# Patient Record
Sex: FEMALE | Race: WHITE | NOT HISPANIC OR LATINO | Employment: OTHER | URBAN - METROPOLITAN AREA
[De-identification: names, ages, dates, MRNs, and addresses within clinical notes are randomized per-mention and may not be internally consistent; named-entity substitution may affect disease eponyms.]

---

## 2017-01-30 ENCOUNTER — HOSPITAL ENCOUNTER (OUTPATIENT)
Dept: MAMMOGRAPHY | Facility: HOSPITAL | Age: 57
Discharge: HOME | End: 2017-01-30
Attending: SURGERY
Payer: COMMERCIAL

## 2017-01-30 DIAGNOSIS — Z12.31 ENCOUNTER FOR SCREENING MAMMOGRAM FOR MALIGNANT NEOPLASM OF BREAST: ICD-10-CM

## 2017-01-30 PROCEDURE — G0202 SCR MAMMO BI INCL CAD: HCPCS

## 2017-02-07 ENCOUNTER — HOSPITAL ENCOUNTER (OUTPATIENT)
Dept: ULTRASOUND IMAGING | Facility: CLINIC | Age: 57
Discharge: HOME | End: 2017-02-07
Payer: COMMERCIAL

## 2017-02-07 ENCOUNTER — HOSPITAL ENCOUNTER (OUTPATIENT)
Dept: MAMMOGRAPHY | Facility: CLINIC | Age: 57
Discharge: HOME | End: 2017-02-07
Payer: COMMERCIAL

## 2017-02-07 DIAGNOSIS — R92.8 ABNORMAL SCREENING MAMMOGRAM: ICD-10-CM

## 2017-02-07 PROCEDURE — 76642 ULTRASOUND BREAST LIMITED: CPT

## 2017-02-07 PROCEDURE — G0206 DX MAMMO INCL CAD UNI: HCPCS

## 2017-03-07 ENCOUNTER — GENERIC CONVERSION - ENCOUNTER (OUTPATIENT)
Dept: OTHER | Facility: OTHER | Age: 57
End: 2017-03-07

## 2017-03-20 ENCOUNTER — GENERIC CONVERSION - ENCOUNTER (OUTPATIENT)
Dept: OTHER | Facility: OTHER | Age: 57
End: 2017-03-20

## 2017-05-05 ENCOUNTER — ALLSCRIPTS OFFICE VISIT (OUTPATIENT)
Dept: OTHER | Facility: OTHER | Age: 57
End: 2017-05-05

## 2017-05-05 ENCOUNTER — LAB REQUISITION (OUTPATIENT)
Dept: LAB | Facility: HOSPITAL | Age: 57
End: 2017-05-05
Payer: COMMERCIAL

## 2017-05-05 DIAGNOSIS — I10 ESSENTIAL (PRIMARY) HYPERTENSION: ICD-10-CM

## 2017-05-05 DIAGNOSIS — E11.9 TYPE 2 DIABETES MELLITUS WITHOUT COMPLICATIONS (HCC): ICD-10-CM

## 2017-05-05 LAB
ALBUMIN SERPL BCP-MCNC: 4 G/DL (ref 3.5–5)
ALP SERPL-CCNC: 77 U/L (ref 46–116)
ALT SERPL W P-5'-P-CCNC: 20 U/L (ref 12–78)
ANION GAP SERPL CALCULATED.3IONS-SCNC: 9 MMOL/L (ref 4–13)
AST SERPL W P-5'-P-CCNC: 11 U/L (ref 5–45)
BILIRUB SERPL-MCNC: 0.53 MG/DL (ref 0.2–1)
BUN SERPL-MCNC: 28 MG/DL (ref 5–25)
CALCIUM SERPL-MCNC: 9.5 MG/DL (ref 8.3–10.1)
CHLORIDE SERPL-SCNC: 101 MMOL/L (ref 100–108)
CO2 SERPL-SCNC: 28 MMOL/L (ref 21–32)
CREAT SERPL-MCNC: 1.28 MG/DL (ref 0.6–1.3)
EST. AVERAGE GLUCOSE BLD GHB EST-MCNC: 197 MG/DL
GFR SERPL CREATININE-BSD FRML MDRD: 43.1 ML/MIN/1.73SQ M
GLUCOSE P FAST SERPL-MCNC: 167 MG/DL (ref 65–99)
HBA1C MFR BLD: 8.5 % (ref 4.2–6.3)
POTASSIUM SERPL-SCNC: 5 MMOL/L (ref 3.5–5.3)
PROT SERPL-MCNC: 7.5 G/DL (ref 6.4–8.2)
SODIUM SERPL-SCNC: 138 MMOL/L (ref 136–145)

## 2017-05-05 PROCEDURE — 83036 HEMOGLOBIN GLYCOSYLATED A1C: CPT | Performed by: FAMILY MEDICINE

## 2017-05-05 PROCEDURE — 80053 COMPREHEN METABOLIC PANEL: CPT | Performed by: FAMILY MEDICINE

## 2017-05-06 ENCOUNTER — GENERIC CONVERSION - ENCOUNTER (OUTPATIENT)
Dept: OTHER | Facility: OTHER | Age: 57
End: 2017-05-06

## 2017-07-03 ENCOUNTER — GENERIC CONVERSION - ENCOUNTER (OUTPATIENT)
Dept: OTHER | Facility: OTHER | Age: 57
End: 2017-07-03

## 2017-07-27 ENCOUNTER — GENERIC CONVERSION - ENCOUNTER (OUTPATIENT)
Dept: OTHER | Facility: OTHER | Age: 57
End: 2017-07-27

## 2017-08-01 ENCOUNTER — ALLSCRIPTS OFFICE VISIT (OUTPATIENT)
Dept: OTHER | Facility: OTHER | Age: 57
End: 2017-08-01

## 2017-08-01 ENCOUNTER — TRANSCRIBE ORDERS (OUTPATIENT)
Dept: ADMINISTRATIVE | Facility: HOSPITAL | Age: 57
End: 2017-08-01

## 2017-08-01 DIAGNOSIS — Z12.31 VISIT FOR SCREENING MAMMOGRAM: Primary | ICD-10-CM

## 2017-09-08 ENCOUNTER — APPOINTMENT (OUTPATIENT)
Dept: LAB | Facility: HOSPITAL | Age: 57
End: 2017-09-08
Payer: COMMERCIAL

## 2017-09-08 ENCOUNTER — GENERIC CONVERSION - ENCOUNTER (OUTPATIENT)
Dept: OTHER | Facility: OTHER | Age: 57
End: 2017-09-08

## 2017-09-08 ENCOUNTER — ALLSCRIPTS OFFICE VISIT (OUTPATIENT)
Dept: OTHER | Facility: OTHER | Age: 57
End: 2017-09-08

## 2017-09-08 DIAGNOSIS — I10 ESSENTIAL (PRIMARY) HYPERTENSION: ICD-10-CM

## 2017-09-08 DIAGNOSIS — E11.9 TYPE 2 DIABETES MELLITUS WITHOUT COMPLICATIONS (HCC): ICD-10-CM

## 2017-09-08 DIAGNOSIS — E78.5 HYPERLIPIDEMIA: ICD-10-CM

## 2017-09-08 LAB
EST. AVERAGE GLUCOSE BLD GHB EST-MCNC: 192 MG/DL
HBA1C MFR BLD: 8.3 % (ref 4.2–6.3)

## 2017-09-08 PROCEDURE — 83036 HEMOGLOBIN GLYCOSYLATED A1C: CPT

## 2017-09-08 PROCEDURE — 84443 ASSAY THYROID STIM HORMONE: CPT

## 2017-09-08 PROCEDURE — 80061 LIPID PANEL: CPT

## 2017-09-08 PROCEDURE — 36415 COLL VENOUS BLD VENIPUNCTURE: CPT

## 2017-09-08 PROCEDURE — 80053 COMPREHEN METABOLIC PANEL: CPT

## 2017-09-09 LAB
ALBUMIN SERPL BCP-MCNC: 3.5 G/DL (ref 3.5–5)
ALP SERPL-CCNC: 67 U/L (ref 46–116)
ALT SERPL W P-5'-P-CCNC: 22 U/L (ref 12–78)
ANION GAP SERPL CALCULATED.3IONS-SCNC: 10 MMOL/L (ref 4–13)
AST SERPL W P-5'-P-CCNC: 13 U/L (ref 5–45)
BILIRUB SERPL-MCNC: 0.5 MG/DL (ref 0.2–1)
BUN SERPL-MCNC: 29 MG/DL (ref 5–25)
CALCIUM SERPL-MCNC: 9 MG/DL (ref 8.3–10.1)
CHLORIDE SERPL-SCNC: 103 MMOL/L (ref 100–108)
CHOLEST SERPL-MCNC: 175 MG/DL (ref 50–200)
CO2 SERPL-SCNC: 27 MMOL/L (ref 21–32)
CREAT SERPL-MCNC: 1.35 MG/DL (ref 0.6–1.3)
GFR SERPL CREATININE-BSD FRML MDRD: 44 ML/MIN/1.73SQ M
GLUCOSE P FAST SERPL-MCNC: 190 MG/DL (ref 65–99)
HDLC SERPL-MCNC: 37 MG/DL (ref 40–60)
LDLC SERPL CALC-MCNC: 70 MG/DL (ref 0–100)
POTASSIUM SERPL-SCNC: 5.1 MMOL/L (ref 3.5–5.3)
PROT SERPL-MCNC: 7 G/DL (ref 6.4–8.2)
SODIUM SERPL-SCNC: 140 MMOL/L (ref 136–145)
TRIGL SERPL-MCNC: 342 MG/DL
TSH SERPL DL<=0.05 MIU/L-ACNC: 3.31 UIU/ML (ref 0.36–3.74)

## 2017-09-11 ENCOUNTER — GENERIC CONVERSION - ENCOUNTER (OUTPATIENT)
Dept: OTHER | Facility: OTHER | Age: 57
End: 2017-09-11

## 2017-09-25 ENCOUNTER — GENERIC CONVERSION - ENCOUNTER (OUTPATIENT)
Dept: FAMILY MEDICINE CLINIC | Facility: CLINIC | Age: 57
End: 2017-09-25

## 2017-09-25 ENCOUNTER — GENERIC CONVERSION - ENCOUNTER (OUTPATIENT)
Dept: OTHER | Facility: OTHER | Age: 57
End: 2017-09-25

## 2017-10-17 ENCOUNTER — GENERIC CONVERSION - ENCOUNTER (OUTPATIENT)
Dept: OTHER | Facility: OTHER | Age: 57
End: 2017-10-17

## 2017-10-25 ENCOUNTER — GENERIC CONVERSION - ENCOUNTER (OUTPATIENT)
Dept: OTHER | Facility: OTHER | Age: 57
End: 2017-10-25

## 2017-12-13 ENCOUNTER — GENERIC CONVERSION - ENCOUNTER (OUTPATIENT)
Dept: FAMILY MEDICINE CLINIC | Facility: CLINIC | Age: 57
End: 2017-12-13

## 2017-12-21 ENCOUNTER — GENERIC CONVERSION - ENCOUNTER (OUTPATIENT)
Dept: FAMILY MEDICINE CLINIC | Facility: CLINIC | Age: 57
End: 2017-12-21

## 2017-12-26 ENCOUNTER — ALLSCRIPTS OFFICE VISIT (OUTPATIENT)
Dept: OTHER | Facility: OTHER | Age: 57
End: 2017-12-26

## 2017-12-26 ENCOUNTER — LAB REQUISITION (OUTPATIENT)
Dept: LAB | Facility: HOSPITAL | Age: 57
End: 2017-12-26
Payer: COMMERCIAL

## 2017-12-26 DIAGNOSIS — M19.90 OSTEOARTHRITIS: ICD-10-CM

## 2017-12-26 DIAGNOSIS — E11.9 TYPE 2 DIABETES MELLITUS WITHOUT COMPLICATIONS (HCC): ICD-10-CM

## 2017-12-26 DIAGNOSIS — E66.9 OBESITY: ICD-10-CM

## 2017-12-26 DIAGNOSIS — E03.9 HYPOTHYROIDISM: ICD-10-CM

## 2017-12-26 DIAGNOSIS — K21.9 GASTRO-ESOPHAGEAL REFLUX DISEASE WITHOUT ESOPHAGITIS: ICD-10-CM

## 2017-12-26 DIAGNOSIS — E55.9 VITAMIN D DEFICIENCY: ICD-10-CM

## 2017-12-26 DIAGNOSIS — I10 ESSENTIAL (PRIMARY) HYPERTENSION: ICD-10-CM

## 2017-12-26 DIAGNOSIS — E78.5 HYPERLIPIDEMIA: ICD-10-CM

## 2017-12-26 LAB
25(OH)D3 SERPL-MCNC: 15.3 NG/ML (ref 30–100)
ALBUMIN SERPL BCP-MCNC: 3.9 G/DL (ref 3.5–5)
ALP SERPL-CCNC: 74 U/L (ref 46–116)
ALT SERPL W P-5'-P-CCNC: 21 U/L (ref 12–78)
ANION GAP SERPL CALCULATED.3IONS-SCNC: 9 MMOL/L (ref 4–13)
AST SERPL W P-5'-P-CCNC: 10 U/L (ref 5–45)
BASOPHILS # BLD AUTO: 0.06 THOUSANDS/ΜL (ref 0–0.1)
BASOPHILS NFR BLD AUTO: 1 % (ref 0–1)
BILIRUB SERPL-MCNC: 0.42 MG/DL (ref 0.2–1)
BUN SERPL-MCNC: 36 MG/DL (ref 5–25)
CALCIUM SERPL-MCNC: 10 MG/DL (ref 8.3–10.1)
CHLORIDE SERPL-SCNC: 101 MMOL/L (ref 100–108)
CHOLEST SERPL-MCNC: 251 MG/DL (ref 50–200)
CO2 SERPL-SCNC: 27 MMOL/L (ref 21–32)
CREAT SERPL-MCNC: 1.28 MG/DL (ref 0.6–1.3)
CREAT UR-MCNC: 107 MG/DL
EOSINOPHIL # BLD AUTO: 0.15 THOUSAND/ΜL (ref 0–0.61)
EOSINOPHIL NFR BLD AUTO: 2 % (ref 0–6)
ERYTHROCYTE [DISTWIDTH] IN BLOOD BY AUTOMATED COUNT: 14 % (ref 11.6–15.1)
EST. AVERAGE GLUCOSE BLD GHB EST-MCNC: 203 MG/DL
GFR SERPL CREATININE-BSD FRML MDRD: 47 ML/MIN/1.73SQ M
GLUCOSE SERPL-MCNC: 173 MG/DL (ref 65–140)
HBA1C MFR BLD: 8.7 % (ref 4.2–6.3)
HCT VFR BLD AUTO: 38.3 % (ref 34.8–46.1)
HDLC SERPL-MCNC: 42 MG/DL (ref 40–60)
HGB BLD-MCNC: 12.4 G/DL (ref 11.5–15.4)
LDLC SERPL CALC-MCNC: 155 MG/DL (ref 0–100)
LYMPHOCYTES # BLD AUTO: 2.54 THOUSANDS/ΜL (ref 0.6–4.47)
LYMPHOCYTES NFR BLD AUTO: 26 % (ref 14–44)
MCH RBC QN AUTO: 28.7 PG (ref 26.8–34.3)
MCHC RBC AUTO-ENTMCNC: 32.4 G/DL (ref 31.4–37.4)
MCV RBC AUTO: 89 FL (ref 82–98)
MICROALBUMIN UR-MCNC: 9.1 MG/L (ref 0–20)
MICROALBUMIN/CREAT 24H UR: 9 MG/G CREATININE (ref 0–30)
MONOCYTES # BLD AUTO: 0.74 THOUSAND/ΜL (ref 0.17–1.22)
MONOCYTES NFR BLD AUTO: 8 % (ref 4–12)
NEUTROPHILS # BLD AUTO: 6.21 THOUSANDS/ΜL (ref 1.85–7.62)
NEUTS SEG NFR BLD AUTO: 63 % (ref 43–75)
NRBC BLD AUTO-RTO: 0 /100 WBCS
PLATELET # BLD AUTO: 331 THOUSANDS/UL (ref 149–390)
PMV BLD AUTO: 11.4 FL (ref 8.9–12.7)
POTASSIUM SERPL-SCNC: 4.9 MMOL/L (ref 3.5–5.3)
PROT SERPL-MCNC: 7.9 G/DL (ref 6.4–8.2)
RBC # BLD AUTO: 4.32 MILLION/UL (ref 3.81–5.12)
SODIUM SERPL-SCNC: 137 MMOL/L (ref 136–145)
TRIGL SERPL-MCNC: 271 MG/DL
TSH SERPL DL<=0.05 MIU/L-ACNC: 2.09 UIU/ML (ref 0.36–3.74)
WBC # BLD AUTO: 9.74 THOUSAND/UL (ref 4.31–10.16)

## 2017-12-26 PROCEDURE — 80061 LIPID PANEL: CPT | Performed by: FAMILY MEDICINE

## 2017-12-26 PROCEDURE — 85025 COMPLETE CBC W/AUTO DIFF WBC: CPT | Performed by: FAMILY MEDICINE

## 2017-12-26 PROCEDURE — 84443 ASSAY THYROID STIM HORMONE: CPT | Performed by: FAMILY MEDICINE

## 2017-12-26 PROCEDURE — 82306 VITAMIN D 25 HYDROXY: CPT | Performed by: FAMILY MEDICINE

## 2017-12-26 PROCEDURE — 82570 ASSAY OF URINE CREATININE: CPT | Performed by: FAMILY MEDICINE

## 2017-12-26 PROCEDURE — 82043 UR ALBUMIN QUANTITATIVE: CPT | Performed by: FAMILY MEDICINE

## 2017-12-26 PROCEDURE — 83036 HEMOGLOBIN GLYCOSYLATED A1C: CPT | Performed by: FAMILY MEDICINE

## 2017-12-26 PROCEDURE — 80053 COMPREHEN METABOLIC PANEL: CPT | Performed by: FAMILY MEDICINE

## 2017-12-27 NOTE — PROGRESS NOTES
Assessment   1  Benign essential hypertension (401 1) (I10)   2  DM type 2 (diabetes mellitus, type 2) (250 00) (E11 9)   3  Hyperlipidemia (272 4) (E78 5)   4  Hypothyroidism (244 9) (E03 9)   5  GERD without esophagitis (530 81) (K21 9)   6  Obesity (278 00) (E66 9)   7  Vitamin D deficiency (268 9) (E55 9)   8  Osteoarthritis (715 90) (M19 90)    Plan   Acute URI    · Cheratussin -10 MG/5ML Oral Syrup; 2 tsp qHs prn  Benign essential hypertension    · Begin a limited exercise program ; Status:Complete;   Done: 62MEV4816   · Begin or continue regular aerobic exercise  Gradually work up to at least 3 sessions of    30 minutes of exercise a week ; Status:Complete;   Done: 63RFT9246   · Continue with our present treatment plan ; Status:Complete;   Done: 16AOJ7549   · Eat a low fat and low cholesterol diet ; Status:Complete;   Done: 93YWE4948   · Restrict the salt in your diet by avoiding highly salted foods ; Status:Complete;   Done:    95OJJ8483   · Restrict your sodium (salt) intake to 2 grams per day ; Status:Complete;   Done:    22VAQ5480   · Take your blood pressure twice a week  Record the numbers and bring them with you to    your appointments ; Status:Complete;   Done: 08RRM0532   · We encourage you to begin to make lifestyle changes to help control your blood    pressure  These may include losing weight, increasing your activity level, limiting salt in    your diet, decreasing alcohol intake, and eating a diet low in fat and rich in fruits    and vegetables ; Status:Complete;   Done: 60GOU6963   · Call [phone] if: Your blood pressure is frequently higher than 140/90 ;    Status:Complete;   Done: 39QWX4173  Benign essential hypertension, DM type 2 (diabetes mellitus, type 2), GERD without    esophagitis, Hyperlipidemia, Hypothyroidism, Obesity, Osteoarthritis, Vitamin D    deficiency    · (1) CBC/PLT/DIFF; Status:Hold For - Exact Date; Requested for: In Office Collection;    · (1) COMPREHENSIVE METABOLIC PANEL; Status:Hold For - Exact Date; Requested    for: In Office Collection;    · (1) HEMOGLOBIN A1C; Status:Hold For - Exact Date; Requested for: In Office Collection;    · (1) LIPID PANEL, FASTING; Status:Hold For - Exact Date; Requested for: In Office    Collection;    · (1) MICROALBUMIN CREATININE RATIO, RANDOM URINE; Status:Hold For - Exact Date; Requested for: In Office Collection;    · (1) TSH WITH FT4 REFLEX; Status:Hold For - Exact Date; Requested for: In Office    Collection;    · (1) VITAMIN D 25-HYDROXY; Status:Hold For - Exact Date; Requested for: In C [employer];   DM type 2 (diabetes mellitus, type 2)    · Brush your teeth {freq1} and floss at least once a day ; Status:Complete;   Done:    50RLG8091   · Cut your nails straight across ; Status:Complete;   Done: 02DCU4924   · Follow a diabetic diet with 1500 calories ; Status:Complete;   Done: 31TNU3969   · Inspect your feet and legs daily if you have vascular disease ; Status:Complete;   Done:    79ZWU6403   · Inspect your feet daily ; Status:Complete;   Done: 79CUP1686   · Some eating tips that can help you lose weight ; Status:Complete;   Done: 97SWF5764   · There are many exercise options for seniors ; Status:Complete;   Done: 46NTR6239   · We recommend that you bring your body mass index down to 26 ; Status:Complete;      Done: 01AQG9178   · We recommend that you change your eating habits slowly ; Status:Complete;   Done:    61IRO8192   · Wear shoes that give your toes plenty of room ; Status:Complete;   Done: 73RNA6001   · You will need to take a blood sugar reading 4 times a day ; Status:Complete;   Done:    53ROK2934  Hyperlipidemia    · A diet that is low in fat, cholesterol, and sodium is considered a cardiac diet ;    Status:Complete;   Done: 17KXY6156   · Eat no more than 30 grams of fat per day ; Status:Complete;   Done: 53QVQ3426   · We recommend you modify your diet to achieve and maintain a healthy weight    Being overweight may increase your risk for developing health problems such as diabetes,    heart disease, and cancer  Avoid high fat foods and eat a balanced diet rich    in fruits and vegetables  The combination of a reduced-calorie diet and increased    physical activity is recommended  Please let us know if you would like to    learn more about your nutrition and calorie needs, and additional options including    weight loss programs that can help you achieve your goals ; Status:Complete;   Done:    06JMG3508    Discussion/Summary      Fasting labs drawn as above  Patient to continue present treatment  Patient instructed to follow a low-fat, low-salt and a low sugar/carbohydrate diet more carefully and get regular exercise 150 minutes per week  Weight loss encouraged  Patient to continue home glucose monitoring  Patient to follow up with the specialist as scheduled and return to the office in 3 months  Possible side effects of new medications were reviewed with the patient/guardian today  The treatment plan was reviewed with the patient/guardian  The patient/guardian understands and agrees with the treatment plan      Chief Complaint   Fasting [facility]    Patient is here today for follow up of chronic conditions described in HPI  History of Present Illness   Patient is here for routine appointment for chronic conditions and fasting labs  Patient has been feeling fairly well overall although currently complains of URI symptoms treated with Mucinex DM  Cough persists and keeping patient awake at night  Patient saw Dr Susan Pacheco, endocrinologist on 12/21/2017 and was started on Victoza and has recheck appointment in 4 months  Patient has an appointment with nonsurgical bariatric medicine at [facility] with Dr Oliver Owen on 02/14/2018  Home glucose monitoring reveals fasting blood sugars 190-220  Patient is up-to-date on diabetic eye exam and foot exam and colonoscopy      The patient reports no change in the condition  She has had no significant interval events  Interval symptoms:  denies weight gain,-- denies cold intolerance,-- stable fatigue,-- denies weakness,-- denies constipation,-- denies dyspnea on exertion,-- denies trouble concentrating,-- denies hair loss-- and-- stable dry skin  Associated symptoms: arthralgias,-- paresthesias-- and-- leg swelling, but-- no myalgias,-- no depression,-- no weight loss-- and-- no palpitations  Medications:  the patient is adherent to her medication regimen, but-- she denies medication side effects  Disease management:  the patient is doing well with her goals  Due for: thyroid stimulating hormone  The patient states her hyperlipidemia has been under good control since the last visit  Comorbid Illnesses: diabetes mellitus-- and-- hypertension  She has no significant interval events  Symptoms: denies muscle pain-- and-- denies muscle weakness  Associated symptoms include no memory loss  Medications: the patient is adherent with her medication regimen  -- She denies medication side effects  The patient is doing well with her hyperlipidemia goals  the patient's last LDL was 70 mg/dL  The patient is due for a lipid panel-- and-- liver function tests  The patient presents for follow-up of essential hypertension  The patient states she has been doing well with her blood pressure control since the last visit  She has no comorbid illnesses  She has no significant interval events  Symptoms: denies impaired vision,-- denies dyspnea,-- denies chest pain,-- denies intermittent leg claudication-- and-- stable lower extremity edema  Associated symptoms include no headache  Home monitoring: The patient is not checking blood pressure at home  Medications: the patient is adherent with her medication regimen  -- She denies medication side effects  Disease Management: the patient is doing well with her blood pressure goals      The patient states she has been doing poorly with her Type II Diabetes control since the last visit  Comorbid Illnesses: hypertension,-- hyperlipidemia-- and-- obesity  She has no significant interval events  Symptoms: stable numbness of the feet,-- denies a foot ulcer,-- denies foot pain-- and-- denies visual impairment  Home monitoring: The patient checks her blood sugars regularly  -- Glycemic control has been poor  -- the patient reports no symptomatic hypoglycemic episodes  Medications: the patient is adherent with her medication regimen  -- She denies medication side effects  The patient is not doing well with her diabetes goals  Due For: a urine microalbumin-- and-- a hemoglobin A1c  Review of Systems        Constitutional: no fever,-- not feeling poorly-- and-- no chills--       The patient presents with complaints of occasional episodes of feeling tired  Eyes: No complaints of eye pain, no red eyes, no eyesight problems, no discharge, no dry eyes, no itching of eyes  ENT: nasal discharge, but-- no earache,-- no nosebleeds,-- no sore throat,-- no hearing loss-- and-- no hoarseness  Gastrointestinal: nausea, but-- no abdominal pain,-- no vomiting,-- no constipation,-- no diarrhea-- and-- no blood in stools  Genitourinary: no dysuria-- and-- no incontinence  Hematologic/Lymphatic: No complaints of swollen glands, no swollen glands in the neck, does not bleed easily, does not bruise easily  Active Problems   1  Abnormal blood chemistry (790 6) (R79 9)   2  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)   3  Acute URI (465 9) (J06 9)   4  Anemia (285 9) (D64 9)   5  Benign essential hypertension (401 1) (I10)   6  Dermatitis (692 9) (L30 9)   7  Disc degeneration, lumbar (722 52) (M51 36)   8  DM type 2 (diabetes mellitus, type 2) (250 00) (E11 9)   9  Encounter for routine gynecological examination (V72 31) (Z01 419)   10  Encounter for screening colonoscopy (V76 51) (Z12 11)   11   Encounter for screening mammogram for malignant neoplasm of breast (V76 12)      (Z12 31)   12  Fibrocystic breast disease, unspecified laterality (610 1) (N60 19)   13  GERD without esophagitis (530 81) (K21 9)   14  Hyperlipidemia (272 4) (E78 5)   15  Hypothyroidism (244 9) (E03 9)   16  Knee pain, right (719 46) (M25 561)   17  Neoplasm of uncertain behavior of skin (238 2) (D48 5)   18  Obesity (278 00) (E66 9)   19  Osteoarthritis (715 90) (M19 90)   20  Preoperative examination (V72 84) (Z01 818)   21  Shoulder pain, right (719 41) (M25 511)   22  Sleep apnea (780 57) (G47 30)   23  Vitamin D deficiency (268 9) (E55 9)    Past Medical History   1  History of Abnormal finding on breast imaging (793 89) (R92 8)   2  History of Acute upper respiratory infection (465 9) (J06 9)   3  History of Age At First Period 15 Years Old (Menarche)   4  History of Arthralgia (719 40)   5  History of acute bronchitis (V12 69) (Z87 09)   6  History of acute pancreatitis (V12 79) (Z87 19)   7  History of diverticulitis of colon (V12 79) (Z87 19)   8  History of influenza (V12 09) (Z87 09)   9  History of polycystic ovarian syndrome (V13 29) (Z87 42)   10  History of rotator cuff tear (V13 59) (Z87 39)   11  History of Irritable bowel syndrome (564 1) (K58 9)   12  History of Never Pregnant    Surgical History   1  History of Anal Fistulotomy (Subcutaneous)   2  History of Cholecystectomy   3  History of Hemorrhoidectomy   4  History of Oral Surgery   5  History of Ovarian Cystectomy   6  History of Perirectal Abscess I&D   7  History of Rotator Cuff Repair   8  History of Tonsillectomy    Family History   Mother    1  Family history of Diabetes  Maternal Aunt    2  Family history of breast cancer in female (V16 3) (Z80 3)   3  Family history of breast cancer in female (V16 3) (Z80 3)  Paternal Aunt    4  Family history of Breast Cancer (V16 3)  Paternal Uncle    5  Family history of Carcinosarcoma Of The Oral Cavity (V16 0)   6   Family history of Prostate Cancer (V16 42)  Maternal Cousin    7  Family history of breast cancer (V16 3) (Z80 3)    Social History    · Being A Social Drinker   · Never a smoker    Current Meds    1  Ammonium Lactate 12 % External Cream;     Therapy: (Recorded:03Vqz1803) to Recorded   2  Aspirin EC Low Dose 81 MG Oral Tablet Delayed Release; TAKE 1 TABLET DAILY; Therapy: (Recorded:39Oct1481) to Recorded   3  Capsaicin 0 025 % External Cream;     Therapy: (Recorded:85Yvo9155) to Recorded   4  Ezetimibe-Simvastatin 10-20 MG Oral Tablet; take 1 tablet by mouth every day; Therapy: 09Ekz9259 to (Marvia )  Requested for: 38Rqz0633; Last     Rx:73Kzo7381 Ordered   5  Farxiga 10 MG Oral Tablet; TAKE 1 TABLET BY MOUTH DAILY BEFORE MEAL; Therapy: 16KXN9601 to (AANHITIV:67NLI2029)  Requested for: 48Hyd8388; Last     Rx:41Vav7692 Ordered   6  Levothyroxine Sodium 112 MCG Oral Tablet; TAKE 1 TABLET DAILY AS DIRECTED; Therapy: 36KHU0113 to )  Requested for: 60Zto4652; Last     Rx:24Mip0972 Ordered   7  Lisinopril-Hydrochlorothiazide 20-25 MG Oral Tablet; TAKE 1TABLET BY MOUTH TWICE A     DAY; Therapy: 17AKJ8614 to (Diamond Christian)  Requested for: 13YZM5433; Last     Rx:10Wow7692 Ordered   8  Lyrica 25 MG Oral Capsule; TAKE 1 CAPSULE TWICE DAILY; Last Rx:73Oix2496     Ordered   9  MetFORMIN HCl - 500 MG Oral Tablet; take 2 tablets by mouth twice daily; Therapy: 43COC4286 to (Evaluate:28Jyz5716)  Requested for: 29KJO3435; Last     Rx:33Zze2825 Ordered   10  MiraLax Oral Powder; Therapy: (Recorded:21Dpe5055) to Recorded   11  OneTouch Delica Lancets 42Z Miscellaneous; TEST TWICE A DAY; Therapy: 60ZYL0369 to (Evaluate:36Eof1775)  Requested for: 33Ivv8836; Last      Rx:00Ore3898 Ordered   12  OneTouch Ultra Blue In [employer]; TEST TWICE A DAY;       Therapy: 92Mua2653 to (Evaluate:53Lac8790)  Requested for: 26Nov2017; Last      Rx:02Sep2016; Status: ACTIVE - Renewal Denied Ordered   13  Pantoprazole Sodium 40 MG Oral Tablet Delayed Release; TAKE 1 TABLET ONCE DAILY; Therapy: 38NNF9996 to (Royanne Shirts)  Requested for: 92MCP2362; Last      Rx:63Xne2911 Ordered   14  Victoza 18 MG/3ML Subcutaneous Solution Pen-injector; Therapy: 06OTF4981 to Recorded    Allergies   1  No Known Drug Allergies    Vitals   Vital Signs    Recorded: 00BHE9830 08:58AM Recorded: 90BPP7185 08:03AM   Temperature  97 3 F   Heart Rate 76    Respiration 16    Systolic 896    Diastolic 80    Height  5 ft 4 5 in   Weight  287 lb    BMI Calculated  48 5   BSA Calculated  2 29     Physical Exam        Constitutional      General appearance: No acute distress, well appearing and well nourished  Eyes      Conjunctiva and lids: No swelling, erythema or discharge  Ears, Nose, Mouth, and Throat      External inspection of ears and nose: Normal        Otoscopic examination: Tympanic membranes translucent with normal light reflex  Canals patent without erythema  Nasal mucosa, septum, and turbinates: Abnormal   There was a mucoid discharge from both nares  The bilateral nasal mucosa was edematous  Oropharynx: Normal with no erythema, edema, exudate or lesions  Pulmonary      Respiratory effort: No increased work of breathing or signs of respiratory distress  Auscultation of lungs: Clear to auscultation  Cardiovascular      Auscultation of heart: Normal rate and rhythm, normal S1 and S2, without murmurs  Examination of extremities for edema and/or varicosities: Abnormal   nonpitting edema present  Carotid pulses: Normal        Abdomen      Abdomen: Non-tender, no masses  Lymphatic      Palpation of lymph nodes in neck: No lymphadenopathy  Musculoskeletal      Gait and station: Normal        Inspection/palpation of joints, bones, and muscles: Normal        Skin      Skin and subcutaneous tissue: Normal without rashes or lesions         Psychiatric Orientation to person, place, and time: Normal        Mood and affect: Normal           Health Management   DM type 2 (diabetes mellitus, type 2)   (1) HEMOGLOBIN A1C; every 3 months; Last 70YYU2768; Next Due: 65APG8337; Overdue  *VB - Eye Exam; every 2 years; Last 78ASW6390; Next Due: 74GGS2452; Active  Hemoglobin A1c- POC; every 6 months; Last 03DSU9152; Next Due: 53GQI0913; Overdue  Urine Microalbumin/Creatinine - POC; every 1 year; Last 27Xap6837; Next Due: 45XRU5811; Overdue  Encounter for screening colonoscopy   COLONOSCOPY; every 3 years; Last 60UWF7369; Next Due: 84GDK1678; Active  Hyperlipidemia   (1) HEPATIC FUNCTION PANEL; every 6 months; Next Permanently Deferred;   (1) LIPID PANEL, FASTING; every 1 year; Last 68CCW3185; Next Due: 39Mwf0203; Active  Health Maintenance   *VB - Foot Exam; every 1 year; Last 50VAH3486; Next Due: 82BTU5342; Active    Future Appointments      Date/Time Provider Specialty Site   08/01/2018 10:15 AM EVANGELINA Stewart   [facility]     Signatures    Electronically signed by : Jearlean Rinne, DO; Dec 26 2017  9:12AM EST                       (Author)

## 2018-01-09 NOTE — RESULT NOTES
Verified Results  (1) HEMOGLOBIN A1C 65XZR8992 05:41PM [facility]   5 7-6 4% impaired fasting glucose  >=6 5% diagnosis of diabetes    Falsely low levels are seen in conditions linked to short RBC life span-  hemolytic anemia, and splenomegaly  Falsely elevated levels are seen in situations where there is an increased production of RBC- receipt of erythropoietin or blood transfusions  Adopted from ADA-Clinical Practice Recommendations     Test Name Result Flag Reference   HEMOGLOBIN A1C 8 4 % H 4 0-5 6   EST  AVG  GLUCOSE 194 mg/dl       (1) BASIC METABOLIC PROFILE 09RXN2986 05:41PM [facility] Kidney Disease Education Program recommendations are as follows:  GFR calculation is accurate only with a steady state creatinine  Chronic Kidney disease less than 60 ml/min/1 73 sq  meters  Kidney failure less than 15 ml/min/1 73 sq  meters  Test Name Result Flag Reference   GLUCOSE,RANDM 161 mg/dL H    SODIUM 138 mmol/L  136-145   POTASSIUM 4 8 mmol/L  3 5-5 3   CHLORIDE 105 mmol/L  100-108   CARBON DIOXIDE 24 mmol/L  21-32   ANION GAP (CALC) 9 mmol/L  4-13   BLOOD UREA NITROGEN 27 mg/dL H 5-25   CREATININE 1 16 mg/dL  0 60-1 30   Standardized to IDMS reference method   CALCIUM 9 5 mg/dL  8 3-10 1   eGFR Non-African American 48 5 ml/min/1 73sq m         Plan  DM type 2 (diabetes mellitus, type 2)    · (1) HEMOGLOBIN A1C ; every 3 months; Last 15FJY7336; Next 18JIX5101;  Status:Active   · Hemoglobin A1c- POC ; every 6 months; Next I459790; Status:Active    Discussion/Summary   FBS is elevated and overall BS control, HgbA1C, is worse at 8 4  Recommend patient increase Farxiga to 10mg QD as discussed

## 2018-01-10 ENCOUNTER — GENERIC CONVERSION - ENCOUNTER (OUTPATIENT)
Dept: OTHER | Facility: OTHER | Age: 58
End: 2018-01-10

## 2018-01-10 NOTE — RESULT NOTES
Discussion/Summary   Phone call to patient discussed lab results  BUN and creatinine are slightly elevated indicating slightly worsening kidney function  Therefore recommend patient discontinue meloxicam and all NSAIDs including Motrin and Aleve  Patient instructed to increase water intake and continue present treatment  Patient to follow-up with endocrinology as scheduled  Verified Results  (1) COMPREHENSIVE METABOLIC PANEL 78YWD1092 67:97HH Pam Briscoe Order Number: [id number]     Test Name Result Flag Reference   SODIUM 140 mmol/L  136-145   POTASSIUM 5 1 mmol/L  3 5-5 3   CHLORIDE 103 mmol/L  100-108   CARBON DIOXIDE 27 mmol/L  21-32   ANION GAP (CALC) 10 mmol/L  4-13   BLOOD UREA NITROGEN 29 mg/dL H 5-25   CREATININE 1 35 mg/dL H 0 60-1 30   Standardized to IDMS reference method   CALCIUM 9 0 mg/dL  8 3-10 1   BILI, TOTAL 0 50 mg/dL  0 20-1 00   ALK PHOSPHATAS 67 U/L     ALT (SGPT) 22 U/L  12-78   Specimen collection should occur prior to Sulfasalazine and/or Sulfapyridine administration due to the potential for falsely depressed results  AST(SGOT) 13 U/L  5-45   Specimen collection should occur prior to Sulfasalazine administration due to the potential for falsely depressed results  ALBUMIN 3 5 g/dL  3 5-5 0   TOTAL PROTEIN 7 0 g/dL  6 4-8 2   eGFR 44 ml/min/1 73sq m     [facility] Disease Education Program recommendations are as follows:  GFR calculation is accurate only with a steady state creatinine  Chronic Kidney disease less than 60 ml/min/1 73 sq  meters  Kidney failure less than 15 ml/min/1 73 sq  meters  GLUCOSE FASTING 190 mg/dL H 65-99   Specimen collection should occur prior to Sulfasalazine administration due to the potential for falsely depressed results  Specimen collection should occur prior to Sulfapyridine administration due to the potential for falsely elevated results       (1) HEMOGLOBIN A1C 05Ygv5494 05:04PM Kemper Rubinstein TW Order Number: [id number]     Test Name Result Flag Reference   HEMOGLOBIN A1C 8 3 % H 4 2-6 3   EST  AVG  GLUCOSE 192 mg/dl       (1) LIPID PANEL, FASTING 08Sep2017 05:04PM Carol Ann Less Order Number: [id number]     Test Name Result Flag Reference   CHOLESTEROL 175 mg/dL     HDL,DIRECT 37 mg/dL L 40-60   Specimen collection should occur prior to Metamizole administration due to the potential for falsley depressed results  LDL CHOLESTEROL CALCULATED 70 mg/dL  0-100   Triglyceride:        Normal ??? ??? ??? ??? ??? ??? ??? <150 mg/dl   ??? ??? ???Borderline High ??? ??? 150-199 mg/dl   ??? ??? ? ?? High ??? ??? ??? ??? ??? ??? ??? 200-499 mg/dl   ??? ??? ? ??Very High ??? ??? ??? ??? ??? >499 mg/dl      Cholesterol:       Desirable ??? ??? ??? ??? <200 mg/dl   ??? ??? Borderline High ??? 200-239 mg/dl   ??? ??? High ??? ??? ??? ??? ??? ??? >239 mg/dl      HDL Cholesterol:       High ??? ???>59 mg/dL   ??? ??? Low ??? ??? <41 mg/dL      This screening LDL is a calculated result  It does not have the accuracy of the Direct Measured LDL in the monitoring of patients with hyperlipidemia and/or statin therapy  Direct Measure LDL (UOB104) must be ordered separately in these patients  TRIGLYCERIDES 342 mg/dL H <=150   Specimen collection should occur prior to N-Acetylcysteine or Metamizole administration due to the potential for falsely depressed results  (1) TSH WITH FT4 REFLEX 08Sep2017 05:04PM Carol Ann Less Order Number: [id number]     Test Name Result Flag Reference   TSH 3 310 uIU/mL  0 358-3 740   Patients undergoing fluorescein dye angiography may retain small amounts of fluorescein in the body for 48-72 hours post procedure  Samples containing fluorescein can produce falsely depressed TSH values  If the patient had this procedure,a specimen should be resubmitted post fluorescein clearance            The recommended reference ranges for TSH during pregnancy are as follows:  First trimester 0 1 to 2 5 uIU/mL  Second trimester  0 2 to 3 0 uIU/mL  Third trimester 0 3 to 3 0 uIU/m       Plan  DM type 2 (diabetes mellitus, type 2)    · (1) HEMOGLOBIN A1C ; every 3 months; Last 42AKI3845; Next 56MNV4146;  Status:Active   · Hemoglobin A1c- POC ; every 6 months; Next 02UYF4274; Status:Active  Hyperlipidemia    · (1) LIPID PANEL, FASTING ; every 1 year;  Last 85CZU6808; Next 95Odk8440;  Status:Active

## 2018-01-12 NOTE — RESULT NOTES
Verified Results  Rapid StrepA- POC 71Fon6044 10:39AM Eriberto Montano     Test Name Result Flag Reference   Rapid Strep Negative       Urine Dip Non-Automated- POC 17Gav6143 10:23AM Eriberto Montano     Test Name Result Flag Reference   Color Yellow     Clarity Transparent     Leukocytes +1     Nitrite neg     Blood neg     Bilirubin neg     Urobilinogen 0 2     Protein neg     Ph 5     Specific Gravity 1 020     Ketone neg     Glucose 1000     Color Yellow     Clarity Transparent     Leukocytes +1     Nitrite neg     Blood neg     Bilirubin neg     Urobilinogen 0 2     Protein neg     Ph 5     Specific Gravity 1 020     Ketone neg     Glucose 1000

## 2018-01-13 NOTE — RESULT NOTES
Message   Please fax lab results to patient at      Verified Results  (1) CBC/PLT/DIFF [id number] 01:40PM Walter Dunn Order Number: [id number]     Test Name Result Flag Reference   WBC COUNT 10 84 Thousand/uL H 4 31-10 16   RBC COUNT 3 81 Million/uL  3 81-5 12   HEMOGLOBIN 11 3 g/dL L 11 5-15 4   HEMATOCRIT 35 0 %  34 8-46  1   MCV 92 fL  82-98   MCH 29 7 pg  26 8-34 3   MCHC 32 3 g/dL  31 4-37 4   RDW 13 2 %  11 6-15 1   MPV 11 4 fL  8 9-12 7   PLATELET COUNT 874 Thousands/uL  149-390   nRBC AUTOMATED 0 /100 WBCs     NEUTROPHILS RELATIVE PERCENT 63 %  43-75   LYMPHOCYTES RELATIVE PERCENT 28 %  14-44   MONOCYTES RELATIVE PERCENT 7 %  4-12   EOSINOPHILS RELATIVE PERCENT 1 %  0-6   BASOPHILS RELATIVE PERCENT 1 %  0-1   NEUTROPHILS ABSOLUTE COUNT 6 87 Thousands/?L  1 85-7 62   LYMPHOCYTES ABSOLUTE COUNT 2 98 Thousands/?L  0 60-4 47   MONOCYTES ABSOLUTE COUNT 0 76 Thousand/?L  0 17-1 22   EOSINOPHILS ABSOLUTE COUNT 0 14 Thousand/?L  0 00-0 61   BASOPHILS ABSOLUTE COUNT 0 05 Thousands/?L  0 00-0 10   - Patient Instructions: This bloodwork is non-fasting  Please drink two glasses of water morning of bloodwork  (1) COMPREHENSIVE METABOLIC PANEL 29XJP4978 47:31EZ Walter Dunn Order Number: [id number]     Test Name Result Flag Reference   GLUCOSE,RANDM 137 mg/dL     If the patient is fasting, the ADA then defines impaired fasting glucose as > 100 mg/dL and diabetes as > or equal to 123 mg/dL     SODIUM 139 mmol/L  136-145   POTASSIUM 4 7 mmol/L  3 5-5 3   CHLORIDE 104 mmol/L  100-108   CARBON DIOXIDE 26 mmol/L  21-32   ANION GAP (CALC) 9 mmol/L  4-13   BLOOD UREA NITROGEN 24 mg/dL  5-25   CREATININE 1 17 mg/dL  0 60-1 30   Standardized to IDMS reference method   CALCIUM 9 7 mg/dL  8 3-10 1   BILI, TOTAL 0 46 mg/dL  0 20-1 00   ALK PHOSPHATAS 69 U/L     ALT (SGPT) 20 U/L  12-78   AST(SGOT) 11 U/L  5-45   ALBUMIN 3 9 g/dL  3 5-5 0   TOTAL PROTEIN 7 4 g/dL 6  4-8 2   eGFR Non-African American 47 8 ml/min/1 73sq m     [facility] [employer] Disease Education Program recommendations are as follows:  GFR calculation is accurate only with a steady state creatinine  Chronic Kidney disease less than 60 ml/min/1 73 sq  meters  Kidney failure less than 15 ml/min/1 73 sq  meters  (1) HEMOGLOBIN A1C 23Dec2016 01:40PM [employer] Order Number: [id number]     Test Name Result Flag Reference   HEMOGLOBIN A1C 8 3 % H 4 2-6 3   EST  AVG  GLUCOSE 192 mg/dl       (1) MICROALBUMIN CREATININE RATIO, RANDOM URINE 23Dec2016 01:40PM [employer] Order Number: [id number]     Test Name Result Flag Reference   MICROALBUMIN/ CREAT R 7 mg/g creatinine  0-30   MICROALBUMIN,URINE 8 4 mg/L  0 0-20 0   CREATININE URINE 125 0 mg/dL       (1) LIPID PANEL, FASTING 23Dec2016 01:40PM [employer] Order Number: [id number]     Test Name Result Flag Reference   CHOLESTEROL 185 mg/dL     HDL,DIRECT 48 mg/dL  40-60   Specimen collection should occur prior to Metamizole administration due to the potential for falsely depressed results  LDL CHOLESTEROL CALCULATED 90 mg/dL  0-100   - Patient Instructions: This is a fasting blood test  Water,black tea or black  coffee only after 9:00pm the night before test   Drink 2 glasses of water the morning of test       Triglyceride:         Normal              <150 mg/dl       Borderline High    150-199 mg/dl       High               200-499 mg/dl       Very High          >499 mg/dl  Cholesterol:         Desirable        <200 mg/dl      Borderline High  200-239 mg/dl      High             >239 mg/dl  HDL Cholesterol:        High    >59 mg/dL      Low     <41 mg/dL  LDL CALCULATED:    This screening LDL is a calculated result  It does not have the accuracy of the Direct Measured LDL in the monitoring of patients with hyperlipidemia and/or statin therapy     Direct Measure LDL (UGJ367) must be ordered separately in these patients  TRIGLYCERIDES 237 mg/dL H <=150   Specimen collection should occur prior to N-Acetylcysteine or Metamizole administration due to the potential for falsely depressed results  (1) TSH WITH FT4 REFLEX 47Lgr1826 01:40PM [facility] Order Number: [id number]     Test Name Result Flag Reference   TSH 0 888 uIU/mL  0 358-3 740   Patients undergoing fluorescein dye angiography may retain small amounts of fluorescein in the body for 48-72 hours post procedure  Samples containing fluorescein can produce falsely depressed TSH values  If the patient had this procedure,a specimen should be resubmitted post fluorescein clearance  The recommended reference ranges for TSH during pregnancy are as follows:  First trimester 0 1 to 2 5 uIU/mL  Second trimester  0 2 to 3 0 uIU/mL  Third trimester 0 3 to 3 0 uIU/m     (1) VITAMIN D 25-HYDROXY [id number] 01:40PM [facility] Order Number: [id number]     Test Name Result Flag Reference   VIT D 25-HYDROX 28 1 ng/mL L 30 0-100 0   This assay is a certified procedure of the CDC Vitamin D Standardization Certification Program (VDSCP)     Deficiency <20ng/ml   Insufficiency 20-30ng/ml   Sufficient  ng/ml     *Patients undergoing fluorescein dye angiography may retain small amounts of fluorescein in the body for 48-72 hours post procedure  Samples containing fluorescein can produce falsely elevated Vitamin D values  If the patient had this procedure, a specimen should be resubmitted post fluorescein clearance  Plan  DM type 2 (diabetes mellitus, type 2)    · (1) HEMOGLOBIN A1C ; every 3 months; Last 01MTY8652; Next 04JEE7679;  Status:Active   · Hemoglobin A1c- POC ; every 6 months; Next 27RHQ3058; Status:Active  Hyperlipidemia    · (1) LIPID PANEL, FASTING ; every 1 year; Last 72AHZ6311; Next 84Byw6157;  Status:Active    Discussion/Summary   Phone call to patient discussed lab results   Discussed treatment options for diabetes including adding medication although patient prefers to try exercise, diet and weight loss and will recheck fasting labs in 3 months  Recommend patient had vitamin D 2000 international units daily

## 2018-01-14 VITALS
BODY MASS INDEX: 48.82 KG/M2 | SYSTOLIC BLOOD PRESSURE: 128 MMHG | WEIGHT: 293 LBS | HEIGHT: 65 IN | RESPIRATION RATE: 16 BRPM | HEART RATE: 72 BPM | TEMPERATURE: 98.3 F | DIASTOLIC BLOOD PRESSURE: 82 MMHG

## 2018-01-14 VITALS
DIASTOLIC BLOOD PRESSURE: 82 MMHG | HEIGHT: 65 IN | HEART RATE: 95 BPM | BODY MASS INDEX: 47.65 KG/M2 | RESPIRATION RATE: 15 BRPM | SYSTOLIC BLOOD PRESSURE: 118 MMHG | OXYGEN SATURATION: 98 % | TEMPERATURE: 98.7 F | WEIGHT: 286 LBS

## 2018-01-14 VITALS
RESPIRATION RATE: 16 BRPM | SYSTOLIC BLOOD PRESSURE: 132 MMHG | BODY MASS INDEX: 47.82 KG/M2 | HEART RATE: 80 BPM | TEMPERATURE: 97.7 F | WEIGHT: 287 LBS | HEIGHT: 65 IN | DIASTOLIC BLOOD PRESSURE: 80 MMHG

## 2018-01-14 NOTE — RESULT NOTES
Verified Results  (1) COMPREHENSIVE METABOLIC PANEL 00XKN0046 06:44DW Elenita Mary Order Number: [id number]  TW Order Number: [id number]     Test Name Result Flag Reference   GLUCOSE,RANDM 160 mg/dL H    If the patient is fasting, the ADA then defines impaired fasting glucose as > 100 mg/dL and diabetes as > or equal to 123 mg/dL  SODIUM 139 mmol/L  136-145   POTASSIUM 5 2 mmol/L  3 5-5 3   CHLORIDE 106 mmol/L  100-108   CARBON DIOXIDE 25 mmol/L  21-32   ANION GAP (CALC) 8 mmol/L  4-13   BLOOD UREA NITROGEN 22 mg/dL  5-25   CREATININE 1 22 mg/dL  0 60-1 30   Standardized to IDMS reference method   CALCIUM 9 8 mg/dL  8 3-10 1   BILI, TOTAL 0 65 mg/dL  0 20-1 00   ALK PHOSPHATAS 65 U/L     ALT (SGPT) 25 U/L  12-78   AST(SGOT) 15 U/L  5-45   ALBUMIN 3 9 g/dL  3 5-5 0   TOTAL PROTEIN 7 6 g/dL  6 4-8 2   eGFR Non-African American 45 8 ml/min/1 73sq m     [facility] [employer] Disease Education Program recommendations are as follows:  GFR calculation is accurate only with a steady state creatinine  Chronic Kidney disease less than 60 ml/min/1 73 sq  meters  Kidney failure less than 15 ml/min/1 73 sq  meters  (1) HEMOGLOBIN A1C 87PBW3673 08:50AM Elenita Mary Order Number: [id number]  TW Order Number: [id number]     Test Name Result Flag Reference   HEMOGLOBIN A1C 7 9 % H 4 2-6 3   EST  AVG  GLUCOSE 180 mg/dl       (1) LIPID PANEL, FASTING 19YLY3125 08:50AM Elenita Mary Order Number: [id number]  TW Order Number: [id number]     Test Name Result Flag Reference   CHOLESTEROL 164 mg/dL     HDL,DIRECT 37 mg/dL L 40-60   Specimen collection should occur prior to Metamizole administration due to the potential for falsely depressed results     LDL CHOLESTEROL CALCULATED 63 mg/dL  0-100   Triglyceride:         Normal              <150 mg/dl       Borderline High    150-199 mg/dl       High               200-499 mg/dl       Very High          >499 mg/dl  Cholesterol:         Desirable        <200 mg/dl      Borderline High  200-239 mg/dl      High             >239 mg/dl  HDL Cholesterol:        High    >59 mg/dL      Low     <41 mg/dL  LDL CALCULATED:    This screening LDL is a calculated result  It does not have the accuracy of the Direct Measured LDL in the monitoring of patients with hyperlipidemia and/or statin therapy  Direct Measure LDL (ICT582) must be ordered separately in these patients  TRIGLYCERIDES 321 mg/dL H <=150   Specimen collection should occur prior to N-Acetylcysteine or Metamizole administration due to the potential for falsely depressed results  (1) TSH WITH FT4 REFLEX 24Jun2016 08:50AM Andra Carver Order Number: [id number]  TW Order Number: [id number]     Test Name Result Flag Reference   TSH 2 200 uIU/mL  0 358-3 740   Patients undergoing fluorescein dye angiography may retain small amounts of fluorescein in the body for 48-72 hours post procedure  Samples containing fluorescein can produce falsely depressed TSH values  If the patient had this procedure,a specimen should be resubmitted post fluorescein clearance  The recommended reference ranges for TSH during pregnancy are as follows:  First trimester 0 1 to 2 5 uIU/mL  Second trimester  0 2 to 3 0 uIU/mL  Third trimester 0 3 to 3 0 uIU/m       Plan  DM type 2 (diabetes mellitus, type 2)    · (1) HEMOGLOBIN A1C ; every 3 months; Last 20UGJ4915; Next 10XKP7552;  Status:Active   · Hemoglobin A1c- POC ; every 6 months; Next 51GMC9266; Status:Active  Hyperlipidemia    · (1) LIPID PANEL, FASTING ; every 1 year; Last 94HXR6579; Next 16WZM6082;  Status:Active    Discussion/Summary   FBS and Trig elevated although overall BS control, HgbA1C, improved at 7 9  Cont present Tx and follow low sugar/carb and low fat diet more carefully

## 2018-01-15 NOTE — RESULT NOTES
Discussion/Summary   FBS elevated and overall BS control, HgbA1C, worse at 8 5  Cont present Tx and f/u with Dr Live Cox, endocrinologist as discussed  Verified Results  (1) COMPREHENSIVE METABOLIC PANEL 80RIF5473 79:75ST [facility] [place] [facility] [place] Number: [id number]     Test Name Result Flag Reference   SODIUM 138 mmol/L  136-145   POTASSIUM 5 0 mmol/L  3 5-5 3   CHLORIDE 101 mmol/L  100-108   CARBON DIOXIDE 28 mmol/L  21-32   ANION GAP (CALC) 9 mmol/L  4-13   BLOOD UREA NITROGEN 28 mg/dL H 5-25   CREATININE 1 28 mg/dL  0 60-1 30   Standardized to IDMS reference method   CALCIUM 9 5 mg/dL  8 3-10 1   BILI, TOTAL 0 53 mg/dL  0 20-1 00   ALK PHOSPHATAS 77 U/L     ALT (SGPT) 20 U/L  12-78   AST(SGOT) 11 U/L  5-45   ALBUMIN 4 0 g/dL  3 5-5 0   TOTAL PROTEIN 7 5 g/dL  6 4-8 2   eGFR Non-African American 43 1 ml/min/1 73sq m     National Kidney Disease Education Program recommendations are as follows:  GFR calculation is accurate only with a steady state creatinine  Chronic Kidney disease less than 60 ml/min/1 73 sq  meters  Kidney failure less than 15 ml/min/1 73 sq  meters  GLUCOSE FASTING 167 mg/dL H 65-99     (1) HEMOGLOBIN A1C 58MCP3500 11:38AM [facility] [place] [facility] [place] Order Number: [id number]     Test Name Result Flag Reference   HEMOGLOBIN A1C 8 5 % H 4 2-6 3   EST  AVG  GLUCOSE 197 mg/dl         Plan  DM type 2 (diabetes mellitus, type 2)    · (1) HEMOGLOBIN A1C ; every 3 months; Last 32BRF0034; Next 35Cfh4070;  Status:Active   · Hemoglobin A1c- POC ; every 6 months;  Next [id number]; Status:Active

## 2018-01-16 NOTE — RESULT NOTES
Verified Results  (1) URINE CULTURE 12Apr2016 10:48AM Pam Briscoe Order Number: [id number]     Test Name Result Flag Reference   CLINICAL REPORT (Report)     Test:        Urine culture  Specimen Source:  Urine, Unspecified Source  Specimen Type:   Urine  Specimen Date:   4/12/2016 10:48 AM  Result Date:    4/13/2016 10:14 PM  Result Status:   Final result  Resulting Lab:   [address] [address]            Tel: [phone]                 CULTURE                                       ------------------                                   50,000-59,000 cfu/ml Mixed Contaminants X3       Discussion/Summary   Urine culture is negative  Continue present treatment

## 2018-01-22 VITALS — BODY MASS INDEX: 49.34 KG/M2 | WEIGHT: 289 LBS | TEMPERATURE: 98.7 F | HEIGHT: 64 IN

## 2018-01-22 VITALS — HEART RATE: 88 BPM | SYSTOLIC BLOOD PRESSURE: 122 MMHG | RESPIRATION RATE: 18 BRPM | DIASTOLIC BLOOD PRESSURE: 74 MMHG

## 2018-01-22 VITALS — OXYGEN SATURATION: 98 %

## 2018-01-23 VITALS
BODY MASS INDEX: 47.82 KG/M2 | WEIGHT: 287 LBS | SYSTOLIC BLOOD PRESSURE: 118 MMHG | TEMPERATURE: 97.3 F | HEIGHT: 65 IN | DIASTOLIC BLOOD PRESSURE: 80 MMHG | RESPIRATION RATE: 16 BRPM | HEART RATE: 76 BPM

## 2018-01-23 NOTE — MISCELLANEOUS
Message   Recorded as Task   Date: 01/09/2018 02:56 PM, Created By: David Lee   Task Name: Call Back   Assigned To: Damaris Negron   Regarding Patient: Satya Felix, Status: Active   Comment:    David Lee - 09 Jan 2018 2:56 PM     TASK CREATED  Angela Aric called asking for her lab work results from 12/26/2017, please advise   Damaris Negron - 10 Adebayo 2018 12:08 PM     TASK EDITED  Phone call to patient discussed lab results  Patient will add vitamin D 2000 international units daily  Patient recently started on Victoza and therefore will continue present treatment for diabetes  Patient switched to generic Vytorin about 2 months ago and cholesterol is significantly elevated  Discussed treatment options of increasing dose of Vytorin or switching to generic Crestor  Patient just filled a 90 day supply of the generic Vytorin and prefers to continue on current treatment and will recheck fasting lipids in 3 months  Will fax lab results to Dr Eric Szymanski, endocrinologist and mail copy to patient          Signatures   Electronically signed by : Lindsay Florez DO; Adebayo 10 2018 12:08PM EST                       (Author)

## 2018-01-31 DIAGNOSIS — Z12.31 ENCOUNTER FOR SCREENING MAMMOGRAM FOR MALIGNANT NEOPLASM OF BREAST: ICD-10-CM

## 2018-02-15 ENCOUNTER — HOSPITAL ENCOUNTER (OUTPATIENT)
Dept: MAMMOGRAPHY | Facility: MEDICAL CENTER | Age: 58
Discharge: HOME | End: 2018-02-15
Payer: COMMERCIAL

## 2018-02-15 DIAGNOSIS — Z12.31 ENCOUNTER FOR SCREENING MAMMOGRAM FOR MALIGNANT NEOPLASM OF BREAST: ICD-10-CM

## 2018-02-15 PROCEDURE — 77067 SCR MAMMO BI INCL CAD: CPT

## 2018-02-21 DIAGNOSIS — E03.9 HYPOTHYROIDISM, UNSPECIFIED TYPE: ICD-10-CM

## 2018-02-21 DIAGNOSIS — E11.8 TYPE 2 DIABETES MELLITUS WITH COMPLICATION, WITHOUT LONG-TERM CURRENT USE OF INSULIN (HCC): Primary | ICD-10-CM

## 2018-02-21 DIAGNOSIS — I10 ESSENTIAL HYPERTENSION: ICD-10-CM

## 2018-02-21 RX ORDER — SITAGLIPTIN 100 MG/1
TABLET, FILM COATED ORAL
Qty: 90 TABLET | Refills: 2 | Status: SHIPPED | OUTPATIENT
Start: 2018-02-21 | End: 2018-02-27

## 2018-02-21 RX ORDER — LEVOTHYROXINE SODIUM 112 UG/1
TABLET ORAL
Qty: 90 TABLET | Refills: 1 | Status: SHIPPED | OUTPATIENT
Start: 2018-02-21 | End: 2018-02-27

## 2018-02-21 RX ORDER — LISINOPRIL AND HYDROCHLOROTHIAZIDE 25; 20 MG/1; MG/1
TABLET ORAL
Qty: 180 TABLET | Refills: 1 | Status: SHIPPED | OUTPATIENT
Start: 2018-02-21 | End: 2018-11-12 | Stop reason: SDUPTHER

## 2018-02-27 ENCOUNTER — OFFICE VISIT (OUTPATIENT)
Dept: FAMILY MEDICINE CLINIC | Facility: CLINIC | Age: 58
End: 2018-02-27
Payer: COMMERCIAL

## 2018-02-27 VITALS
RESPIRATION RATE: 16 BRPM | TEMPERATURE: 99.1 F | OXYGEN SATURATION: 98 % | HEIGHT: 65 IN | BODY MASS INDEX: 46.48 KG/M2 | DIASTOLIC BLOOD PRESSURE: 76 MMHG | WEIGHT: 279 LBS | HEART RATE: 72 BPM | SYSTOLIC BLOOD PRESSURE: 110 MMHG

## 2018-02-27 DIAGNOSIS — R10.13 EPIGASTRIC PAIN: ICD-10-CM

## 2018-02-27 DIAGNOSIS — R07.9 CHEST PAIN, UNSPECIFIED TYPE: Primary | ICD-10-CM

## 2018-02-27 DIAGNOSIS — E78.5 HYPERLIPIDEMIA, UNSPECIFIED HYPERLIPIDEMIA TYPE: ICD-10-CM

## 2018-02-27 PROCEDURE — 99214 OFFICE O/P EST MOD 30 MIN: CPT | Performed by: FAMILY MEDICINE

## 2018-02-27 RX ORDER — LIRAGLUTIDE 6 MG/ML
INJECTION SUBCUTANEOUS
COMMUNITY
Start: 2018-01-21 | End: 2018-07-03

## 2018-02-27 RX ORDER — LANCETS 33 GAUGE
EACH MISCELLANEOUS
COMMUNITY
Start: 2017-12-26 | End: 2018-02-27

## 2018-02-27 RX ORDER — AZITHROMYCIN 250 MG/1
TABLET, FILM COATED ORAL DAILY
COMMUNITY
Start: 2017-09-25 | End: 2018-02-27

## 2018-02-27 RX ORDER — CAPSAICIN 0.025 %
CREAM (GRAM) TOPICAL
Status: ON HOLD | COMMUNITY
End: 2022-02-08

## 2018-02-27 RX ORDER — GUAIFENESIN AND CODEINE PHOSPHATE 100; 10 MG/5ML; MG/5ML
10 SOLUTION ORAL
COMMUNITY
Start: 2017-12-26 | End: 2018-03-29

## 2018-02-27 RX ORDER — ROSUVASTATIN CALCIUM 20 MG/1
20 TABLET, COATED ORAL DAILY
Qty: 90 TABLET | Refills: 1 | Status: SHIPPED | OUTPATIENT
Start: 2018-02-27 | End: 2018-09-18 | Stop reason: SDUPTHER

## 2018-02-27 RX ORDER — LISINOPRIL AND HYDROCHLOROTHIAZIDE 25; 20 MG/1; MG/1
20-25 TABLET ORAL
COMMUNITY
Start: 2013-12-13 | End: 2018-02-27

## 2018-02-27 RX ORDER — POLYETHYLENE GLYCOL 3350 17 G/17G
POWDER, FOR SOLUTION ORAL
COMMUNITY

## 2018-02-27 RX ORDER — LANCETS 33 GAUGE
EACH MISCELLANEOUS 2 TIMES DAILY
COMMUNITY
Start: 2016-06-19 | End: 2018-03-29

## 2018-02-27 RX ORDER — PREGABALIN 25 MG/1
1 CAPSULE ORAL 2 TIMES DAILY
COMMUNITY
End: 2018-03-29

## 2018-02-27 RX ORDER — FERROUS SULFATE 325(65) MG
TABLET ORAL
COMMUNITY
Start: 2013-12-13 | End: 2018-07-03

## 2018-02-27 RX ORDER — PANTOPRAZOLE SODIUM 40 MG/1
40 TABLET, DELAYED RELEASE ORAL
COMMUNITY
Start: 2014-03-14 | End: 2018-12-22 | Stop reason: SDUPTHER

## 2018-02-27 RX ORDER — AMMONIUM LACTATE 12 G/100G
CREAM TOPICAL
Status: ON HOLD | COMMUNITY
End: 2022-02-08

## 2018-02-27 RX ORDER — LEVOTHYROXINE SODIUM 112 UG/1
112 TABLET ORAL
COMMUNITY
Start: 2013-12-13 | End: 2018-09-18 | Stop reason: SDUPTHER

## 2018-02-27 RX ORDER — UBIQUINOL 100 MG
CAPSULE ORAL
COMMUNITY
End: 2019-11-21

## 2018-02-27 RX ORDER — ASPIRIN 81 MG/1
81 TABLET ORAL
COMMUNITY
Start: 2013-12-13 | End: 2018-03-29

## 2018-02-27 RX ORDER — EZETIMIBE AND SIMVASTATIN 10; 20 MG/1; MG/1
TABLET ORAL
COMMUNITY
Start: 2017-12-26 | End: 2018-02-27

## 2018-02-27 RX ORDER — BENZONATATE 100 MG/1
100 CAPSULE ORAL
COMMUNITY
Start: 2014-03-14 | End: 2018-02-27

## 2018-02-27 RX ORDER — ASPIRIN 81 MG/1
1 TABLET ORAL DAILY
COMMUNITY

## 2018-02-27 RX ORDER — PEN NEEDLE, DIABETIC 31 GX3/16"
NEEDLE, DISPOSABLE MISCELLANEOUS
COMMUNITY
Start: 2017-12-27 | End: 2020-06-15

## 2018-02-27 NOTE — PROGRESS NOTES
Assessment/Plan:  Patient will be scheduled for a stress echo, will heed results  Continue present treatment except will discontinue generic Vytorin and switch to rosuvastatin 20 mg daily  Recommend patient call Dr Clayton Friday, endocrinologist regarding potential side effects from [address]  Patient to return to the office as scheduled or sooner p r n  No problem-specific Assessment & Plan notes found for this encounter  Diagnoses and all orders for this visit:    Chest pain, unspecified type  Comments:  Schedule stress echo  Continue present treatment  Orders:  -     Echo stress test w contrast if indicated; Future    Epigastric pain  Comments:  Continue present treatment with pantoprazole  Hyperlipidemia, unspecified hyperlipidemia type  Comments:  Lipids not well controlled  Discontinue generic Vytorin and start rosuvastatin 20 mg daily  Orders:  -     rosuvastatin (CRESTOR) 20 MG tablet; Take 1 tablet (20 mg total) by mouth daily    Other orders  -     ammonium lactate (LAC-HYDRIN) 12 % cream; Apply topically  -     aspirin (ECOTRIN LOW STRENGTH) 81 mg EC tablet; Take 81 mg by mouth  -     aspirin (ASPIRIN LOW DOSE) 81 mg EC tablet; Take 1 tablet by mouth daily  -     Discontinue: azithromycin (ZITHROMAX) 250 mg tablet; Take by mouth daily  -     Discontinue: benzonatate (TESSALON PERLES) 100 mg capsule; Take 100 mg by mouth  -     capsaicin (ZOSTRIX) 0 025 % cream; Apply topically  -     guaifenesin-codeine (CHERATUSSIN AC) 100-10 MG/5ML liquid; Take 10 mL by mouth  -     Cholecalciferol 2000 units CAPS; Take 2,000 Units by mouth  -     Dapagliflozin Propanediol 10 MG TABS; Take 10 mg by mouth  -     Discontinue: ezetimibe-simvastatin (VYTORIN) 10-20 mg per tablet;   -     ferrous sulfate 325 (65 Fe) mg tablet;  Take by mouth  -     UNIFINE PENTIPS 31G X 5 MM MISC;   -     Discontinue: ONETOUCH DELICA LANCETS 23Y MISC;   -     VICTOZA 18 MG/3ML SOPN;   -     Liraglutide 18 MG/3ML SOPN; Inject 1 8 mg under the skin  -     pregabalin (LYRICA) 25 mg capsule; Take 1 capsule by mouth 2 (two) times a day  -     metFORMIN (GLUCOPHAGE) 500 mg tablet; Take 500 mg by mouth  -     polyethylene glycol (MIRALAX) powder; Take by mouth  -     ONETOUCH DELICA LANCETS 36I MISC; by Does not apply route 2 (two) times a day  -     ONETOUCH DELICA LANCETS FINE MISC; by Does not apply route 2 (two) times a day  -     glucose blood test strip; by In Vitro route 2 (two) times a day  -     pantoprazole (PROTONIX) 40 mg tablet; Take 40 mg by mouth  -     Discontinue: sitaGLIPtin (JANUVIA) 100 mg tablet; Take 1 tablet by mouth daily  -     levothyroxine 112 mcg tablet; Take 112 mcg by mouth  -     Discontinue: lisinopril-hydrochlorothiazide (PRINZIDE,ZESTORETIC) 20-25 MG per tablet; Take 20-25 mg by mouth  -     Glucosamine 750 MG TABS; Take by mouth          Subjective:      Patient ID: Glendy Mc is a 62 y o  female  Patient is being seen in follow-up from an emergency room visit at [facility] yesterday on 02/26/18 for chest pain  Patient had labs, EKG, chest x-ray and V/Q lung scan which was negative and was discharged home on no new medication to follow up here  Patient complains of constant chest and epigastric pain for the past 2 weeks  She denies pain associated with physical exertion  Patient denies shortness of breath, palpitations or diaphoresis  Patient admits to nausea and upper abdominal pain since starting on Victoza 2 months ago  Patient states she had a negative stress test several years ago  She admits to family history of heart disease  Chest Pain    The current episode started 1 to 4 weeks ago  The problem occurs constantly  The problem has been unchanged  The pain is present in the substernal region and epigastric region  The pain is moderate  The quality of the pain is described as heavy  The pain radiates to the left shoulder, epigastrium and upper back   Associated symptoms include abdominal pain, back pain, dizziness, headaches, lower extremity edema, malaise/fatigue and nausea  Pertinent negatives include no claudication, cough, diaphoresis, exertional chest pressure, fever, hemoptysis, irregular heartbeat, leg pain, near-syncope, numbness, orthopnea, palpitations, PND, shortness of breath, sputum production, syncope, vomiting or weakness  She has tried antacids for the symptoms  The treatment provided no relief  Risk factors include obesity, lack of exercise and post-menopausal        The following portions of the patient's history were reviewed and updated as appropriate: allergies, current medications, past family history, past medical history, past social history, past surgical history and problem list     Review of Systems   Constitutional: Positive for malaise/fatigue  Negative for diaphoresis and fever  Respiratory: Negative for cough, hemoptysis, sputum production and shortness of breath  Cardiovascular: Positive for chest pain  Negative for palpitations, orthopnea, claudication, syncope, PND and near-syncope  Gastrointestinal: Positive for abdominal pain and nausea  Negative for vomiting  Musculoskeletal: Positive for back pain  Neurological: Positive for dizziness and headaches  Negative for weakness and numbness  Objective:      /76   Pulse 72   Temp 99 1 °F (37 3 °C)   Resp 16   Ht 5' 4 57" (1 64 m)   Wt 127 kg (279 lb)   SpO2 98%   BMI 47 05 kg/m²          Physical Exam   Constitutional: She is oriented to person, place, and time  She appears well-developed and well-nourished  No distress  HENT:   Head: Normocephalic  Right Ear: External ear normal    Left Ear: External ear normal    Nose: Nose normal    Mouth/Throat: Oropharynx is clear and moist    Eyes: Conjunctivae are normal  No scleral icterus  Neck: Neck supple  No thyromegaly present  Cardiovascular: Normal rate and regular rhythm      Pulmonary/Chest: Effort normal and breath sounds normal  Abdominal: Soft  There is tenderness  There is no rebound and no guarding  Mild epigastric tenderness  Musculoskeletal: She exhibits no edema  Lymphadenopathy:     She has no cervical adenopathy  Neurological: She is alert and oriented to person, place, and time  Skin: Skin is warm and dry  She is not diaphoretic  Psychiatric: She has a normal mood and affect

## 2018-02-27 NOTE — PATIENT INSTRUCTIONS
Schedule stress echo  Discontinue generic Vytorin and start rosuvastatin 20 mg daily  Continue other medications the same  Recommend you call Dr Roderick Shaikh, endocrinologist to discuss potential side effects of Victoza  Return to the office as scheduled or sooner as needed

## 2018-03-12 ENCOUNTER — VBI (OUTPATIENT)
Dept: ADMINISTRATIVE | Facility: OTHER | Age: 58
End: 2018-03-12

## 2018-03-12 NOTE — TELEPHONE ENCOUNTER
Glendy Mc    ED Visit Information     Ed visit date: 2/26/18  Diagnosis Description:PRECORDIAL PAIN  In Network? Yes [facility]                                                                                                                                                                                                                                                                                                            Discharge status: Home  Discharged with meds ? NA  Number of ED visits to date: 1  ED Severity:5     Outreach Information    Outreach successful: Yes 1  Date letter mailed:n/a  Date Finalized:3/12/18    Care Coordination    Follow up appointment with pcp: yes 2/27/18  Transportation issues ?  NA    [employer] type:  7 Day Outreach  [facility] PCP:  Yes  Practice Contacted Patient ?:  Yes  Pt had ED follow up with pcp/staff ?:  Yes Call with visit scheduled

## 2018-03-29 ENCOUNTER — OFFICE VISIT (OUTPATIENT)
Dept: FAMILY MEDICINE CLINIC | Facility: CLINIC | Age: 58
End: 2018-03-29
Payer: COMMERCIAL

## 2018-03-29 VITALS
DIASTOLIC BLOOD PRESSURE: 82 MMHG | HEIGHT: 64 IN | RESPIRATION RATE: 16 BRPM | WEIGHT: 286 LBS | TEMPERATURE: 98.3 F | BODY MASS INDEX: 48.83 KG/M2 | HEART RATE: 72 BPM | SYSTOLIC BLOOD PRESSURE: 122 MMHG

## 2018-03-29 DIAGNOSIS — E55.9 VITAMIN D DEFICIENCY: ICD-10-CM

## 2018-03-29 DIAGNOSIS — I10 ESSENTIAL HYPERTENSION: ICD-10-CM

## 2018-03-29 DIAGNOSIS — E11.42 TYPE 2 DIABETES MELLITUS WITH DIABETIC POLYNEUROPATHY, WITHOUT LONG-TERM CURRENT USE OF INSULIN (HCC): Primary | ICD-10-CM

## 2018-03-29 DIAGNOSIS — E03.9 HYPOTHYROIDISM, UNSPECIFIED TYPE: ICD-10-CM

## 2018-03-29 DIAGNOSIS — E11.42 TYPE 2 DIABETES MELLITUS WITH PERIPHERAL NEUROPATHY (HCC): ICD-10-CM

## 2018-03-29 DIAGNOSIS — K21.9 GERD WITHOUT ESOPHAGITIS: ICD-10-CM

## 2018-03-29 DIAGNOSIS — E66.01 OBESITY, CLASS III, BMI 40-49.9 (MORBID OBESITY) (HCC): ICD-10-CM

## 2018-03-29 DIAGNOSIS — E78.5 HYPERLIPIDEMIA, UNSPECIFIED HYPERLIPIDEMIA TYPE: ICD-10-CM

## 2018-03-29 PROBLEM — M17.0 PRIMARY OSTEOARTHRITIS OF BOTH KNEES: Status: ACTIVE | Noted: 2018-02-16

## 2018-03-29 PROBLEM — G47.33 OSA (OBSTRUCTIVE SLEEP APNEA): Status: ACTIVE | Noted: 2018-03-29

## 2018-03-29 PROBLEM — E66.9 DIABETES MELLITUS TYPE 2 IN OBESE (HCC): Status: ACTIVE | Noted: 2018-02-16

## 2018-03-29 PROBLEM — M19.90 ARTHRITIS: Status: ACTIVE | Noted: 2018-03-29

## 2018-03-29 PROBLEM — E11.69 DIABETES MELLITUS TYPE 2 IN OBESE (HCC): Status: ACTIVE | Noted: 2018-02-16

## 2018-03-29 PROBLEM — K58.9 IBS (IRRITABLE BOWEL SYNDROME): Status: ACTIVE | Noted: 2018-03-29

## 2018-03-29 PROBLEM — E66.813 OBESITY, CLASS III, BMI 40-49.9 (MORBID OBESITY): Status: ACTIVE | Noted: 2018-02-16

## 2018-03-29 LAB
ALBUMIN SERPL BCP-MCNC: 3.8 G/DL (ref 3.5–5)
ALP SERPL-CCNC: 68 U/L (ref 46–116)
ALT SERPL W P-5'-P-CCNC: 15 U/L (ref 12–78)
ANION GAP SERPL CALCULATED.3IONS-SCNC: 8 MMOL/L (ref 4–13)
AST SERPL W P-5'-P-CCNC: 13 U/L (ref 5–45)
BILIRUB SERPL-MCNC: 0.6 MG/DL (ref 0.2–1)
BUN SERPL-MCNC: 30 MG/DL (ref 5–25)
CALCIUM SERPL-MCNC: 9.2 MG/DL (ref 8.3–10.1)
CHLORIDE SERPL-SCNC: 107 MMOL/L (ref 100–108)
CHOLEST SERPL-MCNC: 138 MG/DL (ref 50–200)
CO2 SERPL-SCNC: 27 MMOL/L (ref 21–32)
CREAT SERPL-MCNC: 1.16 MG/DL (ref 0.6–1.3)
EST. AVERAGE GLUCOSE BLD GHB EST-MCNC: 174 MG/DL
GFR SERPL CREATININE-BSD FRML MDRD: 52 ML/MIN/1.73SQ M
GLUCOSE P FAST SERPL-MCNC: 128 MG/DL (ref 65–99)
HBA1C MFR BLD: 7.7 % (ref 4.2–6.3)
HDLC SERPL-MCNC: 39 MG/DL (ref 40–60)
LDLC SERPL CALC-MCNC: 61 MG/DL (ref 0–100)
POTASSIUM SERPL-SCNC: 5 MMOL/L (ref 3.5–5.3)
PROT SERPL-MCNC: 7.2 G/DL (ref 6.4–8.2)
SODIUM SERPL-SCNC: 142 MMOL/L (ref 136–145)
TRIGL SERPL-MCNC: 191 MG/DL

## 2018-03-29 PROCEDURE — 36415 COLL VENOUS BLD VENIPUNCTURE: CPT | Performed by: FAMILY MEDICINE

## 2018-03-29 PROCEDURE — 99214 OFFICE O/P EST MOD 30 MIN: CPT | Performed by: FAMILY MEDICINE

## 2018-03-29 PROCEDURE — 80053 COMPREHEN METABOLIC PANEL: CPT | Performed by: FAMILY MEDICINE

## 2018-03-29 PROCEDURE — 83036 HEMOGLOBIN GLYCOSYLATED A1C: CPT | Performed by: FAMILY MEDICINE

## 2018-03-29 PROCEDURE — 80061 LIPID PANEL: CPT | Performed by: FAMILY MEDICINE

## 2018-03-29 RX ORDER — GABAPENTIN 100 MG/1
100 CAPSULE ORAL 2 TIMES DAILY
Qty: 180 CAPSULE | Refills: 3 | Status: SHIPPED | OUTPATIENT
Start: 2018-03-29 | End: 2018-07-03

## 2018-03-29 NOTE — PATIENT INSTRUCTIONS
Switch from Lyrica to gabapentin as discussed  Continue other medications the same  Follow a low-fat, low-salt and a low sugar/carbohydrate diet and get regular exercise walking as tolerated  Weight loss encouraged  Continue home glucose monitoring  Follow up with specialist as scheduled and return to the office in 3 months

## 2018-03-29 NOTE — ASSESSMENT & PLAN NOTE
BP well controlled  Continue present treatment  Follow a low-salt diet and get regular exercise walking as tolerated

## 2018-03-29 NOTE — PROGRESS NOTES
Assessment:  Fasting labs drawn as below  Continue present treatment and will switch from Lyrica to gabapentin  Follow a low-fat, low-salt and a low sugar/carbohydrate diet and get regular exercise walking as tolerated  Weight loss encouraged  Continue home glucose monitoring  Follow up with specialist as scheduled and return to the office in 3 months  Type 2 diabetes mellitus with peripheral neuropathy (HCC)  Improved home glucose monitoring  Fasting labs drawn  Continue present treatment  HTN (hypertension)  BP well controlled  Continue present treatment  Follow a low-salt diet and get regular exercise walking as tolerated  Hyperlipidemia  Fasting labs drawn for lipid profile  Continue Crestor and low-fat and low-cholesterol diet  Diagnoses and all orders for this visit:    Type 2 diabetes mellitus with diabetic polyneuropathy, without long-term current use of insulin (HCC)  -     Dapagliflozin Propanediol (FARXIGA) 10 MG TABS; Take 1 tablet (10 mg total) by mouth daily  -     metFORMIN (GLUCOPHAGE) 500 mg tablet; Take two tablets twice daily  -     gabapentin (NEURONTIN) 100 mg capsule; Take 1 capsule (100 mg total) by mouth 2 (two) times a day  -     Comprehensive metabolic panel  -     HEMOGLOBIN A1C W/ EAG ESTIMATION    Type 2 diabetes mellitus with peripheral neuropathy (HCC)    Essential hypertension    Hypothyroidism, unspecified type    GERD without esophagitis    Obesity, Class III, BMI 40-49 9 (morbid obesity) ([facility] [address] )    Vitamin D deficiency    Hyperlipidemia, unspecified hyperlipidemia type  -     Lipid panel          Subjective:      Patient ID: Ian Mariscal is a 62 y o  female  Patient is here for routine appointment for chronic conditions and fasting labs  Patient has been feeling fairly well overall    Patient denies any further chest pain and canceled her stress echo test secondary to cost   No regular exercise program   Patient recently was started on Victoza by endocrinologist and home glucose monitoring reveals fasting blood sugars in the range of 130-160 which is a significant improvement  Patient's health insurance plan will no longer cover Lyrica and therefore she will be switched to gabapentin  Patient is up-to-date on diabetic eye exam and foot exam       Hypertension   This is a chronic problem  The problem is controlled  Associated symptoms include peripheral edema  Pertinent negatives include no anxiety, blurred vision, chest pain, headaches, malaise/fatigue, orthopnea, palpitations, PND or shortness of breath  Past treatments include ACE inhibitors and diuretics  Compliance problems include exercise and diet  There is no history of CAD/MI, CVA or retinopathy  Hyperlipidemia   This is a chronic problem  The problem is uncontrolled  Recent lipid tests were reviewed and are high  Exacerbating diseases include diabetes, hypothyroidism and obesity  Pertinent negatives include no chest pain, focal weakness, leg pain, myalgias or shortness of breath  Current antihyperlipidemic treatment includes statins  Risk factors for coronary artery disease include diabetes mellitus, dyslipidemia, family history, obesity, hypertension and post-menopausal    Diabetes   She presents for her follow-up diabetic visit  She has type 2 diabetes mellitus  Her disease course has been worsening  There are no hypoglycemic associated symptoms  Pertinent negatives for hypoglycemia include no headaches  Associated symptoms include foot paresthesias  Pertinent negatives for diabetes include no blurred vision, no chest pain, no fatigue, no foot ulcerations, no polydipsia, no polyuria, no visual change, no weakness and no weight loss  There are no hypoglycemic complications  Symptoms are improving  Diabetic complications include peripheral neuropathy  Pertinent negatives for diabetic complications include no CVA or retinopathy  Current diabetic treatment includes oral agent (triple therapy)  She is compliant with treatment most of the time  Her weight is stable  She is following a generally healthy diet  She has had a previous visit with a dietitian  She rarely participates in exercise  Her breakfast blood glucose is taken between 8-9 am  Her breakfast blood glucose range is generally 140-180 mg/dl  An ACE inhibitor/angiotensin II receptor blocker is being taken  She sees a podiatrist Eye exam is current  The following portions of the patient's history were reviewed and updated as appropriate: allergies, current medications, past family history, past medical history, past social history, past surgical history and problem list     Review of Systems   Constitutional: Negative for fatigue, malaise/fatigue and weight loss  Eyes: Negative for blurred vision  Respiratory: Negative for shortness of breath  Cardiovascular: Negative for chest pain, palpitations, orthopnea and PND  Endocrine: Negative for polydipsia and polyuria  Musculoskeletal: Negative for myalgias  Neurological: Negative for focal weakness, weakness and headaches  Objective:      /82   Pulse 72   Temp 98 3 °F (36 8 °C) (Tympanic)   Resp 16   Ht 5' 4 17" (1 63 m)   Wt 130 kg (286 lb)   BMI 48 83 kg/m²          Physical Exam   Constitutional: She is oriented to person, place, and time  She appears well-developed and well-nourished  HENT:   Head: Normocephalic  Right Ear: External ear normal    Left Ear: External ear normal    Nose: Nose normal    Mouth/Throat: Oropharynx is clear and moist    Eyes: Conjunctivae are normal  No scleral icterus  Neck: Neck supple  No thyromegaly present  Cardiovascular: Normal rate and regular rhythm  Pulmonary/Chest: Effort normal and breath sounds normal    Abdominal: Soft  There is no tenderness  Musculoskeletal: She exhibits no edema  Lymphadenopathy:     She has no cervical adenopathy  Neurological: She is alert and oriented to person, place, and time  Skin: Skin is warm and dry  Psychiatric: She has a normal mood and affect

## 2018-07-03 ENCOUNTER — OFFICE VISIT (OUTPATIENT)
Dept: FAMILY MEDICINE CLINIC | Facility: CLINIC | Age: 58
End: 2018-07-03
Payer: COMMERCIAL

## 2018-07-03 ENCOUNTER — TRANSCRIBE ORDERS (OUTPATIENT)
Dept: ADMINISTRATIVE | Facility: HOSPITAL | Age: 58
End: 2018-07-03

## 2018-07-03 ENCOUNTER — APPOINTMENT (OUTPATIENT)
Dept: LAB | Facility: HOSPITAL | Age: 58
End: 2018-07-03
Payer: COMMERCIAL

## 2018-07-03 VITALS
SYSTOLIC BLOOD PRESSURE: 122 MMHG | TEMPERATURE: 97.5 F | HEIGHT: 64 IN | WEIGHT: 278 LBS | DIASTOLIC BLOOD PRESSURE: 80 MMHG | BODY MASS INDEX: 47.46 KG/M2

## 2018-07-03 DIAGNOSIS — R11.0 NAUSEA: ICD-10-CM

## 2018-07-03 DIAGNOSIS — R79.89 ELEVATED SERUM CREATININE: Primary | ICD-10-CM

## 2018-07-03 DIAGNOSIS — K29.70 GASTRITIS WITHOUT BLEEDING, UNSPECIFIED CHRONICITY, UNSPECIFIED GASTRITIS TYPE: Primary | ICD-10-CM

## 2018-07-03 DIAGNOSIS — K21.9 GERD WITHOUT ESOPHAGITIS: ICD-10-CM

## 2018-07-03 DIAGNOSIS — R10.13 EPIGASTRIC ABDOMINAL PAIN: ICD-10-CM

## 2018-07-03 DIAGNOSIS — R10.13 ABDOMINAL PAIN, EPIGASTRIC: ICD-10-CM

## 2018-07-03 LAB
ALBUMIN SERPL BCP-MCNC: 4.1 G/DL (ref 3.5–5.7)
ALP SERPL-CCNC: 56 U/L (ref 40–150)
ALT SERPL W P-5'-P-CCNC: 14 U/L (ref 7–52)
ANION GAP SERPL CALCULATED.3IONS-SCNC: 7 MMOL/L (ref 4–13)
AST SERPL W P-5'-P-CCNC: 11 U/L (ref 13–39)
BASOPHILS # BLD AUTO: 0 THOUSANDS/ΜL (ref 0–0.1)
BASOPHILS NFR BLD AUTO: 1 % (ref 0–1)
BILIRUB SERPL-MCNC: 0.5 MG/DL (ref 0.2–1)
BUN SERPL-MCNC: 31 MG/DL (ref 7–25)
CALCIUM SERPL-MCNC: 9.4 MG/DL (ref 8.6–10.5)
CHLORIDE SERPL-SCNC: 104 MMOL/L (ref 98–107)
CO2 SERPL-SCNC: 26 MMOL/L (ref 21–31)
CREAT SERPL-MCNC: 1.53 MG/DL (ref 0.6–1.2)
EOSINOPHIL # BLD AUTO: 0.2 THOUSAND/ΜL (ref 0–0.61)
EOSINOPHIL NFR BLD AUTO: 2 % (ref 0–6)
ERYTHROCYTE [DISTWIDTH] IN BLOOD BY AUTOMATED COUNT: 14.4 % (ref 11.6–15.1)
GFR SERPL CREATININE-BSD FRML MDRD: 37 ML/MIN/1.73SQ M
GLUCOSE SERPL-MCNC: 129 MG/DL (ref 65–140)
HCT VFR BLD AUTO: 36.3 % (ref 37–47)
HGB BLD-MCNC: 12.3 G/DL (ref 11.5–15.4)
LIPASE SERPL-CCNC: 62 U/L (ref 11–82)
LYMPHOCYTES # BLD AUTO: 2.4 THOUSANDS/ΜL (ref 0.6–4.47)
LYMPHOCYTES NFR BLD AUTO: 26 % (ref 14–44)
MCH RBC QN AUTO: 30.4 PG (ref 26.8–34.3)
MCHC RBC AUTO-ENTMCNC: 33.9 G/DL (ref 31.4–37.4)
MCV RBC AUTO: 90 FL (ref 82–98)
MONOCYTES # BLD AUTO: 0.7 THOUSAND/ΜL (ref 0.17–1.22)
MONOCYTES NFR BLD AUTO: 8 % (ref 4–12)
NEUTROPHILS # BLD AUTO: 6 THOUSANDS/ΜL (ref 1.85–7.62)
NEUTS SEG NFR BLD AUTO: 65 % (ref 43–75)
NRBC BLD AUTO-RTO: 0 /100 WBCS
PLATELET # BLD AUTO: 250 THOUSANDS/UL (ref 149–390)
PMV BLD AUTO: 9.2 FL (ref 8.9–12.7)
POTASSIUM SERPL-SCNC: 4.8 MMOL/L (ref 3.5–5.5)
PROT SERPL-MCNC: 6.9 G/DL (ref 6.4–8.9)
RBC # BLD AUTO: 4.05 MILLION/UL (ref 3.81–5.12)
SODIUM SERPL-SCNC: 137 MMOL/L (ref 134–143)
WBC # BLD AUTO: 9.2 THOUSAND/UL (ref 4.31–10.16)

## 2018-07-03 PROCEDURE — 99214 OFFICE O/P EST MOD 30 MIN: CPT | Performed by: FAMILY MEDICINE

## 2018-07-03 PROCEDURE — 36415 COLL VENOUS BLD VENIPUNCTURE: CPT | Performed by: FAMILY MEDICINE

## 2018-07-03 PROCEDURE — 83690 ASSAY OF LIPASE: CPT | Performed by: FAMILY MEDICINE

## 2018-07-03 PROCEDURE — 80053 COMPREHEN METABOLIC PANEL: CPT | Performed by: FAMILY MEDICINE

## 2018-07-03 PROCEDURE — 85025 COMPLETE CBC W/AUTO DIFF WBC: CPT | Performed by: FAMILY MEDICINE

## 2018-07-03 NOTE — PROGRESS NOTES
Assessment/Plan:  No significant findings on physical exam   Abdomen is soft and nontender  Considering history of pancreatitis are recommended stat blood testing including a stat lipase level  Await results  Ultrasound if no improvement  She is likely getting a rebound flare of gastritis secondary to recent discontinuation for brief period of time on her pantoprazole  She had recently restarted it and I did recommend for the next 5 days she take it twice daily  Consider GI evaluation if symptoms persist or worsen  She will call if any new symptoms develop her current symptoms worsen or persist   No problem-specific Assessment & Plan notes found for this encounter  Diagnoses and all orders for this visit:    Gastritis without bleeding, unspecified chronicity, unspecified gastritis type  -     CBC and differential  -     Comprehensive metabolic panel  -     Lipase    Nausea    Epigastric abdominal pain  -     CBC and differential  -     Comprehensive metabolic panel  -     Lipase  -     US abdomen complete; Future    Other orders  -     Semaglutide 1 MG/DOSE SOPN; Inject 0 5 mg under the skin Once a week            Subjective:      Patient ID: Marlen Marin is a 62 y o  female  Patient with history of epigastric discomfort for the last 5-7 days  Symptoms are mild-to-moderate  Denies any back pain  No chest pain or shortness of breath  She had been off her pantoprazole for several weeks secondary to lack of ability to pay for the medication  She did recently restarted about 5 days ago and symptoms are improving moderately  Denies any high fever sweats or chills  No bowel changes or fevers  She has had history of pancreatitis in the past   Denies recent changes in diabetes medication  Abdominal Pain   Associated symptoms include headaches  Headache    Associated symptoms include abdominal pain and dizziness  Dizziness   Associated symptoms include abdominal pain and headaches  The following portions of the patient's history were reviewed and updated as appropriate: allergies, current medications, past family history, past medical history, past social history, past surgical history and problem list     Review of Systems   Constitutional: Negative  HENT: Negative  Eyes: Negative  Respiratory: Negative  Cardiovascular: Negative  Gastrointestinal: Positive for abdominal pain  Endocrine: Negative  Genitourinary: Negative  Musculoskeletal: Negative  Skin: Negative  Allergic/Immunologic: Negative  Neurological: Positive for dizziness and headaches  Hematological: Negative  Psychiatric/Behavioral: Negative  Objective:      /80   Temp 97 5 °F (36 4 °C)   Ht 5' 4 17" (1 63 m)   Wt 126 kg (278 lb)   BMI 47 46 kg/m²          Physical Exam   Constitutional: She is oriented to person, place, and time  She appears well-developed and well-nourished  HENT:   Head: Normocephalic and atraumatic  Right Ear: External ear normal  Tympanic membrane is not erythematous and not bulging  Left Ear: External ear normal  Tympanic membrane is not erythematous and not bulging  Nose: Nose normal    Mouth/Throat: Oropharynx is clear and moist and mucous membranes are normal  No oral lesions  No oropharyngeal exudate  Eyes: Conjunctivae and EOM are normal  Right eye exhibits no discharge  Left eye exhibits no discharge  No scleral icterus  Neck: Normal range of motion  Neck supple  No thyromegaly present  Cardiovascular: Normal rate, regular rhythm and normal heart sounds  Exam reveals no gallop and no friction rub  No murmur heard  Pulmonary/Chest: Effort normal  No respiratory distress  She has no wheezes  She has no rales  She exhibits no tenderness  Abdominal: Soft  Bowel sounds are normal  She exhibits no distension and no mass  There is no tenderness  There is no rebound and no guarding  Musculoskeletal: Normal range of motion   She exhibits no edema, tenderness or deformity  Lymphadenopathy:     She has no cervical adenopathy  Neurological: She is alert and oriented to person, place, and time  She has normal reflexes  No cranial nerve deficit  She exhibits normal muscle tone  Coordination normal    Skin: Skin is warm and dry  No rash noted  No erythema  No pallor  Psychiatric: She has a normal mood and affect  Her behavior is normal    Vitals reviewed

## 2018-07-04 PROBLEM — IMO0002 UNCONTROLLED TYPE 2 DIABETES MELLITUS WITH PERIPHERAL NEUROPATHY: Status: ACTIVE | Noted: 2017-07-27

## 2018-07-04 PROBLEM — E11.42 TYPE 2 DIABETES MELLITUS WITH PERIPHERAL NEUROPATHY (HCC): Status: RESOLVED | Noted: 2017-07-27 | Resolved: 2018-07-04

## 2018-07-04 PROBLEM — E78.2 MIXED DIABETIC HYPERLIPIDEMIA ASSOCIATED WITH TYPE 2 DIABETES MELLITUS (HCC): Status: ACTIVE | Noted: 2018-02-16

## 2018-07-04 PROBLEM — I10 HTN (HYPERTENSION): Status: RESOLVED | Noted: 2018-03-29 | Resolved: 2018-07-04

## 2018-07-05 LAB
LEFT EYE DIABETIC RETINOPATHY: NORMAL
RIGHT EYE DIABETIC RETINOPATHY: NORMAL

## 2018-07-05 PROCEDURE — 3072F LOW RISK FOR RETINOPATHY: CPT | Performed by: FAMILY MEDICINE

## 2018-07-10 ENCOUNTER — CLINICAL SUPPORT (OUTPATIENT)
Dept: FAMILY MEDICINE CLINIC | Facility: CLINIC | Age: 58
End: 2018-07-10
Payer: COMMERCIAL

## 2018-07-10 DIAGNOSIS — R79.89 HYPOURICEMIA: Primary | ICD-10-CM

## 2018-07-10 LAB
ALBUMIN SERPL BCP-MCNC: 3.6 G/DL (ref 3.5–5)
ALP SERPL-CCNC: 58 U/L (ref 46–116)
ALT SERPL W P-5'-P-CCNC: 20 U/L (ref 12–78)
ANION GAP SERPL CALCULATED.3IONS-SCNC: 7 MMOL/L (ref 4–13)
AST SERPL W P-5'-P-CCNC: 11 U/L (ref 5–45)
BILIRUB SERPL-MCNC: 0.48 MG/DL (ref 0.2–1)
BUN SERPL-MCNC: 36 MG/DL (ref 5–25)
CALCIUM SERPL-MCNC: 9.5 MG/DL (ref 8.3–10.1)
CHLORIDE SERPL-SCNC: 108 MMOL/L (ref 100–108)
CO2 SERPL-SCNC: 25 MMOL/L (ref 21–32)
CREAT SERPL-MCNC: 1.44 MG/DL (ref 0.6–1.3)
GFR SERPL CREATININE-BSD FRML MDRD: 40 ML/MIN/1.73SQ M
GLUCOSE P FAST SERPL-MCNC: 182 MG/DL (ref 65–99)
POTASSIUM SERPL-SCNC: 5.7 MMOL/L (ref 3.5–5.3)
PROT SERPL-MCNC: 7 G/DL (ref 6.4–8.2)
SODIUM SERPL-SCNC: 140 MMOL/L (ref 136–145)

## 2018-07-10 PROCEDURE — 36415 COLL VENOUS BLD VENIPUNCTURE: CPT

## 2018-07-10 PROCEDURE — 80053 COMPREHEN METABOLIC PANEL: CPT | Performed by: FAMILY MEDICINE

## 2018-07-11 ENCOUNTER — APPOINTMENT (OUTPATIENT)
Dept: LAB | Facility: HOSPITAL | Age: 58
End: 2018-07-11
Payer: COMMERCIAL

## 2018-07-11 DIAGNOSIS — E87.5 HYPERKALEMIA: Primary | ICD-10-CM

## 2018-07-11 DIAGNOSIS — E87.5 HYPERKALEMIA: ICD-10-CM

## 2018-07-11 LAB
ANION GAP SERPL CALCULATED.3IONS-SCNC: 5 MMOL/L (ref 4–13)
BUN SERPL-MCNC: 33 MG/DL (ref 7–25)
CALCIUM SERPL-MCNC: 9.2 MG/DL (ref 8.6–10.5)
CHLORIDE SERPL-SCNC: 105 MMOL/L (ref 98–107)
CO2 SERPL-SCNC: 28 MMOL/L (ref 21–31)
CREAT SERPL-MCNC: 1.28 MG/DL (ref 0.6–1.2)
GFR SERPL CREATININE-BSD FRML MDRD: 47 ML/MIN/1.73SQ M
GLUCOSE SERPL-MCNC: 232 MG/DL (ref 65–140)
POTASSIUM SERPL-SCNC: 5 MMOL/L (ref 3.5–5.5)
SODIUM SERPL-SCNC: 138 MMOL/L (ref 134–143)

## 2018-07-11 PROCEDURE — 36415 COLL VENOUS BLD VENIPUNCTURE: CPT

## 2018-07-11 PROCEDURE — 80048 BASIC METABOLIC PNL TOTAL CA: CPT

## 2018-07-13 ENCOUNTER — HOSPITAL ENCOUNTER (OUTPATIENT)
Dept: ULTRASOUND IMAGING | Facility: HOSPITAL | Age: 58
Discharge: HOME | End: 2018-07-13
Payer: COMMERCIAL

## 2018-07-13 DIAGNOSIS — R10.13 EPIGASTRIC ABDOMINAL PAIN: ICD-10-CM

## 2018-07-13 PROCEDURE — 76700 US EXAM ABDOM COMPLETE: CPT

## 2018-07-15 PROBLEM — K76.0 FATTY LIVER: Status: ACTIVE | Noted: 2018-07-15

## 2018-07-21 PROBLEM — M25.519 SHOULDER JOINT PAIN: Status: ACTIVE | Noted: 2018-07-21

## 2018-07-21 PROBLEM — M75.120 FULL THICKNESS ROTATOR CUFF TEAR: Status: ACTIVE | Noted: 2018-07-21

## 2018-08-14 ENCOUNTER — OFFICE VISIT (OUTPATIENT)
Dept: SURGICAL ONCOLOGY | Facility: CLINIC | Age: 58
End: 2018-08-14
Payer: COMMERCIAL

## 2018-08-14 VITALS
DIASTOLIC BLOOD PRESSURE: 90 MMHG | HEIGHT: 64 IN | TEMPERATURE: 98.6 F | RESPIRATION RATE: 16 BRPM | WEIGHT: 276 LBS | SYSTOLIC BLOOD PRESSURE: 130 MMHG | BODY MASS INDEX: 47.12 KG/M2 | HEART RATE: 88 BPM

## 2018-08-14 DIAGNOSIS — Z80.3 FAMILY HISTORY OF BREAST CANCER IN FEMALE: ICD-10-CM

## 2018-08-14 DIAGNOSIS — Z12.31 SCREENING MAMMOGRAM, ENCOUNTER FOR: ICD-10-CM

## 2018-08-14 DIAGNOSIS — N60.19 FIBROCYSTIC BREAST DISEASE, UNSPECIFIED LATERALITY: Primary | ICD-10-CM

## 2018-08-14 PROCEDURE — 99213 OFFICE O/P EST LOW 20 MIN: CPT | Performed by: SURGERY

## 2018-08-14 NOTE — PROGRESS NOTES
Surgical Oncology Follow Up       [facility]  [facility] [address]  [place] [address]    Marlentian Marin  1960  [id number]  [address]  [facility] [place]  [address] [place] [id number]    Chief Complaint   Patient presents with    Breast Problem     Pt is here for 1 year follow up        Assessment/Plan   Diagnoses and all orders for this visit:    Fibrocystic breast disease, unspecified laterality    Family history of breast cancer in female    Screening mammogram, encounter for  -     Mammo screening bilateral w cad; Future        Advance Care Planning/Advance Directives:  Did not discuss  with the patient  Oncology History:     No history exists  History of Present Illness: Follow up   -Interval History: none    Review of Systems:  Review of Systems   Constitutional: Negative  Negative for appetite change and fever  Eyes: Negative  Respiratory: Negative for shortness of breath  Cardiovascular: Positive for chest pain (had negative cardiac eval and negative eval for pe)  Gastrointestinal: Negative  Endocrine: Negative  Genitourinary: Negative  Musculoskeletal: Negative  Negative for arthralgias and myalgias  Skin: Negative  Allergic/Immunologic: Negative  Neurological: Negative  Hematological: Negative  Negative for adenopathy  Does not bruise/bleed easily  Psychiatric/Behavioral: Negative          Patient Active Problem List   Diagnosis    Anemia    Benign essential hypertension    Arthritis    Mixed diabetic hyperlipidemia associated with type 2 diabetes mellitus ([address] )    Disc degeneration, lumbar    Fibrocystic breast disease, unspecified laterality    GERD without esophagitis    Hyperlipidemia    Hypothyroidism    IBS (irritable bowel syndrome)    Neoplasm of uncertain behavior of skin    Obesity, Class III, BMI 40-49 9 (morbid obesity) ([address] )    MARIA FERNANDA (obstructive sleep apnea)    Primary osteoarthritis of both knees    Vitamin D deficiency    Uncontrolled type 2 diabetes mellitus with peripheral neuropathy (HCC)    Fatty liver    Full thickness rotator cuff tear    Shoulder joint pain    Family history of breast cancer in female    Screening mammogram, encounter for     Past Medical History:   Diagnosis Date    Achrochordon     Acute bronchitis     Allergic rhinitis     Dysuria     IBS (irritable bowel syndrome)     Polycystic ovarian syndrome     Rotator cuff tear     right side     URI (upper respiratory infection)      Past Surgical History:   Procedure Laterality Date    ANAL FISTULOTOMY      subcutaneous     CHOLECYSTECTOMY      HEMORRHOID SURGERY      INCISION AND DRAINAGE PERIRECTAL ABSCESS      MOUTH SURGERY      OVARIAN CYST REMOVAL      ROTATOR CUFF REPAIR Right     TONSILLECTOMY       Family History   Problem Relation Age of Onset    Diabetes Mother     Breast cancer Paternal Aunt     Cancer Paternal Uncle         carcinosarcoma of the oral cavity     Prostate cancer Paternal Uncle     Breast cancer Family     Breast cancer Maternal Aunt     Breast cancer Maternal Aunt      Social History     Social History    Marital status: Single     Spouse name: N/A    Number of children: N/A    Years of education: N/A     Occupational History    Not on file       Social History Main Topics    Smoking status: Never Smoker    Smokeless tobacco: Never Used    Alcohol use Yes      Comment: RARE / social     Drug use: No    Sexual activity: Not on file     Other Topics Concern    Not on file     Social History Narrative    No narrative on file       Current Outpatient Prescriptions:     ammonium lactate (LAC-HYDRIN) 12 % cream, Apply topically, Disp: , Rfl:     aspirin (ASPIRIN LOW DOSE) 81 mg EC tablet, Take 1 tablet by mouth daily, Disp: , Rfl:     capsaicin (ZOSTRIX) 0 025 % cream, Apply topically, Disp: , Rfl:    Dapagliflozin Propanediol (FARXIGA) 10 MG TABS, Take 1 tablet (10 mg total) by mouth daily, Disp: 90 tablet, Rfl: 3    Glucosamine 750 MG TABS, Take by mouth, Disp: , Rfl:     glucose blood test strip, by In Vitro route 2 (two) times a day, Disp: , Rfl:     levothyroxine 112 mcg tablet, Take 112 mcg by mouth, Disp: , Rfl:     lisinopril-hydrochlorothiazide (PRINZIDE,ZESTORETIC) 20-25 MG per tablet, TAKE 1TABLET BY MOUTH TWICE A DAY, Disp: 180 tablet, Rfl: 1    metFORMIN (GLUCOPHAGE) 500 mg tablet, Take two tablets twice daily, Disp: 360 tablet, Rfl: 3    ONETOUCH DELICA LANCETS FINE MISC, by Does not apply route 2 (two) times a day, Disp: , Rfl:     pantoprazole (PROTONIX) 40 mg tablet, Take 40 mg by mouth, Disp: , Rfl:     polyethylene glycol (MIRALAX) powder, Take by mouth, Disp: , Rfl:     rosuvastatin (CRESTOR) 20 MG tablet, Take 1 tablet (20 mg total) by mouth daily, Disp: 90 tablet, Rfl: 1    Semaglutide 1 MG/DOSE SOPN, Inject 0 5 mg under the skin Once a week  , Disp: , Rfl:     UNIFINE PENTIPS 31G X 5 MM MISC, , Disp: , Rfl:     Cholecalciferol 2000 units CAPS, Take 2,000 Units by mouth, Disp: , Rfl:   No Known Allergies    The following portions of the patient's history were reviewed and updated as appropriate: allergies, current medications, past family history, past medical history, past social history, past surgical history and problem list         Vitals:    08/14/18 0920   BP: 130/90   Pulse: 88   Resp: 16   Temp: 98 6 °F (37 °C)       Physical Exam   Constitutional: She is oriented to person, place, and time  She appears well-developed and well-nourished  HENT:   Head: Normocephalic and atraumatic  Pulmonary/Chest: Right breast exhibits no inverted nipple, no mass, no nipple discharge, no skin change and no tenderness  Left breast exhibits no inverted nipple, no mass, no nipple discharge, no skin change and no tenderness     Lymphadenopathy:        Right axillary: No pectoral and no lateral adenopathy present  Left axillary: No pectoral and no lateral adenopathy present  Right: No supraclavicular adenopathy present  Left: No supraclavicular adenopathy present  Neurological: She is alert and oriented to person, place, and time  Psychiatric: She has a normal mood and affect  Results:    Imaging  02/15/2018 bilateral screening mammogram is benign BI-RADS one density one    I reviewed the above imaging data  Discussion/Summary:  55-year-old female with history of fibrocystic changes of the breast as well as family history of breast cancer  There are no concerns on examination today  Her last mammogram was benign  I will make arrangements for her upcoming mammogram due in February  I will see her again in one year or sooner should the need arise

## 2018-09-06 ENCOUNTER — OFFICE VISIT (OUTPATIENT)
Dept: FAMILY MEDICINE CLINIC | Facility: CLINIC | Age: 58
End: 2018-09-06
Payer: COMMERCIAL

## 2018-09-06 VITALS
WEIGHT: 279 LBS | BODY MASS INDEX: 47.63 KG/M2 | HEIGHT: 64 IN | RESPIRATION RATE: 16 BRPM | TEMPERATURE: 98.2 F | SYSTOLIC BLOOD PRESSURE: 128 MMHG | OXYGEN SATURATION: 99 % | HEART RATE: 80 BPM | DIASTOLIC BLOOD PRESSURE: 74 MMHG

## 2018-09-06 DIAGNOSIS — IMO0002 UNCONTROLLED TYPE 2 DIABETES MELLITUS WITH PERIPHERAL NEUROPATHY: Primary | ICD-10-CM

## 2018-09-06 DIAGNOSIS — E66.01 OBESITY, CLASS III, BMI 40-49.9 (MORBID OBESITY) (HCC): ICD-10-CM

## 2018-09-06 DIAGNOSIS — E55.9 VITAMIN D DEFICIENCY: ICD-10-CM

## 2018-09-06 DIAGNOSIS — E78.5 HYPERLIPIDEMIA, UNSPECIFIED HYPERLIPIDEMIA TYPE: ICD-10-CM

## 2018-09-06 DIAGNOSIS — I10 BENIGN ESSENTIAL HYPERTENSION: ICD-10-CM

## 2018-09-06 DIAGNOSIS — K21.9 GERD WITHOUT ESOPHAGITIS: ICD-10-CM

## 2018-09-06 LAB
ALBUMIN SERPL BCP-MCNC: 3.7 G/DL (ref 3.5–5)
ALP SERPL-CCNC: 62 U/L (ref 46–116)
ALT SERPL W P-5'-P-CCNC: 17 U/L (ref 12–78)
ANION GAP SERPL CALCULATED.3IONS-SCNC: 8 MMOL/L (ref 4–13)
AST SERPL W P-5'-P-CCNC: 9 U/L (ref 5–45)
BILIRUB SERPL-MCNC: 0.63 MG/DL (ref 0.2–1)
BUN SERPL-MCNC: 33 MG/DL (ref 5–25)
CALCIUM SERPL-MCNC: 9.6 MG/DL (ref 8.3–10.1)
CHLORIDE SERPL-SCNC: 103 MMOL/L (ref 100–108)
CO2 SERPL-SCNC: 27 MMOL/L (ref 21–32)
CREAT SERPL-MCNC: 1.22 MG/DL (ref 0.6–1.3)
GFR SERPL CREATININE-BSD FRML MDRD: 49 ML/MIN/1.73SQ M
GLUCOSE P FAST SERPL-MCNC: 162 MG/DL (ref 65–99)
POTASSIUM SERPL-SCNC: 4.7 MMOL/L (ref 3.5–5.3)
PROT SERPL-MCNC: 7.3 G/DL (ref 6.4–8.2)
SL AMB POCT HEMOGLOBIN AIC: 7.6
SODIUM SERPL-SCNC: 138 MMOL/L (ref 136–145)
TSH SERPL DL<=0.05 MIU/L-ACNC: 1.05 UIU/ML (ref 0.36–3.74)

## 2018-09-06 PROCEDURE — 99214 OFFICE O/P EST MOD 30 MIN: CPT | Performed by: FAMILY MEDICINE

## 2018-09-06 PROCEDURE — 83036 HEMOGLOBIN GLYCOSYLATED A1C: CPT | Performed by: FAMILY MEDICINE

## 2018-09-06 PROCEDURE — 90682 RIV4 VACC RECOMBINANT DNA IM: CPT

## 2018-09-06 PROCEDURE — 3074F SYST BP LT 130 MM HG: CPT | Performed by: FAMILY MEDICINE

## 2018-09-06 PROCEDURE — 3078F DIAST BP <80 MM HG: CPT | Performed by: FAMILY MEDICINE

## 2018-09-06 PROCEDURE — 3045F PR MOST RECENT HEMOGLOBIN A1C LEVEL 7.0-9.0%: CPT | Performed by: FAMILY MEDICINE

## 2018-09-06 PROCEDURE — 84443 ASSAY THYROID STIM HORMONE: CPT | Performed by: FAMILY MEDICINE

## 2018-09-06 PROCEDURE — 80053 COMPREHEN METABOLIC PANEL: CPT | Performed by: FAMILY MEDICINE

## 2018-09-06 PROCEDURE — 90471 IMMUNIZATION ADMIN: CPT | Performed by: FAMILY MEDICINE

## 2018-09-06 PROCEDURE — 36415 COLL VENOUS BLD VENIPUNCTURE: CPT | Performed by: FAMILY MEDICINE

## 2018-09-06 NOTE — PROGRESS NOTES
Assessment/Plan:  Patient received flu blok vaccine today  Fasting labs drawn for CMP and TSH  Patient to continue present treatment  Patient instructed to follow a low-fat, low-salt and a low sugar/carbohydrate diet more carefully and get regular exercise as tolerated  Weight loss encouraged  Continue home glucose monitoring  Follow up with specialist as scheduled and return to the office in 3 months  GERD without esophagitis  Improved and asymptomatic on pantoprazole daily  Continue present treatment  Uncontrolled type 2 diabetes mellitus with peripheral neuropathy [facility])  Lab Results   Component Value Date    HGBA1C 7 6 09/06/2018    Diabetes under better control  Continue present treatment and follow a low sugar and low-carbohydrate diet more carefully  Continue home glucose monitoring and follow up with Endocrinology as scheduled  No results for input(s): POCGLU in the last 72 hours  Blood Sugar Average: Last 72 hrs:      Benign essential hypertension  BP well controlled  Continue present treatment  Hyperlipidemia  Lipids at goal   Continue present treatment  Obesity, Class III, BMI 40-49 9 (morbid obesity) ([address] )  Improved recommend further weight loss  Vitamin D deficiency  Continue vitamin-D supplement  Diagnoses and all orders for this visit:    Uncontrolled type 2 diabetes mellitus with peripheral neuropathy ([address] )  -     POCT hemoglobin A1c  -     Comprehensive metabolic panel  -     influenza vaccine, 1113-7712, quadrivalent, recombinant, PF, 0 5 mL, for patients 18 yr+ (FLUBLOK)    Benign essential hypertension  -     TSH, 3rd generation with Free T4 reflex    Hyperlipidemia, unspecified hyperlipidemia type    GERD without esophagitis    Obesity, Class III, BMI 40-49 9 (morbid obesity) ([address] )    Vitamin D deficiency          Subjective:      Patient ID: Andie Abebe is a 62 y o  female  Patient is here for follow-up appointment for chronic conditions and fasting labs  Patient has been feeling fairly well overall although admits to ongoing fatigue  She admits to not sleeping well and getting up 2-3 times a night to go to the bathroom  Patient has been out of work now for some time  No regular exercise program   Home glucose monitoring reveals fasting blood sugars 130-180  Patient is up-to-date on mammogram, colonoscopy, diabetic eye exam and foot exam   She has a follow-up appoint with Endocrinology in November of this year and follow up with Orthopedics in October of this year  Diabetes   She presents for her follow-up diabetic visit  She has type 2 diabetes mellitus  Her disease course has been improving  There are no hypoglycemic associated symptoms  Pertinent negatives for hypoglycemia include no headaches  Associated symptoms include fatigue and foot paresthesias  Pertinent negatives for diabetes include no blurred vision, no chest pain, no foot ulcerations, no polydipsia, no polyuria, no visual change, no weakness and no weight loss  There are no hypoglycemic complications  Symptoms are improving  Diabetic complications include peripheral neuropathy  Pertinent negatives for diabetic complications include no CVA, heart disease, nephropathy or retinopathy  Risk factors for coronary artery disease include dyslipidemia, diabetes mellitus, family history, obesity, hypertension and post-menopausal  Current diabetic treatment includes oral agent (dual therapy)  She is compliant with treatment all of the time  Her weight is decreasing steadily  She is following a generally unhealthy diet  She rarely participates in exercise  Her home blood glucose trend is decreasing steadily  Her breakfast blood glucose is taken between 8-9 am  Her breakfast blood glucose range is generally 140-180 mg/dl  An ACE inhibitor/angiotensin II receptor blocker is being taken  She sees a podiatrist Eye exam is current  Hypertension   This is a chronic problem  The problem is controlled  Associated symptoms include peripheral edema  Pertinent negatives include no anxiety, blurred vision, chest pain, headaches, orthopnea, palpitations, PND or shortness of breath  Past treatments include ACE inhibitors and diuretics  The current treatment provides significant improvement  Compliance problems include exercise and diet  There is no history of CAD/MI, CVA or retinopathy  The following portions of the patient's history were reviewed and updated as appropriate: allergies, current medications, past family history, past medical history, past social history, past surgical history and problem list     Review of Systems   Constitutional: Positive for fatigue  Negative for weight loss  Eyes: Negative for blurred vision  Respiratory: Negative for shortness of breath  Cardiovascular: Negative for chest pain, palpitations, orthopnea and PND  Endocrine: Negative for polydipsia and polyuria  Neurological: Negative for weakness and headaches  Objective:      /74   Pulse 80   Temp 98 2 °F (36 8 °C)   Resp 16   Ht 5' 4 17" (1 63 m)   Wt 127 kg (279 lb)   SpO2 99%   BMI 47 63 kg/m²          Physical Exam   Constitutional: She is oriented to person, place, and time  She appears well-developed and well-nourished  HENT:   Head: Normocephalic  Right Ear: External ear normal    Left Ear: External ear normal    Nose: Nose normal    Mouth/Throat: Oropharynx is clear and moist    Eyes: Conjunctivae are normal  No scleral icterus  Neck: Neck supple  No thyromegaly present  Cardiovascular: Normal rate and regular rhythm  Pulmonary/Chest: Effort normal and breath sounds normal    Abdominal: Soft  There is no tenderness  Obese   Musculoskeletal: She exhibits edema  Nonpitting edema of the lower extremities bilaterally  Lymphadenopathy:     She has no cervical adenopathy  Neurological: She is alert and oriented to person, place, and time  Skin: Skin is warm and dry  Psychiatric: She has a normal mood and affect

## 2018-09-06 NOTE — ASSESSMENT & PLAN NOTE
Lab Results   Component Value Date    HGBA1C 7 6 09/06/2018    Diabetes under better control  Continue present treatment and follow a low sugar and low-carbohydrate diet more carefully  Continue home glucose monitoring and follow up with Endocrinology as scheduled  No results for input(s): POCGLU in the last 72 hours      Blood Sugar Average: Last 72 hrs:

## 2018-09-16 PROBLEM — Z12.31 SCREENING MAMMOGRAM, ENCOUNTER FOR: Status: RESOLVED | Noted: 2018-08-14 | Resolved: 2018-09-16

## 2018-09-18 ENCOUNTER — OFFICE VISIT (OUTPATIENT)
Dept: FAMILY MEDICINE CLINIC | Facility: CLINIC | Age: 58
End: 2018-09-18
Payer: COMMERCIAL

## 2018-09-18 VITALS
HEIGHT: 64 IN | HEART RATE: 76 BPM | SYSTOLIC BLOOD PRESSURE: 152 MMHG | RESPIRATION RATE: 16 BRPM | TEMPERATURE: 98.7 F | BODY MASS INDEX: 47.8 KG/M2 | WEIGHT: 280 LBS | DIASTOLIC BLOOD PRESSURE: 80 MMHG

## 2018-09-18 DIAGNOSIS — E03.9 HYPOTHYROIDISM, UNSPECIFIED TYPE: Primary | ICD-10-CM

## 2018-09-18 DIAGNOSIS — E78.5 HYPERLIPIDEMIA, UNSPECIFIED HYPERLIPIDEMIA TYPE: ICD-10-CM

## 2018-09-18 DIAGNOSIS — L91.8 CUTANEOUS SKIN TAGS: Primary | ICD-10-CM

## 2018-09-18 DIAGNOSIS — IMO0002 UNCONTROLLED TYPE 2 DIABETES MELLITUS WITH PERIPHERAL NEUROPATHY: ICD-10-CM

## 2018-09-18 PROCEDURE — 11200 RMVL SKIN TAGS UP TO&INC 15: CPT | Performed by: FAMILY MEDICINE

## 2018-09-18 PROCEDURE — 1036F TOBACCO NON-USER: CPT | Performed by: FAMILY MEDICINE

## 2018-09-18 PROCEDURE — 3008F BODY MASS INDEX DOCD: CPT | Performed by: FAMILY MEDICINE

## 2018-09-18 PROCEDURE — 99212 OFFICE O/P EST SF 10 MIN: CPT | Performed by: FAMILY MEDICINE

## 2018-09-18 RX ORDER — LEVOTHYROXINE SODIUM 112 UG/1
112 TABLET ORAL DAILY
Qty: 90 TABLET | Refills: 0 | Status: SHIPPED | OUTPATIENT
Start: 2018-09-18 | End: 2018-12-09 | Stop reason: SDUPTHER

## 2018-09-18 RX ORDER — ROSUVASTATIN CALCIUM 20 MG/1
20 TABLET, COATED ORAL DAILY
Qty: 90 TABLET | Refills: 0 | Status: SHIPPED | OUTPATIENT
Start: 2018-09-18 | End: 2018-09-28 | Stop reason: SDUPTHER

## 2018-09-18 NOTE — PROGRESS NOTES
Skin tag removal  Date/Time: 9/18/2018 11:57 AM  Performed by: Dangelo Longoria by: Raven Alcantar     Procedure Details - Skin Tag Destruction:     Up to 15      Body area:  Head/neck    Head/neck location:  Neck    Malignancy: benign lesion      Destruction method: scissors used for extraction

## 2018-09-18 NOTE — PROGRESS NOTES
Assessment/Plan:    Seven skin tags identified for excision  1% lidocaine with epi, 1 mL used for local anesthetic  Cleansed with Betadine  Excision with scissors and electrocauterization  Antibiotic ointment and dot Band-Aids applied  Wound care instructions given to patient to keep clean and dry  Discussed signs and symptoms of infection  Return to the office in 1 week or sooner p r n Rosella Goldmann Diagnoses and all orders for this visit:    Cutaneous skin tags  Comments:  1% lidocaine with epi, 1 ml  Betadine cleansed  Excision with scissors and electrocauterization  Antibiotic ointment and dot Band-Aids  Orders:  -     Skin tag removal          Subjective:      Patient ID: Nila Lazar is a 62 y o  female  Patient requests removal of several skin tags on her neck  Patient states that skin tags have gotten in the way of her short collars and necklaces  She denies any bleeding or significant change in skin lesions  The following portions of the patient's history were reviewed and updated as appropriate: allergies, current medications, past family history, past medical history, past social history, past surgical history and problem list     Review of Systems      Objective:      /80 (BP Location: Left arm, Patient Position: Sitting, Cuff Size: Large)   Pulse 76   Temp 98 7 °F (37 1 °C) (Tympanic)   Resp 16   Ht 5' 4 37" (1 635 m)   Wt 127 kg (280 lb)   BMI 47 51 kg/m²          Physical Exam   Constitutional: She is oriented to person, place, and time  She appears well-developed and well-nourished  Eyes: Conjunctivae are normal    Neck: Neck supple  Cardiovascular: Normal rate and regular rhythm  Pulmonary/Chest: Effort normal and breath sounds normal    Lymphadenopathy:     She has no cervical adenopathy  Neurological: She is alert and oriented to person, place, and time  Skin: Skin is warm and dry  Several skin tags present on neck     Psychiatric: She has a normal mood and affect

## 2018-09-20 ENCOUNTER — TELEPHONE (OUTPATIENT)
Dept: FAMILY MEDICINE CLINIC | Facility: CLINIC | Age: 58
End: 2018-09-20

## 2018-09-20 NOTE — TELEPHONE ENCOUNTER
Patient insurance company called and requested that during Patient next Appt if we can set her up with diabetic health management program as it is apart of her benefit

## 2018-09-28 DIAGNOSIS — E78.5 HYPERLIPIDEMIA, UNSPECIFIED HYPERLIPIDEMIA TYPE: ICD-10-CM

## 2018-09-28 RX ORDER — ROSUVASTATIN CALCIUM 20 MG/1
TABLET, COATED ORAL
Qty: 90 TABLET | Refills: 0 | Status: SHIPPED | OUTPATIENT
Start: 2018-09-28 | End: 2019-03-08 | Stop reason: SDUPTHER

## 2018-10-11 ENCOUNTER — TELEPHONE (OUTPATIENT)
Dept: FAMILY MEDICINE CLINIC | Facility: CLINIC | Age: 58
End: 2018-10-11

## 2018-10-11 NOTE — TELEPHONE ENCOUNTER
Patient was seen for skin tag removal and and she states that we coded incorrectly that this was suppose to have a diagnoses of being diabetic please advise

## 2018-10-12 NOTE — TELEPHONE ENCOUNTER
Documentation and coding reviewed for [facility] 09/18/2018  Coding is correct  Charge for skin tag removal submitted to insurance and denied as non covered service  Please have the patient refer to their covered benefits summary or contact their health insurance  Please contact patient to advise

## 2018-10-12 NOTE — TELEPHONE ENCOUNTER
Patient called in and insist that the proper code is sent to her insurance company  She states that she did reach out to her insurance company and they said that with her being diabetic it would be covered, also that she had skin tag removals done in the past here, she states it should be documented that she is diagnose for diabetes   Please Yajaira Weaver

## 2018-10-15 NOTE — TELEPHONE ENCOUNTER
In order for coding to be changed on the claim an addendum will need to be done to the note to include the appropriate diagnosis for that visit  Once this is done I can send the coding changes to billing and request the claim be sent for review and reprocessing

## 2018-10-29 ENCOUNTER — APPOINTMENT (OUTPATIENT)
Dept: LAB | Facility: HOSPITAL | Age: 58
End: 2018-10-29
Payer: COMMERCIAL

## 2018-10-29 ENCOUNTER — TRANSCRIBE ORDERS (OUTPATIENT)
Dept: ADMINISTRATIVE | Facility: HOSPITAL | Age: 58
End: 2018-10-29

## 2018-10-29 DIAGNOSIS — IMO0002 UNCONTROLLED TYPE 2 DIABETES MELLITUS WITH POLYNEUROPATHY: ICD-10-CM

## 2018-10-29 DIAGNOSIS — IMO0002 UNCONTROLLED TYPE 2 DIABETES MELLITUS WITH POLYNEUROPATHY: Primary | ICD-10-CM

## 2018-10-29 LAB
EST. AVERAGE GLUCOSE BLD GHB EST-MCNC: 186 MG/DL
HBA1C MFR BLD: 8.1 % (ref 4.2–6.3)

## 2018-10-29 PROCEDURE — 36415 COLL VENOUS BLD VENIPUNCTURE: CPT

## 2018-10-29 PROCEDURE — 83036 HEMOGLOBIN GLYCOSYLATED A1C: CPT

## 2018-11-12 DIAGNOSIS — I10 ESSENTIAL HYPERTENSION: ICD-10-CM

## 2018-11-12 RX ORDER — LISINOPRIL AND HYDROCHLOROTHIAZIDE 25; 20 MG/1; MG/1
1 TABLET ORAL 2 TIMES DAILY
Qty: 180 TABLET | Refills: 3 | Status: SHIPPED | OUTPATIENT
Start: 2018-11-12 | End: 2019-12-07 | Stop reason: SDUPTHER

## 2018-12-09 DIAGNOSIS — E03.9 HYPOTHYROIDISM, UNSPECIFIED TYPE: ICD-10-CM

## 2018-12-09 RX ORDER — LEVOTHYROXINE SODIUM 112 UG/1
TABLET ORAL
Qty: 90 TABLET | Refills: 0 | Status: SHIPPED | OUTPATIENT
Start: 2018-12-09 | End: 2019-03-08 | Stop reason: SDUPTHER

## 2018-12-12 ENCOUNTER — TELEPHONE (OUTPATIENT)
Dept: FAMILY MEDICINE CLINIC | Facility: CLINIC | Age: 58
End: 2018-12-12

## 2018-12-12 DIAGNOSIS — M17.0 PRIMARY OSTEOARTHRITIS OF BOTH KNEES: Primary | ICD-10-CM

## 2018-12-12 RX ORDER — MELOXICAM 15 MG/1
15 TABLET ORAL DAILY
Qty: 90 TABLET | Refills: 0 | Status: SHIPPED | OUTPATIENT
Start: 2018-12-12 | End: 2019-04-02 | Stop reason: SDUPTHER

## 2018-12-12 NOTE — TELEPHONE ENCOUNTER
Pt is requesting a refill of her meloxicam 15mg take 1 daily for a 90 day supply to be sent to [facility] in [place]  It is not on her med list with us, but is showing up in [facility]  It was originally prescribed by Dr Matheus Perera, but she states that he wants her PCP to take over and monitor her kidney function while on this  She has an appt w/you on 12/17  Please advise  Thank you

## 2018-12-17 ENCOUNTER — OFFICE VISIT (OUTPATIENT)
Dept: FAMILY MEDICINE CLINIC | Facility: CLINIC | Age: 58
End: 2018-12-17
Payer: COMMERCIAL

## 2018-12-17 VITALS
TEMPERATURE: 98 F | SYSTOLIC BLOOD PRESSURE: 132 MMHG | BODY MASS INDEX: 46.32 KG/M2 | HEIGHT: 65 IN | HEART RATE: 76 BPM | RESPIRATION RATE: 16 BRPM | DIASTOLIC BLOOD PRESSURE: 76 MMHG | WEIGHT: 278 LBS

## 2018-12-17 DIAGNOSIS — E55.9 VITAMIN D DEFICIENCY: ICD-10-CM

## 2018-12-17 DIAGNOSIS — IMO0002 UNCONTROLLED TYPE 2 DIABETES MELLITUS WITH PERIPHERAL NEUROPATHY: Primary | ICD-10-CM

## 2018-12-17 DIAGNOSIS — I10 BENIGN ESSENTIAL HYPERTENSION: ICD-10-CM

## 2018-12-17 DIAGNOSIS — E03.9 HYPOTHYROIDISM, UNSPECIFIED TYPE: ICD-10-CM

## 2018-12-17 DIAGNOSIS — E78.5 HYPERLIPIDEMIA, UNSPECIFIED HYPERLIPIDEMIA TYPE: ICD-10-CM

## 2018-12-17 DIAGNOSIS — E66.01 OBESITY, CLASS III, BMI 40-49.9 (MORBID OBESITY) (HCC): ICD-10-CM

## 2018-12-17 DIAGNOSIS — M17.0 PRIMARY OSTEOARTHRITIS OF BOTH KNEES: ICD-10-CM

## 2018-12-17 DIAGNOSIS — K21.9 GERD WITHOUT ESOPHAGITIS: ICD-10-CM

## 2018-12-17 LAB
25(OH)D3 SERPL-MCNC: 24 NG/ML (ref 30–100)
ALBUMIN SERPL BCP-MCNC: 3.7 G/DL (ref 3.5–5)
ALP SERPL-CCNC: 65 U/L (ref 46–116)
ALT SERPL W P-5'-P-CCNC: 19 U/L (ref 12–78)
ANION GAP SERPL CALCULATED.3IONS-SCNC: 10 MMOL/L (ref 4–13)
AST SERPL W P-5'-P-CCNC: 12 U/L (ref 5–45)
BILIRUB SERPL-MCNC: 0.48 MG/DL (ref 0.2–1)
BUN SERPL-MCNC: 34 MG/DL (ref 5–25)
CALCIUM SERPL-MCNC: 9.4 MG/DL (ref 8.3–10.1)
CHLORIDE SERPL-SCNC: 104 MMOL/L (ref 100–108)
CHOLEST SERPL-MCNC: 143 MG/DL (ref 50–200)
CO2 SERPL-SCNC: 23 MMOL/L (ref 21–32)
CREAT SERPL-MCNC: 1.23 MG/DL (ref 0.6–1.3)
CREAT UR-MCNC: 79 MG/DL
GFR SERPL CREATININE-BSD FRML MDRD: 48 ML/MIN/1.73SQ M
GLUCOSE P FAST SERPL-MCNC: 161 MG/DL (ref 65–99)
HDLC SERPL-MCNC: 37 MG/DL (ref 40–60)
LDLC SERPL CALC-MCNC: 53 MG/DL (ref 0–100)
MICROALBUMIN UR-MCNC: 5.2 MG/L (ref 0–20)
MICROALBUMIN/CREAT 24H UR: 7 MG/G CREATININE (ref 0–30)
NONHDLC SERPL-MCNC: 106 MG/DL
POTASSIUM SERPL-SCNC: 4.7 MMOL/L (ref 3.5–5.3)
PROT SERPL-MCNC: 7 G/DL (ref 6.4–8.2)
SODIUM SERPL-SCNC: 137 MMOL/L (ref 136–145)
TRIGL SERPL-MCNC: 266 MG/DL

## 2018-12-17 PROCEDURE — 82570 ASSAY OF URINE CREATININE: CPT | Performed by: FAMILY MEDICINE

## 2018-12-17 PROCEDURE — 1036F TOBACCO NON-USER: CPT | Performed by: FAMILY MEDICINE

## 2018-12-17 PROCEDURE — 3075F SYST BP GE 130 - 139MM HG: CPT | Performed by: FAMILY MEDICINE

## 2018-12-17 PROCEDURE — 3061F NEG MICROALBUMINURIA REV: CPT | Performed by: FAMILY MEDICINE

## 2018-12-17 PROCEDURE — 3008F BODY MASS INDEX DOCD: CPT | Performed by: FAMILY MEDICINE

## 2018-12-17 PROCEDURE — 99214 OFFICE O/P EST MOD 30 MIN: CPT | Performed by: FAMILY MEDICINE

## 2018-12-17 PROCEDURE — 36415 COLL VENOUS BLD VENIPUNCTURE: CPT | Performed by: FAMILY MEDICINE

## 2018-12-17 PROCEDURE — 82043 UR ALBUMIN QUANTITATIVE: CPT | Performed by: FAMILY MEDICINE

## 2018-12-17 PROCEDURE — 80061 LIPID PANEL: CPT | Performed by: FAMILY MEDICINE

## 2018-12-17 PROCEDURE — 3078F DIAST BP <80 MM HG: CPT | Performed by: FAMILY MEDICINE

## 2018-12-17 PROCEDURE — 82306 VITAMIN D 25 HYDROXY: CPT | Performed by: FAMILY MEDICINE

## 2018-12-17 PROCEDURE — 80053 COMPREHEN METABOLIC PANEL: CPT | Performed by: FAMILY MEDICINE

## 2018-12-17 RX ORDER — GABAPENTIN 100 MG/1
100 CAPSULE ORAL
COMMUNITY
End: 2019-07-22 | Stop reason: SDUPTHER

## 2018-12-17 NOTE — PROGRESS NOTES
Assessment/Plan:  Fasting labs drawn for CMP, lipid profile, vitamin-D and urine microalbumin  Continue present treatment  Follow up with specialists as scheduled and return to the office in 4 months  Hypothyroidism  Clinically stable on levothyroxine 112 mcg daily  Uncontrolled type 2 diabetes mellitus with peripheral neuropathy [facility])  Lab Results   Component Value Date    HGBA1C 8 1 (H) 10/29/2018    Fair control  Continue present treatment and follow a low sugar and low-carbohydrate diet more carefully  Recommend regular aerobic exercise 150 min per week  Discussed swimming and/or water aerobics  Continue home glucose monitoring  No results for input(s): POCGLU in the last 72 hours  Blood Sugar Average: Last 72 hrs:      Benign essential hypertension  BP well controlled  Continue present treatment and follow a low-salt diet  Primary osteoarthritis of both knees  Symptomatic right knee  Continue meloxicam as per Orthopedics  Follow up with Orthopedics as scheduled  Hyperlipidemia  Lipids well controlled on rosuvastatin 20 mg daily  Low-fat and low-cholesterol diet  Obesity, Class III, BMI 40-49 9 (morbid obesity) ([facility] [address] )  Patient has lost approximately 30 lb  Recommend continued weight loss and discussed losing 10% of body weight or an additional 25-30 lb  Vitamin D deficiency  Vitamin-D level checked  Continue vitamin-D supplement         Diagnoses and all orders for this visit:    Uncontrolled type 2 diabetes mellitus with peripheral neuropathy ([address] )  -     Comprehensive metabolic panel  -     Lipid panel  -     Microalbumin / creatinine urine ratio    Benign essential hypertension    Hyperlipidemia, unspecified hyperlipidemia type  -     Lipid panel    Primary osteoarthritis of both knees    Vitamin D deficiency  -     Vitamin D 25 hydroxy    Obesity, Class III, BMI 40-49 9 (morbid obesity) ([facility])    Hypothyroidism, unspecified type    GERD without esophagitis    Other orders  - gabapentin (NEURONTIN) 100 mg capsule; Take 100 mg by mouth          Subjective:      Patient ID: Erica Rinaldi is a 62 y o  female  Patient is here for routine appointment for chronic conditions and fasting labs  Patient has been feeling fairly well overall although complains of continued right knee arthritic pain  No regular exercise program   Patient saw Dr Ankita Edwards, orthopedic surgeon on 10/12/2018 and he started patient on meloxicam and requested that we monitor her labs  He told patient that she would need to lose 50 lb in order to qualify for total knee replacement surgery  Patient is getting some relief of her knee pain without side effects  Patient sees Dr Jose aKye, endocrinologist every 6 months  Home glucose monitoring reveals fasting blood sugars 150-170  Patient is up-to-date on mammogram and colonoscopy  She is up-to-date on diabetic eye exam and foot exam       Diabetes   She presents for her follow-up diabetic visit  She has type 2 diabetes mellitus  Her disease course has been stable  There are no hypoglycemic associated symptoms  Pertinent negatives for hypoglycemia include no headaches  Associated symptoms include foot paresthesias  Pertinent negatives for diabetes include no blurred vision, no chest pain, no fatigue, no foot ulcerations, no polydipsia, no polyuria, no visual change, no weakness and no weight loss  There are no hypoglycemic complications  Symptoms are stable  Pertinent negatives for diabetic complications include no CVA or heart disease  Risk factors for coronary artery disease include family history, dyslipidemia, diabetes mellitus, hypertension, obesity and post-menopausal  Current diabetic treatment includes oral agent (dual therapy)  She is compliant with treatment all of the time  She is following a generally unhealthy diet  She rarely participates in exercise   Her breakfast blood glucose is taken between 8-9 am  Her breakfast blood glucose range is generally 140-180 mg/dl  An ACE inhibitor/angiotensin II receptor blocker is being taken  She sees a podiatrist Eye exam is current  Hypertension   This is a chronic problem  The problem is controlled  Associated symptoms include peripheral edema  Pertinent negatives include no anxiety, blurred vision, chest pain, headaches, orthopnea, palpitations, PND or shortness of breath  Past treatments include ACE inhibitors and diuretics  The current treatment provides significant improvement  There is no history of CAD/MI or CVA  Hyperlipidemia   This is a chronic problem  The problem is controlled  Recent lipid tests were reviewed and are normal  Exacerbating diseases include diabetes and obesity  She has no history of hypothyroidism  Pertinent negatives include no chest pain, focal weakness, leg pain, myalgias or shortness of breath  Current antihyperlipidemic treatment includes statins  The current treatment provides significant improvement of lipids  Compliance problems include adherence to exercise and adherence to diet  The following portions of the patient's history were reviewed and updated as appropriate: allergies, current medications, past family history, past medical history, past social history, past surgical history and problem list     Review of Systems   Constitutional: Negative for fatigue and weight loss  Eyes: Negative for blurred vision  Respiratory: Negative for shortness of breath  Cardiovascular: Negative for chest pain, palpitations, orthopnea and PND  Endocrine: Negative for polydipsia and polyuria  Musculoskeletal: Negative for myalgias  Neurological: Negative for focal weakness, weakness and headaches  Objective:      /76   Pulse 76   Temp 98 °F (36 7 °C)   Resp 16   Ht 5' 4 5" (1 638 m)   Wt 126 kg (278 lb)   BMI 46 98 kg/m²          Physical Exam   Constitutional: She is oriented to person, place, and time  She appears well-developed and well-nourished     HENT: Head: Normocephalic  Right Ear: External ear normal    Left Ear: External ear normal    Nose: Nose normal    Mouth/Throat: Oropharynx is clear and moist    Eyes: Conjunctivae are normal  No scleral icterus  Neck: Neck supple  No thyromegaly present  Cardiovascular: Normal rate and regular rhythm  Pulmonary/Chest: Effort normal and breath sounds normal    Abdominal: Soft  There is no tenderness  Musculoskeletal: She exhibits no edema  Lymphadenopathy:     She has no cervical adenopathy  Neurological: She is alert and oriented to person, place, and time  Skin: Skin is warm and dry  Psychiatric: She has a normal mood and affect

## 2018-12-17 NOTE — ASSESSMENT & PLAN NOTE
Lab Results   Component Value Date    HGBA1C 8 1 (H) 10/29/2018    Fair control  Continue present treatment and follow a low sugar and low-carbohydrate diet more carefully  Recommend regular aerobic exercise 150 min per week  Discussed swimming and/or water aerobics  Continue home glucose monitoring  No results for input(s): POCGLU in the last 72 hours      Blood Sugar Average: Last 72 hrs:

## 2018-12-17 NOTE — ASSESSMENT & PLAN NOTE
Symptomatic right knee  Continue meloxicam as per Orthopedics  Follow up with Orthopedics as scheduled

## 2018-12-17 NOTE — ASSESSMENT & PLAN NOTE
Patient has lost approximately 30 lb  Recommend continued weight loss and discussed losing 10% of body weight or an additional 25-30 lb

## 2018-12-22 DIAGNOSIS — K21.9 GASTROESOPHAGEAL REFLUX DISEASE WITHOUT ESOPHAGITIS: Primary | ICD-10-CM

## 2018-12-23 RX ORDER — PANTOPRAZOLE SODIUM 40 MG/1
TABLET, DELAYED RELEASE ORAL
Qty: 90 TABLET | Refills: 3 | Status: SHIPPED | OUTPATIENT
Start: 2018-12-23 | End: 2018-12-27 | Stop reason: SDUPTHER

## 2018-12-27 DIAGNOSIS — K21.9 GASTROESOPHAGEAL REFLUX DISEASE WITHOUT ESOPHAGITIS: ICD-10-CM

## 2018-12-27 RX ORDER — PANTOPRAZOLE SODIUM 40 MG/1
40 TABLET, DELAYED RELEASE ORAL DAILY
Qty: 90 TABLET | Refills: 2 | Status: SHIPPED | OUTPATIENT
Start: 2018-12-27 | End: 2020-03-13

## 2019-01-14 ENCOUNTER — TRANSCRIBE ORDERS (OUTPATIENT)
Dept: ADMINISTRATIVE | Facility: HOSPITAL | Age: 59
End: 2019-01-14

## 2019-01-14 ENCOUNTER — APPOINTMENT (OUTPATIENT)
Dept: LAB | Facility: HOSPITAL | Age: 59
End: 2019-01-14

## 2019-01-14 DIAGNOSIS — Z11.1 SCREENING EXAMINATION FOR PULMONARY TUBERCULOSIS: ICD-10-CM

## 2019-01-14 DIAGNOSIS — Z01.84 IMMUNITY STATUS TESTING: ICD-10-CM

## 2019-01-14 DIAGNOSIS — Z01.84 IMMUNITY STATUS TESTING: Primary | ICD-10-CM

## 2019-01-15 ENCOUNTER — APPOINTMENT (OUTPATIENT)
Dept: LAB | Facility: HOSPITAL | Age: 59
End: 2019-01-15

## 2019-01-15 ENCOUNTER — TRANSCRIBE ORDERS (OUTPATIENT)
Dept: ADMINISTRATIVE | Facility: HOSPITAL | Age: 59
End: 2019-01-15

## 2019-01-15 DIAGNOSIS — Z11.1 TUBERCULOSIS SCREENING: ICD-10-CM

## 2019-01-15 DIAGNOSIS — Z01.84 IMMUNITY STATUS TESTING: Primary | ICD-10-CM

## 2019-01-15 DIAGNOSIS — Z01.84 IMMUNITY STATUS TESTING: ICD-10-CM

## 2019-01-15 LAB — RUBV IGG SERPL IA-ACNC: <0.2 IU/ML

## 2019-01-15 PROCEDURE — 86735 MUMPS ANTIBODY: CPT

## 2019-01-15 PROCEDURE — 86762 RUBELLA ANTIBODY: CPT

## 2019-01-15 PROCEDURE — 86765 RUBEOLA ANTIBODY: CPT

## 2019-01-15 PROCEDURE — 86480 TB TEST CELL IMMUN MEASURE: CPT

## 2019-01-15 PROCEDURE — 36415 COLL VENOUS BLD VENIPUNCTURE: CPT

## 2019-01-15 PROCEDURE — 86787 VARICELLA-ZOSTER ANTIBODY: CPT

## 2019-01-16 LAB
GAMMA INTERFERON BACKGROUND BLD IA-ACNC: 0.05 IU/ML
M TB IFN-G BLD-IMP: NEGATIVE
M TB IFN-G CD4+ BCKGRND COR BLD-ACNC: 0.01 IU/ML
M TB IFN-G CD4+ BCKGRND COR BLD-ACNC: 0.03 IU/ML
MITOGEN IGNF BCKGRD COR BLD-ACNC: >10 IU/ML

## 2019-01-17 LAB
MEV IGG SER QL: NORMAL
MUV IGG SER QL: NORMAL
VZV IGG SER IA-ACNC: NORMAL

## 2019-02-16 ENCOUNTER — HOSPITAL ENCOUNTER (OUTPATIENT)
Dept: MAMMOGRAPHY | Facility: MEDICAL CENTER | Age: 59
Discharge: HOME | End: 2019-02-16
Payer: COMMERCIAL

## 2019-02-16 VITALS — HEIGHT: 65 IN | BODY MASS INDEX: 46.32 KG/M2 | WEIGHT: 278 LBS

## 2019-02-16 DIAGNOSIS — Z12.31 SCREENING MAMMOGRAM, ENCOUNTER FOR: ICD-10-CM

## 2019-02-16 PROCEDURE — 77067 SCR MAMMO BI INCL CAD: CPT

## 2019-03-08 DIAGNOSIS — E78.5 HYPERLIPIDEMIA, UNSPECIFIED HYPERLIPIDEMIA TYPE: ICD-10-CM

## 2019-03-08 DIAGNOSIS — E03.9 HYPOTHYROIDISM, UNSPECIFIED TYPE: ICD-10-CM

## 2019-03-08 RX ORDER — ROSUVASTATIN CALCIUM 20 MG/1
TABLET, COATED ORAL
Qty: 90 TABLET | Refills: 0 | Status: SHIPPED | OUTPATIENT
Start: 2019-03-08 | End: 2019-06-29 | Stop reason: SDUPTHER

## 2019-03-08 RX ORDER — LEVOTHYROXINE SODIUM 112 UG/1
TABLET ORAL
Qty: 90 TABLET | Refills: 0 | Status: SHIPPED | OUTPATIENT
Start: 2019-03-08 | End: 2019-04-09 | Stop reason: SDUPTHER

## 2019-03-21 ENCOUNTER — TELEPHONE (OUTPATIENT)
Dept: FAMILY MEDICINE CLINIC | Facility: CLINIC | Age: 59
End: 2019-03-21

## 2019-03-21 NOTE — TELEPHONE ENCOUNTER
Patient called again in reference to a bill she received for her OV on 09/18 - Removal of skin tags  I see a telephone encounter from 10/11/18 where the bill was re-submitted, however the patient states she just got off the phone with her insurance company and they are stating that when it was re-submitted no notation of her Diabetes dx was attached  Patient would like a call back if we can re-submit the bill again as I did explain that the note was edited to include appropriate dx codes for coverage          [phone] No

## 2019-03-26 DIAGNOSIS — E11.42 TYPE 2 DIABETES MELLITUS WITH DIABETIC POLYNEUROPATHY, WITHOUT LONG-TERM CURRENT USE OF INSULIN (HCC): ICD-10-CM

## 2019-04-01 NOTE — TELEPHONE ENCOUNTER
Pulled original request sent to billing  Sent follow up email to billing requesting the status on coding changes

## 2019-04-01 NOTE — TELEPHONE ENCOUNTER
Received response from billing that the dos in question was sent to coding for changes and to allow for more processing time

## 2019-04-02 DIAGNOSIS — M17.0 PRIMARY OSTEOARTHRITIS OF BOTH KNEES: ICD-10-CM

## 2019-04-02 RX ORDER — MELOXICAM 15 MG/1
15 TABLET ORAL DAILY
Qty: 90 TABLET | Refills: 0 | Status: SHIPPED | OUTPATIENT
Start: 2019-04-02 | End: 2019-07-19 | Stop reason: SDUPTHER

## 2019-04-09 DIAGNOSIS — E03.9 HYPOTHYROIDISM, UNSPECIFIED TYPE: ICD-10-CM

## 2019-04-09 RX ORDER — LEVOTHYROXINE SODIUM 112 UG/1
112 TABLET ORAL DAILY
Qty: 90 TABLET | Refills: 0 | Status: SHIPPED | OUTPATIENT
Start: 2019-04-09 | End: 2019-10-10 | Stop reason: SDUPTHER

## 2019-04-24 NOTE — TELEPHONE ENCOUNTER
Claim was corrected and submitted on 2/14  Insurance reported back saying the service is not covered  Seven Juarez explained to her on 3/21 that the service is not covered by her insurance  They received the email from the practice on 10/18, put a hold on the account and that is when the fabián said that even if code was changed it would not be covered by her insurance and the bill is her responsibility  Per Scott Saucedo in billing on 4/24/19  Would you like me to call the patient to review?

## 2019-04-25 NOTE — TELEPHONE ENCOUNTER
Ramesh Lemos, please help me look into this a little closer, I hadn't realized you and the patient had been working on this together  Patient states that her insurance company told her it is covered  Skin tag removal, but they did not put down that she is a diabetic  Did they not put down that she was diabetic again? Ramesh Lemos called billing and she documented that and she would follow up with her if she had not heard anything as of April 19th  Please call patient to review at   Also her insurance changed as of March 1st of 2019 are they rerunning under the correct insurance?

## 2019-04-25 NOTE — TELEPHONE ENCOUNTER
Please call billing to review that the changes I requested were done  I forwarded you the original email to billing for reference

## 2019-04-26 ENCOUNTER — OFFICE VISIT (OUTPATIENT)
Dept: FAMILY MEDICINE CLINIC | Facility: CLINIC | Age: 59
End: 2019-04-26
Payer: COMMERCIAL

## 2019-04-26 ENCOUNTER — TELEPHONE (OUTPATIENT)
Dept: FAMILY MEDICINE CLINIC | Facility: CLINIC | Age: 59
End: 2019-04-26

## 2019-04-26 VITALS
OXYGEN SATURATION: 96 % | HEART RATE: 67 BPM | HEIGHT: 65 IN | SYSTOLIC BLOOD PRESSURE: 120 MMHG | WEIGHT: 285 LBS | DIASTOLIC BLOOD PRESSURE: 78 MMHG | BODY MASS INDEX: 47.48 KG/M2 | RESPIRATION RATE: 16 BRPM | TEMPERATURE: 98 F

## 2019-04-26 DIAGNOSIS — E03.9 HYPOTHYROIDISM, UNSPECIFIED TYPE: ICD-10-CM

## 2019-04-26 DIAGNOSIS — I10 BENIGN ESSENTIAL HYPERTENSION: ICD-10-CM

## 2019-04-26 DIAGNOSIS — K21.9 GERD WITHOUT ESOPHAGITIS: ICD-10-CM

## 2019-04-26 DIAGNOSIS — E78.5 HYPERLIPIDEMIA, UNSPECIFIED HYPERLIPIDEMIA TYPE: ICD-10-CM

## 2019-04-26 DIAGNOSIS — E55.9 VITAMIN D DEFICIENCY: ICD-10-CM

## 2019-04-26 DIAGNOSIS — IMO0002 UNCONTROLLED TYPE 2 DIABETES MELLITUS WITH PERIPHERAL NEUROPATHY: Primary | ICD-10-CM

## 2019-04-26 DIAGNOSIS — M17.0 PRIMARY OSTEOARTHRITIS OF BOTH KNEES: ICD-10-CM

## 2019-04-26 DIAGNOSIS — E66.01 OBESITY, CLASS III, BMI 40-49.9 (MORBID OBESITY) (HCC): ICD-10-CM

## 2019-04-26 LAB — SL AMB POCT HEMOGLOBIN AIC: 8.3 (ref ?–6.5)

## 2019-04-26 PROCEDURE — 3074F SYST BP LT 130 MM HG: CPT | Performed by: FAMILY MEDICINE

## 2019-04-26 PROCEDURE — 3078F DIAST BP <80 MM HG: CPT | Performed by: FAMILY MEDICINE

## 2019-04-26 PROCEDURE — 83036 HEMOGLOBIN GLYCOSYLATED A1C: CPT | Performed by: FAMILY MEDICINE

## 2019-04-26 PROCEDURE — 3045F PR MOST RECENT HEMOGLOBIN A1C LEVEL 7.0-9.0%: CPT | Performed by: FAMILY MEDICINE

## 2019-04-26 PROCEDURE — 99214 OFFICE O/P EST MOD 30 MIN: CPT | Performed by: FAMILY MEDICINE

## 2019-05-01 NOTE — TELEPHONE ENCOUNTER
Per Arturo Parada in billing, she sees that Marylene Czech took off of the hold because changes were updated, corrected and resubmitted  She sees that a dx code of I030 was used  Under the resubmission status it says corrected  I asked Arturo Parada if the diabetes code was added as Kyung Crawford had asked in her request   From what Arturo Parada could see, they did not rebill with a code of E11  She did find Roseline's request and is of adding the E11  Arturo Parada is now going to retype/copy the request and forward to coding to resubmit  She also is going to have coding follow up with both Kyung Crawford and myself

## 2019-05-02 NOTE — TELEPHONE ENCOUNTER
Spoke with Juan Luis Cook in billing  She confirmed for me that a freeze was put on Shanique's account and she will not be receiving any more bills in the mail until the problem is resolved

## 2019-05-03 ENCOUNTER — TELEPHONE (OUTPATIENT)
Dept: FAMILY MEDICINE CLINIC | Facility: CLINIC | Age: 59
End: 2019-05-03

## 2019-05-09 ENCOUNTER — TELEPHONE (OUTPATIENT)
Dept: FAMILY MEDICINE CLINIC | Facility: CLINIC | Age: 59
End: 2019-05-09

## 2019-05-28 DIAGNOSIS — E11.42 TYPE 2 DIABETES MELLITUS WITH DIABETIC POLYNEUROPATHY, WITHOUT LONG-TERM CURRENT USE OF INSULIN (HCC): ICD-10-CM

## 2019-06-20 ENCOUNTER — OFFICE VISIT (OUTPATIENT)
Dept: FAMILY MEDICINE CLINIC | Facility: CLINIC | Age: 59
End: 2019-06-20
Payer: COMMERCIAL

## 2019-06-20 ENCOUNTER — TRANSCRIBE ORDERS (OUTPATIENT)
Dept: ADMINISTRATIVE | Facility: HOSPITAL | Age: 59
End: 2019-06-20

## 2019-06-20 ENCOUNTER — HOSPITAL ENCOUNTER (OUTPATIENT)
Dept: RADIOLOGY | Facility: HOSPITAL | Age: 59
Discharge: HOME | End: 2019-06-20
Payer: COMMERCIAL

## 2019-06-20 VITALS
HEIGHT: 65 IN | TEMPERATURE: 97.7 F | RESPIRATION RATE: 16 BRPM | DIASTOLIC BLOOD PRESSURE: 84 MMHG | WEIGHT: 280 LBS | OXYGEN SATURATION: 99 % | SYSTOLIC BLOOD PRESSURE: 156 MMHG | HEART RATE: 72 BPM | BODY MASS INDEX: 46.65 KG/M2

## 2019-06-20 DIAGNOSIS — M54.42 ACUTE LEFT-SIDED LOW BACK PAIN WITH LEFT-SIDED SCIATICA: ICD-10-CM

## 2019-06-20 DIAGNOSIS — M54.42 ACUTE LEFT-SIDED LOW BACK PAIN WITH LEFT-SIDED SCIATICA: Primary | ICD-10-CM

## 2019-06-20 PROCEDURE — 3008F BODY MASS INDEX DOCD: CPT | Performed by: FAMILY MEDICINE

## 2019-06-20 PROCEDURE — 72110 X-RAY EXAM L-2 SPINE 4/>VWS: CPT

## 2019-06-20 PROCEDURE — 99214 OFFICE O/P EST MOD 30 MIN: CPT | Performed by: FAMILY MEDICINE

## 2019-06-20 PROCEDURE — 96372 THER/PROPH/DIAG INJ SC/IM: CPT

## 2019-06-20 RX ORDER — METHOCARBAMOL 750 MG/1
750 TABLET, FILM COATED ORAL EVERY 6 HOURS PRN
Qty: 30 TABLET | Refills: 0 | Status: SHIPPED | OUTPATIENT
Start: 2019-06-20 | End: 2019-08-13

## 2019-06-20 RX ORDER — KETOROLAC TROMETHAMINE 30 MG/ML
30 INJECTION, SOLUTION INTRAMUSCULAR; INTRAVENOUS ONCE
Status: COMPLETED | OUTPATIENT
Start: 2019-06-20 | End: 2019-06-20

## 2019-06-20 RX ORDER — KETOROLAC TROMETHAMINE 30 MG/ML
30 INJECTION, SOLUTION INTRAMUSCULAR; INTRAVENOUS ONCE
Status: DISCONTINUED | OUTPATIENT
Start: 2019-06-20 | End: 2019-06-20

## 2019-06-20 RX ADMIN — KETOROLAC TROMETHAMINE 60 MG: 30 INJECTION, SOLUTION INTRAMUSCULAR; INTRAVENOUS at 12:41

## 2019-06-27 ENCOUNTER — EVALUATION (OUTPATIENT)
Dept: PHYSICAL THERAPY | Age: 59
End: 2019-06-27
Payer: COMMERCIAL

## 2019-06-27 DIAGNOSIS — M54.42 ACUTE BACK PAIN WITH SCIATICA, LEFT: Primary | ICD-10-CM

## 2019-06-27 PROCEDURE — 97163 PT EVAL HIGH COMPLEX 45 MIN: CPT | Performed by: PHYSICAL THERAPIST

## 2019-06-27 PROCEDURE — 97113 AQUATIC THERAPY/EXERCISES: CPT | Performed by: PHYSICAL THERAPIST

## 2019-06-29 DIAGNOSIS — E78.5 HYPERLIPIDEMIA, UNSPECIFIED HYPERLIPIDEMIA TYPE: ICD-10-CM

## 2019-07-01 ENCOUNTER — OFFICE VISIT (OUTPATIENT)
Dept: PHYSICAL THERAPY | Age: 59
End: 2019-07-01
Payer: COMMERCIAL

## 2019-07-01 DIAGNOSIS — M54.42 ACUTE BACK PAIN WITH SCIATICA, LEFT: Primary | ICD-10-CM

## 2019-07-01 PROCEDURE — 97113 AQUATIC THERAPY/EXERCISES: CPT

## 2019-07-01 RX ORDER — ROSUVASTATIN CALCIUM 20 MG/1
TABLET, COATED ORAL
Qty: 90 TABLET | Refills: 0 | Status: SHIPPED | OUTPATIENT
Start: 2019-07-01 | End: 2019-10-15 | Stop reason: SDUPTHER

## 2019-07-01 NOTE — PROGRESS NOTES
Daily Note     Today's date: 2019  Patient name: Gianfranco Bentley  : 1960  MRN: [id number]  Referring provider: Ryan Marx DO  Dx:   Encounter Diagnosis     ICD-10-CM    1  Acute back pain with sciatica, left M54 42        Start Time: 1300  Stop Time: 1355  Total time in clinic (min): 55 minutes    Subjective: pt reports she was a little sore but did feel good after she left  Objective: See treatment diary below      Assessment: Tolerated treatment well  Added UE and core ex's w/o complaints  Pt did have increased pain down L LE w/ [employer]  Patient would benefit from continued PT      Plan: Continue per plan of care        Precautions: DM, HTN, right RTC repair, GERD, IBS      Exercise Diary             Water walking For 4' 5           Postural training             Gait training 2' 5           Home exercise pgm/patient education 10            Wall: t/h raises  1           Hip abd/add  1             1           squats  1           Knee flex/ext  1           Step-ups (fwd/bkwd/ss)             SLS (eyes open/closed)             SLS w UE mvmt  AROM/ball toss             Weight shifting             UE Noodle work x 4   5           UE AROM             Resistive UE work (paddles, bells, TB)  2 red           Core work ball press  2           Sit on noodle with movement             Seated on pool bench w proper posture  1           Ankle df/pf  1             1           Hip Ab/add  1           Knee flex/ext  1           Deep water mvmt  3           Deep water tx/stretching 10' 10           Specific self - stretches wall/steps 2' 2               Modalities             whirlpool ' 10

## 2019-07-03 ENCOUNTER — OFFICE VISIT (OUTPATIENT)
Dept: PHYSICAL THERAPY | Age: 59
End: 2019-07-03
Payer: COMMERCIAL

## 2019-07-03 DIAGNOSIS — M54.42 ACUTE BACK PAIN WITH SCIATICA, LEFT: Primary | ICD-10-CM

## 2019-07-03 PROCEDURE — 97113 AQUATIC THERAPY/EXERCISES: CPT

## 2019-07-03 NOTE — PROGRESS NOTES
Daily Note     Today's date: 7/3/2019  Patient name: Octavia Puckett  : 1960  MRN: [id number]  Referring provider: Nacho Pruitt DO  Dx:   Encounter Diagnosis     ICD-10-CM    1  Acute back pain with sciatica, left M54 42        Start Time: 1645  Stop Time: 1745  Total time in clinic (min): 60 minutes    Subjective: pt reports pain continues in LB /10  She c/o shin splints and foot cramping  " I need to drink more water"  Objective: See treatment diary below      Assessment: Tolerated treatment well  Added Rows using a noodle today w/o complaints  Pt did have L foot cramping after HS stretch on step  Pt had to sit on the bench for 10 min, cold water was given to pt to drink and gentle AROM to L foot  Patient would benefit from continued PT      Plan: Continue per plan of care        Precautions: DM, HTN, right RTC repair, GERD, IBS      Exercise Diary  6/27 7/1 7/3          Water walking For 4' 5 5          Postural training             Gait training 2' 5 5          Home exercise pgm/patient education 10'            Wall: t/h raises  1 1          Hip abd/add  1 1            1 1          squats  1 1          Knee flex/ext  1 1          Step-ups (fwd/bkwd/ss)             SLS (eyes open/closed)             SLS w UE mvmt  AROM/ball toss             Weight shifting             UE Noodle work x 4   5 5          UE AROM             Resistive UE work (paddles, bells, TB)  2 red 2          Core work ball press  2 2          Sit on noodle with movement             Seated on pool bench w proper posture  1 1          Ankle df/pf  1   1 1          Hip Ab/add  1 1          Knee flex/ext  1 1          Deep water mvmt  3 3          Deep water tx/stretching 10' 10 10          Specific self - stretches wall/steps 2' 2 2              Modalities   7/3          whirlpool 5' 10 10

## 2019-07-08 LAB
LEFT EYE DIABETIC RETINOPATHY: NORMAL
RIGHT EYE DIABETIC RETINOPATHY: NORMAL

## 2019-07-08 PROCEDURE — 3072F LOW RISK FOR RETINOPATHY: CPT | Performed by: FAMILY MEDICINE

## 2019-07-09 ENCOUNTER — OFFICE VISIT (OUTPATIENT)
Dept: PHYSICAL THERAPY | Age: 59
End: 2019-07-09
Payer: COMMERCIAL

## 2019-07-09 DIAGNOSIS — M54.42 ACUTE BACK PAIN WITH SCIATICA, LEFT: Primary | ICD-10-CM

## 2019-07-09 PROCEDURE — 97113 AQUATIC THERAPY/EXERCISES: CPT | Performed by: PHYSICAL THERAPIST

## 2019-07-09 NOTE — PROGRESS NOTES
Daily Note     Today's date: 2019  Patient name: Jon Perez  : 1960  MRN: [id number]  Referring provider: Heather Sampson DO  Dx:   Encounter Diagnosis     ICD-10-CM    1  Acute back pain with sciatica, left M54 42        Start Time: 1300  Stop Time: 1355  Total time in clinic (min): 55 minutes    Subjective: Patient complains of left back and hip pain at 7/10      Objective: See treatment diary below    Precautions: DM, HTN, right RTC repair, GERD, IBS      Exercise Diary  6/27 7/1 7/3 7/9         Water walking For 4' 5 5 5         Postural training             Gait training 2' 5 5 5         Home exercise pgm/patient education 10'            Wall: t/h raises  1 1 1         Hip abd/add  1 1 1           1 1 1         squats  1 1 1         Knee flex/ext  1 1 1         Step-ups (fwd/bkwd/ss)             SLS (eyes open/closed)             SLS w UE mvmt  AROM/ball toss             Weight shifting             UE Noodle work x 4   5 5 5         UE AROM             Resistive UE work (paddles, bells, TB)  2 red 2 2         Core work ball press  2 2 2         Sit on noodle with movement             Seated on pool bench w proper posture  1 1 1         Ankle df/pf  1 1 1           1 1 1         Hip Ab/add  1 1 1         Knee flex/ext  1 1 1         Deep water mvmt  3 3 3         Deep water tx/stretching 10' 10 10 10         Specific self - stretches wall/steps 2' 2 2 2             Modalities  6/27 7/1 7/3 7/9         whirlpool 5' 10 10 10                      Assessment: Tolerated treatment well  Patient exhibited good technique with therapeutic exercises and would benefit from continued PT, still with complaints of left back, hip and groin      Plan: Progress treatment as tolerated

## 2019-07-12 ENCOUNTER — OFFICE VISIT (OUTPATIENT)
Dept: PHYSICAL THERAPY | Age: 59
End: 2019-07-12
Payer: COMMERCIAL

## 2019-07-12 DIAGNOSIS — M54.42 ACUTE BACK PAIN WITH SCIATICA, LEFT: Primary | ICD-10-CM

## 2019-07-12 PROCEDURE — 97113 AQUATIC THERAPY/EXERCISES: CPT

## 2019-07-12 NOTE — PROGRESS NOTES
Daily Note     Today's date: 2019  Patient name: Lucina Young  : 1960  MRN: [id number]  Referring provider: Chris Bunch DO  Dx:   Encounter Diagnosis     ICD-10-CM    1  Acute back pain with sciatica, left M54 42        Start Time: 1255  Stop Time: 1355  Total time in clinic (min): 60 minutes    Subjective: Patient reports increased lower back pain today  Notes getting better since starting with aquatic therapy  Objective: See treatment diary below      Assessment: Tolerated treatment well with all aquatic exercises  Guarded movements out of water  Patient exhibited good technique with therapeutic exercises and would benefit from continued PT      Plan: Continue per plan of care        Precautions: DM, HTN, right RTC repair, GERD, IBS      Exercise Diary  6/27 7/1 7/3 7/9 7/12        Water walking For 4' 5 5 5 5        Postural training             Gait training 2' 5 5 5 5        Home exercise pgm/patient education 10'            Wall: t/h raises  1 1 1 1        Hip abd/add  1 1 1 1        Marching  1 1 1 1        squats  1 1 1 1        Knee flex/ext  1 1 1 1        Step-ups (fwd/bkwd/ss)             SLS (eyes open/closed)             SLS w UE mvmt  AROM/ball toss             Weight shifting             UE Noodle work x 4   5 5 5 5        UE AROM             Resistive UE work (paddles, bells, TB)  2 red 2 2 2        Core work ball press  2 2 2 2        Sit on noodle with movement             Seated on pool bench w proper posture  1 1 1 1        Ankle df/pf  1 1 1 1        marching  1 1 1 1        Hip Ab/add  1 1 1 1        Knee flex/ext  1 1 1 1        Deep water mvmt  3 3 3 3        Deep water tx/stretching 10' 10 10 10 10        Specific self - stretches wall/steps 2' 2 2 2 2            Modalities  6/27 7/1 7/3 7/9 7/12        whirlpool  10 10 10 10

## 2019-07-15 ENCOUNTER — OFFICE VISIT (OUTPATIENT)
Dept: PHYSICAL THERAPY | Age: 59
End: 2019-07-15
Payer: COMMERCIAL

## 2019-07-15 DIAGNOSIS — M54.42 ACUTE BACK PAIN WITH SCIATICA, LEFT: Primary | ICD-10-CM

## 2019-07-15 PROCEDURE — 97113 AQUATIC THERAPY/EXERCISES: CPT | Performed by: PHYSICAL THERAPIST

## 2019-07-15 NOTE — PROGRESS NOTES
Daily Note     Today's date: 7/15/2019  Patient name: Yana Li  : 1960  MRN: [id number]  Referring provider: Josie Crowley DO  Dx:   Encounter Diagnosis     ICD-10-CM    1  Acute back pain with sciatica, left M54 42        Start Time: 1300  Stop Time: 1357  Total time in clinic (min): 57 minutes    Subjective: Patient notes some improvement      Objective: See treatment diary below       Precautions: DM, HTN, right RTC repair, GERD, IBS      Exercise Diary  6/27 7/1 7/3 7/9 7/12 7/15       Water walking For 4' 5 5 5 5 5       Postural training             Gait training 2' 5 5 5 5 5       Home exercise pgm/patient education 10'            Wall: t/h raises  1 1 1 1 1       Hip abd/add  1 1 1 1 1       Marching  1 1 1 1 1       squats  1 1 1 1 1       Knee flex/ext  1 1 1 1 1       Step-ups (fwd/bkwd/ss)             SLS (eyes open/closed)             SLS w UE mvmt  AROM/ball toss             Weight shifting             UE Noodle work x 4   5 5 5 5 5       UE AROM             Resistive UE work (paddles, bells, TB)  2 red 2 2 2 2       Core work ball press  2 2 2 2 2       Sit on noodle with movement             Seated on pool bench w proper posture  1 1 1 1 1       Ankle df/pf  1 1 1 1 1       marching  1 1 1 1 1       Hip Ab/add  1 1 1 1 1       Knee flex/ext  1 1 1 1 1       Deep water mvmt  3 3 3 3 3       Deep water tx/stretching 10' 10 10 10 10 10       Specific self - stretches wall/steps 2' 2 2 2 2 2           Modalities  6/27 7/1 7/3 7/9 7/12 7/15       whirlpool 5' 10 10 10 10 10                       Assessment: Tolerated treatment well  Patient demonstrated fatigue post treatment, exhibited good technique with therapeutic exercises and would benefit from continued PT, slow steady movements in pool, decreased  Pain with water exercises and [address]: Progress treatment as tolerated

## 2019-07-17 NOTE — PROGRESS NOTES
Daily Note     Today's date: 2019  Patient name: Rommel Avendaño  : 1960  MRN: [id number]  Referring provider: Tejinder Almodovar DO  Dx:   Encounter Diagnosis     ICD-10-CM    1  Acute back pain with sciatica, left M54 42        Start Time: 930  Stop Time: 1028  Total time in clinic (min): 58 minutes    Subjective: Patient reports increased pain today, notes mornings are rough due to increased pain  Rate pain today a 7/10  Objective: See treatment diary below      Assessment: Tolerated treatment fairly well,  slow steady movements in pool, decreased  pain with water exercises and DEW TX,   Patient exhibited good technique with therapeutic exercises and would benefit from continued PT      Plan: Continue per plan of care        Precautions: DM, HTN, right RTC repair, GERD, IBS      Exercise Diary  6/27 7/1 7/3 7/9 7/12 7/15 7/18      Water walking For 4' 5 5 5 5 5 5      Postural training             Gait training 2' 5 5 5 5 5 5      Home exercise pgm/patient education 10'            Wall: t/h raises  1 1 1 1 1 1      Hip abd/add  1 1 1 1 1 2      Marching  1 1 1 1 1 1      squats  1 1 1 1 1 1      Knee flex/ext  1 1 1 1 1 1      Step-ups (fwd/bkwd/ss)             SLS (eyes open/closed)             SLS w UE mvmt  AROM/ball toss             Weight shifting             UE Noodle work x 4   5 5 5 5 5 5      UE AROM             Resistive UE work (paddles, bells, TB)  2 red 2 2 2 2 2      Core work ball press  2 2 2 2 2 2      Sit on noodle with movement             Seated on pool bench w proper posture  1 1 1 1 1 1      Ankle df/pf  1 1 1 1 1 1      marching  1 1 1 1 1 1      Hip Ab/add  1 1 1 1 1 1      Knee flex/ext  1 1 1 1 1 1      Deep water mvmt  3 3 3 3 3 3      Deep water tx/stretching 10' 10 10 10 10 10 10      Specific self - stretches wall/steps 2' 2 2 2 2 2 2          Modalities   7/3 7/9 7/12 7/15 7/18      whirlpool 5' 10 10 10 10 10 10'

## 2019-07-18 ENCOUNTER — OFFICE VISIT (OUTPATIENT)
Dept: PHYSICAL THERAPY | Age: 59
End: 2019-07-18
Payer: COMMERCIAL

## 2019-07-18 DIAGNOSIS — M54.42 ACUTE BACK PAIN WITH SCIATICA, LEFT: Primary | ICD-10-CM

## 2019-07-18 PROCEDURE — 97113 AQUATIC THERAPY/EXERCISES: CPT

## 2019-07-19 DIAGNOSIS — M17.0 PRIMARY OSTEOARTHRITIS OF BOTH KNEES: ICD-10-CM

## 2019-07-19 RX ORDER — MELOXICAM 15 MG/1
15 TABLET ORAL DAILY
Qty: 90 TABLET | Refills: 0 | Status: SHIPPED | OUTPATIENT
Start: 2019-07-19 | End: 2019-10-13 | Stop reason: SDUPTHER

## 2019-07-22 DIAGNOSIS — IMO0002 UNCONTROLLED TYPE 2 DIABETES MELLITUS WITH PERIPHERAL NEUROPATHY: Primary | ICD-10-CM

## 2019-07-22 RX ORDER — GABAPENTIN 100 MG/1
100 CAPSULE ORAL 2 TIMES DAILY
Qty: 60 CAPSULE | Refills: 3 | Status: SHIPPED | OUTPATIENT
Start: 2019-07-22 | End: 2019-10-18 | Stop reason: SDUPTHER

## 2019-07-25 ENCOUNTER — OFFICE VISIT (OUTPATIENT)
Dept: PHYSICAL THERAPY | Age: 59
End: 2019-07-25
Payer: COMMERCIAL

## 2019-07-25 DIAGNOSIS — M54.42 ACUTE BACK PAIN WITH SCIATICA, LEFT: Primary | ICD-10-CM

## 2019-07-25 PROCEDURE — 97113 AQUATIC THERAPY/EXERCISES: CPT

## 2019-07-25 NOTE — PROGRESS NOTES
Daily Note     Today's date: 2019  Patient name: Arely Campbell  : 1960  MRN: [id number]  Referring provider: Heidi Lighter, DO  Dx:   Encounter Diagnosis     ICD-10-CM    1  Acute back pain with sciatica, left M54 42        Start Time: 1300  Stop Time: 1400  Total time in clinic (min): 60 minutes    Subjective: pt c/o LBP and sciatica pain 8/10 today  She states she feels better in the water and she does get some relief till the next day  She notes the next day she is really tight in her L LE/buttock  She also states she is doing more around the house now and she hasn't used her walker in about 3 weeks  Objective: See treatment diary below   Goals set at evaluation:  ST-3 WEEKS  1  Decrease pain in LB < 7/10 on VAS at its worst pain is still 7-8/10 at worst but good days 3-4/10  2  Increase ROM L/S right SB,  and extension without symptoms   R lumbar SB and ext did not provoke any symptoms now  3   Improve ambulation to less AD on level surfaces with safe technique   pt is not using any AD to ambulate now      Assessment: Tolerated treatment fairly well  Modified pt's program today to help w/ sciatica pain  Began session w/ DWTX  f/b deep water WW and ex's then ended w/ DWTX again  Pt did have relief after session pain noted at 4/10, will monitor NV to see if  The modified program helped w/ pain and tightness the next day  Pt is showing positive gains toward goals that were set by PT at initial eval  Patient would benefit from continued PT to help reach LTG and also to improve quality of life by decreasing pain and improving function  Plan: Continue per plan of care        Precautions: DM, HTN, right RTC repair, GERD, IBS      Exercise Diary   7/ 7/3 7/9 7/12 7/15 7/18 7/25     Water walking For 4' 5 5 5 5 5 5 5     Postural training             Gait training 2' 5 5 5 5 5 5      Home exercise pgm/patient education 10'            Wall: t/h raises  1 1 1 1 1 1 1     Hip abd/add  1 1 1 1 1 2 2     Marching  1 1 1 1 1 1 1     squats  1 1 1 1 1 1 nt     Knee flex/ext  1 1 1 1 1 1 1     Step-ups (fwd/bkwd/ss)             SLS (eyes open/closed)             SLS w UE mvmt  AROM/ball toss             Weight shifting             UE Noodle work x 4   5 5 5 5 5 5 nt     UE AROM             Resistive UE work (paddles, bells, TB)  2 red 2 2 2 2 2 2     Core work ball press  2 2 2 2 2 2 2     Sit on noodle with movement             Seated on pool bench w proper posture  1 1 1 1 1 1 1     Ankle df/pf  1 1 1 1 1 1 1     marching  1 1 1 1 1 1 1     Hip Ab/add  1 1 1 1 1 1 1     Knee flex/ext  1 1 1 1 1 1 1     Deep water mvmt  3 3 3 3 3 3 5     Deep water tx/stretching 10' 10 10 10 10 10 10 20     Specific self - stretches wall/steps 2' 2 2 2 2 2 2 2         Modalities  6/27 7/1 7/3 7/9 7/12 7/15 7/18 7/25     whirlpool 5' 10 10 10 10 10 10' 10

## 2019-07-29 ENCOUNTER — TELEPHONE (OUTPATIENT)
Dept: FAMILY MEDICINE CLINIC | Facility: CLINIC | Age: 59
End: 2019-07-29

## 2019-07-29 ENCOUNTER — APPOINTMENT (OUTPATIENT)
Dept: LAB | Facility: HOSPITAL | Age: 59
End: 2019-07-29
Payer: COMMERCIAL

## 2019-07-29 ENCOUNTER — APPOINTMENT (OUTPATIENT)
Dept: PHYSICAL THERAPY | Age: 59
End: 2019-07-29
Payer: COMMERCIAL

## 2019-07-29 DIAGNOSIS — R30.0 DYSURIA: Primary | ICD-10-CM

## 2019-07-29 LAB
BACTERIA UR QL AUTO: ABNORMAL /HPF
BILIRUB UR QL STRIP: NEGATIVE
CLARITY UR: CLEAR
COLOR UR: YELLOW
GLUCOSE UR STRIP-MCNC: ABNORMAL MG/DL
HGB UR QL STRIP.AUTO: NEGATIVE
HYALINE CASTS #/AREA URNS LPF: ABNORMAL /LPF
KETONES UR STRIP-MCNC: NEGATIVE MG/DL
LEUKOCYTE ESTERASE UR QL STRIP: ABNORMAL
NITRITE UR QL STRIP: NEGATIVE
NON-SQ EPI CELLS URNS QL MICRO: ABNORMAL /HPF
PH UR STRIP.AUTO: 6 [PH]
PROT UR STRIP-MCNC: NEGATIVE MG/DL
RBC #/AREA URNS AUTO: ABNORMAL /HPF
SP GR UR STRIP.AUTO: 1.02 (ref 1–1.03)
UROBILINOGEN UR QL STRIP.AUTO: 0.2 E.U./DL
WBC #/AREA URNS AUTO: ABNORMAL /HPF

## 2019-07-29 PROCEDURE — 81001 URINALYSIS AUTO W/SCOPE: CPT | Performed by: FAMILY MEDICINE

## 2019-07-29 NOTE — TELEPHONE ENCOUNTER
Patient has burning when urinating and after  Patient would like an order for a urinalysis so that that she can have it done in [place]  Please advise

## 2019-08-02 ENCOUNTER — TELEPHONE (OUTPATIENT)
Dept: FAMILY MEDICINE CLINIC | Facility: CLINIC | Age: 59
End: 2019-08-02

## 2019-08-02 NOTE — TELEPHONE ENCOUNTER
Pt called, stating that you have been working with her to resolve some billing issues and is requesting a phone call back  This is in regard to the skin tag removal, she states that the wrong DX code is being used  She states that she because she is diabetic, this is covered, but the diabetes code needs to be on there  I advised her that you will be out until Tuesday, and would not get back to her until sometime next week and she was fine with that  Her phone #[phone]    Thank you

## 2019-08-08 NOTE — TELEPHONE ENCOUNTER
LM on machine for patient advising I spoke to [facility] in billing and her account was placed back on hold  I am working with billing and coding to resolved

## 2019-08-13 ENCOUNTER — OFFICE VISIT (OUTPATIENT)
Dept: OBGYN CLINIC | Facility: MEDICAL CENTER | Age: 59
End: 2019-08-13
Payer: COMMERCIAL

## 2019-08-13 VITALS
SYSTOLIC BLOOD PRESSURE: 140 MMHG | BODY MASS INDEX: 47.03 KG/M2 | DIASTOLIC BLOOD PRESSURE: 84 MMHG | HEIGHT: 65 IN | WEIGHT: 282.3 LBS

## 2019-08-13 DIAGNOSIS — Z11.51 SCREENING FOR HUMAN PAPILLOMAVIRUS (HPV): ICD-10-CM

## 2019-08-13 DIAGNOSIS — Z01.419 ENCOUNTER FOR ROUTINE GYNECOLOGICAL EXAMINATION WITH PAPANICOLAOU SMEAR OF CERVIX: Primary | ICD-10-CM

## 2019-08-13 PROCEDURE — S0610 ANNUAL GYNECOLOGICAL EXAMINA: HCPCS | Performed by: OBSTETRICS & GYNECOLOGY

## 2019-08-13 NOTE — PROGRESS NOTES
ASSESSMENT & PLAN: Terra Garcia was seen today for vaginitis  Diagnoses and all orders for this visit:    Encounter for routine gynecological examination with Papanicolaou smear of cervix  -     Thinprep Tis Pap and HPV mRNA E6/E7 Reflex HPV 16,18/45    Screening for human papillomavirus (HPV)  -     Thinprep Tis Pap and HPV mRNA E6/E7 Reflex HPV 16,18/45    Discussion/Summary:  Patient is new to myself and the practice referred by primary kasandra,giulia Gaitan; Caitlin Jeong brought an excellent health care summary for review; she has multiple health issues but is being very compliant, including a scheduled visit with Dr Annika Gonzalez tomorrow  Ireviewed potential post menopausal gyn emergencies and also self breast exam, giving her a waterproof placard with instructions and had her palpate "lumps" on our "foam" model  Explained new protocol for pap and HPV since she has not had one in many years  1   Routine well woman exam done today  2  Pap and HPV:  The patient's pap is not up to date  Pap and cotesting was done today  Current ASCCP Guidelines reviewed  3   Mammogram was ordered by Dr Annika Gonzalez  4   Colonoscopy is up to date  4  The following were reviewed in today's visit: breast self exam, adequate intake of calcium and vitamin D, exercise and healthy diet  5  F/u 1 year  CC:  Annual Gynecologic Examination    HPI: Viridiana Marx is a 62 y o  who presents for annual gynecologic examination  She has the following concerns:  Skin tag and suprapubic irritation    Health Maintenance:    Patient describes her health as good  Patient has weight concerns  She exercises 7 days per week with walking   She does wear her seatbelt routinely  She does perform regular monthly self breast exams  She does feel safe at home  Patients does follow a diabetic diet  Patient gets 5 servings of dairy or calcium rich foods a day      Last pap: unknown date  Last mammogram: 2018  Last colonoscopy: 2012    Patient Active Problem List   Diagnosis    Anemia    Benign essential hypertension    Arthritis    Mixed diabetic hyperlipidemia associated with type 2 diabetes mellitus ([facility] [address] )    Disc degeneration, lumbar    Fibrocystic breast disease, unspecified laterality    GERD without esophagitis    Hyperlipidemia    Hypothyroidism    IBS (irritable bowel syndrome)    Neoplasm of uncertain behavior of skin    Obesity, Class III, BMI 40-49 9 (morbid obesity) (HCC)    MARIA FERNANDA (obstructive sleep apnea)    Primary osteoarthritis of both knees    Vitamin D deficiency    Uncontrolled type 2 diabetes mellitus with peripheral neuropathy (HCC)    Fatty liver    Full thickness rotator cuff tear    Shoulder joint pain    Family history of breast cancer in female       Past Medical History:   Diagnosis Date    Achrochordon     Acute bronchitis     Allergic rhinitis     Dysuria     IBS (irritable bowel syndrome)     Polycystic ovarian syndrome     Rotator cuff tear     right side     URI (upper respiratory infection)        Past Surgical History:   Procedure Laterality Date    ANAL FISTULOTOMY      subcutaneous     CHOLECYSTECTOMY      HEMORRHOID SURGERY      INCISION AND DRAINAGE PERIRECTAL ABSCESS      MOUTH SURGERY      OVARIAN CYST REMOVAL      ROTATOR CUFF REPAIR Right     TONSILLECTOMY         Past OB/Gyn History:    History of abnormal pap smears: No    Patient is currently sexually active  monogamous   Patient's family planning method is post menopausal status      Family History   Problem Relation Age of Onset    Diabetes Mother     Breast cancer Paternal Aunt     Cancer Paternal Uncle         carcinosarcoma of the oral cavity     Prostate cancer Paternal Uncle     Breast cancer Family     Breast cancer Maternal Aunt     Breast cancer Maternal Aunt        Social History:  Social History     Socioeconomic History    Marital status: Single     Spouse name: Not on file    Number of children: Not on file    Years of education: Not on file    Highest education level: Not on file   Occupational History    Not on file   Social Needs    Financial resource strain: Not on file    Food insecurity:     Worry: Not on file     Inability: Not on file    Transportation needs:     Medical: Not on file     Non-medical: Not on file   Tobacco Use    Smoking status: Never Smoker    Smokeless tobacco: Never Used   Substance and Sexual Activity    Alcohol use: Yes     Comment: RARE / social     Drug use: No    Sexual activity: Not on file   Lifestyle    Physical activity:     Days per week: Not on file     Minutes per session: Not on file    Stress: Not on file   Relationships    Social connections:     Talks on phone: Not on file     Gets together: Not on file     Attends Pentecostal service: Not on file     Active member of club or organization: Not on file     Attends meetings of clubs or organizations: Not on file     Relationship status: Not on file    Intimate partner violence:     Fear of current or ex partner: Not on file     Emotionally abused: Not on file     Physically abused: Not on file     Forced sexual activity: Not on file   Other Topics Concern    Not on file   Social History Narrative    Caffeine - denies    Retired     Presently lives with significant other     Patient is currently retired after 44 years working for [employer]       No Known Allergies      Current Outpatient Medications:     ammonium lactate (LAC-HYDRIN) 12 % cream, Apply topically, Disp: , Rfl:     aspirin (ASPIRIN LOW DOSE) 81 mg EC tablet, Take 1 tablet by mouth daily, Disp: , Rfl:     capsaicin (ZOSTRIX) 0 025 % cream, Apply topically, Disp: , Rfl:     Cholecalciferol 2000 units CAPS, Take 4,000 Units by mouth , Disp: , Rfl:     Dapagliflozin Propanediol (FARXIGA) 10 MG TABS, Take 1 tablet (10 mg total) by mouth daily, Disp: 90 tablet, Rfl: 3    Dulaglutide (TRULICITY) 0 38 PERALTA/1 5MP SOPN, Inject 0 75 mg under the skin Once a week, Disp: , Rfl:     gabapentin (NEURONTIN) 100 mg capsule, Take 1 capsule (100 mg total) by mouth 2 (two) times a day, Disp: 60 capsule, Rfl: 3    Glucosamine 750 MG TABS, Take by mouth, Disp: , Rfl:     glucose blood test strip, by In Vitro route 2 (two) times a day, Disp: , Rfl:     levothyroxine 112 mcg tablet, Take 1 tablet (112 mcg total) by mouth daily, Disp: 90 tablet, Rfl: 0    lisinopril-hydrochlorothiazide (PRINZIDE,ZESTORETIC) 20-25 MG per tablet, Take 1 tablet by mouth 2 (two) times a day, Disp: 180 tablet, Rfl: 3    meloxicam (MOBIC) 15 mg tablet, Take 1 tablet (15 mg total) by mouth daily, Disp: 90 tablet, Rfl: 0    metFORMIN (GLUCOPHAGE) 500 mg tablet, TAKE 2 TABLETS TWICE A DAY, Disp: 360 tablet, Rfl: 3    ONETOUCH DELICA LANCETS FINE MISC, by Does not apply route 2 (two) times a day, Disp: , Rfl:     pantoprazole (PROTONIX) 40 mg tablet, Take 1 tablet (40 mg total) by mouth daily, Disp: 90 tablet, Rfl: 2    polyethylene glycol (MIRALAX) powder, Take by mouth, Disp: , Rfl:     rosuvastatin (CRESTOR) 20 MG tablet, TAKE 1 TABLET BY MOUTH EVERY DAY, Disp: 90 tablet, Rfl: 0    UNIFINE PENTIPS 31G X 5 MM MISC, , Disp: , Rfl:     Review of Systems  Constitutional :no fever, feels well, no tiredness, no recent weight gain or loss  ENT: no ear ache, no loss of hearing, no nosebleeds or nasal discharge, no sore throat or hoarseness  Cardiovascular: no complaints of slow or fast heart beat, no chest pain, no palpitations, no leg claudication or lower extremity edema    Respiratory: no complaints of shortness of shortness of breath, no CHOI  Breasts:no complaints of breast pain, breast lump, or nipple discharge  Gastrointestinal: no complaints of abdominal pain, constipation, nausea, vomiting, or diarrhea or bloody stools  Genitourinary : no complaints of dysuria, incontinence, pelvic pain, no dysmenorrhea, vaginal discharge or abnormal vaginal bleeding and as noted in HPI   Musculoskeletal: no complaints of arthralgia, no myalgia, no joint swelling or stiffness, no limb pain or swelling  Integumentary: no complaints of skin rash or lesion, itching or dry skin  Neurological: no complaints of headache, no confusion, no numbness or tingling, no dizziness or fainting    Physical Exam:     /84   Ht 5' 4 5" (1 638 m)   Wt 128 kg (282 lb 4 8 oz)   LMP  (LMP Unknown)   Breastfeeding? No   BMI 47 71 kg/m²     General: appears stated age, cooperative, alert normal mood and affect   Psychiatric oriented to person, place and time  Mood and affect normal   Neck: normal, supple,trachea midline, no masses  Thyroid: normal, no thyromegaly   Heart: regular rate and rhythm, S1, S2 normal, no murmur, click, rub or gallop   Lungs: clear to auscultation bilaterally, no increased work of breathing or signs of respiratory distress   Breasts: normal, no dimpling or skin changes noted   Abdomen: soft, non-tender, without masses or organomegaly   Vulva: normal , no lesions; suprapubic area has flat, pink area , previously opened spontaneously (advised to apply an antibiotic ointment)  On right [facility] near external genitalia ia a single polypoid skin tag with a thin base, approx  1 x 1 x 1 cm (if removal is desired, advised to co tact Dr Jett Pink or myself (presently, asymptpmatic)   Vagina: normal , no lesions or dryness   Urethra: normal   Urethal meatus normal   Bladder Normal, soft, non-tender and no prolapse or masses appreciated   Cervix: normal, no palpable masses :ec and c pap a d HPV culture done   Uterus: normal , non-tender, not enlarged, no palpable masses   Adnexa: normal, non-tender without fullness or masses; hemoccult neg  Lymphatic Palpation of lymph nodes in neck, axilla, groin and/or other locations: no lymphadenopathy or masses noted   Skin Normal skin turgor and no rashes    Palpation of skin and subcutaneous tissue normal

## 2019-08-14 ENCOUNTER — OFFICE VISIT (OUTPATIENT)
Dept: SURGICAL ONCOLOGY | Facility: CLINIC | Age: 59
End: 2019-08-14
Payer: COMMERCIAL

## 2019-08-14 VITALS
BODY MASS INDEX: 46.72 KG/M2 | HEART RATE: 113 BPM | SYSTOLIC BLOOD PRESSURE: 130 MMHG | TEMPERATURE: 99 F | RESPIRATION RATE: 18 BRPM | DIASTOLIC BLOOD PRESSURE: 80 MMHG | HEIGHT: 65 IN | WEIGHT: 280.4 LBS

## 2019-08-14 DIAGNOSIS — N60.19 FIBROCYSTIC BREAST DISEASE, UNSPECIFIED LATERALITY: Primary | ICD-10-CM

## 2019-08-14 DIAGNOSIS — Z12.31 SCREENING MAMMOGRAM, ENCOUNTER FOR: ICD-10-CM

## 2019-08-14 DIAGNOSIS — Z80.3 FAMILY HISTORY OF BREAST CANCER IN FEMALE: ICD-10-CM

## 2019-08-14 PROCEDURE — 99213 OFFICE O/P EST LOW 20 MIN: CPT | Performed by: SURGERY

## 2019-08-14 NOTE — PROGRESS NOTES
Surgical Oncology Follow Up       [facility]  [facility] [address]    Quincy Colon  1960  [id number]  [address] [place]  [facility] SURGICAL ONCOLOGY [place]  [place] [facility] [address]    Chief Complaint   Patient presents with    Follow-up       Assessment/Plan   Diagnoses and all orders for this visit:    Fibrocystic breast disease, unspecified laterality    Family history of breast cancer in female    Screening mammogram, encounter for  -     Mammo screening bilateral w 3d & cad; Future        Advance Care Planning/Advance Directives:  Did not discuss  with the patient  Oncology History:     No history exists  History of Present Illness: Follow-up  -Interval History:  None    Review of Systems:  Review of Systems   Constitutional: Negative  Negative for appetite change and fever  Eyes: Negative  Respiratory: Negative for shortness of breath  Cardiovascular: Negative  Gastrointestinal: Negative  Endocrine: Negative  Genitourinary: Negative  Musculoskeletal: Positive for back pain  Negative for arthralgias and myalgias  Skin: Negative  Allergic/Immunologic: Negative  Neurological: Negative  Hematological: Negative  Negative for adenopathy  Does not bruise/bleed easily  Psychiatric/Behavioral: Negative          Patient Active Problem List   Diagnosis    Anemia    Benign essential hypertension    Arthritis    Mixed diabetic hyperlipidemia associated with type 2 diabetes mellitus ([address] )    Disc degeneration, lumbar    Fibrocystic breast disease, unspecified laterality    GERD without esophagitis    Hyperlipidemia    Hypothyroidism    IBS (irritable bowel syndrome)    Neoplasm of uncertain behavior of skin    Obesity, Class III, BMI 40-49 9 (morbid obesity) ([address] )    MARIA FERNANDA (obstructive sleep apnea)    Primary osteoarthritis of both knees    Vitamin D deficiency  Uncontrolled type 2 diabetes mellitus with peripheral neuropathy (HCC)    Fatty liver    Full thickness rotator cuff tear    Shoulder joint pain    Family history of breast cancer in female    Screening mammogram, encounter for     Past Medical History:   Diagnosis Date    Achrochordon     Acute bronchitis     Allergic rhinitis     Dysuria     IBS (irritable bowel syndrome)     Polycystic ovarian syndrome     Rotator cuff tear     right side     URI (upper respiratory infection)      Past Surgical History:   Procedure Laterality Date    ANAL FISTULOTOMY      subcutaneous     CHOLECYSTECTOMY      HEMORRHOID SURGERY      INCISION AND DRAINAGE PERIRECTAL ABSCESS      MOUTH SURGERY      OVARIAN CYST REMOVAL      ROTATOR CUFF REPAIR Right     TONSILLECTOMY       Family History   Problem Relation Age of Onset    Diabetes Mother     Breast cancer Paternal Aunt     Cancer Paternal Uncle         carcinosarcoma of the oral cavity     Prostate cancer Paternal Uncle     Breast cancer Family     Breast cancer Maternal Aunt     Breast cancer Maternal Aunt      Social History     Socioeconomic History    Marital status: Single     Spouse name: Not on file    Number of children: Not on file    Years of education: Not on file    Highest education level: Not on file   Occupational History    Not on file   Social Needs    Financial resource strain: Not on file    Food insecurity:     Worry: Not on file     Inability: Not on file    Transportation needs:     Medical: Not on file     Non-medical: Not on file   Tobacco Use    Smoking status: Never Smoker    Smokeless tobacco: Never Used   Substance and Sexual Activity    Alcohol use: Yes     Comment: RARE / social     Drug use: No    Sexual activity: Not on file   Lifestyle    Physical activity:     Days per week: Not on file     Minutes per session: Not on file    Stress: Not on file   Relationships    Social connections:     Talks on phone: Not on file     Gets together: Not on file     Attends Christian service: Not on file     Active member of club or organization: Not on file     Attends meetings of clubs or organizations: Not on file     Relationship status: Not on file    Intimate partner violence:     Fear of current or ex partner: Not on file     Emotionally abused: Not on file     Physically abused: Not on file     Forced sexual activity: Not on file   Other Topics Concern    Not on file   Social History Narrative    Caffeine - denies    Retired       Current Outpatient Medications:     ammonium lactate (LAC-HYDRIN) 12 % cream, Apply topically, Disp: , Rfl:     aspirin (ASPIRIN LOW DOSE) 81 mg EC tablet, Take 1 tablet by mouth daily, Disp: , Rfl:     capsaicin (ZOSTRIX) 0 025 % cream, Apply topically, Disp: , Rfl:     Cholecalciferol 2000 units CAPS, Take 5,000 Units by mouth , Disp: , Rfl:     Dapagliflozin Propanediol (FARXIGA) 10 MG TABS, Take 1 tablet (10 mg total) by mouth daily, Disp: 90 tablet, Rfl: 3    Dulaglutide (TRULICITY) 0 46 IU/1 4NT SOPN, Inject 0 75 mg under the skin Once a week, Disp: , Rfl:     gabapentin (NEURONTIN) 100 mg capsule, Take 1 capsule (100 mg total) by mouth 2 (two) times a day, Disp: 60 capsule, Rfl: 3    glucose blood test strip, by In Vitro route 2 (two) times a day, Disp: , Rfl:     levothyroxine 112 mcg tablet, Take 1 tablet (112 mcg total) by mouth daily, Disp: 90 tablet, Rfl: 0    lisinopril-hydrochlorothiazide (PRINZIDE,ZESTORETIC) 20-25 MG per tablet, Take 1 tablet by mouth 2 (two) times a day, Disp: 180 tablet, Rfl: 3    meloxicam (MOBIC) 15 mg tablet, Take 1 tablet (15 mg total) by mouth daily, Disp: 90 tablet, Rfl: 0    metFORMIN (GLUCOPHAGE) 500 mg tablet, TAKE 2 TABLETS TWICE A DAY, Disp: 360 tablet, Rfl: 3    ONETOUCH DELICA LANCETS FINE MISC, by Does not apply route 2 (two) times a day, Disp: , Rfl:     pantoprazole (PROTONIX) 40 mg tablet, Take 1 tablet (40 mg total) by mouth daily, Disp: 90 tablet, Rfl: 2    polyethylene glycol (MIRALAX) powder, Take by mouth, Disp: , Rfl:     rosuvastatin (CRESTOR) 20 MG tablet, TAKE 1 TABLET BY MOUTH EVERY DAY, Disp: 90 tablet, Rfl: 0    UNIFINE PENTIPS 31G X 5 MM MISC, , Disp: , Rfl:     Glucosamine 750 MG TABS, Take by mouth, Disp: , Rfl:   No Known Allergies    The following portions of the patient's history were reviewed and updated as appropriate: allergies, current medications, past family history, past medical history, past social history, past surgical history and problem list         Vitals:    08/14/19 0956   BP: 130/80   Pulse: (!) 113   Resp: 18   Temp: 99 °F (37 2 °C)       Physical Exam   Constitutional: She is oriented to person, place, and time  She appears well-developed and well-nourished  HENT:   Head: Normocephalic and atraumatic  Pulmonary/Chest: Right breast exhibits no inverted nipple, no mass, no nipple discharge, no skin change and no tenderness  Left breast exhibits no inverted nipple, no mass, no nipple discharge, no skin change and no tenderness  Lymphadenopathy:        Right axillary: No pectoral and no lateral adenopathy present  Left axillary: No pectoral and no lateral adenopathy present  Right: No supraclavicular adenopathy present  Left: No supraclavicular adenopathy present  Neurological: She is alert and oriented to person, place, and time  Psychiatric: She has a normal mood and affect  Results:  Labs:      Imaging  02/16/2019 bilateral screening mammogram is benign BI-RADS one with a density of two    I reviewed the above imaging data  Discussion/Summary:  59-year-old female with fibrocystic changes and family history of breast cancer  There are no concerns on her examination today  Her last mammogram was also benign  I will make arrangements for her upcoming mammogram due in February of 2020   Provided there are no concerns on this mammogram, I will see her again in one year for another clinical exam or sooner should the need arise

## 2019-08-16 ENCOUNTER — OFFICE VISIT (OUTPATIENT)
Dept: FAMILY MEDICINE CLINIC | Facility: CLINIC | Age: 59
End: 2019-08-16
Payer: COMMERCIAL

## 2019-08-16 VITALS
RESPIRATION RATE: 16 BRPM | TEMPERATURE: 98.3 F | OXYGEN SATURATION: 99 % | HEART RATE: 72 BPM | SYSTOLIC BLOOD PRESSURE: 142 MMHG | BODY MASS INDEX: 48.49 KG/M2 | HEIGHT: 64 IN | WEIGHT: 284 LBS | DIASTOLIC BLOOD PRESSURE: 84 MMHG

## 2019-08-16 DIAGNOSIS — E78.5 HYPERLIPIDEMIA, UNSPECIFIED HYPERLIPIDEMIA TYPE: ICD-10-CM

## 2019-08-16 DIAGNOSIS — E66.01 OBESITY, CLASS III, BMI 40-49.9 (MORBID OBESITY) (HCC): ICD-10-CM

## 2019-08-16 DIAGNOSIS — I10 BENIGN ESSENTIAL HYPERTENSION: ICD-10-CM

## 2019-08-16 DIAGNOSIS — M54.16 LUMBAR RADICULOPATHY: ICD-10-CM

## 2019-08-16 DIAGNOSIS — K21.9 GERD WITHOUT ESOPHAGITIS: ICD-10-CM

## 2019-08-16 DIAGNOSIS — IMO0002 UNCONTROLLED TYPE 2 DIABETES MELLITUS WITH PERIPHERAL NEUROPATHY: Primary | ICD-10-CM

## 2019-08-16 DIAGNOSIS — E03.9 HYPOTHYROIDISM, UNSPECIFIED TYPE: ICD-10-CM

## 2019-08-16 DIAGNOSIS — M51.36 DISC DEGENERATION, LUMBAR: ICD-10-CM

## 2019-08-16 LAB
ALBUMIN SERPL BCP-MCNC: 3.8 G/DL (ref 3.5–5)
ALP SERPL-CCNC: 63 U/L (ref 46–116)
ALT SERPL W P-5'-P-CCNC: 17 U/L (ref 12–78)
ANION GAP SERPL CALCULATED.3IONS-SCNC: 7 MMOL/L (ref 4–13)
AST SERPL W P-5'-P-CCNC: 10 U/L (ref 5–45)
BILIRUB SERPL-MCNC: 0.61 MG/DL (ref 0.2–1)
BUN SERPL-MCNC: 32 MG/DL (ref 5–25)
CALCIUM SERPL-MCNC: 9.4 MG/DL (ref 8.3–10.1)
CHLORIDE SERPL-SCNC: 104 MMOL/L (ref 100–108)
CHOLEST SERPL-MCNC: 160 MG/DL (ref 50–200)
CLINICAL INFO: NORMAL
CO2 SERPL-SCNC: 27 MMOL/L (ref 21–32)
CREAT SERPL-MCNC: 1.19 MG/DL (ref 0.6–1.3)
CYTO CVX: NORMAL
CYTOLOGY CMNT CVX/VAG CYTO-IMP: NORMAL
DATE PREVIOUS BX: NORMAL
GFR SERPL CREATININE-BSD FRML MDRD: 50 ML/MIN/1.73SQ M
GLUCOSE P FAST SERPL-MCNC: 144 MG/DL (ref 65–99)
HDLC SERPL-MCNC: 39 MG/DL (ref 40–60)
HPV E6+E7 MRNA CVX QL NAA+PROBE: NOT DETECTED
LDLC SERPL CALC-MCNC: 65 MG/DL (ref 0–100)
LMP START DATE: NORMAL
NONHDLC SERPL-MCNC: 121 MG/DL
POTASSIUM SERPL-SCNC: 4.7 MMOL/L (ref 3.5–5.3)
PROT SERPL-MCNC: 7.2 G/DL (ref 6.4–8.2)
SL AMB POCT HEMOGLOBIN AIC: 7.8 (ref ?–6.5)
SL AMB PREV. PAP:: NORMAL
SODIUM SERPL-SCNC: 138 MMOL/L (ref 136–145)
SPECIMEN SOURCE CVX/VAG CYTO: NORMAL
TRIGL SERPL-MCNC: 280 MG/DL

## 2019-08-16 PROCEDURE — 83036 HEMOGLOBIN GLYCOSYLATED A1C: CPT | Performed by: FAMILY MEDICINE

## 2019-08-16 PROCEDURE — 99214 OFFICE O/P EST MOD 30 MIN: CPT | Performed by: FAMILY MEDICINE

## 2019-08-16 PROCEDURE — 36415 COLL VENOUS BLD VENIPUNCTURE: CPT | Performed by: FAMILY MEDICINE

## 2019-08-16 PROCEDURE — 80053 COMPREHEN METABOLIC PANEL: CPT | Performed by: FAMILY MEDICINE

## 2019-08-16 PROCEDURE — 3008F BODY MASS INDEX DOCD: CPT | Performed by: FAMILY MEDICINE

## 2019-08-16 PROCEDURE — 1036F TOBACCO NON-USER: CPT | Performed by: FAMILY MEDICINE

## 2019-08-16 PROCEDURE — 80061 LIPID PANEL: CPT | Performed by: FAMILY MEDICINE

## 2019-08-16 PROCEDURE — 3045F PR MOST RECENT HEMOGLOBIN A1C LEVEL 7.0-9.0%: CPT | Performed by: FAMILY MEDICINE

## 2019-08-16 RX ORDER — METHOCARBAMOL 750 MG/1
750 TABLET, FILM COATED ORAL EVERY 6 HOURS PRN
Qty: 30 TABLET | Refills: 2 | Status: SHIPPED | OUTPATIENT
Start: 2019-08-16 | End: 2019-11-21 | Stop reason: ALTCHOICE

## 2019-08-16 NOTE — ASSESSMENT & PLAN NOTE
Lab Results   Component Value Date    HGBA1C 7 8 (A) 08/16/2019    Diabetic control improved with fingerstick hemoglobin A1c at 7 8  Continue present treatment and follow a low sugar and low-carbohydrate diet more carefully  Continue home glucose monitoring and follow up with Endocrinology as scheduled  No results for input(s): POCGLU in the last 72 hours      Blood Sugar Average: Last 72 hrs:

## 2019-08-16 NOTE — PROGRESS NOTES
Assessment/Plan  Fasting labs drawn as below  Patient be scheduled for MRI of the lumbar spine at open MRI at patient's request   Will heed results  Discussed treatment options including referral to pain management and or orthopedic spine surgeon  Continue present treatment and restart methocarbamol 750 mg q 6 hours p r n , caution regarding drowsiness  Continue water exercises  Weight loss encouraged  Return to the office in 3 months  Uncontrolled type 2 diabetes mellitus with peripheral neuropathy ([facility] )  Lab Results   Component Value Date    HGBA1C 7 8 (A) 08/16/2019    Diabetic control improved with fingerstick hemoglobin A1c at 7 8  Continue present treatment and follow a low sugar and low-carbohydrate diet more carefully  Continue home glucose monitoring and follow up with Endocrinology as scheduled  No results for input(s): POCGLU in the last 72 hours  Blood Sugar Average: Last 72 hrs:         Diagnoses and all orders for this visit:    Uncontrolled type 2 diabetes mellitus with peripheral neuropathy ([facility] )  -     POCT hemoglobin A1c  -     Comprehensive metabolic panel    Hypothyroidism, unspecified type    Benign essential hypertension  -     Comprehensive metabolic panel    Hyperlipidemia, unspecified hyperlipidemia type  -     Comprehensive metabolic panel  -     Lipid panel    Obesity, Class III, BMI 40-49 9 (morbid obesity) (HCC)    Disc degeneration, lumbar  -     MRI lumbar spine wo contrast; Future  -     methocarbamol (ROBAXIN) 750 mg tablet; Take 1 tablet (750 mg total) by mouth every 6 (six) hours as needed for muscle spasms    GERD without esophagitis    Lumbar radiculopathy  -     MRI lumbar spine wo contrast; Future  -     methocarbamol (ROBAXIN) 750 mg tablet; Take 1 tablet (750 mg total) by mouth every 6 (six) hours as needed for muscle spasms          Subjective:      Patient ID: Rosemary Rios is a 62 y o  female      Patient is here for follow-up appointment for chronic conditions and fasting labs  Patient complains of continued left-sided low back pain with pain radiating to the left buttocks, left thigh and left groin  She admits to pain, numbness, tingling and weakness into her left leg  Patient has been doing formal physical therapy and more recently water therapy  Home glucose monitoring is improved with fasting blood sugars in the range of 150-170 with an occasional 200  Patient is up-to-date on diabetic eye exam and foot exam and colonoscopy  She recently saw Dr Mckayla Schultz at [facility] on 08/13/2019 and is up-to-date on her mammogram   She follows with Dr Gabriella Mckenzie, endocrinologist every 6 months  Diabetes   She presents for her follow-up diabetic visit  She has type 2 diabetes mellitus  Her disease course has been improving  There are no hypoglycemic associated symptoms  Associated symptoms include fatigue, foot paresthesias, polydipsia, polyuria, weakness and weight loss  Pertinent negatives for diabetes include no blurred vision, no chest pain, no foot ulcerations and no visual change  There are no hypoglycemic complications  Symptoms are improving  Diabetic complications include peripheral neuropathy  Pertinent negatives for diabetic complications include no CVA, heart disease, nephropathy or retinopathy  Risk factors for coronary artery disease include dyslipidemia, diabetes mellitus, family history, obesity and hypertension  Current diabetic treatment includes oral agent (dual therapy)  She is compliant with treatment all of the time  Her weight is decreasing steadily  She is following a generally unhealthy diet  She participates in exercise intermittently  Her home blood glucose trend is decreasing steadily  Her breakfast blood glucose is taken between 8-9 am  Her breakfast blood glucose range is generally 140-180 mg/dl  An ACE inhibitor/angiotensin II receptor blocker is being taken  She sees a podiatrist Eye exam is current     Back Pain   The current episode started more than 1 month ago  The problem occurs constantly  The problem is unchanged  The pain is present in the lumbar spine (left)  The quality of the pain is described as burning and shooting  The pain radiates to the left thigh (left buttocks)  The pain is moderate  The pain is the same all the time  The symptoms are aggravated by standing and sitting  Stiffness is present in the morning  Associated symptoms include leg pain, numbness, paresthesias, tingling, weakness and weight loss  Pertinent negatives include no abdominal pain, bladder incontinence, bowel incontinence, chest pain, dysuria, pelvic pain or perianal numbness  She has tried muscle relaxant, NSAIDs and heat (PT) for the symptoms  The treatment provided mild relief  The following portions of the patient's history were reviewed and updated as appropriate: allergies, current medications, past family history, past medical history, past social history, past surgical history and problem list     Review of Systems   Constitutional: Positive for fatigue and weight loss  Eyes: Negative for blurred vision  Cardiovascular: Negative for chest pain  Gastrointestinal: Negative for abdominal pain and bowel incontinence  Endocrine: Positive for polydipsia and polyuria  Genitourinary: Negative for bladder incontinence, dysuria and pelvic pain  Musculoskeletal: Positive for back pain  Neurological: Positive for tingling, weakness, numbness and paresthesias  Objective:      /84   Pulse 72   Temp 98 3 °F (36 8 °C) (Oral)   Resp 16   Ht 5' 4" (1 626 m)   Wt 129 kg (284 lb)   LMP  (LMP Unknown)   SpO2 99% Comment: Room Air  BMI 48 75 kg/m²          Physical Exam   Constitutional: She is oriented to person, place, and time  She appears well-developed and well-nourished  HENT:   Head: Normocephalic     Right Ear: External ear normal    Left Ear: External ear normal    Nose: Nose normal    Mouth/Throat: Oropharynx is clear and moist    Eyes: Conjunctivae are normal  No scleral icterus  Neck: Neck supple  No thyromegaly present  Cardiovascular: Normal rate and regular rhythm  Pulmonary/Chest: Effort normal and breath sounds normal    Abdominal: Soft  There is no tenderness  Musculoskeletal: She exhibits tenderness  She exhibits no edema  Mild left lumbosacral tenderness  Positive left straight leg raise  Lymphadenopathy:     She has no cervical adenopathy  Neurological: She is alert and oriented to person, place, and time  Skin: Skin is warm and dry  Psychiatric: She has a normal mood and affect

## 2019-08-21 ENCOUNTER — TELEPHONE (OUTPATIENT)
Dept: FAMILY MEDICINE CLINIC | Facility: CLINIC | Age: 59
End: 2019-08-21

## 2019-08-21 NOTE — TELEPHONE ENCOUNTER
Patient left message on voicemail making you aware the old prior Kandis Dub was deleted by provider, you can move forward with prior auth

## 2019-08-21 NOTE — TELEPHONE ENCOUNTER
FYI - Pt called stating that when [employer] sends out reminder TEXT messages it still sends out our old phone number to patients if they need to call our office back to change their appt  I told her I would relay this to the practice admin

## 2019-08-21 NOTE — TELEPHONE ENCOUNTER
Oliva Smith from [address] [place] [address] called, stating that pt has an appt for her MRI of Lumbar Spine w/out contrast on Friday, but needs a prior auth  I called to start prior auth, but there is one pending that was ordered by a different Dr, so I was unable to start the one ordered by her chiropractor, Dr Lloyd Cymro

## 2019-08-22 NOTE — TELEPHONE ENCOUNTER
Prior auth started with [employer], office notes and results of XR faxed to [phone] as requested marked urgent       Awaiting approval  Tracking number [id number]    mario Greene

## 2019-08-23 ENCOUNTER — TELEPHONE (OUTPATIENT)
Dept: FAMILY MEDICINE CLINIC | Facility: CLINIC | Age: 59
End: 2019-08-23

## 2019-08-23 NOTE — TELEPHONE ENCOUNTER
Did peer to peer review with physician regarding denial of MRI of the lumbar spine  She states that patient is still denied MRI of the lumbar spine until she completes a full 6 weeks of physical therapy  Please notify patient

## 2019-08-23 NOTE — TELEPHONE ENCOUNTER
Bindu Berger called and stated that her insurance denied authorization for the MRI scheduled today on her lower back  They stated to Bindu Berger that she can request a peer to peer review  She would like to know the next step to proceed    Please advise  Thank you

## 2019-08-23 NOTE — TELEPHONE ENCOUNTER
Raven Newton from [facility] called and stated that it is denied and a [id number] would be required if you would like  You may call [phone] option 3 and you have 5 days to do this   Tracking number is [id number]

## 2019-08-23 NOTE — TELEPHONE ENCOUNTER
Spoke to [employer] regarding this and they state that it is waiting to be reviewed, and likely will not have a decision today  I called Miriam Akers @ [facility], ph $[phone], to advise her, as pt has appt for her MRI this afternoon

## 2019-09-05 NOTE — PROGRESS NOTES
Patient finished her authorized PT and wanted to see the MD as she was still having issues and wanted an MRI back in July, but has not returned for further PT  D/c PT at this time

## 2019-10-10 DIAGNOSIS — E03.9 HYPOTHYROIDISM, UNSPECIFIED TYPE: ICD-10-CM

## 2019-10-10 RX ORDER — LEVOTHYROXINE SODIUM 112 UG/1
112 TABLET ORAL DAILY
Qty: 90 TABLET | Refills: 0 | Status: SHIPPED | OUTPATIENT
Start: 2019-10-10 | End: 2019-12-30 | Stop reason: SDUPTHER

## 2019-10-13 DIAGNOSIS — M17.0 PRIMARY OSTEOARTHRITIS OF BOTH KNEES: ICD-10-CM

## 2019-10-14 RX ORDER — MELOXICAM 15 MG/1
TABLET ORAL
Qty: 90 TABLET | Refills: 0 | Status: SHIPPED | OUTPATIENT
Start: 2019-10-14 | End: 2019-11-07 | Stop reason: SDUPTHER

## 2019-10-15 DIAGNOSIS — E78.5 HYPERLIPIDEMIA, UNSPECIFIED HYPERLIPIDEMIA TYPE: ICD-10-CM

## 2019-10-15 RX ORDER — ROSUVASTATIN CALCIUM 20 MG/1
20 TABLET, COATED ORAL DAILY
Qty: 90 TABLET | Refills: 3 | Status: SHIPPED | OUTPATIENT
Start: 2019-10-15 | End: 2020-06-15 | Stop reason: SDUPTHER

## 2019-10-18 DIAGNOSIS — IMO0002 UNCONTROLLED TYPE 2 DIABETES MELLITUS WITH PERIPHERAL NEUROPATHY: ICD-10-CM

## 2019-10-18 RX ORDER — GABAPENTIN 100 MG/1
CAPSULE ORAL
Qty: 180 CAPSULE | Refills: 1 | Status: SHIPPED | OUTPATIENT
Start: 2019-10-18 | End: 2019-11-21

## 2019-10-28 ENCOUNTER — TELEPHONE (OUTPATIENT)
Dept: FAMILY MEDICINE CLINIC | Facility: CLINIC | Age: 59
End: 2019-10-28

## 2019-10-28 NOTE — TELEPHONE ENCOUNTER
9/18/2018 claim has been sent for another bill, she wants a phone call only because she doesn't think its resolved   Please advise

## 2019-10-29 NOTE — TELEPHONE ENCOUNTER
Spoke with patient last evening  She states she had spoken with her insurance company who told her they only received one resubmitted claim and it did not have the new dx codes on it  I looked through [employer] and it does show the updated dx  I will call billing today to see if there is anotehr way to approach this issue

## 2019-10-31 ENCOUNTER — TELEPHONE (OUTPATIENT)
Dept: FAMILY MEDICINE CLINIC | Facility: CLINIC | Age: 59
End: 2019-10-31

## 2019-10-31 NOTE — TELEPHONE ENCOUNTER
Reached out to patient to let her know what the insurance company said and that we were going to try to rebill with the dx changed around for the visit

## 2019-10-31 NOTE — TELEPHONE ENCOUNTER
Spoke with insurance company to verify receipt of resubmitted claim  Nathaly Kaur confirmed that it was processed on 2/9/19  She does note that there are the additional diabetes dx which were added in the resubmitted claim; however, she states they would need to be PRIMARY, and the original primary dx was not removed  I spoke with billing who will check to see if we can submit a corrected claim with the diabetes dx as primary for this procedure  They will contact me with further information

## 2019-11-07 DIAGNOSIS — M17.0 PRIMARY OSTEOARTHRITIS OF BOTH KNEES: ICD-10-CM

## 2019-11-07 RX ORDER — MELOXICAM 15 MG/1
15 TABLET ORAL DAILY
Qty: 90 TABLET | Refills: 0 | Status: SHIPPED | OUTPATIENT
Start: 2019-11-07 | End: 2020-02-25 | Stop reason: SDUPTHER

## 2019-11-21 ENCOUNTER — OFFICE VISIT (OUTPATIENT)
Dept: FAMILY MEDICINE CLINIC | Facility: CLINIC | Age: 59
End: 2019-11-21
Payer: COMMERCIAL

## 2019-11-21 VITALS
WEIGHT: 284 LBS | HEIGHT: 64 IN | TEMPERATURE: 97.3 F | BODY MASS INDEX: 48.49 KG/M2 | SYSTOLIC BLOOD PRESSURE: 132 MMHG | RESPIRATION RATE: 16 BRPM | HEART RATE: 72 BPM | DIASTOLIC BLOOD PRESSURE: 80 MMHG

## 2019-11-21 DIAGNOSIS — E66.01 OBESITY, CLASS III, BMI 40-49.9 (MORBID OBESITY) (HCC): ICD-10-CM

## 2019-11-21 DIAGNOSIS — I10 BENIGN ESSENTIAL HYPERTENSION: ICD-10-CM

## 2019-11-21 DIAGNOSIS — E03.9 HYPOTHYROIDISM, UNSPECIFIED TYPE: ICD-10-CM

## 2019-11-21 DIAGNOSIS — K21.9 GERD WITHOUT ESOPHAGITIS: ICD-10-CM

## 2019-11-21 DIAGNOSIS — Z23 NEED FOR INFLUENZA VACCINATION: ICD-10-CM

## 2019-11-21 DIAGNOSIS — E78.5 HYPERLIPIDEMIA, UNSPECIFIED HYPERLIPIDEMIA TYPE: ICD-10-CM

## 2019-11-21 DIAGNOSIS — E55.9 VITAMIN D DEFICIENCY: ICD-10-CM

## 2019-11-21 DIAGNOSIS — M51.36 DISC DEGENERATION, LUMBAR: ICD-10-CM

## 2019-11-21 DIAGNOSIS — IMO0002 UNCONTROLLED TYPE 2 DIABETES MELLITUS WITH PERIPHERAL NEUROPATHY: Primary | ICD-10-CM

## 2019-11-21 LAB — SL AMB POCT HEMOGLOBIN AIC: 9.4 (ref ?–6.5)

## 2019-11-21 PROCEDURE — 83036 HEMOGLOBIN GLYCOSYLATED A1C: CPT

## 2019-11-21 PROCEDURE — 1036F TOBACCO NON-USER: CPT

## 2019-11-21 PROCEDURE — 90682 RIV4 VACC RECOMBINANT DNA IM: CPT

## 2019-11-21 PROCEDURE — 99214 OFFICE O/P EST MOD 30 MIN: CPT

## 2019-11-21 PROCEDURE — 3046F HEMOGLOBIN A1C LEVEL >9.0%: CPT

## 2019-11-21 PROCEDURE — 90471 IMMUNIZATION ADMIN: CPT

## 2019-11-21 RX ORDER — GABAPENTIN 100 MG/1
100 CAPSULE ORAL 2 TIMES DAILY
Qty: 180 CAPSULE | Refills: 1 | Status: CANCELLED | OUTPATIENT
Start: 2019-11-21

## 2019-11-21 RX ORDER — GABAPENTIN 100 MG/1
200 CAPSULE ORAL 2 TIMES DAILY
Qty: 360 CAPSULE | Refills: 1 | Status: SHIPPED | OUTPATIENT
Start: 2019-11-21 | End: 2020-06-15 | Stop reason: SDUPTHER

## 2019-11-21 NOTE — ASSESSMENT & PLAN NOTE
Lab Results   Component Value Date    HGBA1C 9 4 (A) 11/21/2019    Diabetic control significantly worse with fingerstick hemoglobin A1c at 9 4 today revealing poor diabetic control  Patient will increase Trulicity as per Dr Emily Tovar  Patient to continue home glucose monitoring

## 2019-11-21 NOTE — PROGRESS NOTES
Assessment/Plan:  Patient received flu blok vaccine today  Patient given lab requisition for fasting labs as below  Patient to continue present treatment although will increase gabapentin to 200 mg b i d  She is instructed to follow a low-fat, low-salt and a low sugar/carbohydrate diet more carefully and get regular exercise walking 150 minutes per week  Weight loss encouraged  Return to the office in 3 months  Uncontrolled type 2 diabetes mellitus with peripheral neuropathy ([facility] )    Lab Results   Component Value Date    HGBA1C 9 4 (A) 11/21/2019    Diabetic control significantly worse with fingerstick hemoglobin A1c at 9 4 today revealing poor diabetic control  Patient will increase Trulicity as per Dr Tre London  Patient to continue home glucose monitoring  Diagnoses and all orders for this visit:    Uncontrolled type 2 diabetes mellitus with peripheral neuropathy ([facility] )  -     POCT hemoglobin A1c  -     CBC and Platelet; Future  -     Comprehensive metabolic panel; Future  -     Cancel: Microalbumin / creatinine urine ratio  -     gabapentin (NEURONTIN) 100 mg capsule; Take 2 capsules (200 mg total) by mouth 2 (two) times a day  -     Microalbumin / creatinine urine ratio; Future  -     influenza vaccine, [id number], quadrivalent, recombinant, PF, 0 5 mL, for patients 18 yr+ (FLUBLOK)    Hypothyroidism, unspecified type  -     TSH, 3rd generation with Free T4 reflex; Future    Benign essential hypertension    Hyperlipidemia, unspecified hyperlipidemia type  -     Comprehensive metabolic panel; Future  -     Lipid panel; Future    GERD without esophagitis    Disc degeneration, lumbar    Obesity, Class III, BMI 40-49 9 (morbid obesity) ([facility])    Vitamin D deficiency  -     Vitamin D 25 hydroxy;  Future    Need for influenza vaccination  -     influenza vaccine, [id number], quadrivalent, recombinant, PF, 0 5 mL, for patients 18 yr+ (FLUBLOK)    Other orders  -     Cancel: POCT hemoglobin A1c  - Cholecalciferol (VITAMIN D3) 125 MCG (5000 UT) TABS; Take 5,000 Units by mouth daily  -     Cancel: gabapentin (NEURONTIN) 100 mg capsule; Take 1 capsule (100 mg total) by mouth 2 (two) times a day          Subjective:      Patient ID: Bill Colorado is a 61 y o  female  Patient is here for follow-up appointment for chronic conditions and requests lab requisition for fasting labs  She requests flu vaccine today  Patient has been feeling fairly well overall  She admits to not following her diet carefully and no regular exercise as she has been getting over her back problems which are now improved  She recently saw Dr Natalie Sandy, endocrinologist and he increased her Trulicity and recheck appointment in 6 months  Home glucose monitoring reveals fasting blood sugars 190-280  Patient is up-to-date on diabetic eye exam and foot exam   She is up-to-date on mammogram and colonoscopy  Diabetes   She presents for her follow-up diabetic visit  She has type 2 diabetes mellitus  Her disease course has been stable  There are no hypoglycemic associated symptoms  Pertinent negatives for hypoglycemia include no headaches, nervousness/anxiousness or tremors  Associated symptoms include foot paresthesias  Pertinent negatives for diabetes include no blurred vision, no chest pain, no fatigue, no foot ulcerations, no polydipsia, no polyuria, no visual change, no weakness and no weight loss  There are no hypoglycemic complications  Symptoms are stable  Diabetic complications include peripheral neuropathy  Pertinent negatives for diabetic complications include no CVA, heart disease, nephropathy or retinopathy  Risk factors for coronary artery disease include dyslipidemia, diabetes mellitus, family history, obesity, hypertension and post-menopausal  Current diabetic treatment includes oral agent (dual therapy)  She is compliant with treatment all of the time  Her weight is stable  She is following a generally unhealthy diet   She rarely participates in exercise  Her home blood glucose trend is increasing steadily  Her breakfast blood glucose is taken between 7-8 am  Her breakfast blood glucose range is generally 180-200 mg/dl  An ACE inhibitor/angiotensin II receptor blocker is being taken  She sees a podiatrist Eye exam is current  Hypertension   This is a chronic problem  The problem is controlled  Associated symptoms include peripheral edema  Pertinent negatives include no anxiety, blurred vision, chest pain, headaches, orthopnea, palpitations, PND or shortness of breath  Past treatments include ACE inhibitors and diuretics  The current treatment provides significant improvement  Compliance problems include exercise  There is no history of CAD/MI, CVA or retinopathy  Identifiable causes of hypertension include a thyroid problem  Hyperlipidemia   This is a chronic problem  The problem is controlled  Exacerbating diseases include diabetes, hypothyroidism and obesity  Pertinent negatives include no chest pain, focal sensory loss, focal weakness, leg pain, myalgias or shortness of breath  Current antihyperlipidemic treatment includes statins  The current treatment provides significant improvement of lipids  Compliance problems include adherence to exercise and adherence to diet  Thyroid Problem   Presents for follow-up visit  Symptoms include dry skin and leg swelling  Patient reports no anxiety, cold intolerance, constipation, depressed mood, diaphoresis, diarrhea, fatigue, hair loss, heat intolerance, hoarse voice, palpitations, tremors, visual change, weight gain or weight loss  The symptoms have been stable  Her past medical history is significant for diabetes and hyperlipidemia         The following portions of the patient's history were reviewed and updated as appropriate: allergies, current medications, past family history, past medical history, past social history, past surgical history and problem list     Review of Systems Constitutional: Negative for diaphoresis, fatigue, weight gain and weight loss  HENT: Negative for hoarse voice  Eyes: Negative for blurred vision  Respiratory: Negative for shortness of breath  Cardiovascular: Negative for chest pain, palpitations, orthopnea and PND  Gastrointestinal: Negative for constipation and diarrhea  Endocrine: Negative for cold intolerance, heat intolerance, polydipsia and polyuria  Musculoskeletal: Negative for myalgias  Neurological: Negative for tremors, focal weakness, weakness and headaches  Psychiatric/Behavioral: The patient is not nervous/anxious  Objective:      /80   Pulse 72   Temp (!) 97 3 °F (36 3 °C) (Oral)   Resp 16   Ht 5' 4 37" (1 635 m)   Wt 129 kg (284 lb)   LMP  (LMP Unknown)   BMI 48 19 kg/m²          Physical Exam   Constitutional: She is oriented to person, place, and time  She appears well-developed and well-nourished  HENT:   Head: Normocephalic  Right Ear: External ear normal    Left Ear: External ear normal    Nose: Nose normal    Mouth/Throat: Oropharynx is clear and moist    Eyes: Conjunctivae are normal  No scleral icterus  Neck: Neck supple  No thyromegaly present  Cardiovascular: Normal rate and regular rhythm  Pulmonary/Chest: Effort normal and breath sounds normal    Abdominal: Soft  There is no tenderness  Musculoskeletal: She exhibits no edema  Lymphadenopathy:     She has no cervical adenopathy  Neurological: She is alert and oriented to person, place, and time  Skin: Skin is warm and dry  Psychiatric: She has a normal mood and affect  BMI Counseling: Body mass index is 48 19 kg/m²  The BMI is above normal  Nutrition recommendations include reducing portion sizes, decreasing overall calorie intake, reducing fast food intake, consuming healthier snacks, decreasing soda and/or juice intake, moderation in carbohydrate intake and reducing intake of saturated fat and trans fat   Exercise recommendations include moderate aerobic physical activity for 150 minutes/week

## 2019-12-07 DIAGNOSIS — I10 ESSENTIAL HYPERTENSION: ICD-10-CM

## 2019-12-08 RX ORDER — LISINOPRIL AND HYDROCHLOROTHIAZIDE 25; 20 MG/1; MG/1
TABLET ORAL
Qty: 180 TABLET | Refills: 3 | Status: SHIPPED | OUTPATIENT
Start: 2019-12-08 | End: 2022-05-16 | Stop reason: SDUPTHER

## 2019-12-18 ENCOUNTER — TELEPHONE (OUTPATIENT)
Dept: FAMILY MEDICINE CLINIC | Facility: CLINIC | Age: 59
End: 2019-12-18

## 2019-12-19 ENCOUNTER — TELEPHONE (OUTPATIENT)
Dept: FAMILY MEDICINE CLINIC | Facility: CLINIC | Age: 59
End: 2019-12-19

## 2019-12-19 NOTE — TELEPHONE ENCOUNTER
LM for patient that I contacted our billing department and that I will have to contact the insurance company once again  It does appear that it was not resubmitted with the dx codes changed around, but I will call the insurance company to make sure  Once I get an answer from the [facility] I will be able to call billing back and see if there is anything they can do with the balance being in collections  I let the patient know that at the last review it was showing as a non-covered service because they were saying skin tag removal was considered cosmetic  But this may be possibly due to the  Diagnosis coding  I told her I would try once more to see what we could do

## 2019-12-23 ENCOUNTER — APPOINTMENT (OUTPATIENT)
Dept: LAB | Facility: HOSPITAL | Age: 59
End: 2019-12-23
Payer: COMMERCIAL

## 2019-12-23 DIAGNOSIS — E03.9 HYPOTHYROIDISM, UNSPECIFIED TYPE: ICD-10-CM

## 2019-12-23 DIAGNOSIS — IMO0002 UNCONTROLLED TYPE 2 DIABETES MELLITUS WITH PERIPHERAL NEUROPATHY: Primary | ICD-10-CM

## 2019-12-23 DIAGNOSIS — IMO0002 UNCONTROLLED TYPE 2 DIABETES MELLITUS WITH PERIPHERAL NEUROPATHY: ICD-10-CM

## 2019-12-23 DIAGNOSIS — E55.9 VITAMIN D DEFICIENCY: ICD-10-CM

## 2019-12-23 DIAGNOSIS — E78.5 HYPERLIPIDEMIA, UNSPECIFIED HYPERLIPIDEMIA TYPE: ICD-10-CM

## 2019-12-23 LAB
25(OH)D3 SERPL-MCNC: 36.7 NG/ML (ref 30–100)
ALBUMIN SERPL BCP-MCNC: 3.6 G/DL (ref 3.5–5)
ALP SERPL-CCNC: 77 U/L (ref 46–116)
ALT SERPL W P-5'-P-CCNC: 23 U/L (ref 12–78)
ANION GAP SERPL CALCULATED.3IONS-SCNC: 7 MMOL/L (ref 4–13)
AST SERPL W P-5'-P-CCNC: 14 U/L (ref 5–45)
BILIRUB SERPL-MCNC: 0.43 MG/DL (ref 0.2–1)
BUN SERPL-MCNC: 33 MG/DL (ref 5–25)
CALCIUM SERPL-MCNC: 9.3 MG/DL (ref 8.3–10.1)
CHLORIDE SERPL-SCNC: 107 MMOL/L (ref 100–108)
CHOLEST SERPL-MCNC: 207 MG/DL (ref 50–200)
CO2 SERPL-SCNC: 26 MMOL/L (ref 21–32)
CREAT SERPL-MCNC: 1.22 MG/DL (ref 0.6–1.3)
CREAT UR-MCNC: 53.1 MG/DL
ERYTHROCYTE [DISTWIDTH] IN BLOOD BY AUTOMATED COUNT: 13.9 % (ref 11.6–15.1)
GFR SERPL CREATININE-BSD FRML MDRD: 49 ML/MIN/1.73SQ M
GLUCOSE P FAST SERPL-MCNC: 232 MG/DL (ref 65–99)
HCT VFR BLD AUTO: 34.1 % (ref 34.8–46.1)
HDLC SERPL-MCNC: 33 MG/DL
HGB BLD-MCNC: 10.5 G/DL (ref 11.5–15.4)
LDLC SERPL CALC-MCNC: 112 MG/DL (ref 0–100)
MCH RBC QN AUTO: 28.1 PG (ref 26.8–34.3)
MCHC RBC AUTO-ENTMCNC: 30.8 G/DL (ref 31.4–37.4)
MCV RBC AUTO: 91 FL (ref 82–98)
MICROALBUMIN UR-MCNC: <5 MG/L (ref 0–20)
MICROALBUMIN/CREAT 24H UR: <9 MG/G CREATININE (ref 0–30)
NONHDLC SERPL-MCNC: 174 MG/DL
PLATELET # BLD AUTO: 257 THOUSANDS/UL (ref 149–390)
PMV BLD AUTO: 12.3 FL (ref 8.9–12.7)
POTASSIUM SERPL-SCNC: 4.6 MMOL/L (ref 3.5–5.3)
PROT SERPL-MCNC: 7.1 G/DL (ref 6.4–8.2)
RBC # BLD AUTO: 3.74 MILLION/UL (ref 3.81–5.12)
SODIUM SERPL-SCNC: 140 MMOL/L (ref 136–145)
TRIGL SERPL-MCNC: 311 MG/DL
TSH SERPL DL<=0.05 MIU/L-ACNC: 2.27 UIU/ML (ref 0.36–3.74)
WBC # BLD AUTO: 7.31 THOUSAND/UL (ref 4.31–10.16)

## 2019-12-23 PROCEDURE — 82570 ASSAY OF URINE CREATININE: CPT

## 2019-12-23 PROCEDURE — 82306 VITAMIN D 25 HYDROXY: CPT

## 2019-12-23 PROCEDURE — 80053 COMPREHEN METABOLIC PANEL: CPT

## 2019-12-23 PROCEDURE — 82043 UR ALBUMIN QUANTITATIVE: CPT

## 2019-12-23 PROCEDURE — 85027 COMPLETE CBC AUTOMATED: CPT

## 2019-12-23 PROCEDURE — 36415 COLL VENOUS BLD VENIPUNCTURE: CPT

## 2019-12-23 PROCEDURE — 84443 ASSAY THYROID STIM HORMONE: CPT

## 2019-12-23 PROCEDURE — 80061 LIPID PANEL: CPT

## 2019-12-27 ENCOUNTER — TELEPHONE (OUTPATIENT)
Dept: FAMILY MEDICINE CLINIC | Facility: CLINIC | Age: 59
End: 2019-12-27

## 2019-12-27 NOTE — TELEPHONE ENCOUNTER
LM for patient advising of status of claim and that she would be taken out of collections as well as not have to worry about the bill should it deny again   Advised patient that she should keep in touch with me if she receives another b ill

## 2019-12-27 NOTE — TELEPHONE ENCOUNTER
Spoke with Billing department  Was advised to resubmit information to [email or web address] to try once more to get claim taken care of  If it denies again, we will adjust off as a billing error

## 2019-12-30 DIAGNOSIS — E03.9 HYPOTHYROIDISM, UNSPECIFIED TYPE: ICD-10-CM

## 2019-12-30 RX ORDER — LEVOTHYROXINE SODIUM 112 UG/1
112 TABLET ORAL DAILY
Qty: 90 TABLET | Refills: 0 | Status: SHIPPED | OUTPATIENT
Start: 2019-12-30 | End: 2020-04-01 | Stop reason: SDUPTHER

## 2020-02-25 DIAGNOSIS — M17.0 PRIMARY OSTEOARTHRITIS OF BOTH KNEES: ICD-10-CM

## 2020-02-25 RX ORDER — MELOXICAM 15 MG/1
15 TABLET ORAL DAILY
Qty: 90 TABLET | Refills: 0 | Status: SHIPPED | OUTPATIENT
Start: 2020-02-25 | End: 2021-06-03 | Stop reason: SDUPTHER

## 2020-03-13 DIAGNOSIS — K21.9 GASTROESOPHAGEAL REFLUX DISEASE WITHOUT ESOPHAGITIS: ICD-10-CM

## 2020-03-13 RX ORDER — PANTOPRAZOLE SODIUM 40 MG/1
TABLET, DELAYED RELEASE ORAL
Qty: 90 TABLET | Refills: 2 | Status: SHIPPED | OUTPATIENT
Start: 2020-03-13 | End: 2021-06-03 | Stop reason: SDUPTHER

## 2020-03-26 DIAGNOSIS — E11.42 TYPE 2 DIABETES MELLITUS WITH DIABETIC POLYNEUROPATHY, WITHOUT LONG-TERM CURRENT USE OF INSULIN (HCC): ICD-10-CM

## 2020-04-01 DIAGNOSIS — E03.9 HYPOTHYROIDISM, UNSPECIFIED TYPE: ICD-10-CM

## 2020-04-01 RX ORDER — LEVOTHYROXINE SODIUM 112 UG/1
112 TABLET ORAL DAILY
Qty: 90 TABLET | Refills: 0 | Status: SHIPPED | OUTPATIENT
Start: 2020-04-01 | End: 2020-07-20 | Stop reason: SDUPTHER

## 2020-04-07 ENCOUNTER — TELEPHONE (OUTPATIENT)
Dept: FAMILY MEDICINE CLINIC | Facility: CLINIC | Age: 60
End: 2020-04-07

## 2020-05-15 LAB — HBA1C MFR BLD HPLC: 8.3 %

## 2020-06-05 DIAGNOSIS — E11.42 TYPE 2 DIABETES MELLITUS WITH DIABETIC POLYNEUROPATHY, WITHOUT LONG-TERM CURRENT USE OF INSULIN (HCC): ICD-10-CM

## 2020-06-05 RX ORDER — DAPAGLIFLOZIN 10 MG/1
TABLET, FILM COATED ORAL
Qty: 90 TABLET | Refills: 3 | Status: SHIPPED | OUTPATIENT
Start: 2020-06-05 | End: 2022-04-19 | Stop reason: SDUPTHER

## 2020-06-15 ENCOUNTER — TELEPHONE (OUTPATIENT)
Dept: ADMINISTRATIVE | Facility: OTHER | Age: 60
End: 2020-06-15

## 2020-06-15 ENCOUNTER — OFFICE VISIT (OUTPATIENT)
Dept: FAMILY MEDICINE CLINIC | Facility: CLINIC | Age: 60
End: 2020-06-15

## 2020-06-15 VITALS
BODY MASS INDEX: 48.49 KG/M2 | RESPIRATION RATE: 16 BRPM | HEART RATE: 76 BPM | HEIGHT: 64 IN | DIASTOLIC BLOOD PRESSURE: 82 MMHG | TEMPERATURE: 98 F | SYSTOLIC BLOOD PRESSURE: 132 MMHG | WEIGHT: 284 LBS

## 2020-06-15 DIAGNOSIS — E11.42 TYPE 2 DIABETES MELLITUS WITH DIABETIC POLYNEUROPATHY, WITHOUT LONG-TERM CURRENT USE OF INSULIN (HCC): Primary | ICD-10-CM

## 2020-06-15 DIAGNOSIS — E78.5 HYPERLIPIDEMIA, UNSPECIFIED HYPERLIPIDEMIA TYPE: ICD-10-CM

## 2020-06-15 DIAGNOSIS — E03.9 HYPOTHYROIDISM, UNSPECIFIED TYPE: ICD-10-CM

## 2020-06-15 DIAGNOSIS — IMO0002 UNCONTROLLED TYPE 2 DIABETES MELLITUS WITH PERIPHERAL NEUROPATHY: ICD-10-CM

## 2020-06-15 DIAGNOSIS — E55.9 VITAMIN D DEFICIENCY: ICD-10-CM

## 2020-06-15 DIAGNOSIS — I10 BENIGN ESSENTIAL HYPERTENSION: ICD-10-CM

## 2020-06-15 DIAGNOSIS — K21.9 GERD WITHOUT ESOPHAGITIS: ICD-10-CM

## 2020-06-15 DIAGNOSIS — E66.01 OBESITY, CLASS III, BMI 40-49.9 (MORBID OBESITY) (HCC): ICD-10-CM

## 2020-06-15 PROCEDURE — 3008F BODY MASS INDEX DOCD: CPT | Performed by: FAMILY MEDICINE

## 2020-06-15 PROCEDURE — 3079F DIAST BP 80-89 MM HG: CPT | Performed by: FAMILY MEDICINE

## 2020-06-15 PROCEDURE — 99214 OFFICE O/P EST MOD 30 MIN: CPT | Performed by: FAMILY MEDICINE

## 2020-06-15 PROCEDURE — 1036F TOBACCO NON-USER: CPT | Performed by: FAMILY MEDICINE

## 2020-06-15 PROCEDURE — 3075F SYST BP GE 130 - 139MM HG: CPT | Performed by: FAMILY MEDICINE

## 2020-06-15 PROCEDURE — 2022F DILAT RTA XM EVC RTNOPTHY: CPT | Performed by: FAMILY MEDICINE

## 2020-06-15 RX ORDER — GABAPENTIN 100 MG/1
200 CAPSULE ORAL 2 TIMES DAILY
Qty: 360 CAPSULE | Refills: 3 | Status: SHIPPED | OUTPATIENT
Start: 2020-06-15 | End: 2021-07-26 | Stop reason: SDUPTHER

## 2020-06-15 RX ORDER — ROSUVASTATIN CALCIUM 20 MG/1
20 TABLET, COATED ORAL DAILY
Qty: 90 TABLET | Refills: 3 | Status: SHIPPED | OUTPATIENT
Start: 2020-06-15 | End: 2021-07-12 | Stop reason: SDUPTHER

## 2020-07-14 ENCOUNTER — TELEPHONE (OUTPATIENT)
Dept: FAMILY MEDICINE CLINIC | Facility: CLINIC | Age: 60
End: 2020-07-14

## 2020-07-14 NOTE — TELEPHONE ENCOUNTER
Spoke with manager of Radiology in [facility]  They do offer a kyler program that will cover screening mammos for under- or un-insured women  This kyler also covers if there is a need for a diagnostic, and then if any other follow up testing is needed (u/s, etc) there is a separate kyler in the network that helps with that  Patient contact information given to [facility] who will pass it to [facility] in the front office and get patient scheduled for this       LM for patient to advise they will reach out

## 2020-07-16 LAB
LEFT EYE DIABETIC RETINOPATHY: NORMAL
RIGHT EYE DIABETIC RETINOPATHY: NORMAL

## 2020-07-20 DIAGNOSIS — E03.9 HYPOTHYROIDISM, UNSPECIFIED TYPE: ICD-10-CM

## 2020-07-20 RX ORDER — LEVOTHYROXINE SODIUM 112 UG/1
112 TABLET ORAL DAILY
Qty: 90 TABLET | Refills: 3 | Status: SHIPPED | OUTPATIENT
Start: 2020-07-20 | End: 2020-12-23 | Stop reason: SDUPTHER

## 2020-07-21 ENCOUNTER — TELEPHONE (OUTPATIENT)
Dept: FAMILY MEDICINE CLINIC | Facility: CLINIC | Age: 60
End: 2020-07-21

## 2020-07-21 NOTE — TELEPHONE ENCOUNTER
Patient states that they are starting to bring volunteers back for the [address]  Patient needs your ok to go back to volunteering  Please advise

## 2020-07-21 NOTE — TELEPHONE ENCOUNTER
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

## 2020-07-22 NOTE — TELEPHONE ENCOUNTER
lmom making patient aware of recommendation  Okay per communication sheet  Advised she return call with questions or concern

## 2020-07-28 ENCOUNTER — TELEPHONE (OUTPATIENT)
Dept: SURGICAL ONCOLOGY | Facility: CLINIC | Age: 60
End: 2020-07-28

## 2020-07-28 NOTE — TELEPHONE ENCOUNTER
LVM for patient to call back regarding Follow up appt with Dr Kem Herrera on 8/12/20  Patient has a mammo scheduled on the same date and time as her F/U  Patient will need to reschedule her appt with Dr Kem Herrera to a date after her mammo is complete

## 2020-07-28 NOTE — TELEPHONE ENCOUNTER
Call to insurance company to find out policy for coverage of skin tag removals as well as when last claim was submitted and with what dx codes  Per Mery, the last claim received was in February of 2019, and the diabetic dx were attached to the OV and NOT the procedure  Had the diabetic dx been attached to the procedure code it would have been a covered service  Spoke with Director to determine next steps as there was no further action taken by billing office even after further requests from our office for corrected information to be sent to insurance  Will contact billing office to have amount adjusted and then contact patient  July 28, 2020       Verify Pcp      Patient: Boy Beverly   YOB: 1968   Date of Visit: 7/28/2020       Dear  Pcp:    Thank you for referring Boy Beverly to me for evaluation. Below are my notes for this visit with him.    If you have questions, please do not hesitate to call me. I look forward to following your patient along with you.      Sincerely,        Darrick Cutler MD        CC: No Recipients  Darrick Cutler MD  7/28/2020  5:33 PM  Sign when Signing Visit  Due to COVID-19 ACTION PLAN, the patient's office visit was converted to a phone visit.    This patient verbally consents to a telephone visit.    Patient states they are Boy Beverly and that they are speaking to me from their home.     It has been 11 months since the patient's last in-office visit.    This visit is being performed via video to discuss Follow-up (1 yr f/u )    Clinician Location: [place] [address] Binu Brunson is in [place]  and his identity has been established.   He was informed that consent to treat includes permission to submit charges to the applicable insurance on file. Boy was advised regarding the potential risk inherent in video visits, as the assessment may be limited due to what can be seen on the screen which potentially results in an incomplete assessment; as well as either of us may discontinue the video visit if it is felt that the videoconferencing connections are not adequate for his/her situation.   11-20 minutes were spent in this encounter. His wife is also in attendance       History of Present Illness  Advocate Free Text HPI:   [birth year] 48 year old male with a history of nonischemic cardiomyopathy which was thought to tachycardia mediated ( frequent PVC's( 18.5% of total beats by holter (6-20-13). TSH was 1.143 (6-21-13). Assessments of LVEF were confounded by frequent PVC's with estimates of EF ranging from 25% to the forties. He ultimate had a cardiac MRI which  appeared to indicate an EF in the normal range after gated analysis with no evidence of myocardial scar. His frequent ectopy resolved with resolution of marital difficulties. He had a stress echo on 1-17-14 which showed an EF of 35-40% . He was last seen in 2014.     He has no complaints, denies ANDERSON, SOB or chest pain. 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 His PMH is significant for a very rigorous fitness routine and as an elite boxer. He is not taking steroids or dietary supplements     I saw him in the company of his wife.     5-29-18 No complaints other than nocturnal need to urinate which he attributes to spironolactone, since his last visit he developed angioedema to ACE treatment and was changed to A2 agent, this was titrated to the max dose of 32 mg He feels well, denies PND, SOB or othopnea. He has curtailed his workouts per our recommendations. He did not want to take a beta blocker- felt tired on metoprolol and refused beta blockade.      8-1-19  No complaint engaging in moderate exercise, denies dizziness or syncope    Patient notes the following changes in medical history since last visit:   Chief Complaint   Patient presents with   • Follow-up     1 yr f/u       7- No complaints denies syncope or lightheadedness, he has been taking coreg which he started on 10- and which was increased to now 25 mg bid, he notes that his heart rate is not as rapid with exercise which is somewhat bothersome to him     Review of Systems   Cardiovascular: Negative for chest pain, dyspnea on exertion, irregular heartbeat, near-syncope, palpitations and syncope.        Patient offers the following concerns: as above     Patient is doing home BP checks: Yes (110/70)     The following testing was reviewed during this phone visit: none     Medication and allergy reconciliation completed over the phone.     The following problems were addressed  during today's call:      Problem List Items Addressed This Visit           Visit Diagnoses     Nonischemic cardiomyopathy (CMS/HCC)    -  Primary        Discussion/Summary  Advocate DS:   1.Cardiomyopathy     h/o nonischemic cardiomyopathy which was thought to be tachycardia mediated, most recent echo 9-3-2019 LVEF 30-35% increased to 50-55% with stress;  holter (12 hr) 5 PVC's 10 PAC's ,  7-30-18  echo - LVEF 35-40% , MRI- EF 36 % (7-2017) Prior Holter in 2018 had also shown rare (<10 PVC'S)      His care is complicated by the fact that he lives in [place] and is now moving to the [facility] but will be still working in [place]  but returns to [place] for his care except for emergencies .  He has been taking candesartan at the maximum dose 32 mg  (developed angioedema on an ACE) and spironolactone, he refuses to take metoprolol and has curtailed his work.           The following was ordered during today's call: None   No orders of the defined types were placed in this encounter.      Time spent talking with patient during today's call: 12 minutes     Testing should be completed prior to next visit. New prescriptions / refills sent to the pharmacy.    Patient was advised to call if they experience any new or worsening symptoms.    Return in about 6 months (around 1/28/2021).

## 2020-08-12 ENCOUNTER — HOSPITAL ENCOUNTER (OUTPATIENT)
Dept: MAMMOGRAPHY | Facility: HOSPITAL | Age: 60
Discharge: HOME | End: 2020-08-12

## 2020-08-12 VITALS — WEIGHT: 284 LBS | BODY MASS INDEX: 48.49 KG/M2 | HEIGHT: 64 IN

## 2020-08-12 DIAGNOSIS — Z12.31 SCREENING MAMMOGRAM, ENCOUNTER FOR: ICD-10-CM

## 2020-08-12 PROCEDURE — 77063 BREAST TOMOSYNTHESIS BI: CPT

## 2020-08-12 PROCEDURE — 77067 SCR MAMMO BI INCL CAD: CPT

## 2020-08-27 ENCOUNTER — ANNUAL EXAM (OUTPATIENT)
Dept: OBGYN CLINIC | Facility: MEDICAL CENTER | Age: 60
End: 2020-08-27

## 2020-08-27 VITALS
TEMPERATURE: 98.5 F | BODY MASS INDEX: 47.12 KG/M2 | SYSTOLIC BLOOD PRESSURE: 120 MMHG | DIASTOLIC BLOOD PRESSURE: 70 MMHG | WEIGHT: 276 LBS | HEIGHT: 64 IN

## 2020-08-27 DIAGNOSIS — Z12.31 ENCOUNTER FOR SCREENING MAMMOGRAM FOR MALIGNANT NEOPLASM OF BREAST: ICD-10-CM

## 2020-08-27 DIAGNOSIS — Z01.419 ENCNTR FOR GYN EXAM (GENERAL) (ROUTINE) W/O ABN FINDINGS: Primary | ICD-10-CM

## 2020-08-27 PROCEDURE — 99396 PREV VISIT EST AGE 40-64: CPT | Performed by: OBSTETRICS & GYNECOLOGY

## 2020-08-27 PROCEDURE — 3052F HG A1C>EQUAL 8.0%<EQUAL 9.0%: CPT | Performed by: OBSTETRICS & GYNECOLOGY

## 2020-08-27 NOTE — PROGRESS NOTES
ASSESSMENT & PLAN: Kobe Salvador was seen today for gynecologic exam     Diagnoses and all orders for this visit:    Encntr for gyn exam (general) (routine) w/o abn findings    Encounter for screening mammogram for malignant neoplasm of breast  -     Cancel: Mammo screening bilateral w 3d & cad; Future  -     Mammo screening bilateral w 3d & cad; Future    Discussion/Summary:  Patient here for yearly gyn preventive exam; polypoid nuris right inner thigh has not changed in size or appearance; she will notify us when she desires removal     1   Routine well woman exam done today  2  Pap and HPV:  The patient's pap is up to date  Pap and cotesting was not done today  Current ASCCP Guidelines reviewed  3   Mammogram was ordered  4  Colorectal cancer screening is up to date  4  The following were reviewed in today's visit: breast self exam, adequate intake of calcium and vitamin D, exercise and healthy diet  5  F/u 1 year  CC:  Annual Gynecologic Examination    HPI: Rosalba Holguin is a 61 y o  who presents for annual gynecologic examination  She has the following concerns:  none    Health Maintenance:    Patient describes her health as good  Patient does not have weight concerns  She exercises 7 days per week with walking and helping her significant other with his work       She does wear her seatbelt routinely  She does perform regular monthly self breast exams  She does feel safe at home  Patients does not follow a  diet  Patient gets 4 servings of dairy or calcium rich foods a day      Last pap:  Last mammogram:   Last colorectal cancer screenin    Patient Active Problem List   Diagnosis    Anemia    Benign essential hypertension    Arthritis    Mixed diabetic hyperlipidemia associated with type 2 diabetes mellitus ([address] )    Disc degeneration, lumbar    Fibrocystic breast disease, unspecified laterality    GERD without esophagitis    Hyperlipidemia    Hypothyroidism    IBS (irritable bowel syndrome)    Neoplasm of uncertain behavior of skin    Obesity, Class III, BMI 40-49 9 (morbid obesity) (HCC)    MARIA FERNANDA (obstructive sleep apnea)    Primary osteoarthritis of both knees    Vitamin D deficiency    Uncontrolled type 2 diabetes mellitus with peripheral neuropathy (HCC)    Fatty liver    Full thickness rotator cuff tear    Shoulder joint pain    Family history of breast cancer in female    Screening mammogram, encounter for    Lumbar radiculopathy       Past Medical History:   Diagnosis Date    Achrochordon     Acute bronchitis     Allergic rhinitis     BRCA1 negative     BRCA2 negative     Dysuria     IBS (irritable bowel syndrome)     Polycystic ovarian syndrome     Rotator cuff tear     right side     URI (upper respiratory infection)        Past Surgical History:   Procedure Laterality Date    ANAL FISTULOTOMY      subcutaneous     CHOLECYSTECTOMY      HEMORRHOID SURGERY      INCISION AND DRAINAGE PERIRECTAL ABSCESS      MOUTH SURGERY      OVARIAN CYST REMOVAL      ROTATOR CUFF REPAIR Right     TONSILLECTOMY         Past OB/Gyn History:    History of abnormal pap smears: No    Patient is currently sexually active  monogamous   Patient's family planning method is post menopausal status      Family History   Problem Relation Age of Onset    Diabetes Mother     Breast cancer Paternal Aunt     Cancer Paternal Uncle         carcinosarcoma of the oral cavity     Prostate cancer Paternal Uncle     Breast cancer Family     Breast cancer Maternal Aunt     Breast cancer Maternal Aunt     No Known Problems Sister     No Known Problems Maternal Grandmother     No Known Problems Paternal Grandmother     No Known Problems Sister     No Known Problems Paternal Aunt     No Known Problems Maternal Aunt        Social History:  Social History     Socioeconomic History    Marital status: Single     Spouse name: Not on file    Number of children: Not on file  Years of education: Not on file    Highest education level: Not on file   Occupational History    Not on file   Social Needs    Financial resource strain: Not on file    Food insecurity     Worry: Not on file     Inability: Not on file    Transportation needs     Medical: Not on file     Non-medical: Not on file   Tobacco Use    Smoking status: Never Smoker    Smokeless tobacco: Never Used   Substance and Sexual Activity    Alcohol use: Yes     Comment: RARE / social     Drug use: No    Sexual activity: Not on file   Lifestyle    Physical activity     Days per week: Not on file     Minutes per session: Not on file    Stress: Not on file   Relationships    Social connections     Talks on phone: Not on file     Gets together: Not on file     Attends [employer] service: Not on file     Active member of club or organization: Not on file     Attends meetings of clubs or organizations: Not on file     Relationship status: Not on file    Intimate partner violence     Fear of current or ex partner: Not on file     Emotionally abused: Not on file     Physically abused: Not on file     Forced sexual activity: Not on file   Other Topics Concern    Not on file   Social History Narrative    Caffeine - denies    Retired     Presently lives with s o  Patient is currently unemployed       No Known Allergies      Current Outpatient Medications:     ammonium lactate (LAC-HYDRIN) 12 % cream, Apply topically, Disp: , Rfl:     aspirin (ASPIRIN LOW DOSE) 81 mg EC tablet, Take 1 tablet by mouth daily, Disp: , Rfl:     capsaicin (ZOSTRIX) 0 025 % cream, Apply topically , Disp: , Rfl:     Dulaglutide (TRULICITY) 4 75 EZ/2 2AB SOPN, Inject 1 5 mg under the skin Once a week , Disp: , Rfl:     FARXIGA 10 MG TABS, TAKE 1 TABLET BY MOUTH EVERY DAY (Patient not taking: Reported on 6/15/2020), Disp: 90 tablet, Rfl: 3    Ferrous Gluconate (IRON 27 PO), Take by mouth, Disp: , Rfl:     gabapentin (NEURONTIN) 100 mg capsule, Take 2 capsules (200 mg total) by mouth 2 (two) times a day, Disp: 360 capsule, Rfl: 3    glucose blood test strip, 1 each by Other route 2 (two) times a day Use as instructed, Disp: 180 each, Rfl: 3    levothyroxine 112 mcg tablet, Take 1 tablet (112 mcg total) by mouth daily, Disp: 90 tablet, Rfl: 3    lisinopril-hydrochlorothiazide (PRINZIDE,ZESTORETIC) 20-25 MG per tablet, TAKE 1 TABLET BY MOUTH TWICE A DAY, Disp: 180 tablet, Rfl: 3    meloxicam (MOBIC) 15 mg tablet, Take 1 tablet (15 mg total) by mouth daily, Disp: 90 tablet, Rfl: 0    metFORMIN (GLUCOPHAGE) 1000 MG tablet, Take 1,000 mg by mouth 2 (two) times a day with meals, Disp: , Rfl:     ONETOUCH DELICA LANCETS FINE MISC, by Does not apply route 2 (two) times a day, Disp: , Rfl:     pantoprazole (PROTONIX) 40 mg tablet, TAKE 1 TABLET BY MOUTH EVERY DAY, Disp: 90 tablet, Rfl: 2    polyethylene glycol (MIRALAX) powder, Take by mouth, Disp: , Rfl:     rosuvastatin (CRESTOR) 20 MG tablet, Take 1 tablet (20 mg total) by mouth daily, Disp: 90 tablet, Rfl: 3    Review of Systems  Constitutional :no fever, feels well, no tiredness, no recent weight gain or loss  ENT: no ear ache, no loss of hearing, no nosebleeds or nasal discharge, no sore throat or hoarseness  Cardiovascular: no complaints of slow or fast heart beat, no chest pain, no palpitations, no leg claudication or lower extremity edema  Respiratory: no complaints of shortness of shortness of breath, no CHOI  Breasts:no complaints of breast pain, breast lump, or nipple discharge  Gastrointestinal: no complaints of abdominal pain, constipation, nausea, vomiting, or diarrhea or bloody stools  Genitourinary : no complaints of dysuria, incontinence, pelvic pain, no dysmenorrhea, vaginal discharge or abnormal vaginal bleeding and as noted in HPI  Musculoskeletal: no complaints of arthralgia, no myalgia, no joint swelling or stiffness, no limb pain or swelling    Integumentary: no complaints of skin rash or lesion, itching or dry skin  Neurological: no complaints of headache, no confusion, no numbness or tingling, no dizziness or fainting    Physical Exam:     /70   Temp 98 5 °F (36 9 °C) (Temporal)   Ht 5' 4" (1 626 m)   Wt 125 kg (276 lb)   LMP  (LMP Unknown)   Breastfeeding No   BMI 47 38 kg/m²     General: appears stated age, cooperative, alert normal mood and affect   Psychiatric oriented to person, place and time  Mood and affect normal   Neck: normal, supple,trachea midline, no masses  Thyroid: normal, no thyromegaly   Heart: regular rate and rhythm, S1, S2 normal, no murmur, click, rub or gallop   Lungs: clear to auscultation bilaterally, no increased work of breathing or signs of respiratory distress   Breasts: normal, no dimpling or skin changes noted   Abdomen: soft, non-tender, without masses or organomegaly   Vulva: normal , no lesions   Vagina: normal , no lesions or atrophy   Urethra: normal   Urethal meatus normal   Bladder Normal, soft, non-tender and no prolapse or masses appreciated   Cervix: normal, no palpable masses    Uterus: normal , non-tender, not enlarged, no palpable masses   Adnexa: normal, non-tender without fullness or masses; hemoccult neg  Lymphatic Palpation of lymph nodes in neck, axilla, groin and/or other locations: no lymphadenopathy or masses noted   Skin Normal skin turgor and no rashes    Palpation of skin and subcutaneous tissue normal

## 2020-11-10 ENCOUNTER — TELEPHONE (OUTPATIENT)
Dept: FAMILY MEDICINE CLINIC | Facility: CLINIC | Age: 60
End: 2020-11-10

## 2020-12-15 ENCOUNTER — OFFICE VISIT (OUTPATIENT)
Dept: FAMILY MEDICINE CLINIC | Facility: CLINIC | Age: 60
End: 2020-12-15

## 2020-12-15 VITALS
TEMPERATURE: 96.1 F | BODY MASS INDEX: 45.58 KG/M2 | HEART RATE: 72 BPM | SYSTOLIC BLOOD PRESSURE: 126 MMHG | OXYGEN SATURATION: 98 % | WEIGHT: 267 LBS | DIASTOLIC BLOOD PRESSURE: 76 MMHG | HEIGHT: 64 IN | RESPIRATION RATE: 16 BRPM

## 2020-12-15 DIAGNOSIS — E78.5 HYPERLIPIDEMIA, UNSPECIFIED HYPERLIPIDEMIA TYPE: ICD-10-CM

## 2020-12-15 DIAGNOSIS — K21.9 GERD WITHOUT ESOPHAGITIS: ICD-10-CM

## 2020-12-15 DIAGNOSIS — I10 BENIGN ESSENTIAL HYPERTENSION: ICD-10-CM

## 2020-12-15 DIAGNOSIS — IMO0002 UNCONTROLLED TYPE 2 DIABETES MELLITUS WITH PERIPHERAL NEUROPATHY: Primary | ICD-10-CM

## 2020-12-15 DIAGNOSIS — E03.9 HYPOTHYROIDISM, UNSPECIFIED TYPE: ICD-10-CM

## 2020-12-15 DIAGNOSIS — D64.9 ANEMIA, UNSPECIFIED TYPE: ICD-10-CM

## 2020-12-15 PROCEDURE — 99214 OFFICE O/P EST MOD 30 MIN: CPT | Performed by: FAMILY MEDICINE

## 2020-12-23 DIAGNOSIS — E03.9 HYPOTHYROIDISM, UNSPECIFIED TYPE: ICD-10-CM

## 2020-12-23 RX ORDER — LEVOTHYROXINE SODIUM 0.1 MG/1
100 TABLET ORAL DAILY
Qty: 90 TABLET | Refills: 0 | Status: SHIPPED | OUTPATIENT
Start: 2020-12-23 | End: 2021-04-01 | Stop reason: SDUPTHER

## 2021-01-19 ENCOUNTER — TELEPHONE (OUTPATIENT)
Dept: FAMILY MEDICINE CLINIC | Facility: CLINIC | Age: 61
End: 2021-01-19

## 2021-01-19 DIAGNOSIS — IMO0002 UNCONTROLLED TYPE 2 DIABETES MELLITUS WITH PERIPHERAL NEUROPATHY: Primary | ICD-10-CM

## 2021-01-19 DIAGNOSIS — E55.9 VITAMIN D DEFICIENCY: ICD-10-CM

## 2021-01-19 DIAGNOSIS — E78.5 HYPERLIPIDEMIA, UNSPECIFIED HYPERLIPIDEMIA TYPE: ICD-10-CM

## 2021-01-19 NOTE — TELEPHONE ENCOUNTER
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 Please advise, thank you

## 2021-01-20 NOTE — TELEPHONE ENCOUNTER
Pt states that she can now afford to have full labs done  Pt recently had CBC and TSH checked but would like to know if there are other lab tests you recommend at this time  Please advise, thank you  Pt will call back with lab fax number if orders are placed

## 2021-02-23 ENCOUNTER — TELEPHONE (OUTPATIENT)
Dept: SURGICAL ONCOLOGY | Facility: CLINIC | Age: 61
End: 2021-02-23

## 2021-02-23 NOTE — TELEPHONE ENCOUNTER
Patient is not  having any symptoms of  fever, cough, shortness of breath, chills, repeated shaking with chills, muscle pain, headache, sore throat, or new loss of taste/smell  Patient has not been tested for COVID -19  Patient has not been in contact with someone confirmed to have COVID-19  Patient has not travelled outside of [facility] in the past 14 days  Reviewed masking policy with patient  Reviewed visitor policy with patient

## 2021-02-24 ENCOUNTER — OFFICE VISIT (OUTPATIENT)
Dept: SURGICAL ONCOLOGY | Facility: CLINIC | Age: 61
End: 2021-02-24

## 2021-02-24 VITALS
HEIGHT: 64 IN | WEIGHT: 270 LBS | BODY MASS INDEX: 46.1 KG/M2 | DIASTOLIC BLOOD PRESSURE: 84 MMHG | SYSTOLIC BLOOD PRESSURE: 120 MMHG | TEMPERATURE: 98.8 F | HEART RATE: 84 BPM

## 2021-02-24 DIAGNOSIS — N60.19 FIBROCYSTIC BREAST DISEASE, UNSPECIFIED LATERALITY: Primary | ICD-10-CM

## 2021-02-24 DIAGNOSIS — Z12.31 SCREENING MAMMOGRAM, ENCOUNTER FOR: ICD-10-CM

## 2021-02-24 DIAGNOSIS — Z80.3 FAMILY HISTORY OF BREAST CANCER IN FEMALE: ICD-10-CM

## 2021-02-24 PROCEDURE — 99213 OFFICE O/P EST LOW 20 MIN: CPT | Performed by: SURGERY

## 2021-02-24 RX ORDER — MELATONIN
1000 DAILY
COMMUNITY
End: 2022-01-18

## 2021-02-24 NOTE — PROGRESS NOTES
Surgical Oncology Follow Up       [facility] SURGICAL ONCOLOGY TOO  [facility] [address] [place] [address]    Paul Marley  1960  [id number]  [facility] SURGICAL ONCOLOGY [facility] [address]  [address] [place] [address]    Chief Complaint   Patient presents with    Follow-up       Assessment/Plan   Diagnoses and all orders for this visit:    Fibrocystic breast disease, unspecified laterality    Family history of breast cancer in female    Screening mammogram, encounter for  -     Mammo screening bilateral w 3d & cad; Future    Other orders  -     cholecalciferol (VITAMIN D3) 1,000 units tablet; Take 1,000 Units by mouth daily        Advance Care Planning/Advance Directives:  Did not discuss  with the patient  Oncology History:    Oncology History    No history exists  History of Present Illness: Follow-up visit secondary to fibrocystic changes and family history of breast cancer, no concerns  -Interval History: recent mammogram    Review of Systems:  Review of Systems   Constitutional: Negative  Negative for appetite change and fever  Eyes: Negative  Respiratory: Negative for shortness of breath  Cardiovascular: Negative  Gastrointestinal: Negative  Endocrine: Negative  Genitourinary: Negative  Musculoskeletal: Negative  Negative for arthralgias and myalgias  Skin: Negative  Allergic/Immunologic: Negative  Neurological: Negative  Hematological: Negative  Negative for adenopathy  Does not bruise/bleed easily  Psychiatric/Behavioral: Negative          Patient Active Problem List   Diagnosis    Anemia    Benign essential hypertension    Arthritis    Mixed diabetic hyperlipidemia associated with type 2 diabetes mellitus ([facility] )    Disc degeneration, lumbar    Fibrocystic breast disease, unspecified laterality    GERD without esophagitis    Hyperlipidemia    Hypothyroidism    IBS (irritable bowel syndrome)    Neoplasm of uncertain behavior of skin    Obesity, Class III, BMI 40-49 9 (morbid obesity) (HCC)    MARIA FERNANDA (obstructive sleep apnea)    Primary osteoarthritis of both knees    Vitamin D deficiency    Uncontrolled type 2 diabetes mellitus with peripheral neuropathy (HCC)    Fatty liver    Full thickness rotator cuff tear    Shoulder joint pain    Family history of breast cancer in female    Screening mammogram, encounter for    Lumbar radiculopathy     Past Medical History:   Diagnosis Date    Achrochordon     Acute bronchitis     Allergic rhinitis     Dysuria     IBS (irritable bowel syndrome)     Polycystic ovarian syndrome     Rotator cuff tear     right side     URI (upper respiratory infection)      Past Surgical History:   Procedure Laterality Date    ANAL FISTULOTOMY      subcutaneous     CHOLECYSTECTOMY      HEMORRHOID SURGERY      INCISION AND DRAINAGE PERIRECTAL ABSCESS      MOUTH SURGERY      OVARIAN CYST REMOVAL      ROTATOR CUFF REPAIR Right     TONSILLECTOMY       Family History   Problem Relation Age of Onset    Diabetes Mother     Breast cancer Paternal Aunt     Cancer Paternal Uncle         carcinosarcoma of the oral cavity     Prostate cancer Paternal Uncle     Breast cancer Family     Breast cancer Maternal Aunt     Breast cancer Maternal Aunt     No Known Problems Sister     No Known Problems Maternal Grandmother     No Known Problems Paternal Grandmother     No Known Problems Sister     No Known Problems Paternal Aunt     No Known Problems Maternal Aunt      Social History     Socioeconomic History    Marital status: Single     Spouse name: Not on file    Number of children: Not on file    Years of education: Not on file    Highest education level: Not on file   Occupational History    Not on file   Social Needs    Financial resource strain: Not on file    Food insecurity     Worry: Not on file     Inability: Not on file   Emaline Folds Transportation needs     Medical: Not on file     Non-medical: Not on file   Tobacco Use    Smoking status: Never Smoker    Smokeless tobacco: Never Used   Substance and Sexual Activity    Alcohol use: Yes     Comment: RARE / social     Drug use: No    Sexual activity: Not on file   Lifestyle    Physical activity     Days per week: Not on file     Minutes per session: Not on file    Stress: Not on file   Relationships    Social connections     Talks on phone: Not on file     Gets together: Not on file     Attends Yazidism service: Not on file     Active member of club or organization: Not on file     Attends meetings of clubs or organizations: Not on file     Relationship status: Not on file    Intimate partner violence     Fear of current or ex partner: Not on file     Emotionally abused: Not on file     Physically abused: Not on file     Forced sexual activity: Not on file   Other Topics Concern    Not on file   Social History Narrative    Caffeine - denies    Retired       Current Outpatient Medications:     ammonium lactate (LAC-HYDRIN) 12 % cream, Apply topically, Disp: , Rfl:     aspirin (ASPIRIN LOW DOSE) 81 mg EC tablet, Take 1 tablet by mouth daily, Disp: , Rfl:     capsaicin (ZOSTRIX) 0 025 % cream, Apply topically , Disp: , Rfl:     cholecalciferol (VITAMIN D3) 1,000 units tablet, Take 1,000 Units by mouth daily, Disp: , Rfl:     Dulaglutide (TRULICITY) 2 38 RC/5 6ZW SOPN, Inject 1 5 mg under the skin Once a week , Disp: , Rfl:     FARXIGA 10 MG TABS, TAKE 1 TABLET BY MOUTH EVERY DAY, Disp: 90 tablet, Rfl: 3    Ferrous Gluconate (IRON 27 PO), Take by mouth, Disp: , Rfl:     gabapentin (NEURONTIN) 100 mg capsule, Take 2 capsules (200 mg total) by mouth 2 (two) times a day, Disp: 360 capsule, Rfl: 3    glucose blood test strip, 1 each by Other route 2 (two) times a day Use as instructed, Disp: 180 each, Rfl: 3    levothyroxine 100 mcg tablet, Take 1 tablet (100 mcg total) by mouth daily, Disp: 90 tablet, Rfl: 0    lisinopril-hydrochlorothiazide (PRINZIDE,ZESTORETIC) 20-25 MG per tablet, TAKE 1 TABLET BY MOUTH TWICE A DAY, Disp: 180 tablet, Rfl: 3    meloxicam (MOBIC) 15 mg tablet, Take 1 tablet (15 mg total) by mouth daily, Disp: 90 tablet, Rfl: 0    metFORMIN (GLUCOPHAGE) 1000 MG tablet, Take 1,000 mg by mouth 2 (two) times a day with meals, Disp: , Rfl:     ONETOUCH DELICA LANCETS FINE MISC, by Does not apply route 2 (two) times a day, Disp: , Rfl:     pantoprazole (PROTONIX) 40 mg tablet, TAKE 1 TABLET BY MOUTH EVERY DAY, Disp: 90 tablet, Rfl: 2    polyethylene glycol (MIRALAX) powder, Take by mouth, Disp: , Rfl:     rosuvastatin (CRESTOR) 20 MG tablet, Take 1 tablet (20 mg total) by mouth daily, Disp: 90 tablet, Rfl: 3  No Known Allergies    The following portions of the patient's history were reviewed and updated as appropriate: allergies, current medications, past family history, past medical history, past social history, past surgical history and problem list         Vitals:    02/24/21 1419   BP: 120/84   Pulse: 84   Temp: 98 8 °F (37 1 °C)       Physical Exam  Constitutional:       General: She is not in acute distress  Appearance: She is well-developed  HENT:      Head: Normocephalic and atraumatic  Chest:      Breasts:         Right: No inverted nipple, mass, nipple discharge, skin change or tenderness  Left: No inverted nipple, mass, nipple discharge, skin change or tenderness  Lymphadenopathy:      Upper Body:      Right upper body: No supraclavicular, axillary or pectoral adenopathy  Left upper body: No supraclavicular, axillary or pectoral adenopathy  Neurological:      Mental Status: She is alert and oriented to person, place, and time     Psychiatric:         Mood and Affect: Mood normal            Results:  Labs:      Imaging   08/12/2020 bilateral 3D screening mammogram is benign BI-RADS one with a density of two    I reviewed the above imaging data  Discussion/Summary: 80-year-old female with fibrocystic changes and family history of breast cancer  There are no concerns on examination today  Her last mammogram was benign    I will make arrangements for her upcoming mammogram due in August   I will plan to see her again in one year or sooner should

## 2021-03-10 DIAGNOSIS — Z23 ENCOUNTER FOR IMMUNIZATION: ICD-10-CM

## 2021-03-29 ENCOUNTER — IMMUNIZATIONS (OUTPATIENT)
Dept: FAMILY MEDICINE CLINIC | Facility: HOSPITAL | Age: 61
End: 2021-03-29

## 2021-03-29 DIAGNOSIS — Z23 ENCOUNTER FOR IMMUNIZATION: Primary | ICD-10-CM

## 2021-03-29 PROCEDURE — 91300 SARS-COV-2 / COVID-19 MRNA VACCINE (PFIZER-BIONTECH) 30 MCG: CPT

## 2021-03-29 PROCEDURE — 0001A SARS-COV-2 / COVID-19 MRNA VACCINE (PFIZER-BIONTECH) 30 MCG: CPT

## 2021-04-01 DIAGNOSIS — E03.9 HYPOTHYROIDISM, UNSPECIFIED TYPE: ICD-10-CM

## 2021-04-01 RX ORDER — LEVOTHYROXINE SODIUM 0.1 MG/1
100 TABLET ORAL DAILY
Qty: 90 TABLET | Refills: 1 | Status: SHIPPED | OUTPATIENT
Start: 2021-04-01 | End: 2021-10-04 | Stop reason: SDUPTHER

## 2021-04-15 ENCOUNTER — TELEPHONE (OUTPATIENT)
Dept: FAMILY MEDICINE CLINIC | Facility: CLINIC | Age: 61
End: 2021-04-15

## 2021-04-19 ENCOUNTER — IMMUNIZATIONS (OUTPATIENT)
Dept: FAMILY MEDICINE CLINIC | Facility: HOSPITAL | Age: 61
End: 2021-04-19

## 2021-04-19 DIAGNOSIS — Z23 ENCOUNTER FOR IMMUNIZATION: Primary | ICD-10-CM

## 2021-04-19 PROCEDURE — 91300 SARS-COV-2 / COVID-19 MRNA VACCINE (PFIZER-BIONTECH) 30 MCG: CPT

## 2021-04-19 PROCEDURE — 0002A SARS-COV-2 / COVID-19 MRNA VACCINE (PFIZER-BIONTECH) 30 MCG: CPT

## 2021-05-20 LAB — HBA1C MFR BLD HPLC: 8.5 %

## 2021-06-03 ENCOUNTER — OFFICE VISIT (OUTPATIENT)
Dept: FAMILY MEDICINE CLINIC | Facility: CLINIC | Age: 61
End: 2021-06-03

## 2021-06-03 VITALS
HEIGHT: 64 IN | DIASTOLIC BLOOD PRESSURE: 82 MMHG | BODY MASS INDEX: 46.13 KG/M2 | TEMPERATURE: 96.8 F | OXYGEN SATURATION: 99 % | SYSTOLIC BLOOD PRESSURE: 130 MMHG | HEART RATE: 68 BPM | WEIGHT: 270.2 LBS | RESPIRATION RATE: 16 BRPM

## 2021-06-03 DIAGNOSIS — K21.9 GERD WITHOUT ESOPHAGITIS: ICD-10-CM

## 2021-06-03 DIAGNOSIS — IMO0002 UNCONTROLLED TYPE 2 DIABETES MELLITUS WITH PERIPHERAL NEUROPATHY: Primary | ICD-10-CM

## 2021-06-03 DIAGNOSIS — E03.9 HYPOTHYROIDISM, UNSPECIFIED TYPE: ICD-10-CM

## 2021-06-03 DIAGNOSIS — M17.0 PRIMARY OSTEOARTHRITIS OF BOTH KNEES: ICD-10-CM

## 2021-06-03 DIAGNOSIS — I10 BENIGN ESSENTIAL HYPERTENSION: ICD-10-CM

## 2021-06-03 DIAGNOSIS — K21.9 GASTROESOPHAGEAL REFLUX DISEASE WITHOUT ESOPHAGITIS: ICD-10-CM

## 2021-06-03 DIAGNOSIS — E55.9 VITAMIN D DEFICIENCY: ICD-10-CM

## 2021-06-03 DIAGNOSIS — E78.5 HYPERLIPIDEMIA, UNSPECIFIED HYPERLIPIDEMIA TYPE: ICD-10-CM

## 2021-06-03 DIAGNOSIS — E66.01 OBESITY, CLASS III, BMI 40-49.9 (MORBID OBESITY) (HCC): ICD-10-CM

## 2021-06-03 PROCEDURE — 99214 OFFICE O/P EST MOD 30 MIN: CPT | Performed by: FAMILY MEDICINE

## 2021-06-03 RX ORDER — MELOXICAM 15 MG/1
15 TABLET ORAL DAILY
Qty: 90 TABLET | Refills: 3 | Status: SHIPPED | OUTPATIENT
Start: 2021-06-03 | End: 2022-04-01

## 2021-06-03 RX ORDER — PANTOPRAZOLE SODIUM 40 MG/1
40 TABLET, DELAYED RELEASE ORAL DAILY
Qty: 90 TABLET | Refills: 3 | Status: SHIPPED | OUTPATIENT
Start: 2021-06-03 | End: 2022-07-13 | Stop reason: SDUPTHER

## 2021-06-03 NOTE — PROGRESS NOTES
Assessment/Plan:    Patient to obtain labs for TSH  Patient to continue present treatment  Recommend patient take meloxicam p r n  only and tried to reduce amount secondary to kidney function  Patient instructed to follow a low-fat, low-salt and a low sugar /carbohydrate diet more carefully and get regular aerobic exercise 150 minutes per week  Recommend swimming  Continue home glucose monitoring  Weight loss encouraged  Follow up with specialists as scheduled and return to the office in 6 months  Diagnoses and all orders for this visit:    Uncontrolled type 2 diabetes mellitus with peripheral neuropathy ([facility] )  -     Cancel: POCT hemoglobin A1c  -     metFORMIN (GLUCOPHAGE) 1000 MG tablet; Take 1 tablet (1,000 mg total) by mouth 2 (two) times a day with meals    Benign essential hypertension    Hyperlipidemia, unspecified hyperlipidemia type    Hypothyroidism, unspecified type    Gastroesophageal reflux disease without esophagitis  -     pantoprazole (PROTONIX) 40 mg tablet; Take 1 tablet (40 mg total) by mouth daily    Primary osteoarthritis of both knees  -     meloxicam (MOBIC) 15 mg tablet; Take 1 tablet (15 mg total) by mouth daily    GERD without esophagitis    Obesity, Class III, BMI 40-49 9 (morbid obesity) ([facility] )    Vitamin D deficiency          Subjective:      Patient ID: Glendy Mc is a 61 y o  female  Patient is here for follow-up appointment for chronic conditions and she is not fasting today  Patient is overdue for repeat TSH since dose adjustment in December of 2020 will obtain labs at a [facility] out labs  Patient saw Dr Everette Sepulveda, endocrinologist on 05/20/ 21 and he ordered labs  Patient has been feeling fairly well overall  No regular exercise program   Patient is up-to-date on diabetic eye exam and foot exam   She is up-to-date on colonoscopy and mammogram   Patient received COVID vaccine x2  Diabetes  She presents for her follow-up diabetic visit   She has type 2 diabetes mellitus  Her disease course has been stable  There are no hypoglycemic associated symptoms  Pertinent negatives for hypoglycemia include no headaches  Associated symptoms include fatigue and foot paresthesias  Pertinent negatives for diabetes include no blurred vision, no chest pain, no foot ulcerations, no polydipsia, no polyuria, no visual change, no weakness and no weight loss  There are no hypoglycemic complications  Symptoms are stable  Diabetic complications include nephropathy and peripheral neuropathy  Pertinent negatives for diabetic complications include no CVA, heart disease or retinopathy  Risk factors for coronary artery disease include dyslipidemia, diabetes mellitus, hypertension, obesity and family history  Current diabetic treatment includes oral agent (triple therapy)  She is compliant with treatment all of the time  Her weight is stable  She is following a generally unhealthy diet  She rarely participates in exercise  There is no change in her home blood glucose trend  Her breakfast blood glucose is taken between 8-9 am  Her breakfast blood glucose range is generally 180-200 mg/dl  An ACE inhibitor/angiotensin II receptor blocker is being taken  She sees a podiatrist Eye exam is current  Hypertension  This is a chronic problem  The problem is controlled  Associated symptoms include peripheral edema  Pertinent negatives include no anxiety, blurred vision, chest pain, headaches, orthopnea, palpitations, PND or shortness of breath  Past treatments include ACE inhibitors and diuretics  The current treatment provides significant improvement  Compliance problems include exercise  There is no history of CAD/MI, CVA or retinopathy  Hyperlipidemia  This is a chronic problem  The problem is controlled  Exacerbating diseases include diabetes, hypothyroidism and obesity  Associated symptoms include a focal sensory loss   Pertinent negatives include no chest pain, focal weakness, leg pain, myalgias or shortness of breath  Current antihyperlipidemic treatment includes statins  The current treatment provides significant improvement of lipids  Compliance problems include adherence to exercise and adherence to diet  The following portions of the patient's history were reviewed and updated as appropriate: allergies, current medications, past family history, past medical history, past social history, past surgical history and problem list     Review of Systems   Constitutional: Positive for fatigue  Negative for weight loss  Eyes: Negative for blurred vision  Respiratory: Negative for shortness of breath  Cardiovascular: Negative for chest pain, palpitations, orthopnea and PND  Endocrine: Negative for polydipsia and polyuria  Musculoskeletal: Negative for myalgias  Neurological: Negative for focal weakness, weakness and headaches  Objective:      /82   Pulse 68   Temp (!) 96 8 °F (36 °C) (Temporal)   Resp 16   Ht 5' 4" (1 626 m)   Wt 123 kg (270 lb 3 2 oz)   LMP  (LMP Unknown)   SpO2 99%   BMI 46 38 kg/m²          Physical Exam  Constitutional:       General: She is not in acute distress  Appearance: Normal appearance  She is obese  HENT:      Head: Normocephalic  Mouth/Throat:      Mouth: Mucous membranes are moist    Eyes:      General: No scleral icterus  Conjunctiva/sclera: Conjunctivae normal    Neck:      Musculoskeletal: Neck supple  Vascular: No carotid bruit  Cardiovascular:      Rate and Rhythm: Normal rate and regular rhythm  Pulmonary:      Effort: Pulmonary effort is normal       Breath sounds: Normal breath sounds  Abdominal:      Palpations: Abdomen is soft  Tenderness: There is no abdominal tenderness  Musculoskeletal:      Right lower leg: Edema present  Left lower leg: Edema present  Lymphadenopathy:      Cervical: No cervical adenopathy  Skin:     General: Skin is warm and dry     Neurological:      General: No focal deficit present  Mental Status: She is alert and oriented to person, place, and time  Psychiatric:         Mood and Affect: Mood normal          Behavior: Behavior normal          Thought Content: Thought content normal          Judgment: Judgment normal          BMI Counseling: Body mass index is 46 38 kg/m²  The BMI is above normal  Nutrition recommendations include reducing portion sizes, decreasing overall calorie intake, 3-5 servings of fruits/vegetables daily, reducing fast food intake, consuming healthier snacks, decreasing soda and/or juice intake, moderation in carbohydrate intake, increasing intake of lean protein, reducing intake of saturated fat and trans fat and reducing intake of cholesterol  Exercise recommendations include moderate aerobic physical activity for 150 minutes/week

## 2021-06-29 ENCOUNTER — TELEPHONE (OUTPATIENT)
Dept: ADMINISTRATIVE | Facility: OTHER | Age: 61
End: 2021-06-29

## 2021-06-29 NOTE — TELEPHONE ENCOUNTER
----- Message from Hernandez Leal sent at 6/28/2021 11:26 AM EDT -----  Regarding: Care Gap Request  06/28/21 11:26 AM    Hello, our patient Andie Abebe has had Diabetic Foot Exam and Hemoglobin A1c completed/performed  Please assist in updating the patient chart by pulling the Encounter Tab from Endocrinology  The date of service is 05/21/21       Thank you,  Hernandez Leal  [facility] AT [address] [facility]

## 2021-07-08 NOTE — TELEPHONE ENCOUNTER
Upon review of the In Basket request we were able to locate, review, and update the patient chart as requested for Diabetic Foot Exam and Hemoglobin A1c  Any additional questions or concerns should be emailed to the Practice Liaisons via [email or web address] email, please do not reply via In Basket      Thank you  David Collado

## 2021-07-12 DIAGNOSIS — E78.5 HYPERLIPIDEMIA, UNSPECIFIED HYPERLIPIDEMIA TYPE: ICD-10-CM

## 2021-07-12 RX ORDER — ROSUVASTATIN CALCIUM 20 MG/1
20 TABLET, COATED ORAL DAILY
Qty: 90 TABLET | Refills: 3 | Status: SHIPPED | OUTPATIENT
Start: 2021-07-12 | End: 2022-03-08 | Stop reason: SDUPTHER

## 2021-07-26 DIAGNOSIS — IMO0002 UNCONTROLLED TYPE 2 DIABETES MELLITUS WITH PERIPHERAL NEUROPATHY: ICD-10-CM

## 2021-07-26 RX ORDER — GABAPENTIN 100 MG/1
200 CAPSULE ORAL 2 TIMES DAILY
Qty: 360 CAPSULE | Refills: 3 | Status: SHIPPED | OUTPATIENT
Start: 2021-07-26 | End: 2022-01-18

## 2021-08-13 ENCOUNTER — HOSPITAL ENCOUNTER (OUTPATIENT)
Dept: MAMMOGRAPHY | Facility: HOSPITAL | Age: 61
Discharge: HOME | End: 2021-08-13

## 2021-08-13 VITALS — HEIGHT: 64 IN | BODY MASS INDEX: 46.1 KG/M2 | WEIGHT: 270 LBS

## 2021-08-13 DIAGNOSIS — Z12.31 SCREENING MAMMOGRAM, ENCOUNTER FOR: ICD-10-CM

## 2021-08-13 PROCEDURE — 77067 SCR MAMMO BI INCL CAD: CPT

## 2021-08-13 PROCEDURE — 77063 BREAST TOMOSYNTHESIS BI: CPT

## 2021-09-07 ENCOUNTER — HOSPITAL ENCOUNTER (OUTPATIENT)
Dept: ULTRASOUND IMAGING | Facility: HOSPITAL | Age: 61
Discharge: HOME | End: 2021-09-07

## 2021-09-07 ENCOUNTER — HOSPITAL ENCOUNTER (OUTPATIENT)
Dept: MAMMOGRAPHY | Facility: HOSPITAL | Age: 61
Discharge: HOME | End: 2021-09-07

## 2021-09-07 DIAGNOSIS — R92.8 ABNORMAL MAMMOGRAM: ICD-10-CM

## 2021-09-07 PROCEDURE — 76642 ULTRASOUND BREAST LIMITED: CPT

## 2021-09-07 PROCEDURE — 77065 DX MAMMO INCL CAD UNI: CPT

## 2021-09-07 NOTE — PROGRESS NOTES
Call placed to patient regarding recommendation for;    _____ RIGHT ___X___LEFT      __X___Ultrasound guided  ______Stereotactic breast biopsy x2  Pt states that procedure was explained to her, additional questions answered at this time    __X___Verbalized understanding        Blood thinners: No: _____ Yes: _____ What: ASA 81mg    Biopsy teaching sheet given:  _____yes __X__no (All teaching points discussed during call, pt with no questions at this time, pt adv to arrive at [phone] for 1400 biopsy)    Pt given name/# for any further questions/needs    Pt agreeable to a post procedure call and states we can give her biopsy results to her over the phone

## 2021-09-21 ENCOUNTER — HOSPITAL ENCOUNTER (OUTPATIENT)
Dept: ULTRASOUND IMAGING | Facility: HOSPITAL | Age: 61
Discharge: HOME | End: 2021-09-21
Payer: COMMERCIAL

## 2021-09-21 ENCOUNTER — HOSPITAL ENCOUNTER (OUTPATIENT)
Dept: MAMMOGRAPHY | Facility: HOSPITAL | Age: 61
Discharge: HOME | End: 2021-09-21
Payer: COMMERCIAL

## 2021-09-21 VITALS — DIASTOLIC BLOOD PRESSURE: 66 MMHG | SYSTOLIC BLOOD PRESSURE: 143 MMHG | HEART RATE: 80 BPM

## 2021-09-21 DIAGNOSIS — R92.8 ABNORMAL MAMMOGRAM: ICD-10-CM

## 2021-09-21 PROCEDURE — 88361 TUMOR IMMUNOHISTOCHEM/COMPUT: CPT | Performed by: PATHOLOGY

## 2021-09-21 PROCEDURE — 19084 BX BREAST ADD LESION US IMAG: CPT

## 2021-09-21 PROCEDURE — 88342 IMHCHEM/IMCYTCHM 1ST ANTB: CPT | Performed by: PATHOLOGY

## 2021-09-21 PROCEDURE — 88305 TISSUE EXAM BY PATHOLOGIST: CPT | Performed by: PATHOLOGY

## 2021-09-21 PROCEDURE — 19083 BX BREAST 1ST LESION US IMAG: CPT

## 2021-09-21 PROCEDURE — A4648 IMPLANTABLE TISSUE MARKER: HCPCS

## 2021-09-21 RX ORDER — LIDOCAINE HYDROCHLORIDE 10 MG/ML
5 INJECTION, SOLUTION EPIDURAL; INFILTRATION; INTRACAUDAL; PERINEURAL ONCE
Status: COMPLETED | OUTPATIENT
Start: 2021-09-21 | End: 2021-09-21

## 2021-09-21 RX ADMIN — LIDOCAINE HYDROCHLORIDE 5 ML: 10 INJECTION, SOLUTION EPIDURAL; INFILTRATION; INTRACAUDAL; PERINEURAL at 15:00

## 2021-09-21 RX ADMIN — LIDOCAINE HYDROCHLORIDE 5 ML: 10 INJECTION, SOLUTION EPIDURAL; INFILTRATION; INTRACAUDAL; PERINEURAL at 14:55

## 2021-09-21 NOTE — PROGRESS NOTES
Procedure type:    _____ultrasound guided _____stereotactic    Breast:    ___X__Left _____Right    Location: 12 OCLOCK, PERIAREOLAR    Needle:12 G ALNI    # of passes:  2    Clip: HYDROMARK COIL    Performed by:DR Sabina Alejo    Pressure held for 5 minutes by:CATALINA SCHRADER    Steri Strips:    ___X__yes _____no    Band aid:    __X___yes_____no    Tolerated procedure:    __X___yes _____no

## 2021-09-21 NOTE — PROGRESS NOTES
Procedure type:    __ x___ultrasound guided _____stereotactic    Breast:    ___x__Left _____Right    Location: 11 oclock periareolar    Needle: 12 g nancy    # of passes: 2    Clip: WING  Performed by: DR Sunil Childs    Pressure held for 5 minutes by:CATALINA ZIEGLER TECH    Steri Strips:    ___X__yes _____no    Band aid:    __X___yes_____no    Tolerated procedure:    __X___yes _____no

## 2021-09-22 NOTE — PROGRESS NOTES
Post procedure call completed    Bleeding: _____yes __X___no (pt denies)    Pain: _____yes ___X___no (pt states area is sensitive, used ice yesterday, took Tylenol with relief)    Redness/Swelling: ______yes ___X___no (pt denies)    Band aid removed: __X___yes _____no    Steri-Strips intact: ___X___yes _____no (discussed with patient to remove steri strips on Sunday if they have not come off on their own)    Pt with no questions at this time, adv Dr Gerald Larson office will call when results available, adv to call with any questions or concerns, has name/# for contact

## 2021-09-23 ENCOUNTER — TELEPHONE (OUTPATIENT)
Dept: GENETICS | Facility: CLINIC | Age: 61
End: 2021-09-23

## 2021-09-23 ENCOUNTER — TELEPHONE (OUTPATIENT)
Dept: MAMMOGRAPHY | Facility: CLINIC | Age: 61
End: 2021-09-23

## 2021-09-23 DIAGNOSIS — D05.12 DUCTAL CARCINOMA IN SITU (DCIS) OF LEFT BREAST: Primary | ICD-10-CM

## 2021-09-23 DIAGNOSIS — Z80.3 FAMILY HISTORY OF BREAST CANCER IN FEMALE: ICD-10-CM

## 2021-09-23 NOTE — TELEPHONE ENCOUNTER
Epic email sent to Dr Neelam Rogers,     Just checking in to make sure you saw Shanique's breast biopsy results and you are going to contact her  Please let me know if I can be of any further assistance       Thanks,   Hieu Jose, MSN, RN, CN-BN  Breast Nurse Navigator

## 2021-09-24 ENCOUNTER — DOCUMENTATION (OUTPATIENT)
Dept: GENETICS | Facility: CLINIC | Age: 61
End: 2021-09-24

## 2021-09-24 ENCOUNTER — CLINICAL SUPPORT (OUTPATIENT)
Dept: GENETICS | Facility: CLINIC | Age: 61
End: 2021-09-24

## 2021-09-24 DIAGNOSIS — Z80.3 FAMILY HISTORY OF BREAST CANCER IN FEMALE: ICD-10-CM

## 2021-09-24 DIAGNOSIS — D05.12 DUCTAL CARCINOMA IN SITU (DCIS) OF LEFT BREAST: ICD-10-CM

## 2021-09-24 PROCEDURE — 36415 COLL VENOUS BLD VENIPUNCTURE: CPT

## 2021-09-24 NOTE — PROGRESS NOTES
I introduced myself to Min Rehman and let her know that her nurse navigator reached out to the cancer risk and genetics program on her behalf      I reviewed the following with Min Rehman:     · While the majority of cancer occurs by chance, approximately 5-10% of breast cancer has an underlying genetic cause   Genetic testing is available which can determine if there is an underlying genetic cause to your cancer  Understanding if there is an underlying genetic cause can:  ? Provide your surgeon with additional information to help with surgical decisions, treatment decisions and eligibility for clinical trials  ? It can determine if you have an increased risk for any additional cancers  ? Help family members understand their cancer risk         · We work closely with the [place] [address] breast surgeons and are reaching out to see if you have interest in genetic testing  The reason we are reaching out at this time is that this result may help your surgeon determine the appropriate type of surgery (i e  lumpectomy vs mastectomy)  This test is not a requirement but can take 5-10 days to complete so we would like to start the process as soon as possible so the results are ready for your appointment with your surgeon  · If you are interested in genetic testing, I can collect your family history and initiate genetic testing for you        ·   Patient elected to pursue testing      · Diagnosis Details:    Ductal carcinoma in situ     Left breast     · Personal History:  ? Do you have a personal history of any other cancer? No  § If yes type/age of diagnosis:   · Family history:   ? Do you have Ashkenazi Faith ancestry? No  § If yes, maternal, paternal, or both? ? Do you have any children? No  § How many sons? 0  § How many daughters? 0  § Do any of your children have a history of cancer? No  § If yes type/age of diagnosis:      ? Do you have any brothers or sisters? Yes  § How many brothers? 4  § How many sisters?  2  § Are they from the same parents? Yes  § If no how maternal/paternal half-siblings:  § Do any of your brothers or sisters have a history of cancer? No  § If yes who and the type/age of diagnosis:            Do you have nieces or nephews? Yes  § Do any of them have a history of cancer? No  § If yes type/age of diagnosis:     ? Does your mother have a history of cancer? No  § If yes, cancer type and age of diagnosis:   § Is your mother still living? No  § Age/Age of death: 70     ? Thinking about your mother's family (aunts, uncles, cousins, grandparents) is there anyone with a history of cancer? Yes  § If yes, list relationship, cancer type and age of diagnosis:  Maternal aunt breast ca age 48 ; thyroid ca age 61  Maternal aunt metastatic breast ca age 46  Maternal cousin breast ca age 48      ? Does your father have a history of cancer? No  § If yes, cancer type and age of diagnosis:  § Is your father still living? No  § Age/age of death: 72     ? Thinking about your father's family (aunts, uncles, cousins, grandparents) is there anyone with a history of cancer? Yes  § If yes, list relationship, cancer type and age of diagnosis:   Paternal aunt breast ca age 61; breast ca age 80; skin ca (face) age 80  Paternal uncle tongue ca age 58 ()  Paternal uncle prostate ca age 61 ()     Types of Results- positive, negative, VUS  ? Positive result- may explain personal diagnosis/family history  Can give surgeon information on treatment plan, inform future screening/management or tell a person about other possible risks  Positive results can initiate testing for other family members who may be at risk (children, siblings, etc)  ? Negative result- does not give an explanation  Surgical/treatment plan will be based on clinical presentation and will be part of discussion with surgeon  Negative result cannot be passed down to children, but they are still at elevated risk  ?  Uncertain result- common, but treated like a negative result clinically  90% are downgraded over time  · [facility] billing [address]  ? Most insurance plans cover the cost of genetic testing   The out-of-pocket cost varies due to the differences in deductibles, co-payments and co-insurance defined by individual plans but 90% of people pay $100 or less for a genetic test      A blood kit has been draw and shipped to the lab      We have genetic counselors available, if you have any additional questions or would like to speak with them we can schedule you a 15 minute phone appointment        Plan:     Genetic Testing Preformed: STAT Myriad Panel - BRCA1/2 along with 9 other genes reported in 5-12 calender days with full Myriad myRisk panel reported in 14 calender days or less        [facility] panel (35 genes): BRCA1, BRCA2, MLH1, MSH2, MSH6,  PMS2, EPCAM, APC, MUTYH, CDKN2A, CDK4, TP53, PTEN, STK11, CDH1, BMPR1A, SMAD4, PALB2, CHEK2, SKYLAR, NBN, BARD1, BRIP1, RAD51C, RAD51D, HOXB13, POLD1, POLE, GREM1, AXIN2, GALNT12, MSH3, NTHL1, RPS20 & RNF43        Patient does not have any further questions, declined meeting with genetic counselor     When your results are ready, someone from the genetics team will call you, review the results, and contact your breast surgeon  You will be contacted with any type of result- positive, negative, or uncertain

## 2021-09-24 NOTE — ADDENDUM NOTE
Addended by: Jaswinder London on: 9/24/2021 09:47 AM     Modules accepted: Level of Service, SmartSet

## 2021-10-01 ENCOUNTER — TELEPHONE (OUTPATIENT)
Dept: GENETICS | Facility: CLINIC | Age: 61
End: 2021-10-01

## 2021-10-04 ENCOUNTER — TELEPHONE (OUTPATIENT)
Dept: GENETICS | Facility: CLINIC | Age: 61
End: 2021-10-04

## 2021-10-04 ENCOUNTER — PATIENT OUTREACH (OUTPATIENT)
Dept: CASE MANAGEMENT | Facility: HOSPITAL | Age: 61
End: 2021-10-04

## 2021-10-04 DIAGNOSIS — Z78.9 NEED FOR FOLLOW-UP BY SOCIAL WORKER: Primary | ICD-10-CM

## 2021-10-04 DIAGNOSIS — E03.9 HYPOTHYROIDISM, UNSPECIFIED TYPE: ICD-10-CM

## 2021-10-04 RX ORDER — LEVOTHYROXINE SODIUM 0.1 MG/1
100 TABLET ORAL DAILY
Qty: 90 TABLET | Refills: 1 | Status: SHIPPED | OUTPATIENT
Start: 2021-10-04 | End: 2022-05-16 | Stop reason: SDUPTHER

## 2021-10-05 ENCOUNTER — TELEPHONE (OUTPATIENT)
Dept: GENETICS | Facility: CLINIC | Age: 61
End: 2021-10-05

## 2021-10-08 ENCOUNTER — PATIENT OUTREACH (OUTPATIENT)
Dept: CASE MANAGEMENT | Facility: HOSPITAL | Age: 61
End: 2021-10-08

## 2021-10-19 ENCOUNTER — OFFICE VISIT (OUTPATIENT)
Dept: SURGICAL ONCOLOGY | Facility: CLINIC | Age: 61
End: 2021-10-19

## 2021-10-19 VITALS
WEIGHT: 276 LBS | RESPIRATION RATE: 16 BRPM | BODY MASS INDEX: 47.12 KG/M2 | TEMPERATURE: 97.8 F | HEART RATE: 59 BPM | SYSTOLIC BLOOD PRESSURE: 140 MMHG | DIASTOLIC BLOOD PRESSURE: 82 MMHG | OXYGEN SATURATION: 100 % | HEIGHT: 64 IN

## 2021-10-19 DIAGNOSIS — Z13.71 BRCA NEGATIVE: ICD-10-CM

## 2021-10-19 DIAGNOSIS — E78.2 MIXED DIABETIC HYPERLIPIDEMIA ASSOCIATED WITH TYPE 2 DIABETES MELLITUS (HCC): ICD-10-CM

## 2021-10-19 DIAGNOSIS — E11.69 MIXED DIABETIC HYPERLIPIDEMIA ASSOCIATED WITH TYPE 2 DIABETES MELLITUS (HCC): ICD-10-CM

## 2021-10-19 DIAGNOSIS — D05.12 DUCTAL CARCINOMA IN SITU (DCIS) OF LEFT BREAST: Primary | ICD-10-CM

## 2021-10-19 PROCEDURE — 99215 OFFICE O/P EST HI 40 MIN: CPT | Performed by: SURGERY

## 2021-10-22 ENCOUNTER — DOCUMENTATION (OUTPATIENT)
Dept: HEMATOLOGY ONCOLOGY | Facility: CLINIC | Age: 61
End: 2021-10-22

## 2021-10-22 ENCOUNTER — PATIENT OUTREACH (OUTPATIENT)
Dept: CASE MANAGEMENT | Facility: OTHER | Age: 61
End: 2021-10-22

## 2021-10-29 ENCOUNTER — CONSULT (OUTPATIENT)
Dept: PLASTIC SURGERY | Facility: CLINIC | Age: 61
End: 2021-10-29

## 2021-10-29 VITALS
RESPIRATION RATE: 16 BRPM | HEART RATE: 80 BPM | HEIGHT: 64 IN | WEIGHT: 276.9 LBS | TEMPERATURE: 97.7 F | BODY MASS INDEX: 47.27 KG/M2 | SYSTOLIC BLOOD PRESSURE: 166 MMHG | DIASTOLIC BLOOD PRESSURE: 83 MMHG

## 2021-10-29 DIAGNOSIS — Z71.89 ENCOUNTER TO DISCUSS BREAST RECONSTRUCTION: Primary | ICD-10-CM

## 2021-10-29 DIAGNOSIS — D05.12 DUCTAL CARCINOMA IN SITU (DCIS) OF LEFT BREAST: ICD-10-CM

## 2021-10-29 PROCEDURE — 99244 OFF/OP CNSLTJ NEW/EST MOD 40: CPT | Performed by: SURGERY

## 2021-11-01 ENCOUNTER — TELEPHONE (OUTPATIENT)
Dept: SURGICAL ONCOLOGY | Facility: CLINIC | Age: 61
End: 2021-11-01

## 2021-11-02 ENCOUNTER — DOCUMENTATION (OUTPATIENT)
Dept: HEMATOLOGY ONCOLOGY | Facility: CLINIC | Age: 61
End: 2021-11-02

## 2021-11-03 ENCOUNTER — TELEPHONE (OUTPATIENT)
Dept: SURGICAL ONCOLOGY | Facility: CLINIC | Age: 61
End: 2021-11-03

## 2021-11-16 ENCOUNTER — TELEPHONE (OUTPATIENT)
Dept: SURGICAL ONCOLOGY | Facility: CLINIC | Age: 61
End: 2021-11-16

## 2021-11-17 ENCOUNTER — TELEPHONE (OUTPATIENT)
Dept: CARDIAC SURGERY | Facility: CLINIC | Age: 61
End: 2021-11-17

## 2021-11-17 DIAGNOSIS — D05.12 DUCTAL CARCINOMA IN SITU (DCIS) OF LEFT BREAST: ICD-10-CM

## 2021-11-17 DIAGNOSIS — D48.5 NEOPLASM OF UNCERTAIN BEHAVIOR OF SKIN: Primary | ICD-10-CM

## 2021-11-18 ENCOUNTER — PATIENT OUTREACH (OUTPATIENT)
Dept: CASE MANAGEMENT | Facility: OTHER | Age: 61
End: 2021-11-18

## 2021-11-30 ENCOUNTER — TELEPHONE (OUTPATIENT)
Dept: FAMILY MEDICINE CLINIC | Facility: CLINIC | Age: 61
End: 2021-11-30

## 2021-12-02 PROCEDURE — U0003 INFECTIOUS AGENT DETECTION BY NUCLEIC ACID (DNA OR RNA); SEVERE ACUTE RESPIRATORY SYNDROME CORONAVIRUS 2 (SARS-COV-2) (CORONAVIRUS DISEASE [COVID-19]), AMPLIFIED PROBE TECHNIQUE, MAKING USE OF HIGH THROUGHPUT TECHNOLOGIES AS DESCRIBED BY CMS-2020-01-R: HCPCS | Performed by: FAMILY MEDICINE

## 2021-12-02 PROCEDURE — U0005 INFEC AGEN DETEC AMPLI PROBE: HCPCS | Performed by: FAMILY MEDICINE

## 2021-12-06 ENCOUNTER — TELEMEDICINE (OUTPATIENT)
Dept: FAMILY MEDICINE CLINIC | Facility: CLINIC | Age: 61
End: 2021-12-06

## 2021-12-06 DIAGNOSIS — U07.1 COVID-19: Primary | ICD-10-CM

## 2021-12-06 PROCEDURE — 99212 OFFICE O/P EST SF 10 MIN: CPT | Performed by: FAMILY MEDICINE

## 2021-12-10 ENCOUNTER — TELEPHONE (OUTPATIENT)
Dept: SURGICAL ONCOLOGY | Facility: CLINIC | Age: 61
End: 2021-12-10

## 2021-12-14 ENCOUNTER — RA CDI HCC (OUTPATIENT)
Dept: OTHER | Facility: HOSPITAL | Age: 61
End: 2021-12-14

## 2021-12-14 ENCOUNTER — TELEPHONE (OUTPATIENT)
Dept: SURGICAL ONCOLOGY | Facility: CLINIC | Age: 61
End: 2021-12-14

## 2021-12-15 PROBLEM — E11.22 TYPE 2 DIABETES MELLITUS WITH STAGE 3B CHRONIC KIDNEY DISEASE (HCC): Status: ACTIVE | Noted: 2021-12-15

## 2021-12-15 PROBLEM — N18.32 TYPE 2 DIABETES MELLITUS WITH STAGE 3B CHRONIC KIDNEY DISEASE (HCC): Status: ACTIVE | Noted: 2021-12-15

## 2021-12-30 DIAGNOSIS — D05.12 DUCTAL CARCINOMA IN SITU (DCIS) OF LEFT BREAST: Primary | ICD-10-CM

## 2022-01-04 ENCOUNTER — PATIENT OUTREACH (OUTPATIENT)
Dept: CASE MANAGEMENT | Facility: OTHER | Age: 62
End: 2022-01-04

## 2022-01-04 DIAGNOSIS — Z78.9 NEED FOR FOLLOW-UP BY SOCIAL WORKER: Primary | ICD-10-CM

## 2022-01-06 ENCOUNTER — TELEPHONE (OUTPATIENT)
Dept: SURGICAL ONCOLOGY | Facility: CLINIC | Age: 62
End: 2022-01-06

## 2022-01-06 NOTE — TELEPHONE ENCOUNTER
Patient called to give updated insurance to our preauthorization department  I confirmed we already have it in her chart and sent an updated e-mail regarding this to Mario Gardner in our Pre authorization department  Reviewed need to get pre op testing done,when and where were discussed  Patient aware we are awaiting clearance from PCP regarding her previous COVID diagnosis  She states she will call her PCP and follow up  Confirmed her upcoming appointment with Dr Marissa Rosa to update her H&P  Encouraged her to complete all of the above prior to seeing Dr Marissa Rosa at her visit  Patient stated she understood and was appreciative of call

## 2022-01-12 ENCOUNTER — PATIENT OUTREACH (OUTPATIENT)
Dept: CASE MANAGEMENT | Facility: OTHER | Age: 62
End: 2022-01-12

## 2022-01-12 NOTE — PROGRESS NOTES
OSW received update pt's breast surgery has been moved to February 8th  Phoned  [facility] inquiring about approval  Per intake, pt's information has not been processed yet  Informed referral will be reviewed closer to surgical date  OSW will keep referral open and review in a few weeks  Addendum: OSW received Tiger Text from Kimo Guzman at [facility]) [facility], that pt has been accepted for their services when she has her surgery  AVS/DC instructions will need to be sent to [facility] once patient is discharged  OSW phoned pt to update  LM on VM explaining above  Will continue to follow

## 2022-01-13 ENCOUNTER — LAB (OUTPATIENT)
Dept: LAB | Facility: CLINIC | Age: 62
End: 2022-01-13
Payer: COMMERCIAL

## 2022-01-13 ENCOUNTER — CLINICAL SUPPORT (OUTPATIENT)
Dept: URGENT CARE | Facility: CLINIC | Age: 62
End: 2022-01-13
Payer: COMMERCIAL

## 2022-01-13 ENCOUNTER — APPOINTMENT (OUTPATIENT)
Dept: RADIOLOGY | Facility: CLINIC | Age: 62
End: 2022-01-13
Payer: COMMERCIAL

## 2022-01-13 ENCOUNTER — TELEPHONE (OUTPATIENT)
Dept: OTHER | Facility: OTHER | Age: 62
End: 2022-01-13

## 2022-01-13 DIAGNOSIS — D05.12 DUCTAL CARCINOMA IN SITU (DCIS) OF LEFT BREAST: ICD-10-CM

## 2022-01-13 DIAGNOSIS — E55.9 VITAMIN D DEFICIENCY: ICD-10-CM

## 2022-01-13 DIAGNOSIS — E78.5 HYPERLIPIDEMIA, UNSPECIFIED HYPERLIPIDEMIA TYPE: ICD-10-CM

## 2022-01-13 DIAGNOSIS — R94.31 ABNORMAL EKG: Primary | ICD-10-CM

## 2022-01-13 DIAGNOSIS — E78.2 MIXED DIABETIC HYPERLIPIDEMIA ASSOCIATED WITH TYPE 2 DIABETES MELLITUS (HCC): ICD-10-CM

## 2022-01-13 DIAGNOSIS — IMO0002 UNCONTROLLED TYPE 2 DIABETES MELLITUS WITH PERIPHERAL NEUROPATHY: ICD-10-CM

## 2022-01-13 DIAGNOSIS — E78.2 MIXED DIABETIC HYPERLIPIDEMIA ASSOCIATED WITH TYPE 2 DIABETES MELLITUS (HCC): Primary | ICD-10-CM

## 2022-01-13 DIAGNOSIS — E11.69 MIXED DIABETIC HYPERLIPIDEMIA ASSOCIATED WITH TYPE 2 DIABETES MELLITUS (HCC): Primary | ICD-10-CM

## 2022-01-13 DIAGNOSIS — E11.69 MIXED DIABETIC HYPERLIPIDEMIA ASSOCIATED WITH TYPE 2 DIABETES MELLITUS (HCC): ICD-10-CM

## 2022-01-13 LAB
25(OH)D3 SERPL-MCNC: 32 NG/ML (ref 30–100)
ALBUMIN SERPL BCP-MCNC: 3.8 G/DL (ref 3.5–5)
ALP SERPL-CCNC: 69 U/L (ref 46–116)
ALT SERPL W P-5'-P-CCNC: 18 U/L (ref 12–78)
ANION GAP SERPL CALCULATED.3IONS-SCNC: 6 MMOL/L (ref 4–13)
AST SERPL W P-5'-P-CCNC: 12 U/L (ref 5–45)
BACTERIA UR QL AUTO: ABNORMAL /HPF
BASOPHILS # BLD AUTO: 0.09 THOUSANDS/ΜL (ref 0–0.1)
BASOPHILS NFR BLD AUTO: 1 % (ref 0–1)
BILIRUB SERPL-MCNC: 0.42 MG/DL (ref 0.2–1)
BILIRUB UR QL STRIP: NEGATIVE
BUN SERPL-MCNC: 38 MG/DL (ref 5–25)
CALCIUM SERPL-MCNC: 9.4 MG/DL (ref 8.3–10.1)
CHLORIDE SERPL-SCNC: 108 MMOL/L (ref 100–108)
CHOLEST SERPL-MCNC: 143 MG/DL
CLARITY UR: ABNORMAL
CO2 SERPL-SCNC: 26 MMOL/L (ref 21–32)
COLOR UR: ABNORMAL
CREAT SERPL-MCNC: 1.43 MG/DL (ref 0.6–1.3)
CREAT UR-MCNC: 76.7 MG/DL
EOSINOPHIL # BLD AUTO: 0.34 THOUSAND/ΜL (ref 0–0.61)
EOSINOPHIL NFR BLD AUTO: 3 % (ref 0–6)
ERYTHROCYTE [DISTWIDTH] IN BLOOD BY AUTOMATED COUNT: 13.6 % (ref 11.6–15.1)
EST. AVERAGE GLUCOSE BLD GHB EST-MCNC: 214 MG/DL
GFR SERPL CREATININE-BSD FRML MDRD: 39 ML/MIN/1.73SQ M
GLUCOSE P FAST SERPL-MCNC: 162 MG/DL (ref 65–99)
GLUCOSE UR STRIP-MCNC: ABNORMAL MG/DL
HBA1C MFR BLD: 9.1 %
HCT VFR BLD AUTO: 36.6 % (ref 34.8–46.1)
HDLC SERPL-MCNC: 36 MG/DL
HGB BLD-MCNC: 11.1 G/DL (ref 11.5–15.4)
HGB UR QL STRIP.AUTO: NEGATIVE
HYALINE CASTS #/AREA URNS LPF: ABNORMAL /LPF
IMM GRANULOCYTES # BLD AUTO: 0.05 THOUSAND/UL (ref 0–0.2)
IMM GRANULOCYTES NFR BLD AUTO: 1 % (ref 0–2)
KETONES UR STRIP-MCNC: NEGATIVE MG/DL
LDLC SERPL CALC-MCNC: 69 MG/DL (ref 0–100)
LEUKOCYTE ESTERASE UR QL STRIP: ABNORMAL
LYMPHOCYTES # BLD AUTO: 2.6 THOUSANDS/ΜL (ref 0.6–4.47)
LYMPHOCYTES NFR BLD AUTO: 26 % (ref 14–44)
MCH RBC QN AUTO: 28.4 PG (ref 26.8–34.3)
MCHC RBC AUTO-ENTMCNC: 30.3 G/DL (ref 31.4–37.4)
MCV RBC AUTO: 94 FL (ref 82–98)
MICROALBUMIN UR-MCNC: 13.8 MG/L (ref 0–20)
MICROALBUMIN/CREAT 24H UR: 18 MG/G CREATININE (ref 0–30)
MONOCYTES # BLD AUTO: 0.9 THOUSAND/ΜL (ref 0.17–1.22)
MONOCYTES NFR BLD AUTO: 9 % (ref 4–12)
NEUTROPHILS # BLD AUTO: 6.02 THOUSANDS/ΜL (ref 1.85–7.62)
NEUTS SEG NFR BLD AUTO: 60 % (ref 43–75)
NITRITE UR QL STRIP: NEGATIVE
NON-SQ EPI CELLS URNS QL MICRO: ABNORMAL /HPF
NONHDLC SERPL-MCNC: 107 MG/DL
NRBC BLD AUTO-RTO: 0 /100 WBCS
PH UR STRIP.AUTO: 6 [PH]
PLATELET # BLD AUTO: 283 THOUSANDS/UL (ref 149–390)
PMV BLD AUTO: 12 FL (ref 8.9–12.7)
POTASSIUM SERPL-SCNC: 4.6 MMOL/L (ref 3.5–5.3)
PROT SERPL-MCNC: 7.9 G/DL (ref 6.4–8.2)
PROT UR STRIP-MCNC: NEGATIVE MG/DL
RBC # BLD AUTO: 3.91 MILLION/UL (ref 3.81–5.12)
RBC #/AREA URNS AUTO: ABNORMAL /HPF
SODIUM SERPL-SCNC: 140 MMOL/L (ref 136–145)
SP GR UR STRIP.AUTO: 1.02 (ref 1–1.03)
TRIGL SERPL-MCNC: 192 MG/DL
UROBILINOGEN UR QL STRIP.AUTO: 0.2 E.U./DL
WBC # BLD AUTO: 10 THOUSAND/UL (ref 4.31–10.16)
WBC #/AREA URNS AUTO: ABNORMAL /HPF

## 2022-01-13 PROCEDURE — 82043 UR ALBUMIN QUANTITATIVE: CPT | Performed by: FAMILY MEDICINE

## 2022-01-13 PROCEDURE — 93005 ELECTROCARDIOGRAM TRACING: CPT

## 2022-01-13 PROCEDURE — 3046F HEMOGLOBIN A1C LEVEL >9.0%: CPT | Performed by: INTERNAL MEDICINE

## 2022-01-13 PROCEDURE — 80053 COMPREHEN METABOLIC PANEL: CPT

## 2022-01-13 PROCEDURE — 71046 X-RAY EXAM CHEST 2 VIEWS: CPT

## 2022-01-13 PROCEDURE — 87077 CULTURE AEROBIC IDENTIFY: CPT | Performed by: SURGERY

## 2022-01-13 PROCEDURE — 82306 VITAMIN D 25 HYDROXY: CPT

## 2022-01-13 PROCEDURE — 85025 COMPLETE CBC W/AUTO DIFF WBC: CPT

## 2022-01-13 PROCEDURE — 80061 LIPID PANEL: CPT

## 2022-01-13 PROCEDURE — 81001 URINALYSIS AUTO W/SCOPE: CPT | Performed by: SURGERY

## 2022-01-13 PROCEDURE — 82570 ASSAY OF URINE CREATININE: CPT | Performed by: FAMILY MEDICINE

## 2022-01-13 PROCEDURE — 83036 HEMOGLOBIN GLYCOSYLATED A1C: CPT

## 2022-01-13 PROCEDURE — 36415 COLL VENOUS BLD VENIPUNCTURE: CPT

## 2022-01-13 PROCEDURE — 3061F NEG MICROALBUMINURIA REV: CPT | Performed by: INTERNAL MEDICINE

## 2022-01-13 PROCEDURE — 87086 URINE CULTURE/COLONY COUNT: CPT | Performed by: SURGERY

## 2022-01-14 ENCOUNTER — TELEPHONE (OUTPATIENT)
Dept: SURGICAL ONCOLOGY | Facility: CLINIC | Age: 62
End: 2022-01-14

## 2022-01-14 NOTE — TELEPHONE ENCOUNTER
As per Dr Amy De La Cruz, attempted calling Nydia Knox to review test results and recommendation for an endocrinology consult, no answer, left her a message to return my call

## 2022-01-14 NOTE — TELEPHONE ENCOUNTER
----- Message from Adalgisa Louise MD sent at 1/13/2022  4:07 PM EST -----  She has glucose in her urine and her hgba1c is not acceptable  I am putting in an endocrine consult  She needs to see them asap

## 2022-01-14 NOTE — TELEPHONE ENCOUNTER
----- Message from Milton Jordan MD sent at 1/13/2022  4:07 PM EST -----  She has glucose in her urine and her hgba1c is not acceptable  I am putting in an endocrine consult  She needs to see them asap

## 2022-01-14 NOTE — TELEPHONE ENCOUNTER
Received a return call from Rosita  Reviewed her test results and Dr Nicole Beavers recommendation for an endocrinology consult  Pt shared she has seen Dr Brandt Brito, Endocrinologist on 12/23/22 at [facility]  She shared she had been off her diabetic medication for 6 months due to Insurance issues  She has restarted these medications  Trulicity on 75/03/92 and [employer] on 12/29/22  She states she is now taking these faithfully  Will send an Endocrinology Clearance to Dr Brandt Brito  Pt is aware

## 2022-01-16 LAB
BACTERIA UR CULT: ABNORMAL
BACTERIA UR CULT: ABNORMAL

## 2022-01-17 ENCOUNTER — TELEPHONE (OUTPATIENT)
Dept: HEMATOLOGY ONCOLOGY | Facility: CLINIC | Age: 62
End: 2022-01-17

## 2022-01-17 ENCOUNTER — PATIENT OUTREACH (OUTPATIENT)
Dept: CASE MANAGEMENT | Facility: OTHER | Age: 62
End: 2022-01-17

## 2022-01-17 ENCOUNTER — TELEPHONE (OUTPATIENT)
Dept: FAMILY MEDICINE CLINIC | Facility: CLINIC | Age: 62
End: 2022-01-17

## 2022-01-17 NOTE — PROGRESS NOTES
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 and will feel comfortable with her incision and drain/s  She stated she will discuss further with [facility] once they come out to see her  Karissa Ha reported she is emotionally coping well with her diagnosis and feels much more relieved since her health insurance became active  She appreciates follow up calls from this writer  OSW sent Tiger Text to ALIVIA Noriega with updated address for Karissa Ha  Pt will discharge to: [address] [facility], [address] [facility] [address] [place]  Received confirmation from [place] she now has this information  Will continue to follow and provide support as needed

## 2022-01-17 NOTE — TELEPHONE ENCOUNTER
Spoke to Ciaran Winter at [address] [place] [address] trying to have the EKG resulted for pt's apt 1/18/2022  Staff was advised that Dr Crews Orn nurse would be calling the office back

## 2022-01-17 NOTE — TELEPHONE ENCOUNTER
Blanca from [facility] is calling regarding the results of patients EKG done on 1/13/22    She can be reached at  [phone] option [phone]

## 2022-01-17 NOTE — TELEPHONE ENCOUNTER
Called [address] [facility] to have EKG resulted for apt 1/18/2022  Referred to cardiology as the care now only perform them and do not result them  Inquired about what office results them [facility] was not sure and referred staff to call ordering provider

## 2022-01-18 ENCOUNTER — CONSULT (OUTPATIENT)
Dept: FAMILY MEDICINE CLINIC | Facility: CLINIC | Age: 62
End: 2022-01-18
Payer: COMMERCIAL

## 2022-01-18 VITALS
BODY MASS INDEX: 44.05 KG/M2 | TEMPERATURE: 97.7 F | HEART RATE: 80 BPM | HEIGHT: 64 IN | DIASTOLIC BLOOD PRESSURE: 76 MMHG | WEIGHT: 258 LBS | SYSTOLIC BLOOD PRESSURE: 120 MMHG | RESPIRATION RATE: 16 BRPM | OXYGEN SATURATION: 99 %

## 2022-01-18 DIAGNOSIS — E55.9 VITAMIN D DEFICIENCY: ICD-10-CM

## 2022-01-18 DIAGNOSIS — I10 BENIGN ESSENTIAL HYPERTENSION: ICD-10-CM

## 2022-01-18 DIAGNOSIS — N18.32 TYPE 2 DIABETES MELLITUS WITH STAGE 3B CHRONIC KIDNEY DISEASE, WITHOUT LONG-TERM CURRENT USE OF INSULIN (HCC): ICD-10-CM

## 2022-01-18 DIAGNOSIS — K21.9 GERD WITHOUT ESOPHAGITIS: ICD-10-CM

## 2022-01-18 DIAGNOSIS — E66.01 OBESITY, CLASS III, BMI 40-49.9 (MORBID OBESITY) (HCC): ICD-10-CM

## 2022-01-18 DIAGNOSIS — D05.12 DUCTAL CARCINOMA IN SITU (DCIS) OF LEFT BREAST: ICD-10-CM

## 2022-01-18 DIAGNOSIS — N39.0 URINARY TRACT INFECTION WITHOUT HEMATURIA, SITE UNSPECIFIED: ICD-10-CM

## 2022-01-18 DIAGNOSIS — E78.5 HYPERLIPIDEMIA, UNSPECIFIED HYPERLIPIDEMIA TYPE: ICD-10-CM

## 2022-01-18 DIAGNOSIS — E03.9 HYPOTHYROIDISM, UNSPECIFIED TYPE: ICD-10-CM

## 2022-01-18 DIAGNOSIS — Z01.818 PREOPERATIVE CLEARANCE: Primary | ICD-10-CM

## 2022-01-18 DIAGNOSIS — E11.22 TYPE 2 DIABETES MELLITUS WITH STAGE 3B CHRONIC KIDNEY DISEASE, WITHOUT LONG-TERM CURRENT USE OF INSULIN (HCC): ICD-10-CM

## 2022-01-18 DIAGNOSIS — IMO0002 UNCONTROLLED TYPE 2 DIABETES MELLITUS WITH PERIPHERAL NEUROPATHY: ICD-10-CM

## 2022-01-18 PROCEDURE — 3066F NEPHROPATHY DOC TX: CPT | Performed by: INTERNAL MEDICINE

## 2022-01-18 PROCEDURE — 99243 OFF/OP CNSLTJ NEW/EST LOW 30: CPT | Performed by: FAMILY MEDICINE

## 2022-01-18 RX ORDER — METRONIDAZOLE 500 MG/1
500 TABLET ORAL EVERY 12 HOURS SCHEDULED
Qty: 14 TABLET | Refills: 0 | Status: SHIPPED | OUTPATIENT
Start: 2022-01-18 | End: 2022-01-25

## 2022-01-18 RX ORDER — DULAGLUTIDE 0.75 MG/.5ML
3 INJECTION, SOLUTION SUBCUTANEOUS WEEKLY
Qty: 6 ML | Refills: 2 | Status: SHIPPED | OUTPATIENT
Start: 2022-01-18 | End: 2022-01-20 | Stop reason: CLARIF

## 2022-01-18 RX ORDER — METRONIDAZOLE 500 MG/1
500 TABLET ORAL EVERY 8 HOURS SCHEDULED
Qty: 21 TABLET | Refills: 0 | Status: SHIPPED | OUTPATIENT
Start: 2022-01-18 | End: 2022-01-18 | Stop reason: SDUPTHER

## 2022-01-18 RX ORDER — MELATONIN
4000 DAILY
Qty: 90 TABLET | Refills: 1 | Status: SHIPPED | OUTPATIENT
Start: 2022-01-18

## 2022-01-18 RX ORDER — GABAPENTIN 300 MG/1
300 CAPSULE ORAL 2 TIMES DAILY
Qty: 180 CAPSULE | Refills: 0
Start: 2022-01-18

## 2022-01-18 NOTE — PROGRESS NOTES
Assessment/Plan:  PA T labs and EKG reviewed  Patient was started on metronidazole 500 mg 1 b i d  for 7 days and instructed to increase water intake  Patient to continue present treatment  Patient instructed to follow a low-fat, low-salt and a low sugar/carbohydrate diet more carefully and get regular exercise walking as tolerated  Continued weight loss encouraged  Continue home glucose monitoring  Patient scheduled for preoperative clearance exam with Dr Terrell Goff at [facility] on 1/20/22  Patient is medically cleared for proposed surgery  Diagnoses and all orders for this visit:    Preoperative clearance    Ductal carcinoma in situ (DCIS) of left breast    Uncontrolled type 2 diabetes mellitus with peripheral neuropathy (HCC)  -     gabapentin (NEURONTIN) 300 mg capsule; Take 1 capsule (300 mg total) by mouth 2 (two) times a day  -     Dulaglutide (Trulicity) 0 82 IA/4 5JM SOPN; Inject 2 mL (3 mg total) under the skin once a week    Type 2 diabetes mellitus with stage 3b chronic kidney disease, without long-term current use of insulin (HCC)    Benign essential hypertension    Hypothyroidism, unspecified type    Hyperlipidemia, unspecified hyperlipidemia type    Urinary tract infection without hematuria, site unspecified  -     Discontinue: metroNIDAZOLE (FLAGYL) 500 mg tablet; Take 1 tablet (500 mg total) by mouth every 8 (eight) hours for 7 days  -     metroNIDAZOLE (FLAGYL) 500 mg tablet; Take 1 tablet (500 mg total) by mouth every 12 (twelve) hours for 7 days    Obesity, Class III, BMI 40-49 9 (morbid obesity) ([facility] [address] )    GERD without esophagitis    Vitamin D deficiency  -     cholecalciferol (VITAMIN D3) 1,000 units tablet; Take 4 tablets (4,000 Units total) by mouth daily          Subjective:      Patient ID: Donovan Dunaway is a 64 y o  female      Patient is here for preoperative medical clearance exam for left mastectomy with lymph node biopsy scheduled on 02/08/2022 at [facility] with Dr Laurie Edmondson, breast surgeon  Patient had PA T labs and EKG on 01/13/2022 which were reviewed  Urine culture positive for UTI  Patient had previously run out of diabetic medications few months ago and recently restarted all her diabetic medications over the past 3 weeks and home glucose monitoring reveals fasting blood sugars in the range of 140-170 over the past few weeks and previously were elevated greater than 200-300 range  Patient states her weight is down approximately 18 lb through dieting  Patient has an appoint with Dr Ladi Friedman at [facility] on 01/20/2022 for clearance for surgery  Patient denies history of bleeding problems or problems with anesthesia in the past       The following portions of the patient's history were reviewed and updated as appropriate: allergies, current medications, past family history, past medical history, past social history, past surgical history and problem list     Review of Systems   Constitutional: Negative for activity change, appetite change, chills, diaphoresis, fatigue, fever and unexpected weight change  HENT: Negative  Eyes: Negative  Respiratory: Negative for cough, chest tightness, shortness of breath and wheezing  Cardiovascular: Positive for leg swelling  Negative for chest pain and palpitations  Gastrointestinal: Positive for abdominal pain and nausea  Negative for blood in stool, constipation, diarrhea and vomiting  Endocrine: Negative for cold intolerance and heat intolerance  Genitourinary: Positive for urgency  Negative for difficulty urinating, dysuria, flank pain, frequency and hematuria  Musculoskeletal: Positive for back pain  Negative for arthralgias, gait problem, joint swelling, myalgias, neck pain and neck stiffness  Skin: Negative  Neurological: Positive for light-headedness  Negative for dizziness, syncope, weakness and headaches  Hematological: Negative for adenopathy   Does not bruise/bleed easily  Psychiatric/Behavioral: Negative for decreased concentration, dysphoric mood and sleep disturbance  The patient is not nervous/anxious  Objective:      /76   Pulse 80   Temp 97 7 °F (36 5 °C) (Skin)   Resp 16   Ht 5' 4" (1 626 m)   Wt 117 kg (258 lb)   LMP  (LMP Unknown)   SpO2 99%   BMI 44 29 kg/m²          Physical Exam  Constitutional:       General: She is not in acute distress  Appearance: Normal appearance  She is obese  She is not ill-appearing  HENT:      Head: Normocephalic  Mouth/Throat:      Mouth: Mucous membranes are moist    Eyes:      General: No scleral icterus  Conjunctiva/sclera: Conjunctivae normal    Neck:      Vascular: No carotid bruit  Cardiovascular:      Rate and Rhythm: Normal rate and regular rhythm  Pulmonary:      Effort: Pulmonary effort is normal       Breath sounds: Normal breath sounds  Abdominal:      Palpations: Abdomen is soft  Tenderness: There is no abdominal tenderness  There is no right CVA tenderness or left CVA tenderness  Musculoskeletal:      Cervical back: Neck supple  Right lower leg: No edema  Left lower leg: No edema  Lymphadenopathy:      Cervical: No cervical adenopathy  Skin:     General: Skin is warm and dry  Neurological:      General: No focal deficit present  Mental Status: She is alert and oriented to person, place, and time  Psychiatric:         Mood and Affect: Mood normal          Behavior: Behavior normal          Thought Content:  Thought content normal          Judgment: Judgment normal

## 2022-01-20 ENCOUNTER — TELEPHONE (OUTPATIENT)
Dept: ENDOCRINOLOGY | Facility: CLINIC | Age: 62
End: 2022-01-20

## 2022-01-20 ENCOUNTER — CONSULT (OUTPATIENT)
Dept: ENDOCRINOLOGY | Facility: CLINIC | Age: 62
End: 2022-01-20
Payer: COMMERCIAL

## 2022-01-20 VITALS
SYSTOLIC BLOOD PRESSURE: 130 MMHG | WEIGHT: 263 LBS | HEIGHT: 64 IN | BODY MASS INDEX: 44.9 KG/M2 | HEART RATE: 70 BPM | DIASTOLIC BLOOD PRESSURE: 80 MMHG

## 2022-01-20 DIAGNOSIS — E11.69 MIXED DIABETIC HYPERLIPIDEMIA ASSOCIATED WITH TYPE 2 DIABETES MELLITUS (HCC): ICD-10-CM

## 2022-01-20 DIAGNOSIS — I10 BENIGN ESSENTIAL HYPERTENSION: ICD-10-CM

## 2022-01-20 DIAGNOSIS — IMO0002 UNCONTROLLED TYPE 2 DIABETES MELLITUS WITH PERIPHERAL NEUROPATHY: Primary | ICD-10-CM

## 2022-01-20 DIAGNOSIS — E03.9 HYPOTHYROIDISM, UNSPECIFIED TYPE: ICD-10-CM

## 2022-01-20 DIAGNOSIS — E78.2 MIXED DIABETIC HYPERLIPIDEMIA ASSOCIATED WITH TYPE 2 DIABETES MELLITUS (HCC): ICD-10-CM

## 2022-01-20 PROCEDURE — 3075F SYST BP GE 130 - 139MM HG: CPT | Performed by: INTERNAL MEDICINE

## 2022-01-20 PROCEDURE — 99244 OFF/OP CNSLTJ NEW/EST MOD 40: CPT | Performed by: INTERNAL MEDICINE

## 2022-01-20 PROCEDURE — 3079F DIAST BP 80-89 MM HG: CPT | Performed by: INTERNAL MEDICINE

## 2022-01-20 PROCEDURE — 1036F TOBACCO NON-USER: CPT | Performed by: INTERNAL MEDICINE

## 2022-01-20 RX ORDER — DULAGLUTIDE 3 MG/.5ML
3 INJECTION, SOLUTION SUBCUTANEOUS WEEKLY
COMMUNITY
End: 2022-01-20 | Stop reason: SDUPTHER

## 2022-01-20 RX ORDER — BLOOD-GLUCOSE METER
EACH MISCELLANEOUS 3 TIMES DAILY
Qty: 1 KIT | Refills: 0 | Status: SHIPPED | OUTPATIENT
Start: 2022-01-20

## 2022-01-20 RX ORDER — BLOOD SUGAR DIAGNOSTIC
STRIP MISCELLANEOUS
Qty: 100 STRIP | Refills: 4 | Status: SHIPPED | OUTPATIENT
Start: 2022-01-20

## 2022-01-20 RX ORDER — DULAGLUTIDE 3 MG/.5ML
3 INJECTION, SOLUTION SUBCUTANEOUS WEEKLY
Qty: 2 ML | Refills: 5 | Status: SHIPPED | OUTPATIENT
Start: 2022-01-20 | End: 2022-03-31 | Stop reason: SDUPTHER

## 2022-01-20 RX ORDER — BLOOD-GLUCOSE METER
KIT MISCELLANEOUS
Qty: 1 KIT | Refills: 1 | Status: SHIPPED | OUTPATIENT
Start: 2022-01-20 | End: 2022-01-20

## 2022-01-20 NOTE — PATIENT INSTRUCTIONS
Please stop the Farxiga 3 days before surgery  Please call the office if sugars increase prior to the surgery  You can check the formulary to see if

## 2022-01-20 NOTE — PROGRESS NOTES
New Patient Progress Note      Chief Complaint   Patient presents with    Diabetes Type 2      Referring Provider  Juan Boone Do  [phone]  [facility],  [address] [facility]     History of Present Illness:   Lola Queen is a 64 y o  female with a history of type 2 diabetes without long term use of insulin seen to establish care  She was formerly seeing Dr Beny Rivers, but is changing care to [facility]  She has a mastectomy scheduled for 2/5/2022 with Dr Linwood Davila at [facility]  Recalls being diagnosed with diabetes 10-12ys, based on labs  She started with metformin and [place] and Trulicity added later  She [facility]  There was a disruption in her meds from July-Dec 2021  Fhx in mother and grandmother as well as other extended family members    Denies complications of of retinopathy or nephropathy, but not MI or CVA  Denies hospitalizations for severe hypoglycemic or severe hyperglycemic episodes  No DKA    Current regimen: Farxiga 10mg, metformin 1g twice dialy, Trulicity 3mg weekly  She resumed Trulicity 78/42/01 and [place] on 12/30/21    Of note had COVID late Nov-Dec 2021  Breast CA surgery was delayed, now scheduled for 2/8/22  To have her surgery at [facility]  Two days ago, she was started on metronidazole by Dr Mckayla Capellan  further diabetic ROS: blurry vision, increased thirst  She has increased urination from her meds  Home blood glucose readings:   Before breakfast: 155-224, but in the past one week <180  Before lunch:   Before dinner:   Bedtime:     Hypoglycemic episodes: none      Diabetes education: YES, but over 10yrs ago  Diet: 3 meals per day, used to snack through the day  She started this past week to changed her diet  Stopped having candy or sweets  Calls herself a "sugar-holic " She is now reading labels for carbs and sugars, and trying to drink more water       Activity: Daily activity   Has had a weight loss of 18# since June 2021  Some is intentional and some is not intentional  Most has occurred in the past 2-3mos               Opthamology: sees yearly in July; no retinopathy, has start of cataracts  Podiatry: seeing Dr Jayna Saeed, saw in Jan 2022  Dental: seeing  Connie vaccine: received 21-22  Had COVID, 2 vaccines    Has hypertension: followed by PCP; on lisinopril hctz-lisinopril  Has hyperlipidemia: followed by PCP; on statin - tolerating well, no myalgias  Thyroid disorders: has hypothyroidism on levothyroxine  History of pancreatitis: one episode of pancreatitis after gallbladder surgery  She had a procedure after this which caused a pancreatitis (about 18yrs ago)   No other risks, infrequent alchol    Patient Active Problem List   Diagnosis    Anemia    Benign essential hypertension    Arthritis    Mixed diabetic hyperlipidemia associated with type 2 diabetes mellitus ([address] )    Disc degeneration, lumbar    Fibrocystic breast disease, unspecified laterality    GERD without esophagitis    Hyperlipidemia    Hypothyroidism    IBS (irritable bowel syndrome)    Neoplasm of uncertain behavior of skin    Obesity, Class III, BMI 40-49 9 (morbid obesity) ([facility] [address] )    MARIA FERNANDA (obstructive sleep apnea)    Primary osteoarthritis of both knees    Vitamin D deficiency    Uncontrolled type 2 diabetes mellitus with peripheral neuropathy (HCC)    Fatty liver    Full thickness rotator cuff tear    Shoulder joint pain    Family history of breast cancer in female    Screening mammogram, encounter for    Lumbar radiculopathy    Ductal carcinoma in situ (DCIS) of left breast    BRCA negative    COVID-19    Type 2 diabetes mellitus with stage 3b chronic kidney disease ([facility] [address] )      Past Medical History:   Diagnosis Date    Achrochordon     Acute bronchitis     Allergic rhinitis     Diabetes (HCC)     Dysuria     Hypothyroid     IBS (irritable bowel syndrome)     Polycystic ovarian syndrome     Rotator cuff tear     right side     URI (upper respiratory infection)       Past Surgical History:   Procedure Laterality Date    ANAL FISTULOTOMY      subcutaneous     BREAST LUMPECTOMY Left 2021    CHOLECYSTECTOMY      HEMORRHOID SURGERY      INCISION AND DRAINAGE PERIRECTAL ABSCESS      MOUTH SURGERY      OVARIAN CYST REMOVAL      ROTATOR CUFF REPAIR Right     TONSILLECTOMY      US GUIDANCE BREAST BIOPSY LEFT EACH ADDITIONAL Left 2021    US GUIDED BREAST BIOPSY LEFT COMPLETE Left 2021      Family History   Problem Relation Age of Onset    Diabetes Mother     Diabetes type II Mother     Breast cancer Paternal Aunt     Cancer Paternal Uncle         carcinosarcoma of the oral cavity     Prostate cancer Paternal Uncle     Breast cancer Family     Breast cancer Maternal Aunt     Breast cancer Maternal Aunt     Diabetes type II Maternal Aunt     Thyroid cancer Maternal Aunt     Hypertension Father          01    No Known Problems Sister     No Known Problems Maternal Grandmother     No Known Problems Paternal Grandmother     No Known Problems Sister     No Known Problems Paternal Aunt     No Known Problems Maternal Aunt     Diabetes type II Maternal Uncle     Diabetes type II Maternal Grandfather      Social History     Tobacco Use    Smoking status: Never Smoker    Smokeless tobacco: Never Used   Substance Use Topics    Alcohol use: Not Currently     Alcohol/week: 0 0 standard drinks     Comment: Drink only 1-2 drinks 4x a year       No Known Allergies      Current Outpatient Medications:     aspirin (ASPIRIN LOW DOSE) 81 mg EC tablet, Take 1 tablet by mouth daily, Disp: , Rfl:     cholecalciferol (VITAMIN D3) 1,000 units tablet, Take 4 tablets (4,000 Units total) by mouth daily, Disp: 90 tablet, Rfl: 1    Dulaglutide (Trulicity) 3 MR/3 9OI SOPN, Inject 0 5 mL (3 mg total) under the skin once a week, Disp: 2 mL, Rfl: 5    FARXIGA 10 MG TABS, TAKE 1 TABLET BY MOUTH EVERY DAY, Disp: 90 tablet, Rfl: 3    Ferrous Gluconate (IRON 27 PO), Take by mouth  , Disp: , Rfl:     gabapentin (NEURONTIN) 300 mg capsule, Take 1 capsule (300 mg total) by mouth 2 (two) times a day, Disp: 180 capsule, Rfl: 0    levothyroxine 100 mcg tablet, Take 1 tablet (100 mcg total) by mouth daily, Disp: 90 tablet, Rfl: 1    lisinopril-hydrochlorothiazide (PRINZIDE,ZESTORETIC) 20-25 MG per tablet, TAKE 1 TABLET BY MOUTH TWICE A DAY, Disp: 180 tablet, Rfl: 3    meloxicam (MOBIC) 15 mg tablet, Take 1 tablet (15 mg total) by mouth daily, Disp: 90 tablet, Rfl: 3    metFORMIN (GLUCOPHAGE) 1000 MG tablet, Take 1 tablet (1,000 mg total) by mouth 2 (two) times a day with meals, Disp: 180 tablet, Rfl: 3    metroNIDAZOLE (FLAGYL) 500 mg tablet, Take 1 tablet (500 mg total) by mouth every 12 (twelve) hours for 7 days, Disp: 14 tablet, Rfl: 0    ONETOUCH DELICA LANCETS FINE MISC, by Does not apply route 2 (two) times a day, Disp: , Rfl:     pantoprazole (PROTONIX) 40 mg tablet, Take 1 tablet (40 mg total) by mouth daily, Disp: 90 tablet, Rfl: 3    polyethylene glycol (MIRALAX) powder, Take by mouth, Disp: , Rfl:     rosuvastatin (CRESTOR) 20 MG tablet, Take 1 tablet (20 mg total) by mouth daily, Disp: 90 tablet, Rfl: 3    ammonium lactate (LAC-HYDRIN) 12 % cream, Apply topically (Patient not taking: Reported on 10/19/2021), Disp: , Rfl:     Blood Glucose Monitoring Suppl (ONE TOUCH ULTRA 2) w/Device KIT, Use 3 (three) times a day, Disp: 1 kit, Rfl: 0    capsaicin (ZOSTRIX) 0 025 % cream, Apply topically  (Patient not taking: Reported on 10/19/2021), Disp: , Rfl:     glucose blood (OneTouch Ultra) test strip, Use to test 3x daily, Disp: 100 strip, Rfl: 4  Review of Systems   Constitutional: Positive for unexpected weight change  HENT: Negative for hearing loss, trouble swallowing and voice change  Eyes: Negative for visual disturbance  Respiratory: Negative for cough and shortness of breath      Cardiovascular: Negative for chest pain and palpitations  Gastrointestinal: Negative for constipation, diarrhea and nausea  Endocrine: Positive for polydipsia  Negative for polyuria  Skin:        Dry skin   Neurological: Positive for numbness  Negative for tremors and weakness  Psychiatric/Behavioral: The patient is not nervous/anxious  All other systems reviewed and are negative  Physical Exam:  Body mass index is 45 14 kg/m²  /80 (BP Location: Left arm, Patient Position: Sitting, Cuff Size: Large)   Pulse 70   Ht 5' 4" (1 626 m)   Wt 119 kg (263 lb)   LMP  (LMP Unknown)   BMI 45 14 kg/m²    Wt Readings from Last 3 Encounters:   01/20/22 119 kg (263 lb)   01/18/22 117 kg (258 lb)   10/29/21 126 kg (276 lb 14 4 oz)       GEN: NAD  E/n/m mask in place, hearing intact bilat  Eyes: no stare or proptosis, nl lids, EOMI  Neck: trachea midline, thyroid NT to palpation, nl in size, no nodules or neck masses noted, no cervical LAD  CV; heart reg rate s1s2 nl, no m/r/g appreciated  Resp: CTAB, good effort  Ab+BS  Neuro: no tremor  MS: no c/c in digits, moves all 4 ext, nl muscle bulk, gait nl  Skin: warm and dry, no palmar erythema  Ext: deferred  Psych: nl mood and affect, no gross lapses in memory    Labs:   Lab Results   Component Value Date    HGBA1C 9 1 (H) 01/13/2022         Lab Results   Component Value Date    CREATININE 1 43 (H) 01/13/2022    CREATININE 1 22 12/23/2019    CREATININE 1 19 08/16/2019    BUN 38 (H) 01/13/2022     10/09/2015    K 4 6 01/13/2022     01/13/2022    CO2 26 01/13/2022     eGFR   Date Value Ref Range Status   01/13/2022 39 ml/min/1 73sq m Final     No components found for: [facility]    Lab Results   Component Value Date    CHOL 198 10/09/2015    HDL 36 (L) 01/13/2022    TRIG 192 (H) 01/13/2022       Lab Results   Component Value Date    ALT 18 01/13/2022    AST 12 01/13/2022    ALKPHOS 69 01/13/2022    BILITOT 0 48 10/09/2015           Impression:  1   Uncontrolled type 2 diabetes mellitus with peripheral neuropathy ([place] [address] )    2  Mixed diabetic hyperlipidemia associated with type 2 diabetes mellitus ([place] [address] )    3  Hypothyroidism, unspecified type    4  Benign essential hypertension           Plan:    Apollo Champagne was seen today for diabetes type 2  Diagnoses and all orders for this visit:    Uncontrolled type 2 diabetes mellitus with peripheral neuropathy ([place] [address] )  -     Discontinue: Blood Glucose Monitoring Suppl (ONE TOUCH ULTRA MINI) w/Device KIT; Use to test 3x daily  -     glucose blood (OneTouch Ultra) test strip; Use to test 3x daily  -     Dulaglutide (Trulicity) 3 MI/5 4FC SOPN; Inject 0 5 mL (3 mg total) under the skin once a week    Mixed diabetic hyperlipidemia associated with type 2 diabetes mellitus ([place] [address] )  -     Ambulatory Referral to Endocrinology    Hypothyroidism, unspecified type    Benign essential hypertension      1  Mixed diabetic hyperlipidemia associated with type 2 diabetes mellitus ([place] [address] )     - Ambulatory Referral to Endocrinology    1  T2DM, uncontrolled, complicated by neuropathy: Bgs are significantly improved and from an endocrine perspective, her cancer surgery should not be delayed  Advised checking Bgs 3x daily, log provided  Current meter is 7yrs old and new meter RX is sent to pharmacy  Recommend DM education and nutrition as an outpatient  Insulin pen use was reviewed at the time of visit, but would need to be reinforced if this is needed post-op  Reviewed timing and doses of basal-bolus insulin in case this is needed after surgery  She will continue metformin, Farxiga and Trulicity for now, but advised her to stop [place] 3 days prior to surgery to decrease risk of euglyecmic DKA  2  HTN: Adherent to care, including ACEI  Her egfr is low but will need to be monitored after resolution of the UTI  Discussed that she may be unable to remain on metformin and or [place] in the coming months if renal function changes       3  Hyperlipidemia: Taking and tolerating statin well  4  Hypothyroidism: on levothyroxine 100mcg daily    She will ultimately plan to see our NP Hazel Kumar in [facility], but will communicate with the [facility] office until she is able to be seen  Discussed with the patient and all questioned fully answered  She will call me if any problems arise      Counseled patient on diagnostic results, prognosis, risk and benefit of treatment options, instruction for management, importance of treatment compliance, Risk  factor reduction and impressions      Huyen Moon MD

## 2022-01-21 ENCOUNTER — TELEPHONE (OUTPATIENT)
Dept: ENDOCRINOLOGY | Facility: CLINIC | Age: 62
End: 2022-01-21

## 2022-01-21 LAB
ATRIAL RATE: 67 BPM
P AXIS: 49 DEGREES
PR INTERVAL: 180 MS
QRS AXIS: 42 DEGREES
QRSD INTERVAL: 70 MS
QT INTERVAL: 384 MS
QTC INTERVAL: 405 MS
T WAVE AXIS: 49 DEGREES
VENTRICULAR RATE: 67 BPM

## 2022-01-21 PROCEDURE — 93010 ELECTROCARDIOGRAM REPORT: CPT | Performed by: INTERNAL MEDICINE

## 2022-01-21 NOTE — TELEPHONE ENCOUNTER
Received a Prior Authorization request from Pt's pharmacy  For Trulicity 3mg  , Prior Authorization send to Pt's prescription plan Via [facility]  Mandy Flores (Jade Brambila) - [id number]    Need help? Call us at [phone]    Outcome   N/A  Next Steps   The plan will fax you a determination, typically within 1 to 5 business days  How do I follow up?     Drug    Trulicity 8SP/4 4CY pen-injectors   Form    [employer]  Coverage Determination Form    Medicare Part D Form for Formulary Exception and Quantity Limit Requests      [phone]  phone      [phone]  fa ICU/CTICU

## 2022-01-24 ENCOUNTER — TELEPHONE (OUTPATIENT)
Dept: SURGICAL ONCOLOGY | Facility: CLINIC | Age: 62
End: 2022-01-24

## 2022-01-24 ENCOUNTER — TELEPHONE (OUTPATIENT)
Dept: HEMATOLOGY ONCOLOGY | Facility: CLINIC | Age: 62
End: 2022-01-24

## 2022-01-24 NOTE — TELEPHONE ENCOUNTER
Received a call from Emiliano Tay who shared she had a tooth abscess back in September for which she had a root canal  The abscess did not heal and she had this tooth pulled on 01/21/22  She wanted to inform Dr Raymundo Ovalle as she has upcoming surgery  Dr Raymundo Ovalle informed  Pt has appt with Dr Raymundo Ovalle on Wednesday

## 2022-01-24 NOTE — TELEPHONE ENCOUNTER
Patient calling in, stating she would like to speak with Dr Jewels Marti nurse to give a update on her health    Patient's best call back number [phone]

## 2022-01-26 ENCOUNTER — OFFICE VISIT (OUTPATIENT)
Dept: SURGICAL ONCOLOGY | Facility: CLINIC | Age: 62
End: 2022-01-26

## 2022-01-26 VITALS
SYSTOLIC BLOOD PRESSURE: 130 MMHG | DIASTOLIC BLOOD PRESSURE: 78 MMHG | WEIGHT: 257 LBS | HEIGHT: 64 IN | BODY MASS INDEX: 43.87 KG/M2 | HEART RATE: 80 BPM | RESPIRATION RATE: 16 BRPM | TEMPERATURE: 97.1 F

## 2022-01-26 DIAGNOSIS — D05.12 DUCTAL CARCINOMA IN SITU (DCIS) OF LEFT BREAST: Primary | ICD-10-CM

## 2022-01-26 PROBLEM — U07.1 COVID-19: Status: RESOLVED | Noted: 2021-12-06 | Resolved: 2022-01-26

## 2022-01-26 PROCEDURE — PREOP: Performed by: SURGERY

## 2022-01-26 PROCEDURE — 3008F BODY MASS INDEX DOCD: CPT | Performed by: INTERNAL MEDICINE

## 2022-01-26 NOTE — H&P (VIEW-ONLY)
Surgical Oncology Follow Up       [facility] SURGICAL ONCOLOGY TOO  [facility] [id number]    Nicky Hinton  1960  [id number]  [facility] SURGICAL ONCOLOGY [place]  [place] [address]    Chief Complaint   Patient presents with    Follow-up       Assessment/Plan   Diagnoses and all orders for this visit:    Ductal carcinoma in situ (DCIS) of left breast        Advance Care Planning/Advance Directives:  Discussed disease status, cancer treatment plans and/or cancer treatment goals with the patient  Oncology History:    Oncology History   Ductal carcinoma in situ (DCIS) of left breast   9/23/2021 Initial Diagnosis    Ductal carcinoma in situ (DCIS) of left breast     10/19/2021 -  Cancer Staged    Staging form: Breast, AJCC 8th Edition  - Clinical: Stage 0 (cTis (DCIS), cN0, cM0, ER+, NV+, HER2: Not Assessed) - Signed by Chuck Merchant MD on 10/19/2021  Stage prefix: Initial diagnosis  Method of lymph node assessment: Clinical  Nuclear grade: G1           History of Present Illness:  Left breast cancer, ductal carcinoma in Situ, here today for preop visit  -Interval History:  Developed COVID and was recently seen by endocrinology secondary to the uncontrolled diabetes    Review of Systems:  Review of Systems   Constitutional: Negative  Negative for appetite change and fever  Eyes: Negative  Respiratory: Negative for shortness of breath  Cardiovascular: Negative  Gastrointestinal: Negative  Endocrine: Negative  Genitourinary: Negative  Musculoskeletal: Negative  Negative for arthralgias and myalgias  Skin: Negative  Allergic/Immunologic: Negative  Neurological: Negative  Hematological: Negative  Negative for adenopathy  Does not bruise/bleed easily  Psychiatric/Behavioral: Negative          Patient Active Problem List   Diagnosis    Anemia    Benign essential hypertension    Arthritis    Mixed diabetic hyperlipidemia associated with type 2 diabetes mellitus (HCC)    Disc degeneration, lumbar    Fibrocystic breast disease, unspecified laterality    GERD without esophagitis    Hyperlipidemia    Hypothyroidism    IBS (irritable bowel syndrome)    Neoplasm of uncertain behavior of skin    Obesity, Class III, BMI 40-49 9 (morbid obesity) (HCC)    MARIA FERNANDA (obstructive sleep apnea)    Primary osteoarthritis of both knees    Vitamin D deficiency    Uncontrolled type 2 diabetes mellitus with peripheral neuropathy (HCC)    Fatty liver    Full thickness rotator cuff tear    Shoulder joint pain    Family history of breast cancer in female    Screening mammogram, encounter for    Lumbar radiculopathy    Ductal carcinoma in situ (DCIS) of left breast    BRCA negative    Type 2 diabetes mellitus with stage 3b chronic kidney disease (HCC)     Past Medical History:   Diagnosis Date    Achrochordon     Acute bronchitis     Allergic rhinitis     Diabetes ([address] )     Dysuria     Hypothyroid     IBS (irritable bowel syndrome)     Polycystic ovarian syndrome     Rotator cuff tear     right side     URI (upper respiratory infection)      Past Surgical History:   Procedure Laterality Date    ANAL FISTULOTOMY      subcutaneous     BREAST LUMPECTOMY Left 09/23/2021    CHOLECYSTECTOMY      HEMORRHOID SURGERY      INCISION AND DRAINAGE PERIRECTAL ABSCESS      MOUTH SURGERY      OVARIAN CYST REMOVAL      ROTATOR CUFF REPAIR Right     TONSILLECTOMY      US GUIDANCE BREAST BIOPSY LEFT EACH ADDITIONAL Left 9/21/2021    US GUIDED BREAST BIOPSY LEFT COMPLETE Left 9/21/2021     Family History   Problem Relation Age of Onset    Diabetes Mother     Diabetes type II Mother     Breast cancer Paternal Aunt     Cancer Paternal Uncle         carcinosarcoma of the oral cavity     Prostate cancer Paternal Uncle     Breast cancer Family     Breast cancer Maternal Aunt  Breast cancer Maternal Aunt     Diabetes type II Maternal Aunt     Thyroid cancer Maternal Aunt     Hypertension Father          01    No Known Problems Sister     No Known Problems Maternal Grandmother     No Known Problems Paternal Grandmother     No Known Problems Sister     No Known Problems Paternal Aunt     No Known Problems Maternal Aunt     Diabetes type II Maternal Uncle     Diabetes type II Maternal Grandfather      Social History     Socioeconomic History    Marital status: Single     Spouse name: Not on file    Number of children: Not on file    Years of education: Not on file    Highest education level: Not on file   Occupational History    Not on file   Tobacco Use    Smoking status: Never Smoker    Smokeless tobacco: Never Used   Vaping Use    Vaping Use: Never used   Substance and Sexual Activity    Alcohol use: Not Currently     Alcohol/week: 0 0 standard drinks     Comment: Drink only 1-2 drinks 4x a year      Drug use: No    Sexual activity: Not Currently     Partners: Male     Birth control/protection: None   Other Topics Concern    Not on file   Social History Narrative    Caffeine - denies    Retired     Social Determinants of Health     Financial Resource Strain: Not on file   Food Insecurity: Not on file   Transportation Needs: Not on file   Physical Activity: Not on file   Stress: Not on file   Social Connections: Not on file   Intimate Partner Violence: Not on file   Housing Stability: Not on file       Current Outpatient Medications:     aspirin (ASPIRIN LOW DOSE) 81 mg EC tablet, Take 1 tablet by mouth daily, Disp: , Rfl:     Blood Glucose Monitoring Suppl (ONE TOUCH ULTRA 2) w/Device KIT, Use 3 (three) times a day, Disp: 1 kit, Rfl: 0    cholecalciferol (VITAMIN D3) 1,000 units tablet, Take 4 tablets (4,000 Units total) by mouth daily, Disp: 90 tablet, Rfl: 1    Dulaglutide (Trulicity) 3 SL/7 4JN SOPN, Inject 0 5 mL (3 mg total) under the skin once a week, Disp: 2 mL, Rfl: 5    FARXIGA 10 MG TABS, TAKE 1 TABLET BY MOUTH EVERY DAY, Disp: 90 tablet, Rfl: 3    Ferrous Gluconate (IRON 27 PO), Take by mouth  , Disp: , Rfl:     gabapentin (NEURONTIN) 300 mg capsule, Take 1 capsule (300 mg total) by mouth 2 (two) times a day, Disp: 180 capsule, Rfl: 0    glucose blood (OneTouch Ultra) test strip, Use to test 3x daily, Disp: 100 strip, Rfl: 4    levothyroxine 100 mcg tablet, Take 1 tablet (100 mcg total) by mouth daily, Disp: 90 tablet, Rfl: 1    lisinopril-hydrochlorothiazide (PRINZIDE,ZESTORETIC) 20-25 MG per tablet, TAKE 1 TABLET BY MOUTH TWICE A DAY, Disp: 180 tablet, Rfl: 3    meloxicam (MOBIC) 15 mg tablet, Take 1 tablet (15 mg total) by mouth daily, Disp: 90 tablet, Rfl: 3    metFORMIN (GLUCOPHAGE) 1000 MG tablet, Take 1 tablet (1,000 mg total) by mouth 2 (two) times a day with meals, Disp: 180 tablet, Rfl: 3    ONETOUCH DELICA LANCETS FINE MISC, by Does not apply route 2 (two) times a day, Disp: , Rfl:     pantoprazole (PROTONIX) 40 mg tablet, Take 1 tablet (40 mg total) by mouth daily, Disp: 90 tablet, Rfl: 3    polyethylene glycol (MIRALAX) powder, Take by mouth, Disp: , Rfl:     rosuvastatin (CRESTOR) 20 MG tablet, Take 1 tablet (20 mg total) by mouth daily, Disp: 90 tablet, Rfl: 3    ammonium lactate (LAC-HYDRIN) 12 % cream, Apply topically (Patient not taking: Reported on 10/19/2021), Disp: , Rfl:     capsaicin (ZOSTRIX) 0 025 % cream, Apply topically  (Patient not taking: Reported on 10/19/2021), Disp: , Rfl:   No Known Allergies    The following portions of the patient's history were reviewed and updated as appropriate: allergies, current medications, past family history, past medical history, past social history, past surgical history and problem list         Vitals:    01/26/22 1125   BP: 130/78   Pulse: 80   Resp: 16   Temp: (!) 97 1 °F (36 2 °C)       Physical Exam  Constitutional:       General: She is not in acute distress  Appearance: She is well-developed  She is obese  HENT:      Head: Normocephalic and atraumatic  Cardiovascular:      Heart sounds: Normal heart sounds  Pulmonary:      Breath sounds: Normal breath sounds  Chest:   Breasts:      Right: No inverted nipple, mass, nipple discharge, skin change, tenderness, axillary adenopathy or supraclavicular adenopathy  Left: No inverted nipple, mass, nipple discharge, skin change, tenderness, axillary adenopathy or supraclavicular adenopathy  Abdominal:      Palpations: Abdomen is soft  Musculoskeletal:      Right lower leg: Edema present  Left lower leg: Edema present  Lymphadenopathy:      Upper Body:      Right upper body: No supraclavicular, axillary or pectoral adenopathy  Left upper body: No supraclavicular, axillary or pectoral adenopathy  Neurological:      Mental Status: She is alert and oriented to person, place, and time  Psychiatric:         Mood and Affect: Mood normal            Results:  Labs:  12/02/2021 COVID positive    01/13/2022 hemoglobin A1c was 9 1    01/13/2022 urine culture shows Gardnerella vaginalis treated already with Flagyl    Imaging  And 01/20/2021 post biopsy mammogram shows a 7 2 cm extent of disease in the left breast    I reviewed the above laboratory and imaging data  Discussion/Summary:  80-year-old female with extensive DCIS of the left breast   She was counseled on a mastectomy with sentinel node biopsy  She met with Plastic surgery to discuss reconstruction and will be proceeding with expander/implant reconstruction  Her surgery was initially delayed secondary to insurance concerns  She then developed COVID-19 and surgery was again delayed  She also has issues with her diabetic management  She was seen by endocrinology and has been cleared for surgery  Her PCP has also cleared her from the Jarrell Tavares perspective  All of her questions were answered  Surgery is scheduled for 02/08/2022

## 2022-01-26 NOTE — PROGRESS NOTES
Surgical Oncology Follow Up       [facility] SURGICAL ONCOLOGY TOO  [facility] [place] [address]    Arie Yancey  1960  [id number]  [facility] SURGICAL ONCOLOGY [place] [address] [place] [address]    Chief Complaint   Patient presents with    Follow-up       Assessment/Plan   Diagnoses and all orders for this visit:    Ductal carcinoma in situ (DCIS) of left breast        Advance Care Planning/Advance Directives:  Discussed disease status, cancer treatment plans and/or cancer treatment goals with the patient  Oncology History:    Oncology History   Ductal carcinoma in situ (DCIS) of left breast   9/23/2021 Initial Diagnosis    Ductal carcinoma in situ (DCIS) of left breast     10/19/2021 -  Cancer Staged    Staging form: Breast, AJCC 8th Edition  - Clinical: Stage 0 (cTis (DCIS), cN0, cM0, ER+, WI+, HER2: Not Assessed) - Signed by Adalgisa Louise MD on 10/19/2021  Stage prefix: Initial diagnosis  Method of lymph node assessment: Clinical  Nuclear grade: G1           History of Present Illness:  Left breast cancer, ductal carcinoma in Situ, here today for preop visit  -Interval History:  Developed COVID and was recently seen by endocrinology secondary to the uncontrolled diabetes    Review of Systems:  Review of Systems   Constitutional: Negative  Negative for appetite change and fever  Eyes: Negative  Respiratory: Negative for shortness of breath  Cardiovascular: Negative  Gastrointestinal: Negative  Endocrine: Negative  Genitourinary: Negative  Musculoskeletal: Negative  Negative for arthralgias and myalgias  Skin: Negative  Allergic/Immunologic: Negative  Neurological: Negative  Hematological: Negative  Negative for adenopathy  Does not bruise/bleed easily  Psychiatric/Behavioral: Negative          Patient Active Problem List   Diagnosis    Anemia    Benign essential hypertension    Arthritis    Mixed diabetic hyperlipidemia associated with type 2 diabetes mellitus (HCC)    Disc degeneration, lumbar    Fibrocystic breast disease, unspecified laterality    GERD without esophagitis    Hyperlipidemia    Hypothyroidism    IBS (irritable bowel syndrome)    Neoplasm of uncertain behavior of skin    Obesity, Class III, BMI 40-49 9 (morbid obesity) (HCC)    MARIA FERNANDA (obstructive sleep apnea)    Primary osteoarthritis of both knees    Vitamin D deficiency    Uncontrolled type 2 diabetes mellitus with peripheral neuropathy (HCC)    Fatty liver    Full thickness rotator cuff tear    Shoulder joint pain    Family history of breast cancer in female    Screening mammogram, encounter for    Lumbar radiculopathy    Ductal carcinoma in situ (DCIS) of left breast    BRCA negative    Type 2 diabetes mellitus with stage 3b chronic kidney disease (HCC)     Past Medical History:   Diagnosis Date    Achrochordon     Acute bronchitis     Allergic rhinitis     Diabetes ([address] )     Dysuria     Hypothyroid     IBS (irritable bowel syndrome)     Polycystic ovarian syndrome     Rotator cuff tear     right side     URI (upper respiratory infection)      Past Surgical History:   Procedure Laterality Date    ANAL FISTULOTOMY      subcutaneous     BREAST LUMPECTOMY Left 09/23/2021    CHOLECYSTECTOMY      HEMORRHOID SURGERY      INCISION AND DRAINAGE PERIRECTAL ABSCESS      MOUTH SURGERY      OVARIAN CYST REMOVAL      ROTATOR CUFF REPAIR Right     TONSILLECTOMY      US GUIDANCE BREAST BIOPSY LEFT EACH ADDITIONAL Left 9/21/2021    US GUIDED BREAST BIOPSY LEFT COMPLETE Left 9/21/2021     Family History   Problem Relation Age of Onset    Diabetes Mother     Diabetes type II Mother     Breast cancer Paternal Aunt     Cancer Paternal Uncle         carcinosarcoma of the oral cavity     Prostate cancer Paternal Uncle     Breast cancer Family     Breast cancer Maternal Aunt  Breast cancer Maternal Aunt     Diabetes type II Maternal Aunt     Thyroid cancer Maternal Aunt     Hypertension Father          01    No Known Problems Sister     No Known Problems Maternal Grandmother     No Known Problems Paternal Grandmother     No Known Problems Sister     No Known Problems Paternal Aunt     No Known Problems Maternal Aunt     Diabetes type II Maternal Uncle     Diabetes type II Maternal Grandfather      Social History     Socioeconomic History    Marital status: Single     Spouse name: Not on file    Number of children: Not on file    Years of education: Not on file    Highest education level: Not on file   Occupational History    Not on file   Tobacco Use    Smoking status: Never Smoker    Smokeless tobacco: Never Used   Vaping Use    Vaping Use: Never used   Substance and Sexual Activity    Alcohol use: Not Currently     Alcohol/week: 0 0 standard drinks     Comment: Drink only 1-2 drinks 4x a year      Drug use: No    Sexual activity: Not Currently     Partners: Male     Birth control/protection: None   Other Topics Concern    Not on file   Social History Narrative    Caffeine - denies    Retired     Social Determinants of Health     Financial Resource Strain: Not on file   Food Insecurity: Not on file   Transportation Needs: Not on file   Physical Activity: Not on file   Stress: Not on file   Social Connections: Not on file   Intimate Partner Violence: Not on file   Housing Stability: Not on file       Current Outpatient Medications:     aspirin (ASPIRIN LOW DOSE) 81 mg EC tablet, Take 1 tablet by mouth daily, Disp: , Rfl:     Blood Glucose Monitoring Suppl (ONE TOUCH ULTRA 2) w/Device KIT, Use 3 (three) times a day, Disp: 1 kit, Rfl: 0    cholecalciferol (VITAMIN D3) 1,000 units tablet, Take 4 tablets (4,000 Units total) by mouth daily, Disp: 90 tablet, Rfl: 1    Dulaglutide (Trulicity) 3 ID/0 3UC SOPN, Inject 0 5 mL (3 mg total) under the skin once a week, Disp: 2 mL, Rfl: 5    FARXIGA 10 MG TABS, TAKE 1 TABLET BY MOUTH EVERY DAY, Disp: 90 tablet, Rfl: 3    Ferrous Gluconate (IRON 27 PO), Take by mouth  , Disp: , Rfl:     gabapentin (NEURONTIN) 300 mg capsule, Take 1 capsule (300 mg total) by mouth 2 (two) times a day, Disp: 180 capsule, Rfl: 0    glucose blood (OneTouch Ultra) test strip, Use to test 3x daily, Disp: 100 strip, Rfl: 4    levothyroxine 100 mcg tablet, Take 1 tablet (100 mcg total) by mouth daily, Disp: 90 tablet, Rfl: 1    lisinopril-hydrochlorothiazide (PRINZIDE,ZESTORETIC) 20-25 MG per tablet, TAKE 1 TABLET BY MOUTH TWICE A DAY, Disp: 180 tablet, Rfl: 3    meloxicam (MOBIC) 15 mg tablet, Take 1 tablet (15 mg total) by mouth daily, Disp: 90 tablet, Rfl: 3    metFORMIN (GLUCOPHAGE) 1000 MG tablet, Take 1 tablet (1,000 mg total) by mouth 2 (two) times a day with meals, Disp: 180 tablet, Rfl: 3    ONETOUCH DELICA LANCETS FINE MISC, by Does not apply route 2 (two) times a day, Disp: , Rfl:     pantoprazole (PROTONIX) 40 mg tablet, Take 1 tablet (40 mg total) by mouth daily, Disp: 90 tablet, Rfl: 3    polyethylene glycol (MIRALAX) powder, Take by mouth, Disp: , Rfl:     rosuvastatin (CRESTOR) 20 MG tablet, Take 1 tablet (20 mg total) by mouth daily, Disp: 90 tablet, Rfl: 3    ammonium lactate (LAC-HYDRIN) 12 % cream, Apply topically (Patient not taking: Reported on 10/19/2021), Disp: , Rfl:     capsaicin (ZOSTRIX) 0 025 % cream, Apply topically  (Patient not taking: Reported on 10/19/2021), Disp: , Rfl:   No Known Allergies    The following portions of the patient's history were reviewed and updated as appropriate: allergies, current medications, past family history, past medical history, past social history, past surgical history and problem list         Vitals:    01/26/22 1125   BP: 130/78   Pulse: 80   Resp: 16   Temp: (!) 97 1 °F (36 2 °C)       Physical Exam  Constitutional:       General: She is not in acute distress  Appearance: She is well-developed  She is obese  HENT:      Head: Normocephalic and atraumatic  Cardiovascular:      Heart sounds: Normal heart sounds  Pulmonary:      Breath sounds: Normal breath sounds  Chest:   Breasts:      Right: No inverted nipple, mass, nipple discharge, skin change, tenderness, axillary adenopathy or supraclavicular adenopathy  Left: No inverted nipple, mass, nipple discharge, skin change, tenderness, axillary adenopathy or supraclavicular adenopathy  Abdominal:      Palpations: Abdomen is soft  Musculoskeletal:      Right lower leg: Edema present  Left lower leg: Edema present  Lymphadenopathy:      Upper Body:      Right upper body: No supraclavicular, axillary or pectoral adenopathy  Left upper body: No supraclavicular, axillary or pectoral adenopathy  Neurological:      Mental Status: She is alert and oriented to person, place, and time  Psychiatric:         Mood and Affect: Mood normal            Results:  Labs:  12/02/2021 COVID positive    01/13/2022 hemoglobin A1c was 9 1    01/13/2022 urine culture shows Gardnerella vaginalis treated already with Flagyl    Imaging  And 01/20/2021 post biopsy mammogram shows a 7 2 cm extent of disease in the left breast    I reviewed the above laboratory and imaging data  Discussion/Summary:  60-year-old female with extensive DCIS of the left breast   She was counseled on a mastectomy with sentinel node biopsy  She met with Plastic surgery to discuss reconstruction and will be proceeding with expander/implant reconstruction  Her surgery was initially delayed secondary to insurance concerns  She then developed COVID-19 and surgery was again delayed  She also has issues with her diabetic management  She was seen by endocrinology and has been cleared for surgery  Her PCP has also cleared her from the Jarrell Tavares perspective  All of her questions were answered  Surgery is scheduled for 02/08/2022

## 2022-01-28 NOTE — PRE-PROCEDURE INSTRUCTIONS
Pre-Surgery Instructions:  Have you had / have a sore throat? NO  have you had / have a cough less than 1 week? NO  Have you had / have a fever greater than 100 0 - 100  4? NO  Are you experiencing any shortness of breath? NO  Pre procedure instructions given, verbalizes understanding COVID policy reviewed awaiting TOS call on 2/7       Medication Instructions    aspirin (ASPIRIN LOW DOSE) 81 mg EC tablet Instructed patient per Anesthesia Guidelines  calling MD to double check stopping for 7 days     cholecalciferol (VITAMIN D3) 1,000 units tablet Instructed patient per Anesthesia Guidelines   Dulaglutide (Trulicity) 3 FT/3 0MF SOPN Patient was instructed by Physician and understands  taking 1/28    gabapentin (NEURONTIN) 300 mg capsule Instructed patient per Anesthesia Guidelines  TAKING ON DOS    levothyroxine 100 mcg tablet Instructed patient per Anesthesia Guidelines  TAKING ON DOS     lisinopril-hydrochlorothiazide (PRINZIDE,ZESTORETIC) 20-25 MG per tablet Instructed patient per Anesthesia Guidelines  HOLDING ON DOS    meloxicam (MOBIC) 15 mg tablet Instructed patient per Anesthesia Guidelines  STOPPING 2/4    metFORMIN (GLUCOPHAGE) 1000 MG tablet Instructed patient per Anesthesia Guidelines  HOLDING ON DOS    pantoprazole (PROTONIX) 40 mg tablet Instructed patient per Anesthesia Guidelines  TAKING ON DOS     rosuvastatin (CRESTOR) 20 MG tablet Instructed patient per Anesthesia Guidelines

## 2022-01-31 ENCOUNTER — ANESTHESIA EVENT (OUTPATIENT)
Dept: PERIOP | Facility: HOSPITAL | Age: 62
End: 2022-01-31
Payer: COMMERCIAL

## 2022-01-31 PROCEDURE — NC001 PR NO CHARGE: Performed by: PHYSICIAN ASSISTANT

## 2022-01-31 NOTE — H&P
Assessment/Plan: please see HPI  At today's visit we discussed options for postmastectomy reconstruction, including immediate versus delayed reconstruction, tissue expander/ implant based reconstruction, both traditional two-stage, as well as single stage direct to implant reconstruction  We discussed reduction mammoplasty / mastopexy type approach utilizing Wise pattern, a tissue expander and de-epithelialized flap  Also discussed autologous options to include latissimus dorsi flap, tram flap, both pedicled and free, as well as other micro surgical options  She is aware that I do not perform micro surgery, if she were interested micro surgery we would refer her to our practice micro surgeon  We discussed the procedures, potential risks, complications limitations, she is aware that surgery on the opposite  Breast is best done in a staged fashion to give the best   Results  The appropriate measurements and photographs were obtained , and we reviewed diagrams and photographs of reasonable results  Their questions were answered to their satisfaction  One is interested in proceeding with immediate left breast reconstruction utilizing a tissue expander and de-epithelialized flap, she understands the potential risks, complications and limitation,   Consent was obtained, she will work with our coordinator and Dr Katie Oliva office to arrange a date for the procedure  She will be seen the day prior to surgery for preoperative markings            Diagnoses and all orders for this visit:     Ductal carcinoma in situ (DCIS) of left breast  -     Ambulatory referral to Plastic Surgery            Subjective:   Left breast cancer      Patient ID: Delta Talley is a 64 y o  female      [facility] Romulo Mckeon is a 70-year-old female referred by doctor Milady Carr to discuss postmastectomy reconstruction options    One to has a recent diagnosis of DCIS of the left breast   Romulo Mckeon is accompanied by her good friend Nini Broussard      The following portions of the patient's history were reviewed and updated as appropriate: allergies, current medications, past family history, past medical history, past social history, past surgical history and problem list      Review of Systems   Constitutional: Negative for chills and fever  HENT: Negative for hearing loss  Eyes: Positive for visual disturbance  Negative for discharge  Respiratory: Negative for chest tightness and shortness of breath  Cardiovascular: Negative for chest pain and leg swelling  Gastrointestinal: Negative for blood in stool, constipation, diarrhea and nausea  Genitourinary: Negative for dysuria  Musculoskeletal: Negative for gait problem  Skin: Negative for rash  Allergic/Immunologic: Negative for immunocompromised state  Neurological: Negative for seizures and headaches  Hematological: Does not bruise/bleed easily  Psychiatric/Behavioral: Negative for dysphoric mood  The patient is not nervous/anxious            Objective:        BP (!) 187/94   Pulse 80   Temp 97 7 °F (36 5 °C) (Temporal)   Resp 16   Ht 5' 4" (1 626 m)   Wt 126 kg (276 lb 14 4 oz)   LMP  (LMP Unknown)   BMI 47 53 kg/m²             Physical Exam  HENT:      Head: Normocephalic and atraumatic  Eyes:      Extraocular Movements: Extraocular movements intact  Pupils: Pupils are equal, round, and reactive to light  Cardiovascular:      Rate and Rhythm: Normal rate  Pulmonary:      Effort: Pulmonary effort is normal    Abdominal:      Palpations: Abdomen is soft  Comments: Overhanging, redundant abdominal pannus, fullness extends to xiphoid, flanks and posterior trunk, see photo   Musculoskeletal:         General: Normal range of motion  Cervical back: Normal range of motion and neck supple  Skin:     General: Skin is warm        Comments: Bilateral pendulous breasts/ B/C cup, grade 3 ptosis, sternal notch to nipple distances are 34 cm on the left, 35 cm on the right, intermammary distance is 23 cm  The inframammary fold to nipple distances are 10 5 cm on the left, 12 cm on the right base diameter is 13 5-14 cm bilaterally   Neurological:      Mental Status: She is alert and oriented to person, place, and time     Psychiatric:         Mood and Affect: Mood normal

## 2022-02-07 ENCOUNTER — OFFICE VISIT (OUTPATIENT)
Dept: PLASTIC SURGERY | Facility: CLINIC | Age: 62
End: 2022-02-07
Payer: COMMERCIAL

## 2022-02-07 VITALS
DIASTOLIC BLOOD PRESSURE: 77 MMHG | HEIGHT: 64 IN | TEMPERATURE: 97.2 F | SYSTOLIC BLOOD PRESSURE: 142 MMHG | BODY MASS INDEX: 43.87 KG/M2 | HEART RATE: 78 BPM | WEIGHT: 257 LBS

## 2022-02-07 DIAGNOSIS — Z71.89 ENCOUNTER TO DISCUSS BREAST RECONSTRUCTION: Primary | ICD-10-CM

## 2022-02-07 PROCEDURE — 1036F TOBACCO NON-USER: CPT | Performed by: SURGERY

## 2022-02-07 PROCEDURE — 99214 OFFICE O/P EST MOD 30 MIN: CPT | Performed by: SURGERY

## 2022-02-07 PROCEDURE — 3077F SYST BP >= 140 MM HG: CPT | Performed by: SURGERY

## 2022-02-07 PROCEDURE — 3078F DIAST BP <80 MM HG: CPT | Performed by: SURGERY

## 2022-02-07 NOTE — PROGRESS NOTES
Assessment/Plan:  Please see HPI  I discussed with her in detail how the left breast reconstruction will be performed  Given that her preference would be to proceed with a Wise pattern/breast lift/reduction type of technique, we will proceed with immediate left breast reconstruction utilizing a tissue expander and de-epithelialized flap, assuming the flaps are of appropriate thickness  I discussed the procedure, how it is performed, as well as potential risks, complications limitations including, but not limited to infection, bleeding, scarring, asymmetry, the need for multiple procedures, the ventral expander/implant exchange, she is aware that the right breast will be addressed in a staged fashion in the future once we have made considerable progress with the reconstructed breast   She is aware that her poorly controlled diabetes may contribute to wound healing issues, etcetera  We discussed anticipated results, recovery, use of drains, etcetera  Her questions were answered to her satisfaction and consent was obtained  There are no diagnoses linked to this encounter  Subjective:  Preoperative visit     Patient ID: Khadra Awan is a 64 y o  female  [facility] is scheduled tomorrow to undergo left mastectomy and immediate left breast reconstruction  She is here for a preoperative visit to discuss the procedure, and for preoperative markings  The following portions of the patient's history were reviewed and updated as appropriate: allergies, current medications, past family history, past medical history, past social history, past surgical history and problem list     Review of Systems   Constitutional: Negative for chills and fever  HENT: Negative for hearing loss  Eyes: Positive for visual disturbance  Negative for discharge  Respiratory: Negative for chest tightness and shortness of breath  Cardiovascular: Negative for chest pain and leg swelling     Gastrointestinal: Negative for blood in stool, constipation, diarrhea and nausea  Genitourinary: Negative for dysuria  Musculoskeletal: Negative for gait problem  Skin: Negative for rash  Allergic/Immunologic: Negative for immunocompromised state  Neurological: Negative for seizures and headaches  Hematological: Does not bruise/bleed easily  Psychiatric/Behavioral: Negative for dysphoric mood  The patient is not nervous/anxious  Objective:      /77   Pulse 78   Temp (!) 97 2 °F (36 2 °C)   Ht 5' 4" (1 626 m)   Wt 117 kg (257 lb)   LMP  (LMP Unknown)   BMI 44 11 kg/m²          Physical Exam  HENT:      Head: Normocephalic  Eyes:      Extraocular Movements: Extraocular movements intact  Pupils: Pupils are equal, round, and reactive to light  Cardiovascular:      Rate and Rhythm: Normal rate  Pulmonary:      Effort: Pulmonary effort is normal    Abdominal:      Palpations: Abdomen is soft  Musculoskeletal:         General: Normal range of motion  Cervical back: Normal range of motion and neck supple  Skin:     General: Skin is warm  Comments: Dependent, ptotic breasts bilaterally, see previous visit for measurements and photographs  Neurological:      Mental Status: She is alert and oriented to person, place, and time     Psychiatric:         Mood and Affect: Mood normal

## 2022-02-08 ENCOUNTER — ANESTHESIA (OUTPATIENT)
Dept: PERIOP | Facility: HOSPITAL | Age: 62
End: 2022-02-08
Payer: COMMERCIAL

## 2022-02-08 ENCOUNTER — HOSPITAL ENCOUNTER (OUTPATIENT)
Facility: HOSPITAL | Age: 62
Setting detail: OUTPATIENT SURGERY
Discharge: HOME | End: 2022-02-09
Attending: SURGERY | Admitting: SURGERY
Payer: COMMERCIAL

## 2022-02-08 ENCOUNTER — HOSPITAL ENCOUNTER (OUTPATIENT)
Dept: NUCLEAR MEDICINE | Facility: HOSPITAL | Age: 62
Discharge: HOME | End: 2022-02-08
Attending: SURGERY
Payer: COMMERCIAL

## 2022-02-08 DIAGNOSIS — D05.12 DUCTAL CARCINOMA IN SITU (DCIS) OF LEFT BREAST: ICD-10-CM

## 2022-02-08 LAB
GLUCOSE SERPL-MCNC: 158 MG/DL (ref 65–140)
GLUCOSE SERPL-MCNC: 197 MG/DL (ref 65–140)

## 2022-02-08 PROCEDURE — 88307 TISSUE EXAM BY PATHOLOGIST: CPT | Performed by: PATHOLOGY

## 2022-02-08 PROCEDURE — 14301 TIS TRNFR ANY 30.1-60 SQ CM: CPT | Performed by: PHYSICIAN ASSISTANT

## 2022-02-08 PROCEDURE — A9541 TC99M SULFUR COLLOID: HCPCS

## 2022-02-08 PROCEDURE — 82948 REAGENT STRIP/BLOOD GLUCOSE: CPT

## 2022-02-08 PROCEDURE — 14302 TIS TRNFR ADDL 30 SQ CM: CPT | Performed by: PHYSICIAN ASSISTANT

## 2022-02-08 PROCEDURE — 19303 MAST SIMPLE COMPLETE: CPT | Performed by: SURGERY

## 2022-02-08 PROCEDURE — 19357 TISS XPNDR PLMT BRST RCNSTJ: CPT | Performed by: SURGERY

## 2022-02-08 PROCEDURE — G1004 CDSM NDSC: HCPCS

## 2022-02-08 PROCEDURE — 38525 BIOPSY/REMOVAL LYMPH NODES: CPT | Performed by: SURGERY

## 2022-02-08 PROCEDURE — 14302 TIS TRNFR ADDL 30 SQ CM: CPT | Performed by: SURGERY

## 2022-02-08 PROCEDURE — C1789 PROSTHESIS, BREAST, IMP: HCPCS | Performed by: SURGERY

## 2022-02-08 PROCEDURE — 14301 TIS TRNFR ANY 30.1-60 SQ CM: CPT | Performed by: SURGERY

## 2022-02-08 PROCEDURE — 38900 IO MAP OF SENT LYMPH NODE: CPT | Performed by: SURGERY

## 2022-02-08 PROCEDURE — 78195 LYMPH SYSTEM IMAGING: CPT

## 2022-02-08 PROCEDURE — 19357 TISS XPNDR PLMT BRST RCNSTJ: CPT | Performed by: PHYSICIAN ASSISTANT

## 2022-02-08 DEVICE — BREAST TISSUE EXPANDER, SUTURE TABS, INTEGRAL INJECTION DOME, 650CC
Type: IMPLANTABLE DEVICE | Site: BREAST | Status: FUNCTIONAL
Brand: MENTOR ARTOURA PLUS, SMOOTH, ULTRA HIGH PROFILE

## 2022-02-08 RX ORDER — ONDANSETRON 2 MG/ML
4 INJECTION INTRAMUSCULAR; INTRAVENOUS EVERY 6 HOURS PRN
Status: DISCONTINUED | OUTPATIENT
Start: 2022-02-08 | End: 2022-02-09 | Stop reason: HOSPADM

## 2022-02-08 RX ORDER — GLYCOPYRROLATE 0.2 MG/ML
INJECTION INTRAMUSCULAR; INTRAVENOUS AS NEEDED
Status: DISCONTINUED | OUTPATIENT
Start: 2022-02-08 | End: 2022-02-08

## 2022-02-08 RX ORDER — PROPOFOL 10 MG/ML
INJECTION, EMULSION INTRAVENOUS CONTINUOUS PRN
Status: DISCONTINUED | OUTPATIENT
Start: 2022-02-08 | End: 2022-02-08

## 2022-02-08 RX ORDER — ONDANSETRON 2 MG/ML
INJECTION INTRAMUSCULAR; INTRAVENOUS AS NEEDED
Status: DISCONTINUED | OUTPATIENT
Start: 2022-02-08 | End: 2022-02-08

## 2022-02-08 RX ORDER — OXYCODONE HYDROCHLORIDE AND ACETAMINOPHEN 5; 325 MG/1; MG/1
1 TABLET ORAL EVERY 4 HOURS PRN
Status: DISCONTINUED | OUTPATIENT
Start: 2022-02-08 | End: 2022-02-09 | Stop reason: HOSPADM

## 2022-02-08 RX ORDER — PROPOFOL 10 MG/ML
INJECTION, EMULSION INTRAVENOUS AS NEEDED
Status: DISCONTINUED | OUTPATIENT
Start: 2022-02-08 | End: 2022-02-08

## 2022-02-08 RX ORDER — OXYCODONE HYDROCHLORIDE AND ACETAMINOPHEN 5; 325 MG/1; MG/1
1 TABLET ORAL EVERY 4 HOURS PRN
Qty: 18 TABLET | Refills: 0 | Status: SHIPPED | OUTPATIENT
Start: 2022-02-08 | End: 2022-03-08

## 2022-02-08 RX ORDER — DOCUSATE SODIUM 100 MG/1
100 CAPSULE, LIQUID FILLED ORAL 2 TIMES DAILY
Status: DISCONTINUED | OUTPATIENT
Start: 2022-02-08 | End: 2022-02-09 | Stop reason: HOSPADM

## 2022-02-08 RX ORDER — MELATONIN
4000 DAILY
Status: DISCONTINUED | OUTPATIENT
Start: 2022-02-09 | End: 2022-02-09 | Stop reason: HOSPADM

## 2022-02-08 RX ORDER — ONDANSETRON 2 MG/ML
4 INJECTION INTRAMUSCULAR; INTRAVENOUS ONCE AS NEEDED
Status: DISCONTINUED | OUTPATIENT
Start: 2022-02-08 | End: 2022-02-08 | Stop reason: HOSPADM

## 2022-02-08 RX ORDER — HYDROMORPHONE HCL/PF 1 MG/ML
SYRINGE (ML) INJECTION AS NEEDED
Status: DISCONTINUED | OUTPATIENT
Start: 2022-02-08 | End: 2022-02-08

## 2022-02-08 RX ORDER — SODIUM CHLORIDE, SODIUM LACTATE, POTASSIUM CHLORIDE, CALCIUM CHLORIDE 600; 310; 30; 20 MG/100ML; MG/100ML; MG/100ML; MG/100ML
125 INJECTION, SOLUTION INTRAVENOUS CONTINUOUS
Status: DISCONTINUED | OUTPATIENT
Start: 2022-02-08 | End: 2022-02-09 | Stop reason: HOSPADM

## 2022-02-08 RX ORDER — FENTANYL CITRATE 50 UG/ML
INJECTION, SOLUTION INTRAMUSCULAR; INTRAVENOUS AS NEEDED
Status: DISCONTINUED | OUTPATIENT
Start: 2022-02-08 | End: 2022-02-08

## 2022-02-08 RX ORDER — FENTANYL CITRATE/PF 50 MCG/ML
25 SYRINGE (ML) INJECTION
Status: DISCONTINUED | OUTPATIENT
Start: 2022-02-08 | End: 2022-02-08 | Stop reason: HOSPADM

## 2022-02-08 RX ORDER — SULFAMETHOXAZOLE AND TRIMETHOPRIM 800; 160 MG/1; MG/1
1 TABLET ORAL EVERY 12 HOURS SCHEDULED
Qty: 60 TABLET | Refills: 0 | Status: SHIPPED | OUTPATIENT
Start: 2022-02-08 | End: 2022-03-10

## 2022-02-08 RX ORDER — MEPERIDINE HYDROCHLORIDE 25 MG/ML
12.5 INJECTION INTRAMUSCULAR; INTRAVENOUS; SUBCUTANEOUS
Status: DISCONTINUED | OUTPATIENT
Start: 2022-02-08 | End: 2022-02-08 | Stop reason: HOSPADM

## 2022-02-08 RX ORDER — ATORVASTATIN CALCIUM 40 MG/1
40 TABLET, FILM COATED ORAL
Status: DISCONTINUED | OUTPATIENT
Start: 2022-02-08 | End: 2022-02-09 | Stop reason: HOSPADM

## 2022-02-08 RX ORDER — HYDROMORPHONE HCL/PF 1 MG/ML
0.5 SYRINGE (ML) INJECTION
Status: DISCONTINUED | OUTPATIENT
Start: 2022-02-08 | End: 2022-02-08 | Stop reason: HOSPADM

## 2022-02-08 RX ORDER — PANTOPRAZOLE SODIUM 40 MG/1
40 TABLET, DELAYED RELEASE ORAL
Status: DISCONTINUED | OUTPATIENT
Start: 2022-02-09 | End: 2022-02-09 | Stop reason: HOSPADM

## 2022-02-08 RX ORDER — MIDAZOLAM HYDROCHLORIDE 2 MG/2ML
INJECTION, SOLUTION INTRAMUSCULAR; INTRAVENOUS AS NEEDED
Status: DISCONTINUED | OUTPATIENT
Start: 2022-02-08 | End: 2022-02-08

## 2022-02-08 RX ORDER — ISOSULFAN BLUE 50 MG/5ML
INJECTION, SOLUTION SUBCUTANEOUS AS NEEDED
Status: DISCONTINUED | OUTPATIENT
Start: 2022-02-08 | End: 2022-02-08 | Stop reason: HOSPADM

## 2022-02-08 RX ORDER — BUPIVACAINE HYDROCHLORIDE 2.5 MG/ML
INJECTION, SOLUTION EPIDURAL; INFILTRATION; INTRACAUDAL AS NEEDED
Status: DISCONTINUED | OUTPATIENT
Start: 2022-02-08 | End: 2022-02-08 | Stop reason: HOSPADM

## 2022-02-08 RX ORDER — GABAPENTIN 300 MG/1
300 CAPSULE ORAL 2 TIMES DAILY
Status: DISCONTINUED | OUTPATIENT
Start: 2022-02-08 | End: 2022-02-09 | Stop reason: HOSPADM

## 2022-02-08 RX ORDER — NEOSTIGMINE METHYLSULFATE 1 MG/ML
INJECTION INTRAVENOUS AS NEEDED
Status: DISCONTINUED | OUTPATIENT
Start: 2022-02-08 | End: 2022-02-08

## 2022-02-08 RX ORDER — VECURONIUM BROMIDE 1 MG/ML
INJECTION, POWDER, LYOPHILIZED, FOR SOLUTION INTRAVENOUS AS NEEDED
Status: DISCONTINUED | OUTPATIENT
Start: 2022-02-08 | End: 2022-02-08

## 2022-02-08 RX ORDER — BUPIVACAINE HYDROCHLORIDE 5 MG/ML
INJECTION, SOLUTION PERINEURAL AS NEEDED
Status: DISCONTINUED | OUTPATIENT
Start: 2022-02-08 | End: 2022-02-08 | Stop reason: HOSPADM

## 2022-02-08 RX ORDER — CEFAZOLIN SODIUM 1 G/50ML
1000 SOLUTION INTRAVENOUS EVERY 8 HOURS
Status: COMPLETED | OUTPATIENT
Start: 2022-02-08 | End: 2022-02-09

## 2022-02-08 RX ORDER — LEVOTHYROXINE SODIUM 0.1 MG/1
100 TABLET ORAL
Status: DISCONTINUED | OUTPATIENT
Start: 2022-02-09 | End: 2022-02-09 | Stop reason: HOSPADM

## 2022-02-08 RX ADMIN — CEFAZOLIN SODIUM 3000 MG: 10 INJECTION, POWDER, FOR SOLUTION INTRAVENOUS at 13:15

## 2022-02-08 RX ADMIN — HYDROMORPHONE HYDROCHLORIDE 0.5 MG: 1 INJECTION, SOLUTION INTRAMUSCULAR; INTRAVENOUS; SUBCUTANEOUS at 17:57

## 2022-02-08 RX ADMIN — VECURONIUM BROMIDE 7 MG: 1 INJECTION, POWDER, LYOPHILIZED, FOR SOLUTION INTRAVENOUS at 13:19

## 2022-02-08 RX ADMIN — OXYCODONE HYDROCHLORIDE AND ACETAMINOPHEN 1 TABLET: 5; 325 TABLET ORAL at 21:10

## 2022-02-08 RX ADMIN — VECURONIUM BROMIDE 2 MG: 1 INJECTION, POWDER, LYOPHILIZED, FOR SOLUTION INTRAVENOUS at 15:05

## 2022-02-08 RX ADMIN — FENTANYL CITRATE 25 MCG: 50 INJECTION, SOLUTION INTRAMUSCULAR; INTRAVENOUS at 17:29

## 2022-02-08 RX ADMIN — GLYCOPYRROLATE 0.6 MG: 0.2 INJECTION, SOLUTION INTRAMUSCULAR; INTRAVENOUS at 16:33

## 2022-02-08 RX ADMIN — PROPOFOL 140 MCG/KG/MIN: 10 INJECTION, EMULSION INTRAVENOUS at 13:18

## 2022-02-08 RX ADMIN — FENTANYL CITRATE 50 MCG: 50 INJECTION INTRAMUSCULAR; INTRAVENOUS at 13:57

## 2022-02-08 RX ADMIN — SODIUM CHLORIDE, SODIUM LACTATE, POTASSIUM CHLORIDE, AND CALCIUM CHLORIDE: .6; .31; .03; .02 INJECTION, SOLUTION INTRAVENOUS at 16:55

## 2022-02-08 RX ADMIN — ENOXAPARIN SODIUM 30 MG: 30 INJECTION SUBCUTANEOUS at 12:15

## 2022-02-08 RX ADMIN — FENTANYL CITRATE 25 MCG: 50 INJECTION, SOLUTION INTRAMUSCULAR; INTRAVENOUS at 17:35

## 2022-02-08 RX ADMIN — ATORVASTATIN CALCIUM 40 MG: 40 TABLET, FILM COATED ORAL at 21:06

## 2022-02-08 RX ADMIN — MIDAZOLAM 2 MG: 1 INJECTION INTRAMUSCULAR; INTRAVENOUS at 13:15

## 2022-02-08 RX ADMIN — GABAPENTIN 300 MG: 300 CAPSULE ORAL at 21:06

## 2022-02-08 RX ADMIN — NEOSTIGMINE METHYLSULFATE 3.5 MG: 1 INJECTION INTRAVENOUS at 16:33

## 2022-02-08 RX ADMIN — HYDROMORPHONE HYDROCHLORIDE 0.5 MG: 1 INJECTION, SOLUTION INTRAMUSCULAR; INTRAVENOUS; SUBCUTANEOUS at 14:46

## 2022-02-08 RX ADMIN — CEFAZOLIN SODIUM 1000 MG: 1 SOLUTION INTRAVENOUS at 21:06

## 2022-02-08 RX ADMIN — SODIUM CHLORIDE, SODIUM LACTATE, POTASSIUM CHLORIDE, AND CALCIUM CHLORIDE: .6; .31; .03; .02 INJECTION, SOLUTION INTRAVENOUS at 14:11

## 2022-02-08 RX ADMIN — HYDROMORPHONE HYDROCHLORIDE 0.5 MG: 1 INJECTION, SOLUTION INTRAMUSCULAR; INTRAVENOUS; SUBCUTANEOUS at 16:07

## 2022-02-08 RX ADMIN — VECURONIUM BROMIDE 2 MG: 1 INJECTION, POWDER, LYOPHILIZED, FOR SOLUTION INTRAVENOUS at 15:42

## 2022-02-08 RX ADMIN — ONDANSETRON 4 MG: 2 INJECTION INTRAMUSCULAR; INTRAVENOUS at 16:33

## 2022-02-08 RX ADMIN — SODIUM CHLORIDE, SODIUM LACTATE, POTASSIUM CHLORIDE, AND CALCIUM CHLORIDE 125 ML/HR: .6; .31; .03; .02 INJECTION, SOLUTION INTRAVENOUS at 23:59

## 2022-02-08 RX ADMIN — HYDROMORPHONE HYDROCHLORIDE 0.5 MG: 1 INJECTION, SOLUTION INTRAMUSCULAR; INTRAVENOUS; SUBCUTANEOUS at 18:17

## 2022-02-08 RX ADMIN — FENTANYL CITRATE 100 MCG: 50 INJECTION INTRAMUSCULAR; INTRAVENOUS at 13:18

## 2022-02-08 RX ADMIN — PROPOFOL 150 MG: 10 INJECTION, EMULSION INTRAVENOUS at 13:18

## 2022-02-08 RX ADMIN — SODIUM CHLORIDE, SODIUM LACTATE, POTASSIUM CHLORIDE, AND CALCIUM CHLORIDE 125 ML/HR: .6; .31; .03; .02 INJECTION, SOLUTION INTRAVENOUS at 11:01

## 2022-02-08 RX ADMIN — FENTANYL CITRATE 50 MCG: 50 INJECTION INTRAMUSCULAR; INTRAVENOUS at 14:40

## 2022-02-08 RX ADMIN — VECURONIUM BROMIDE 3 MG: 1 INJECTION, POWDER, LYOPHILIZED, FOR SOLUTION INTRAVENOUS at 14:39

## 2022-02-08 RX ADMIN — LIDOCAINE HYDROCHLORIDE 100 MG: 20 INJECTION INTRAVENOUS at 13:18

## 2022-02-08 RX ADMIN — DOCUSATE SODIUM 100 MG: 100 CAPSULE ORAL at 21:06

## 2022-02-08 NOTE — OP NOTE
Chief complaint:   Chief Complaint   Patient presents with   • Sinus Problem     C/O sinus head and facial pressure - ONSET: Monday   • Nasal Congestion     C/O nasal congestion and throat drainage       Vitals:  Visit Vitals  /74   Pulse 80   Temp 98.1 °F (36.7 °C) (Tympanic)   Ht 5' 8\" (1.727 m)   Wt 74.8 kg   BMI 25.09 kg/m²       HISTORY OF PRESENT ILLNESS     51-year-old male who presents to the walk-in clinic today with a complaint of sinus pressure, postnasal drip, congestion, and left ear pain.  Patient's been experiencing these symptoms since Monday.  Over-the-counter medications are not helping.  No nausea or vomiting.  No documented fevers.        Other significant problems:  There are no active problems to display for this patient.      PAST MEDICAL, FAMILY AND SOCIAL HISTORY     Medications:  Current Outpatient Medications   Medication   • cetirizine (ZYRTEC) 10 MG tablet   • Probiotic Product (PROBIOTIC ADVANCED PO)   • Cyanocobalamin (VITAMIN B-12 PO)   • fluticasone (FLONASE) 50 MCG/ACT nasal spray   • Calcium Polycarbophil (FIBER-CAPS PO)   • diphenhydrAMINE (BENADRYL) 50 MG capsule   • ALPRAZolam (XANAX) 0.5 MG tablet     No current facility-administered medications for this visit.        Allergies:  ALLERGIES:   Allergen Reactions   • Adhesive   (Environmental) RASH     Adhesive tape/bandaids     • Penicillins RASH   • Prevacid RASH   • Prilosec Otc Other (See Comments)     Fever, fatigue, blood in urine       Past Medical  History/Surgeries:  Past Medical History:   Diagnosis Date   • Gastroesophageal reflux disease        Past Surgical History:   Procedure Laterality Date   • Appendectomy     • Colonoscopy     • Fracture surgery      left leg - agae 22   • Inj proc for anal fistula  2004   • Tonsillectomy         Family History:  Family History   Problem Relation Age of Onset   • Diabetes Father    • Diabetes Brother    • Arrhythmia Brother        Social History:  Social History  PERATIVE REPORT  PATIENT NAME: Junior Healy    :  1960  MRN: [id number]  Pt Location: [facility] OR ROOM 05    SURGERY DATE: 2022    Surgeon(s) and Role:  Panel 1:     * Krysta Galvan MD - Primary     * Alfonza Runner, MD - Assisting      Preop Diagnosis:  Ductal carcinoma in situ (DCIS) of left breast [D05 12]    Post-Op Diagnosis Codes:     * Ductal carcinoma in situ (DCIS) of left breast [D05 12]    Procedure(s) (LRB):  MASTECTOMY WITH BIOPSY LYMPH NODE SENTINEL; 1200 NUC MED (Left)  Injection of blue dye  Use of gamma probe    Specimen(s):  ID Type Source Tests Collected by Time Destination   1 : sentinel node #1 left axilla Tissue Breast, Left TISSUE EXAM Krysta Galvan MD 2022 1432    2 :  sentinel node #2 left axilla Tissue Breast, Left TISSUE EXAM Krysta Galvan MD 2022 1433    3 : left breast tissue stitch short superior long lateral Tissue Breast, Left TISSUE EXAM Krysta Galvan MD 2022 1435        Estimated Blood Loss:   Minimal    Drains:  * No LDAs found *    Anesthesia Type:   General    Operative Indications:  Ductal carcinoma in situ (DCIS) of left breast [D05 12]      Operative Findings:  n/a    Complications:   None    Procedure and Technique:  Felisha Quesada is a 80-year-old female who presented with extensive ductal carcinoma in-situ of the breast   She was counseled mastectomy along sentinel biopsy  She opted for immediate reconstruction  She presented the day surgery to the radiology suite and underwent lymphoscintigraphy of the left breast   She to the operating room  She had preoperative antibiotics DVT prophylaxis  She was given anesthesia  She had a 5 cc injection Lymphazurin into the left subareolar plexus  She was prepped and draped in the usual standard fashion  Time-out was performed  Attention was turned to the left breast   0 5% Marcaine plain was injected for local anesthesia  A central incision around the nipple-areolar complex was created per plastic surgery marking      Tobacco Use   • Smoking status: Current Every Day Smoker     Packs/day: 0.50     Types: Cigarettes   • Smokeless tobacco: Never Used   Substance Use Topics   • Alcohol use: No       REVIEW OF SYSTEMS     Review of Systems   Constitutional: Negative.    HENT: Positive for congestion, ear pain, postnasal drip, rhinorrhea and sinus pressure.    Eyes: Negative.    Respiratory: Positive for cough.    Cardiovascular: Negative.    Gastrointestinal: Negative.    Endocrine: Negative.    Genitourinary: Negative.    Musculoskeletal: Negative.    Neurological: Negative.    Psychiatric/Behavioral: Negative.        PHYSICAL EXAM     Physical Exam   Constitutional: He is oriented to person, place, and time. He appears well-developed and well-nourished. No distress.   HENT:   Head: Normocephalic and atraumatic.   Thick mucus secretions seen behind the tympanic membranes bilaterally with slight bulging.  Palpation of the frontal and maxillary sinuses elicit tenderness.  Oropharynx is clear.   Eyes: Conjunctivae and EOM are normal. Pupils are equal, round, and reactive to light.   Neck: Normal range of motion. Neck supple.   Cardiovascular: Normal rate, regular rhythm and normal heart sounds. Exam reveals no gallop and no friction rub.   No murmur heard.  Pulmonary/Chest: Effort normal and breath sounds normal. No respiratory distress. He has no wheezes. He has no rales.   Neurological: He is alert and oriented to person, place, and time.   Skin: Skin is warm and dry. He is not diaphoretic.   Psychiatric: He has a normal mood and affect. His behavior is normal.   Nursing note and vitals reviewed.      ASSESSMENT/PLAN     John was seen today for sinus problem and nasal congestion.    Diagnoses and all orders for this visit:    Other acute sinusitis, recurrence not specified  -     methylPREDNISolone (MEDROL, YUE,) 4 MG tablet; Follow package directions  -     azithromycin (ZITHROMAX Z-YUE) 250 MG tablet; Take 2 tablets by mouth  Her mastectomy flaps were created to the level of clavicle superior, to the sternal border medial, to the inframammary fold inferior and to the edge of the latissimus dorsi muscle lateral   The breast was then excised from the underlying pectoralis including the pectoralis fascia  This was marked with a short stitch superior and a long stitch lateral   The breast tissue was interrogated using the gamma probe to for any sentinel nodes within the breast itself  Attention was then turned to left axilla  There were noted to be 2 radioactive nodes in the far axillary tail, one of which was blue stained  These were excised and submitted as sentinel node one and two of the left axilla  Following removal of these 2 nodes, there were no additional blue stained, radioactive or palpable nodes  All specimens were sent to pathology in formalin  Her wound was irrigated and hemostasis was achieved  She remained in hemodynamically stable condition for the remainder of her procedure which will be dictated by Dr Pasquale Mahan           Patient Disposition:  hemodynamically stable      SIGNATURE: Drew Stephens MD  DATE: February 8, 2022  TIME: 4:28 PM daily for 1 day, THEN 1 tablet daily for 4 days.    Acute serous otitis media of left ear, recurrence not specified  -     methylPREDNISolone (MEDROL, YUE,) 4 MG tablet; Follow package directions      If symptoms worsen or fail to improve, patient should follow-up with their PCP.

## 2022-02-08 NOTE — PROGRESS NOTES
Feedback from daily OR meeting that pt does not need preop  COVID test due to warning on screen that pt is within 90 days (12/2/20221) of COVID positive diagnosis & may result in false positive

## 2022-02-08 NOTE — ANESTHESIA PREPROCEDURE EVALUATION
Procedure:  MASTECTOMY WITH BIOPSY LYMPH NODE SENTINEL; 1200 NUC MED (Left Breast)  IMMEDIATE RECONSTRUCTION WITH TISSUE EXPANDER PLACEMENT (Left Breast)  DE-EPITHELIALIZED FLAP BREAST (Left Breast)    Relevant Problems   CARDIO   (+) Benign essential hypertension   (+) Hyperlipidemia   (+) Mixed diabetic hyperlipidemia associated with type 2 diabetes mellitus (HCC)      ENDO   (+) Hypothyroidism   (+) Type 2 diabetes mellitus with stage 3b chronic kidney disease (HCC)      GI/HEPATIC   (+) Fatty liver   (+) GERD without esophagitis      HEMATOLOGY   (+) Anemia      MUSCULOSKELETAL   (+) Arthritis   (+) Disc degeneration, lumbar   (+) Primary osteoarthritis of both knees      PULMONARY   (+) MARIA FERNANDA (obstructive sleep apnea)      Other   (+) BRCA negative   (+) Ductal carcinoma in situ (DCIS) of left breast   (+) Obesity, Class III, BMI 40-49 9 (morbid obesity) (HCC)   (+) Uncontrolled type 2 diabetes mellitus with peripheral neuropathy (HCC)        Physical Exam    Airway    Mallampati score: III  TM Distance: >3 FB  Neck ROM: full     Dental   No notable dental hx     Cardiovascular  Rhythm: regular, Rate: normal, Cardiovascular exam normal    Pulmonary  Pulmonary exam normal Breath sounds clear to auscultation,     Other Findings        Anesthesia Plan  ASA Score- 3     Anesthesia Type- general with ASA Monitors  Additional Monitors:   Airway Plan:           Plan Factors-    Chart reviewed  Patient summary reviewed  Patient is not a current smoker  Patient instructed to abstain from smoking on day of procedure  Patient did not smoke on day of surgery  Induction- intravenous  Postoperative Plan-     Informed Consent- Anesthetic plan and risks discussed with patient (SO)

## 2022-02-08 NOTE — INTERVAL H&P NOTE
H&P reviewed  After examining the patient I find no changes in the patients condition since the H&P had been written      Vitals:    02/08/22 1027   BP: 142/66   Pulse: 77   Resp: 16   Temp: 97 9 °F (36 6 °C)   SpO2: 99%

## 2022-02-08 NOTE — INTERIM OP NOTE
MASTECTOMY WITH BIOPSY LYMPH NODE SENTINEL; 1200 NUC MED  Postoperative Note  PATIENT NAME: Yunier Ordonez  : 1960  MRN: [id number]  [facility] 05    Surgery Date: 2022    Preop Diagnosis:  Ductal carcinoma in situ (DCIS) of left breast [D05 12]    Post-Op Diagnosis Codes:     * Ductal carcinoma in situ (DCIS) of left breast [D05 12]    Procedure(s) (LRB):  MASTECTOMY WITH BIOPSY LYMPH NODE SENTINEL; 1200 NUC MED (Left)  IMMEDIATE RECONSTRUCTION WITH TISSUE EXPANDER PLACEMENT (Left)  DE-EPITHELIALIZED FLAP BREAST (Left)    Surgeon(s) and Role:  Panel 1:     * Drew Stephens MD - Primary     * Aden Macario MD - Assisting  Panel 2:     * Veena Dewitt MD - Primary    Specimens:  ID Type Source Tests Collected by Time Destination   1 : sentinel node #1 left axilla Tissue Breast, Left TISSUE EXAM Drew Stephens MD 2022 1432    2 :  sentinel node #2 left axilla Tissue Breast, Left TISSUE EXAM Drew Stephens MD 2022 1433    3 : left breast tissue stitch short superior long lateral Tissue Breast, Left TISSUE EXAM Drew Stephens MD 2022 1435        Estimated Blood Loss:   100 mL    Anesthesia Type:   General     Findings:    None  Complications:   None    SIGNATURE: Talha Ruffin PA-C   DATE: 2022   TIME: 5:15 PM

## 2022-02-08 NOTE — ANESTHESIA POSTPROCEDURE EVALUATION
Post-Op Assessment Note    CV Status:  Stable    Pain management: adequate     Mental Status:  Alert and awake   Hydration Status:  Euvolemic   PONV Controlled:  Controlled   Airway Patency:  Patent      Post Op Vitals Reviewed: Yes      Staff: Anesthesiologist         No complications documented      /73 (02/08/22 1751)    Temp      Pulse 82 (02/08/22 1751)   Resp 19 (02/08/22 1751)    SpO2 98 % (02/08/22 1751)

## 2022-02-09 VITALS
HEART RATE: 68 BPM | DIASTOLIC BLOOD PRESSURE: 60 MMHG | BODY MASS INDEX: 44 KG/M2 | SYSTOLIC BLOOD PRESSURE: 127 MMHG | RESPIRATION RATE: 18 BRPM | TEMPERATURE: 97.6 F | HEIGHT: 64 IN | WEIGHT: 257.72 LBS | OXYGEN SATURATION: 100 %

## 2022-02-09 LAB
ANION GAP SERPL CALCULATED.3IONS-SCNC: 6 MMOL/L (ref 4–13)
BUN SERPL-MCNC: 22 MG/DL (ref 5–25)
CALCIUM SERPL-MCNC: 8.8 MG/DL (ref 8.3–10.1)
CHLORIDE SERPL-SCNC: 107 MMOL/L (ref 100–108)
CO2 SERPL-SCNC: 31 MMOL/L (ref 21–32)
CREAT SERPL-MCNC: 1.17 MG/DL (ref 0.6–1.3)
ERYTHROCYTE [DISTWIDTH] IN BLOOD BY AUTOMATED COUNT: 13.3 % (ref 11.6–15.1)
GFR SERPL CREATININE-BSD FRML MDRD: 50 ML/MIN/1.73SQ M
GLUCOSE SERPL-MCNC: 151 MG/DL (ref 65–140)
HCT VFR BLD AUTO: 30.2 % (ref 34.8–46.1)
HGB BLD-MCNC: 9.4 G/DL (ref 11.5–15.4)
MAGNESIUM SERPL-MCNC: 1.4 MG/DL (ref 1.6–2.6)
MCH RBC QN AUTO: 28.5 PG (ref 26.8–34.3)
MCHC RBC AUTO-ENTMCNC: 31.1 G/DL (ref 31.4–37.4)
MCV RBC AUTO: 92 FL (ref 82–98)
PLATELET # BLD AUTO: 206 THOUSANDS/UL (ref 149–390)
PMV BLD AUTO: 11.1 FL (ref 8.9–12.7)
POTASSIUM SERPL-SCNC: 5 MMOL/L (ref 3.5–5.3)
RBC # BLD AUTO: 3.3 MILLION/UL (ref 3.81–5.12)
SODIUM SERPL-SCNC: 144 MMOL/L (ref 136–145)
WBC # BLD AUTO: 8.28 THOUSAND/UL (ref 4.31–10.16)

## 2022-02-09 PROCEDURE — 99024 POSTOP FOLLOW-UP VISIT: CPT | Performed by: SURGERY

## 2022-02-09 PROCEDURE — 83735 ASSAY OF MAGNESIUM: CPT | Performed by: PHYSICIAN ASSISTANT

## 2022-02-09 PROCEDURE — 85027 COMPLETE CBC AUTOMATED: CPT | Performed by: PHYSICIAN ASSISTANT

## 2022-02-09 PROCEDURE — 80048 BASIC METABOLIC PNL TOTAL CA: CPT | Performed by: PHYSICIAN ASSISTANT

## 2022-02-09 PROCEDURE — NC001 PR NO CHARGE: Performed by: SURGERY

## 2022-02-09 RX ADMIN — OXYCODONE HYDROCHLORIDE AND ACETAMINOPHEN 1 TABLET: 5; 325 TABLET ORAL at 08:32

## 2022-02-09 RX ADMIN — METFORMIN HYDROCHLORIDE 1000 MG: 500 TABLET ORAL at 08:01

## 2022-02-09 RX ADMIN — PANTOPRAZOLE SODIUM 40 MG: 40 TABLET, DELAYED RELEASE ORAL at 05:22

## 2022-02-09 RX ADMIN — OXYCODONE HYDROCHLORIDE AND ACETAMINOPHEN 1 TABLET: 5; 325 TABLET ORAL at 03:07

## 2022-02-09 RX ADMIN — Medication 4000 UNITS: at 08:01

## 2022-02-09 RX ADMIN — CEFAZOLIN SODIUM 1000 MG: 1 SOLUTION INTRAVENOUS at 05:21

## 2022-02-09 RX ADMIN — LEVOTHYROXINE SODIUM 100 MCG: 100 TABLET ORAL at 05:21

## 2022-02-09 RX ADMIN — GABAPENTIN 300 MG: 300 CAPSULE ORAL at 08:01

## 2022-02-09 RX ADMIN — DOCUSATE SODIUM 100 MG: 100 CAPSULE ORAL at 08:00

## 2022-02-09 NOTE — DISCHARGE INSTRUCTIONS
[facility]  Dr Alyssa Jama   [address] [facility], [address]  Phone: [phone]     Postoperative Instructions for Outpatient Surgery     These instructions are being provided by your doctor to give you basic guidelines during your post-op recovery  Please let our office know if your contact information has changed       Please call the office today for an appointment on Thursday 2/10 for a dressing change      Dressings:  Keep clean and dry until seen in the office      Activity Restrictions:  Nothing strenuous      Bathing:  Sponge bathe only until seen in the office      Medications:    Resume pre-op medications  You may take tylenol, aleve, or ibuprofen for pain control             Percocet as needed for pain  Bactrim for infection prevention  Other:  Ice to left breast at 15 minutes intervals as needed for pain or swelling  Please track and record drain output daily  Roderick-Morrison Drain Care   WHAT YOU NEED TO KNOW:   A Roderick-Morrison (BIRGIT) drain is used to remove fluids that build up in an area of your body after surgery  The BIRGIT drain is a bulb shaped device connected to a tube  One end of the tube is placed inside you during surgery  The other end comes out through a small cut in your skin  The bulb is connected to this end  You may have a stitch to hold the tube in place  DISCHARGE INSTRUCTIONS:   Seek care immediately if:   · Your BIRGIT drain breaks or comes out  · You have cloudy yellow or brown drainage from your BIRGIT drain site, or the drainage smells bad  Contact your healthcare provider if:   · You drain less than 30 milliliters (2 tablespoons) in 24 hours  This may mean your drain can be removed  · You suddenly stop draining fluid or think your BIRGIT drain is blocked  · You have a fever higher than 101 5°F (38 6°C)  · You have increased pain, redness, or swelling around the drain site  · You have questions about your BIRGIT drain care      How a Roderick-Morrison drain works: The BIRGIT drain removes fluids by creating suction in the tube  The bulb is squeezed flat and connected to the tube that sticks out of your body  The bulb expands as it fills with fluid  How to change the bandage around your Roderick-Morrison drain:  If you have a bandage, change it once a day  You may need to change your bandage more than once a day if it gets completely wet  · Wash your hands with soap and water  · Loosen the tape and gently remove the old bandage  Throw the old bandage into a plastic trash bag  · Use soap and water or saline (salt water) solution to clean your BIRGIT drain site  Dip a cotton swab or gauze pad in the solution and gently clean your skin  · Pat the area dry  · Place a new bandage on your BIRGIT drain site and secure it to your skin with medical tape  · Wash your hands  How to empty the Roderick-Morrison drain:  Empty the bulb when it is half full or every 8 to 12 hours  · Wash your hands with soap and water  · Remove the plug from the bulb  · Pour the fluid into a measuring cup  · Clean the plug with an alcohol swab or a cotton ball dipped in rubbing alcohol  · Squeeze the bulb flat and put the plug back in  The bulb should stay flat until it starts to fill with fluid again  · Measure the amount of fluid you pour out  Write down how much fluid you empty from the BIRGIT drain and the date and time you collected it  · Flush the fluid down the toilet  Wash your hands  Clear clogged tubing: Use the following steps to clear your Roderick-Morrison tubing:  · Hold the tubing between your thumb and first finger at the place closest to your skin  This hand will prevent the tube from being pulled out of your skin  · Use your other thumb and first finger to slide the clog down the tubing toward the bulb  You may have to repeat the sliding until the tubing is unclogged      Roderick-Morrison drain removal:  The amount of fluid that you drain will decrease as your wound heals  The BIRGIT drain usually is removed when less than 30 milliliters (2 tablespoons) is collected in 24 hours  Ask your healthcare provider when and how your BIRGIT drain will be removed  Follow up with your doctor as directed:  Write down your questions so you remember to ask them during your visits  © Copyright [employer] 2021 Information is for End User's use only and may not be sold, redistributed or otherwise used for commercial purposes  All illustrations and images included in CareNotes® are the copyrighted property of A D A M , Inc  or [facility]   The above information is an  only  It is not intended as medical advice for individual conditions or treatments  Talk to your doctor, nurse or pharmacist before following any medical regimen to see if it is safe and effective for you

## 2022-02-09 NOTE — OP NOTE
PERATIVE REPORT  PATIENT NAME: Rosalba Holguin    :  1960  MRN: [id number]  Pt Location: [facility] OR ROOM 05    SURGERY DATE: 2022    Surgeon(s) and Role:    Panel 2:     * Fidel Wilson MD - Primary        Boubacar Franco PA-C assisting  Preop Diagnosis:  Ductal carcinoma in situ (DCIS) of left breast [D05 12]    Post-Op Diagnosis Codes:     * Ductal carcinoma in situ (DCIS) of left breast [D05 12] status post left mastectomy    Procedure immediate left breast reconstruction utilizing a tissue expander and de-epithelialized flap/flap from trunk (32 cm x 15 cm  Specimen(s):  ID Type Source Tests Collected by Time Destination   1 : sentinel node #1 left axilla Tissue Lymph Node, [facility] TISSUE EXAM Garrick Hdez MD 2022 1432    2 :  sentinel node #2 left axilla Tissue Lymph Node, [facility] TISSUE EXAM Garrick Hdez MD 2022 1433    3 : left breast tissue stitch short superior long lateral Tissue Breast, Left TISSUE EXAM Garrick Hdez MD 2022 1435        Estimated Blood Loss:   100 mL    Drains:  Closed/Suction Drain Breast Bulb 15 Fr  (Active)   Site Description Unable to view 22   Dressing Status Clean;Dry; Intact 22   Drainage Appearance Bloody 22   Status To bulb suction 22   Output (mL) 20 mL 22 0801   Number of days: 1       Closed/Suction Drain Breast Bulb 15 Fr  (Active)   Site Description Unable to view 22   Dressing Status Clean;Dry; Intact 22   Drainage Appearance Bloody 22   Status To bulb suction 22   Output (mL) 50 mL 22 0521   Number of days: 1       Anesthesia Type:   General    Operative Indications:  Ductal carcinoma in situ (DCIS) of left breast [D05 12]  Status post left mastectomy    Operative Findings:  As above    Complications:   None    Procedure and Technique:  Wonder was seen in the office the day prior to surgery, at which time the preoperative markings were placed    The day of surgery, following the mastectomy, we were summoned to the room for the reconstruction  The operative field was re-prepped and draped in sterile fashion and a proper time-out was performed  The wound was thoroughly irrigated with antibiotic solution and hemostasis assured  Utilizing a lighted mammary retractor and the Bovie cautery, the pectoralis was then elevated from the chest wall in order to create a subpectoral pocket for the expander placement  Perforating vessels were clamped and cauterized prior to dividing them  After adequate pectoralis release had been completed, an utilizing the previously placed markings, the inferiorly-based flap from the trunk was de-epithelialized utilizing a 15 blade  This inferiorly based flap was then released from the mastectomy flaps medially laterally utilizing the Bovie cautery  Total flap dimensions were 32 cm by 15 cm  The lateral aspect of the flap was then secured to the serrated us along the lateral chest wall the planned lateral pocket margin, a 15 [place] of [place] drain was then placed deep to the de-epithelialized flap and pectoralis, 2nd plane was placed in the subcutaneous plane  Both were secured 3-0 nylon sutures  The wound was again thoroughly irrigated with antibiotic solution  Hemostasis was assured  The surgical site was re-prepped and draped, the surgical team's gloves were changed, and the instruments were wiped down with antibiotic solution  A mentor CPX breast tissue expander, 650 cc ultra high-profile, was pared prepared on the back table, soaked in antibiotic solution and placed deep to the pectoralis and de-epithelialized flap under direct vision  It was secured at several suture tabs to the chest wall utilizing 2-0 Vicryl sutures    Following this the interface between the lateral border of the pectoralis the leading edge of the de-epithelialized flap was then secured completely over the expander utilizing 2-0 Vicryl sutures in a figure-of-eight fashion  Following this the expander was inflated utilizing 50 cc aliquots of saline, total fill volume of 200 cc was achieved  The mastectomy flaps were then reoriented following the Wise pattern markings, and closure was accomplished with 2-0 and 3-0 Vicryl buried at the level of the deep dermis this was followed by running subcuticular strata fix  The flap margins appeared to be fully viable was dressed with dry sterile dressings  A well-padded surgical bra was applied the patient was transferred to the recovery room  I was present for the entire procedure and A qualified resident physician was not available  The physician assistant provided essential assistance patient positioning, exposure, hemostasis the closure this increased operative physician C and decreased operating time      Patient Disposition:  PACU       SIGNATURE: Gerardo Plata MD  DATE: February 9, 2022  TIME: 10:50 AM

## 2022-02-09 NOTE — PLAN OF CARE
Problem: PAIN - ADULT  Goal: Verbalizes/displays adequate comfort level or baseline comfort level  Description: Interventions:  - Encourage patient to monitor pain and request assistance  - Assess pain using appropriate pain scale  - Administer analgesics based on type and severity of pain and evaluate response  - Implement non-pharmacological measures as appropriate and evaluate response  - Consider cultural and social influences on pain and pain management  - Notify physician/advanced practitioner if interventions unsuccessful or patient reports new pain  Outcome: Progressing     Problem: INFECTION - ADULT  Goal: Absence or prevention of progression during hospitalization  Description: INTERVENTIONS:  - Assess and monitor for signs and symptoms of infection  - Monitor lab/diagnostic results  - Monitor all insertion sites, i e  indwelling lines, tubes, and drains  - Monitor endotracheal if appropriate and nasal secretions for changes in amount and color  - [place] appropriate cooling/warming therapies per order  - Administer medications as ordered  - Instruct and encourage patient and family to use good hand hygiene technique  - Identify and instruct in appropriate isolation precautions for identified infection/condition  Outcome: Progressing  Goal: Absence of fever/infection during neutropenic period  Description: INTERVENTIONS:  - Monitor WBC    Outcome: Progressing     Problem: SAFETY ADULT  Goal: Patient will remain free of falls  Description: INTERVENTIONS:  - Educate patient/family on patient safety including physical limitations  - Instruct patient to call for assistance with activity   - Consult OT/PT to assist with strengthening/mobility   - Keep Call bell within reach  - Keep bed low and locked with side rails adjusted as appropriate  - Keep care items and personal belongings within reach  - Initiate and maintain comfort rounds  - Make Fall Risk Sign visible to staff  - Apply yellow socks and bracelet for high fall risk patients  - Consider moving patient to room near nurses station  Outcome: Progressing  Goal: Maintain or return to baseline ADL function  Description: INTERVENTIONS:  -  Assess patient's ability to carry out ADLs; assess patient's baseline for ADL function and identify physical deficits which impact ability to perform ADLs (bathing, care of mouth/teeth, toileting, grooming, dressing, etc )  - Assess/evaluate cause of self-care deficits   - Assess range of motion  - Assess patient's mobility; develop plan if impaired  - Assess patient's need for assistive devices and provide as appropriate  - Encourage maximum independence but intervene and supervise when necessary  - Involve family in performance of ADLs  - Assess for home care needs following discharge   - Consider OT consult to assist with ADL evaluation and planning for discharge  - Provide patient education as appropriate  Outcome: Progressing  Goal: Maintains/Returns to pre admission functional level  Description: INTERVENTIONS:  - Perform BMAT or MOVE assessment daily    - Set and communicate daily mobility goal to care team and patient/family/caregiver     - Collaborate with rehabilitation services on mobility goals if consulted    Problem: DISCHARGE PLANNING  Goal: Discharge to home or other facility with appropriate resources  Description: INTERVENTIONS:  - Identify barriers to discharge w/patient and caregiver  - Arrange for needed discharge resources and transportation as appropriate  - Identify discharge learning needs (meds, wound care, etc )  - Arrange for interpretive services to assist at discharge as needed  - Refer to Case Management Department for coordinating discharge planning if the patient needs post-hospital services based on physician/advanced practitioner order or complex needs related to functional status, cognitive ability, or social support system  Outcome: Progressing   - Out of bed for toileting  - Record patient progress and toleration of activity level   Outcome: Progressing

## 2022-02-09 NOTE — DISCHARGE SUMMARY
Discharge Summary - Reyna Horowitz 64 y o  female MRN: [id number]    Unit/Bed#: SDS 07 Encounter: [id number]    Admission Date:     Admitting Diagnosis: Ductal carcinoma in situ (DCIS) of left breast [D05 12]    HPI: as per Dr Agnes Kim on 38/22, " 40-year-old female with extensive DCIS of the left breast   She was counseled on a mastectomy with sentinel node biopsy  She met with Plastic surgery to discuss reconstruction and will be proceeding with expander/implant reconstruction  Her surgery was initially delayed secondary to insurance concerns  She then developed COVID-19 and surgery was again delayed  She also has issues with her diabetic management  She was seen by endocrinology and has been cleared for surgery  Her PCP has also cleared her from the [place] [id number] perspective  All of her questions were answered  Surgery is scheduled for 02/08/2022 "    Procedures Performed: No orders of the defined types were placed in this encounter  Summary of Hospital Course: The patient underwent a successful left mastectomy with axillary sentinel lymph node biopsy followed by tissue expander reconstruction of the left breast performed by Plastic surgery  Postoperatively she was maintained overnight in the hospital, and followed a stable course  Vital signs were normal and stable, postoperative examination was reassuring, and drain output from her surgical site was serosanguineous with a total of 175 cc over both drains in place  She had no difficulties with postoperative ambulation, pain control, urination, or eating  She was seen the morning of postoperative day 1 and found to be in good condition  At this time, she expressed a continued appetite, well controlled pain, and is IR to go home  She was deemed suitable for discharge to home    Prior to discharge from the hospital, surgical follow-up, post admission care instructions, provisions for wound care, drain care, medication reconciliation and pain scripts were arranged  Significant Findings, Care, Treatment and Services Provided:   2/8/22 left mastectomy, left axillary sentinel lymph node biopsy, tissue expander reconstruction of the left breast    Complications:  None    Discharge Diagnosis:  Ductal carcinoma in-situ of the left breast    Medical Problems             Resolved Problems  Date Reviewed: 1/26/2022    None                Condition at Discharge: good         Discharge instructions/Information to patient and family:   See after visit summary for information provided to patient and family  Provisions for Follow-Up Care:  See after visit summary for information related to follow-up care and any pertinent home health orders  PCP: Sylvain Hayward DO    Disposition: Home    Planned Readmission: No      Discharge Statement   I spent 25 minutes discharging the patient  This time was spent on the day of discharge  I had direct contact with the patient on the day of discharge  Additional documentation is required if more than 30 minutes were spent on discharge  Discharge Medications:  See after visit summary for reconciled discharge medications provided to patient and family

## 2022-02-09 NOTE — PROGRESS NOTES
Progress Note - Surgical Oncology  Arie Yancey 64 y o  female MRN: [id number]  Unit/Bed#: SDS 07 Encounter: [id number]    Assessment:  64 F with extensive left breast ductal common [place] in-situ status post left mastectomy, left axillary sentinel lymph node biopsy and tissue expander reconstruction on 02/08/2022  VSS, afebrile  1 5 L urine output  175 cc total Roderick-Morrison drain output, drain 1  85 cc, drain 2  90 cc     Incisions C/D/I    No labs this a m  Plan:  -diet as tolerated  -pain control  -patient electing to perform on drain care, once nursing drain teaching, to be performed prior to discharge  -home medication regimen  -out of bed as able  -discharge to home this morning, surgical oncology and plastic surgical follow-up arranged    Subjective/Objective     Subjective:  No acute events transpired overnight, and the patient rested comfortably  She notes well-controlled pain, no nausea or vomiting postoperatively, has an appetite this morning, and has been out of bed and urinating without difficulty  Objective:     Blood pressure 141/75, pulse 74, temperature 97 6 °F (36 4 °C), temperature source Temporal, resp  rate 18, height 5' 4" (1 626 m), weight 117 kg (257 lb 11 5 oz), SpO2 100 %, not currently breastfeeding  ,Body mass index is 44 24 kg/m²  Intake/Output Summary (Last 24 hours) at 2/9/2022 0732  Last data filed at 2/9/2022 0601  Gross per 24 hour   Intake 2800 ml   Output 1825 ml   Net 975 ml       Invasive Devices  Report    Peripheral Intravenous Line            Peripheral IV 02/08/22 Dorsal (posterior); Right Wrist <1 day          Drain            Closed/Suction Drain Breast Bulb 15 Fr  <1 day    Closed/Suction Drain Breast Bulb 15 Fr  <1 day                Physical Exam:   General:  No acute distress  HEENT:  Normocephalic, atraumatic  Cardiovascular:  Regular rate, regular rhythm  Chest:  Incisions C/D/I, Roderick-Morrison drains x2 with serosanguineous effluent maintaining suction  Respiratory:  No distress, room air  Abdomen:  Soft, nontender  Extremities:  No clubbing, cyanosis, or edema  Neuro:  AAO x3  Skin:  Warm, dry      Lab, Imaging and other studies:  No labs this a m    VTE Pharmacologic Prophylaxis: Sequential compression device (Venodyne)   VTE Mechanical Prophylaxis: sequential compression device

## 2022-02-10 ENCOUNTER — TELEPHONE (OUTPATIENT)
Dept: GYNECOLOGIC ONCOLOGY | Facility: CLINIC | Age: 62
End: 2022-02-10

## 2022-02-10 ENCOUNTER — OFFICE VISIT (OUTPATIENT)
Dept: PLASTIC SURGERY | Facility: CLINIC | Age: 62
End: 2022-02-10

## 2022-02-10 VITALS — WEIGHT: 257 LBS | BODY MASS INDEX: 43.87 KG/M2 | HEIGHT: 64 IN

## 2022-02-10 DIAGNOSIS — Z98.890 POST-OPERATIVE STATE: Primary | ICD-10-CM

## 2022-02-10 PROCEDURE — 99024 POSTOP FOLLOW-UP VISIT: CPT

## 2022-02-10 NOTE — PROGRESS NOTES
Patient present for 1st post-op, S/P Left Breast Recon with Tissue Expander placement on 02/08/2022  Dressings taken down  Left breast incision is clean, dry and intact  BIRGIT drains accessed and stripped  ABD pad and surgical bra placed on patient  Patient cleared to shower but not to let the water directly hit the incision  Photographs obtained  Patient will follow up in 1 week for drain check  Encouraged to call with any questions or concerns

## 2022-02-10 NOTE — TELEPHONE ENCOUNTER
Eugenio Wong from [facility] called stating the patient denied home care as per her discharge from hospital,  Patient stating she can take care of herself as well as her wounds  Home care nurse will not open her case

## 2022-02-16 ENCOUNTER — OFFICE VISIT (OUTPATIENT)
Dept: PLASTIC SURGERY | Facility: CLINIC | Age: 62
End: 2022-02-16

## 2022-02-16 DIAGNOSIS — D05.12 DUCTAL CARCINOMA IN SITU (DCIS) OF LEFT BREAST: Primary | ICD-10-CM

## 2022-02-16 PROCEDURE — 99024 POSTOP FOLLOW-UP VISIT: CPT | Performed by: PHYSICIAN ASSISTANT

## 2022-02-16 NOTE — LETTER
February 16, 2022     Charity Marcus, [address] [facility]  [address] [place] [address]    Patient: Triston Neal   YOB: 1960   Date of Visit: 2/16/2022       Dear Dr Holley Fails: Thank you for referring Radha Sheehan to me for evaluation  Below are my notes for this consultation  If you have questions, please do not hesitate to call me  I look forward to following your patient along with you  Sincerely,        Inocencia Castaneda PA-C        CC: MD Inocencia Hess PA-C  2/16/2022 10:12 AM  Sign when Signing Visit  Assessment/Plan:   Beny Hdez is a 17-year-old female who is status post immediate left breast reconstruction with tissue expander and de-epithelialized flap done in conjunction with Dr Robyn Vera on 02/08/2022  Please see HPI  Her anterior drain was removed today  She will continue with antibiotics  I did discuss with her range of motion exercises  We will see her back in 1 week to assess the remaining drain and remove sutures  She is encouraged to follow up sooner with any concerns  Diagnoses and all orders for this visit:    Ductal carcinoma in situ (DCIS) of left breast          Subjective:     Patient ID: Triston Neal is a 64 y o  female  HPI   She reports some discomfort along the left lateral aspect  She states that her anterior drain is draining approximately 10 cc per day and her posterior drain is draining approximately 25-35 cc daily  Review of Systems   Skin:        As per HPI  Objective:     Physical Exam  Skin:     Comments: Left breast with tissue expander in place  Incision is clean, dry and intact  Drains are serosanguineous  Anterior drain was removed without difficulty

## 2022-02-22 ENCOUNTER — OFFICE VISIT (OUTPATIENT)
Dept: SURGICAL ONCOLOGY | Facility: CLINIC | Age: 62
End: 2022-02-22

## 2022-02-22 VITALS
RESPIRATION RATE: 16 BRPM | SYSTOLIC BLOOD PRESSURE: 124 MMHG | HEART RATE: 75 BPM | WEIGHT: 258.8 LBS | OXYGEN SATURATION: 99 % | DIASTOLIC BLOOD PRESSURE: 68 MMHG | HEIGHT: 64 IN | TEMPERATURE: 98.2 F | BODY MASS INDEX: 44.18 KG/M2

## 2022-02-22 DIAGNOSIS — D05.12 DUCTAL CARCINOMA IN SITU (DCIS) OF LEFT BREAST: Primary | ICD-10-CM

## 2022-02-22 DIAGNOSIS — Z12.31 SCREENING MAMMOGRAM, ENCOUNTER FOR: ICD-10-CM

## 2022-02-22 PROCEDURE — 3008F BODY MASS INDEX DOCD: CPT | Performed by: SURGERY

## 2022-02-22 PROCEDURE — 99024 POSTOP FOLLOW-UP VISIT: CPT | Performed by: SURGERY

## 2022-02-22 NOTE — PROGRESS NOTES
64 y o  female is here today s/p left mastectomy and and expander placement  She reports feeling well overall  Physical Exam  Constitutional:       General: She is not in acute distress  Chest:   Breasts:      Left: Skin change (Well-healing incision with no signs of infection, BIRGIT in place) present  Neurological:      Mental Status: She is alert and oriented to person, place, and time  Psychiatric:         Mood and Affect: Mood normal          Data:     02/08/2022 left mastectomy and sentinel node biopsy  Staging:    Extensive ductal carcinoma in Situ with no evidence of invasive disease  Tumor grade low to intermediate  LVI not identified  Margins clean  Estrogen receptor and progesterone receptor status positive  HER2 status and test method not applicable    Lymph node assessment/status 0/2 sentinel nodes, additional negative node in the breast      Neoadjuvant therapy:  Not applicable  Stage: 0        Diagnoses and all orders for this visit:    Ductal carcinoma in situ (DCIS) of left breast    Screening mammogram, encounter for  -     Mammo screening right w 3d & cad; Future        Assessment/Plan:  26-year-old female status post left mastectomy and reconstruction for extensive DCIS of the left breast   She is healing well with no signs of infection  She is scheduled to see Plastic surgery again tomorrow  There is no indication for any adjuvant treatment  I will make arrangements for her contralateral mammogram due in August   We will see her again at that time or sooner should the need arise

## 2022-02-23 ENCOUNTER — OFFICE VISIT (OUTPATIENT)
Dept: PLASTIC SURGERY | Facility: CLINIC | Age: 62
End: 2022-02-23

## 2022-02-23 DIAGNOSIS — D05.12 DUCTAL CARCINOMA IN SITU (DCIS) OF LEFT BREAST: Primary | ICD-10-CM

## 2022-02-23 PROCEDURE — 99024 POSTOP FOLLOW-UP VISIT: CPT | Performed by: PHYSICIAN ASSISTANT

## 2022-02-23 NOTE — LETTER
February 23, 2022     Milton Jordan MD  [address]  [address] [facility],[address]    Patient: Gallo Neal   YOB: 1960   Date of Visit: 2/23/2022       Dear Dr Krista Vazquez: Thank you for referring Maegan Van to me for evaluation  Below are my notes for this consultation  If you have questions, please do not hesitate to call me  I look forward to following your patient along with you  Sincerely,        Mor Cuevas PA-C        CC: No Recipients  Claudia Givens  2/23/2022  9:27 AM  Cosign Needed  Assessment/Plan:     Sandy Allen is a 70-year-old female who is status post immediate left breast reconstruction with tissue expander and de-epithelialized flap done in conjunction with Dr Krista Vazquez on 02/08/2022  Please see HPI  Patient was seen by Dr Krista Vazquez yesterday and told that she was cancer free additional treatments or radiation  Her anterior drain was removed on her previous visit  Today, she reports that she has had 15 cc in her posterior drain 2 days ago, and 10 cc in the last 24 hours  Posterior drain removed today  Subcutaneous sutures were trimmed  Patient advised to apply Aquaphor to the drain sites and her 1 incision from the next few days to aid in softening of the eschar  Patient will be seen back in the office in 3 weeks for potential tissue expander fill  Diagnoses and all orders for this visit:    Ductal carcinoma in situ (DCIS) of left breast          Subjective:     Patient ID: Gallo Neal is a 64 y o  female  HPI     Patient reports that she is doing well  She denies pain, fever, oozing from the drain site for incision  She does say that her surgical bra has been rubbing her incision, and that when she saw Dr Krista Vazquez yesterday she was informed that she did not need to wear it  Patient was advised to wear a sports bra or soft bra with no underwire the next several weeks to months    Patient is otherwise doing well and has no complaints  Review of Systems    See HPI    Objective:     Physical Exam      Drain site is clean and dry  There is minimal eschar surrounding posterior and anterior drain sites  Drain is dark serosanguineous  Left breast fold incision is clean and dry  No bleeding, oozing, hyperpigmentation, hypertrophy, dehiscence or signs of infection  Breast mound tissue appears healthy and well vascularized  no palpitations/no dyspnea on exertion/no chest pain

## 2022-02-23 NOTE — PROGRESS NOTES
Assessment/Plan:     Yashira Cotter is a 79-year-old female who is status post immediate left breast reconstruction with tissue expander and de-epithelialized flap done in conjunction with Dr Misha Edgar on 02/08/2022  Please see HPI  Patient was seen by Dr Misha Edgar yesterday and told that she was cancer free additional treatments or radiation  Her anterior drain was removed on her previous visit  Today, she reports that she has had 15 cc in her posterior drain 2 days ago, and 10 cc in the last 24 hours  Posterior drain removed today  Subcutaneous sutures were trimmed  Patient advised to apply Aquaphor to the drain sites and her 1 incision from the next few days to aid in softening of the eschar  Patient will be seen back in the office in 3 weeks for potential tissue expander fill  Diagnoses and all orders for this visit:    Ductal carcinoma in situ (DCIS) of left breast          Subjective:     Patient ID: Arie Yancey is a 64 y o  female  HPI     Patient reports that she is doing well  She denies pain, fever, oozing from the drain site for incision  She does say that her surgical bra has been rubbing her incision, and that when she saw Dr Misha Edgar yesterday she was informed that she did not need to wear it  Patient was advised to wear a sports bra or soft bra with no underwire the next several weeks to months  Patient is otherwise doing well and has no complaints  Review of Systems    See HPI    Objective:     Physical Exam      Drain site is clean and dry  There is minimal eschar surrounding posterior and anterior drain sites  Drain is dark serosanguineous  Left breast fold incision is clean and dry  No bleeding, oozing, hyperpigmentation, hypertrophy, dehiscence or signs of infection  Breast mound tissue appears healthy and well vascularized

## 2022-02-24 ENCOUNTER — PATIENT OUTREACH (OUTPATIENT)
Dept: CASE MANAGEMENT | Facility: OTHER | Age: 62
End: 2022-02-24

## 2022-02-24 NOTE — PROGRESS NOTES
Supportive outreach call placed to Rosita heller LM on VM with reason for call and provided return call information  Will follow

## 2022-03-08 ENCOUNTER — OFFICE VISIT (OUTPATIENT)
Dept: ENDOCRINOLOGY | Facility: CLINIC | Age: 62
End: 2022-03-08
Payer: COMMERCIAL

## 2022-03-08 VITALS
SYSTOLIC BLOOD PRESSURE: 122 MMHG | HEIGHT: 64 IN | DIASTOLIC BLOOD PRESSURE: 72 MMHG | HEART RATE: 78 BPM | WEIGHT: 258 LBS | BODY MASS INDEX: 44.05 KG/M2

## 2022-03-08 DIAGNOSIS — E78.5 HYPERLIPIDEMIA, UNSPECIFIED HYPERLIPIDEMIA TYPE: ICD-10-CM

## 2022-03-08 DIAGNOSIS — I10 BENIGN ESSENTIAL HYPERTENSION: ICD-10-CM

## 2022-03-08 DIAGNOSIS — E55.9 VITAMIN D DEFICIENCY: ICD-10-CM

## 2022-03-08 DIAGNOSIS — E03.9 HYPOTHYROIDISM, UNSPECIFIED TYPE: ICD-10-CM

## 2022-03-08 DIAGNOSIS — IMO0002 UNCONTROLLED TYPE 2 DIABETES MELLITUS WITH PERIPHERAL NEUROPATHY: Primary | ICD-10-CM

## 2022-03-08 LAB — SL AMB POCT HEMOGLOBIN AIC: 7.4 (ref ?–6.5)

## 2022-03-08 PROCEDURE — 99214 OFFICE O/P EST MOD 30 MIN: CPT | Performed by: NURSE PRACTITIONER

## 2022-03-08 PROCEDURE — 1036F TOBACCO NON-USER: CPT | Performed by: NURSE PRACTITIONER

## 2022-03-08 PROCEDURE — 3078F DIAST BP <80 MM HG: CPT | Performed by: NURSE PRACTITIONER

## 2022-03-08 PROCEDURE — 83036 HEMOGLOBIN GLYCOSYLATED A1C: CPT | Performed by: NURSE PRACTITIONER

## 2022-03-08 PROCEDURE — 3074F SYST BP LT 130 MM HG: CPT | Performed by: NURSE PRACTITIONER

## 2022-03-08 PROCEDURE — 3051F HG A1C>EQUAL 7.0%<8.0%: CPT | Performed by: NURSE PRACTITIONER

## 2022-03-08 PROCEDURE — 3008F BODY MASS INDEX DOCD: CPT | Performed by: NURSE PRACTITIONER

## 2022-03-08 RX ORDER — ROSUVASTATIN CALCIUM 20 MG/1
20 TABLET, COATED ORAL DAILY
Qty: 90 TABLET | Refills: 3 | Status: SHIPPED | OUTPATIENT
Start: 2022-03-08

## 2022-03-08 NOTE — ASSESSMENT & PLAN NOTE
Patient's control has improved significantly  Point of care hemoglobin A1c is 7 4%  We did discuss increasing Trulicity to 4 5 mg once weekly  However, patient would like to focus on healthy diet and increase physical activity  Repeat hemoglobin A1c prior to next appointment in 4 months  If at that time, she remains above goal, we will consider either increasing Trulicity or adding glipizide    Lab Results   Component Value Date    HGBA1C 7 4 (A) 03/08/2022

## 2022-03-08 NOTE — PROGRESS NOTES
Established Patient Progress Note      Chief Complaint   Patient presents with    Diabetes Type 2        History of Present Illness:   Mindi Schulz is a 64 y o  female with HTN, HLD, hypothyroidism, and type 2 diabetes without long term use of insulin since approximately 2010  Reports complications of neuropathy  Denies recent illness or hospitalizations  Denies recent severe hypoglycemic or severe hyperglycemic episodes  Denies any issues with her current regimen  home glucose monitoring: are performed regularly-once daily in the morning    + family history of T2DM in mother and maternal grandmother    Patient was last seen on 01/20/2022 for initial consult with Dr Kavitha Matos MD   At that time, patient required pre-surgical clearance for a mastectomy on 02/05/2022  She was instructed to hold her Farxiga 3 days prior to surgery to avoid euglycemic DKA  Patient's surgery was uncomplicated  She reports feeling well at this time  She has been paying closer attention to her diet although she reports that she still sometimes cheats   She denies any side effects her current medication regimen  Component      Latest Ref Rng & Units 2/9/2022   eGFR      ml/min/1 73sq m 50     Home blood glucose readings: 130-150; 180    Current regimen:   Trulicity 3 mg weekly  Metformin 1000 mg twice daily    Last Eye Exam: 07/2021; + cataracts  Last Foot Exam: Dr Zaira Dobbs, 01/2022- upcoming on Friday    For hypothyroidism, she is taking 100 mcg of LT4 daily  She reports taking this consistently and correctly  Last thyroid function tests from [facility], 2021 were within normal limits  She denies symptoms of hypo and hyperthyroidism      Patient Active Problem List   Diagnosis    Anemia    Benign essential hypertension    Arthritis    Mixed diabetic hyperlipidemia associated with type 2 diabetes mellitus ([address] )    Disc degeneration, lumbar    Fibrocystic breast disease, unspecified laterality    GERD without esophagitis    Hyperlipidemia    Hypothyroidism    IBS (irritable bowel syndrome)    Neoplasm of uncertain behavior of skin    Obesity, Class III, BMI 40-49 9 (morbid obesity) (HCC)    MARIA FERNANDA (obstructive sleep apnea)    Primary osteoarthritis of both knees    Vitamin D deficiency    Uncontrolled type 2 diabetes mellitus with peripheral neuropathy (HCC)    Fatty liver    Full thickness rotator cuff tear    Shoulder joint pain    Family history of breast cancer in female    Screening mammogram, encounter for    Lumbar radiculopathy    Ductal carcinoma in situ (DCIS) of left breast    BRCA negative    Type 2 diabetes mellitus with stage 3b chronic kidney disease ([facility] )      Past Medical History:   Diagnosis Date    Achrochordon     Acute bronchitis     Allergic rhinitis     Cancer ([facility] [address] )     L breast-mastectomy w/ reconstruction today 2/8/2022    Diabetes ([facility] [address] [facility]     Diabetes mellitus ([facility] [address] )     Disease of thyroid gland     Dysuria     Hypothyroid     IBS (irritable bowel syndrome)     Polycystic ovarian syndrome     Rotator cuff tear     right side     Sleep apnea     pt chooses not to use CPAP-better sinvr tonsil removal    URI (upper respiratory infection)     Wears glasses       Past Surgical History:   Procedure Laterality Date    ANAL FISTULOTOMY      subcutaneous     BREAST LUMPECTOMY Left 09/23/2021    BREAST RECONSTRUCTION Left 2/8/2022    Procedure: IMMEDIATE RECONSTRUCTION WITH TISSUE EXPANDER PLACEMENT;  Surgeon: Kostas Yun MD;  [facility];  Service: Plastics    CHOLECYSTECTOMY      FLAP LOCAL TRUNK Left 2/8/2022    Procedure: DE-EPITHELIALIZED FLAP BREAST;  Surgeon: Kostas Yun MD;  [facility];  Service: Plastics    HEMORRHOID SURGERY      INCISION AND DRAINAGE PERIRECTAL ABSCESS      MASTECTOMY W/ SENTINEL NODE BIOPSY Left 2/8/2022    Procedure: MASTECTOMY WITH BIOPSY LYMPH NODE SENTINEL; [address] N[address] MED;  Surgeon: Jeannette Quintero MD; [facility];  Service: Surgical Oncology    MOUTH SURGERY      OVARIAN CYST REMOVAL      ROTATOR CUFF REPAIR Right     TONSILLECTOMY      US GUIDANCE BREAST BIOPSY LEFT EACH ADDITIONAL Left 2021    US GUIDED BREAST BIOPSY LEFT COMPLETE Left 2021      Family History   Problem Relation Age of Onset    Diabetes Mother     Diabetes type II Mother     Breast cancer Paternal Aunt     Cancer Paternal Uncle         carcinosarcoma of the oral cavity     Prostate cancer Paternal Uncle     Breast cancer Family     Breast cancer Maternal Aunt     Breast cancer Maternal Aunt     Diabetes type II Maternal Aunt     Thyroid cancer Maternal Aunt     Hypertension Father          01    No Known Problems Sister     No Known Problems Maternal Grandmother     No Known Problems Paternal Grandmother     No Known Problems Sister     No Known Problems Paternal Aunt     No Known Problems Maternal Aunt     Diabetes type II Maternal Uncle     Diabetes type II Maternal Grandfather      Social History     Tobacco Use    Smoking status: Never Smoker    Smokeless tobacco: Never Used   Substance Use Topics    Alcohol use: Yes     Alcohol/week: 1 0 standard drink     Types: 1 Glasses of wine per week     Comment: Drink only 1-2 drinks 4x a year       No Known Allergies      Current Outpatient Medications:     aspirin (ASPIRIN LOW DOSE) 81 mg EC tablet, Take 1 tablet by mouth daily, Disp: , Rfl:     Blood Glucose Monitoring Suppl (ONE TOUCH ULTRA 2) w/Device KIT, Use 3 (three) times a day, Disp: 1 kit, Rfl: 0    cholecalciferol (VITAMIN D3) 1,000 units tablet, Take 4 tablets (4,000 Units total) by mouth daily, Disp: 90 tablet, Rfl: 1    Dulaglutide (Trulicity) 3 ZM/8 0CI SOPN, Inject 0 5 mL (3 mg total) under the skin once a week, Disp: 2 mL, Rfl: 5    FARXIGA 10 MG TABS, TAKE 1 TABLET BY MOUTH EVERY DAY, Disp: 90 tablet, Rfl: 3    Ferrous Gluconate (IRON 27 PO), Take by mouth  , Disp: , Rfl:     gabapentin (NEURONTIN) 300 mg capsule, Take 1 capsule (300 mg total) by mouth 2 (two) times a day, Disp: 180 capsule, Rfl: 0    glucose blood (OneTouch Ultra) test strip, Use to test 3x daily, Disp: 100 strip, Rfl: 4    levothyroxine 100 mcg tablet, Take 1 tablet (100 mcg total) by mouth daily, Disp: 90 tablet, Rfl: 1    lisinopril-hydrochlorothiazide (PRINZIDE,ZESTORETIC) 20-25 MG per tablet, TAKE 1 TABLET BY MOUTH TWICE A DAY, Disp: 180 tablet, Rfl: 3    meloxicam (MOBIC) 15 mg tablet, Take 1 tablet (15 mg total) by mouth daily, Disp: 90 tablet, Rfl: 3    metFORMIN (GLUCOPHAGE) 1000 MG tablet, Take 1 tablet (1,000 mg total) by mouth 2 (two) times a day with meals, Disp: 180 tablet, Rfl: 3    ONETOUCH DELICA LANCETS FINE MISC, by Does not apply route 2 (two) times a day, Disp: , Rfl:     pantoprazole (PROTONIX) 40 mg tablet, Take 1 tablet (40 mg total) by mouth daily, Disp: 90 tablet, Rfl: 3    polyethylene glycol (MIRALAX) powder, Take by mouth, Disp: , Rfl:     rosuvastatin (CRESTOR) 20 MG tablet, Take 1 tablet (20 mg total) by mouth daily, Disp: 90 tablet, Rfl: 3    sulfamethoxazole-trimethoprim (BACTRIM DS) 800-160 mg per tablet, Take 1 tablet by mouth every 12 (twelve) hours, Disp: 60 tablet, Rfl: 0    Review of Systems   Constitutional: Negative for activity change, appetite change, fatigue and unexpected weight change  HENT: Negative for dental problem, sore throat, trouble swallowing and voice change  Eyes: Negative for visual disturbance  Respiratory: Negative for cough, chest tightness and shortness of breath  Cardiovascular: Negative for chest pain, palpitations and leg swelling  Gastrointestinal: Negative for constipation, diarrhea, nausea and vomiting  Endocrine: Negative for cold intolerance, heat intolerance, polydipsia, polyphagia and polyuria  Genitourinary: Negative for frequency  Musculoskeletal: Negative for arthralgias, back pain, gait problem and myalgias  Skin: Negative for wound  Allergic/Immunologic: Negative for environmental allergies and food allergies  Neurological: Positive for numbness  Negative for dizziness, tremors, weakness, light-headedness and headaches  Hematological: Does not bruise/bleed easily  Psychiatric/Behavioral: Negative for decreased concentration, dysphoric mood and sleep disturbance  The patient is not nervous/anxious  Physical Exam:  Body mass index is 44 29 kg/m²  /72   Pulse 78   Ht 5' 4" (1 626 m)   Wt 117 kg (258 lb)   LMP  (LMP Unknown)   BMI 44 29 kg/m²    Wt Readings from Last 3 Encounters:   03/08/22 117 kg (258 lb)   02/22/22 117 kg (258 lb 12 8 oz)   02/10/22 117 kg (257 lb)       Physical Exam  Vitals reviewed  Constitutional:       General: She is not in acute distress  Appearance: She is well-developed  She is obese  She is not ill-appearing  HENT:      Head: Normocephalic and atraumatic  Comments: Mask in place  Eyes:      Pupils: Pupils are equal, round, and reactive to light  Neck:      Thyroid: No thyromegaly  Cardiovascular:      Rate and Rhythm: Normal rate and regular rhythm  Pulses: Normal pulses  Heart sounds: Normal heart sounds  Pulmonary:      Effort: Pulmonary effort is normal       Breath sounds: Normal breath sounds  Abdominal:      General: Bowel sounds are normal  There is no distension  Palpations: Abdomen is soft  Tenderness: There is no abdominal tenderness  Musculoskeletal:      Cervical back: Normal range of motion and neck supple  Right lower leg: No edema  Left lower leg: No edema  Lymphadenopathy:      Cervical: No cervical adenopathy  Skin:     General: Skin is warm and dry  Capillary Refill: Capillary refill takes less than 2 seconds  Neurological:      Mental Status: She is alert and oriented to person, place, and time        Gait: Gait normal    Psychiatric:         Mood and Affect: Mood normal  Behavior: Behavior normal            Labs:   Lab Results   Component Value Date    HGBA1C 7 4 (A) 03/08/2022    HGBA1C 9 1 (H) 01/13/2022    HGBA1C 8 5 05/20/2021     Lab Results   Component Value Date    CREATININE 1 17 02/09/2022    CREATININE 1 43 (H) 01/13/2022    CREATININE 1 22 12/23/2019    BUN 22 02/09/2022     10/09/2015    K 5 0 02/09/2022     02/09/2022    CO2 31 02/09/2022     eGFR   Date Value Ref Range Status   02/09/2022 50 ml/min/1 73sq m Final     Lab Results   Component Value Date    CHOL 198 10/09/2015    HDL 36 (L) 01/13/2022    TRIG 192 (H) 01/13/2022     Lab Results   Component Value Date    ALT 18 01/13/2022    AST 12 01/13/2022    ALKPHOS 69 01/13/2022    BILITOT 0 48 10/09/2015     Lab Results   Component Value Date    XTD3UXFFSHJT 2 270 12/23/2019    HGR8ZLXBYIUB 1 050 09/06/2018    VVW6GDDEWFFK 2 090 12/26/2017     No results found for: FREET4, TSI    Impression & Plan:    Problem List Items Addressed This Visit        Endocrine    Hypothyroidism     Check thyroid function test   Continue 100 mcg of levothyroxine daily  Relevant Orders    TSH, 3rd generation Lab Collect    T4, free Lab Collect    Uncontrolled type 2 diabetes mellitus with peripheral neuropathy ([facility] [address] ) - Primary     Patient's control has improved significantly  Point of care hemoglobin A1c is 7 4%  We did discuss increasing Trulicity to 4 5 mg once weekly  However, patient would like to focus on healthy diet and increase physical activity  Repeat hemoglobin A1c prior to next appointment in 4 months  If at that time, she remains above goal, we will consider either increasing Trulicity or adding glipizide    Lab Results   Component Value Date    HGBA1C 7 4 (A) 03/08/2022            Relevant Orders    POCT hemoglobin A1c    Comprehensive metabolic panel Lab Collect    HEMOGLOBIN A1C W/ EAG ESTIMATION Lab Collect    Microalbumin / creatinine urine ratio Lab Collect    Lipid panel Lab Collect Lab Collect Cardiovascular and Mediastinum    Benign essential hypertension     BP stable 122/72  Continue current regimen  Other    Hyperlipidemia     LDL is stable  Triglycerides are trending down  Repeat fasting lipid panel prior to next appointment  Continue to focus on healthy diet and increase physical activity  Continue statin  Relevant Medications    rosuvastatin (CRESTOR) 20 MG tablet    Vitamin D deficiency     Continue vitamin-D supplements  Orders Placed This Encounter   Procedures    TSH, 3rd generation Lab Collect     This is a patient instruction: This test is non-fasting  Please drink two glasses of water morning of bloodwork  Standing Status:   Future     Standing Expiration Date:   3/8/2023    T4, free Lab Collect     Standing Status:   Future     Standing Expiration Date:   3/8/2023    Comprehensive metabolic panel Lab Collect     This is a patient instruction: Patient fasting for 8 hours or longer recommended  Standing Status:   Future     Standing Expiration Date:   3/8/2023    HEMOGLOBIN A1C W/ EAG ESTIMATION Lab Collect     Standing Status:   Future     Standing Expiration Date:   3/8/2023    Microalbumin / creatinine urine ratio Lab Collect     Standing Status:   Future     Standing Expiration Date:   3/8/2023    Lipid panel Lab Collect Lab Collect     This is a patient instruction: This test requires patient fasting for 10-12 hours or longer  Drinking of black coffee or black tea is acceptable  Standing Status:   Future     Standing Expiration Date:   3/8/2023    POCT hemoglobin A1c       There are no Patient Instructions on file for this visit  Discussed with the patient and all questioned fully answered  She will call me if any problems arise  Follow-up appointment in 4 months       Counseled patient on diagnostic results, prognosis, risk and benefit of treatment options, instruction for management, importance of treatment compliance, Risk  factor reduction and impressions    KORI Couch

## 2022-03-08 NOTE — ASSESSMENT & PLAN NOTE
LDL is stable  Triglycerides are trending down  Repeat fasting lipid panel prior to next appointment  Continue to focus on healthy diet and increase physical activity  Continue statin

## 2022-03-15 ENCOUNTER — OFFICE VISIT (OUTPATIENT)
Dept: PLASTIC SURGERY | Facility: CLINIC | Age: 62
End: 2022-03-15

## 2022-03-15 DIAGNOSIS — D05.12 DUCTAL CARCINOMA IN SITU (DCIS) OF LEFT BREAST: Primary | ICD-10-CM

## 2022-03-15 PROCEDURE — 99024 POSTOP FOLLOW-UP VISIT: CPT | Performed by: PHYSICIAN ASSISTANT

## 2022-03-15 NOTE — PROGRESS NOTES
Assessment/Plan:     Layla Lepe a 20-year-old female who is status post immediate left breast reconstruction with tissue expander and de-epithelialized flap done in conjunction with Dr Misha Edgar on 02/08/2022Lindell Claude see HPI  The patient presented to the office today for an incision check and possible tissue expander feel  However, upon evaluation of the patient's incisions, she had areas of superficial nonhealing, most notably at the T-incision  Please see photo in media  Patient's incision was debrided today and spitting sutures removed where applicable  Patient instructed to cover incisions with bacitracin and Xeroform with daily dressing changes  Patient additionally instructed to avoid under wires and scrubbing the incision areas  The patient will return to the office in 1 week for an incision check  She has been informed to call the office with any questions or concerns that arise prior to her next scheduled appointment, or if she experiences increased drainage from the incision sites, fever, chills, redness, increasing pain  Diagnoses and all orders for this visit:    Ductal carcinoma in situ (DCIS) of left breast          Subjective:     Patient ID: Arie Yancey is a 64 y o  female  HPI    Patient reports that she has been pain free  She does state that she has had some drainage from her incision sites  She otherwise denies complaints including fever, chills, increased erythema, nausea, vomiting  Review of Systems    See HPI    Objective:     Physical Exam      Patient's left breast mound is smooth and flat  Incisions with significant eschar, most notably at the T-incision  She has pinpoint open areas underlying her horizontal incision medially  Spitting sutures noted  After debridement, patient with fibrous, nonpurulent tissue underneath  Please see photo in media

## 2022-03-22 ENCOUNTER — TELEPHONE (OUTPATIENT)
Dept: ENDOCRINOLOGY | Facility: CLINIC | Age: 62
End: 2022-03-22

## 2022-03-22 NOTE — TELEPHONE ENCOUNTER
Outcome   Cancelledtoday  There is currently an authorization in place for this product, effective 1/21/2022 through1/21/2023 There is currently an authorization in place for this product, effective 1/21/2022 through1/21/2023This product does not require authorization at this time and is covered in accordance with your benefit plan   The pharmacy submitting the claim may not be an eligible pharmacy

## 2022-03-22 NOTE — TELEPHONE ENCOUNTER
Altamease Rota: [id number] - Rx #: [id number]  Need help?   Call us at [phone]    Status   Sent to [employer]    Drug    Trulicity 5GG/2 8ME pen-injectors   Form    [employer] Authorization Form

## 2022-03-23 ENCOUNTER — OFFICE VISIT (OUTPATIENT)
Dept: PLASTIC SURGERY | Facility: CLINIC | Age: 62
End: 2022-03-23

## 2022-03-23 DIAGNOSIS — D05.12 DUCTAL CARCINOMA IN SITU (DCIS) OF LEFT BREAST: Primary | ICD-10-CM

## 2022-03-23 PROCEDURE — 99024 POSTOP FOLLOW-UP VISIT: CPT | Performed by: PHYSICIAN ASSISTANT

## 2022-03-23 NOTE — PROGRESS NOTES
Assessment/Plan:     Luis Eduardo Rao a 57-year-old female who is status post immediate left breast reconstruction with tissue expander and de-epithelialized flap done in conjunction with Dr Maliha Whitlock on 02/08/2022Cassandra Keenan see HPI      Patient presents to the office today for wound check of her left breast mound T-incision  Upon evaluation, the patient's T incision has a layer of fibrinous tissue  This was gently debrided, revealing well granulated, healing tissue underneath  Please see photo in media  Of note, patient has approximately 5 mm of tunneling at the most superior portion of the T-incision wound only  Patient was discussed with Dr Valentin Carney  The patient will continue with local wound care with bacitracin and Xeroform return to the clinic in 1 week for a wound check  Diagnoses and all orders for this visit:    Ductal carcinoma in situ (DCIS) of left breast          Subjective:     Patient ID: Azul Carrillo is a 64 y o  female  HPI     Patient reports no issues since she was in last week  She reports pain has resolved  She does ask if she can wear a bra other than her surgical bra  She was told that she could if there is no underwire  Patient voiced understanding  Review of Systems    See HPI  Objective:     Physical Exam      Patient's left breast mound T incision wound measures approximately 3 cm x 0 8 cm  Healing, well granulated tissue at wound base  Patient does have approximately 5 mm of tunneling at the most superior portion of the T-incision wound

## 2022-03-29 ENCOUNTER — PATIENT OUTREACH (OUTPATIENT)
Dept: CASE MANAGEMENT | Facility: OTHER | Age: 62
End: 2022-03-29

## 2022-03-29 NOTE — PROGRESS NOTES
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

## 2022-03-31 ENCOUNTER — OFFICE VISIT (OUTPATIENT)
Dept: PLASTIC SURGERY | Facility: CLINIC | Age: 62
End: 2022-03-31

## 2022-03-31 DIAGNOSIS — IMO0002 UNCONTROLLED TYPE 2 DIABETES MELLITUS WITH PERIPHERAL NEUROPATHY: ICD-10-CM

## 2022-03-31 DIAGNOSIS — M17.0 PRIMARY OSTEOARTHRITIS OF BOTH KNEES: ICD-10-CM

## 2022-03-31 DIAGNOSIS — D05.12 DUCTAL CARCINOMA IN SITU (DCIS) OF LEFT BREAST: Primary | ICD-10-CM

## 2022-03-31 PROCEDURE — 99024 POSTOP FOLLOW-UP VISIT: CPT | Performed by: PHYSICIAN ASSISTANT

## 2022-03-31 RX ORDER — DULAGLUTIDE 3 MG/.5ML
3 INJECTION, SOLUTION SUBCUTANEOUS WEEKLY
Qty: 2 ML | Refills: 5 | Status: SHIPPED | OUTPATIENT
Start: 2022-03-31 | End: 2022-04-01 | Stop reason: SDUPTHER

## 2022-03-31 NOTE — TELEPHONE ENCOUNTER
Left Message for patient letting her know we did submit the prior auth and we are just waiting on approval/denial

## 2022-03-31 NOTE — PROGRESS NOTES
POST-OP EVALUATION  3/31/2022    Jorden peterson a 64 y o  female is here today for routine post-operative follow up  She is status post immediate left breast reconstruction with tissue expander and de-epithelialized flap done in conjunction with Dr Ida Brothers on 02/08/2022  She has no pain, reports some drainage from her wound      Past Medical History:   Diagnosis Date    Achrochordon     Acute bronchitis     Allergic rhinitis     Cancer (HCC)     L breast-mastectomy w/ reconstruction today 2/8/2022    Diabetes [facility] [address] [facility]     Diabetes mellitus ([facility] [address] )     Disease of thyroid gland     Dysuria     Hypothyroid     IBS (irritable bowel syndrome)     Polycystic ovarian syndrome     Rotator cuff tear     right side     Sleep apnea     pt chooses not to use CPAP-better sinvr tonsil removal    URI (upper respiratory infection)     Wears glasses      Past Surgical History:   Procedure Laterality Date    ANAL FISTULOTOMY      subcutaneous     BREAST LUMPECTOMY Left 09/23/2021    BREAST RECONSTRUCTION Left 2/8/2022    Procedure: IMMEDIATE RECONSTRUCTION WITH TISSUE EXPANDER PLACEMENT;  Surgeon: Regina Guzman MD;  [facility];  Service: Plastics    CHOLECYSTECTOMY      FLAP LOCAL TRUNK Left 2/8/2022    Procedure: DE-EPITHELIALIZED FLAP BREAST;  Surgeon: Regina Guzman MD;  [facility];  Service: Plastics    HEMORRHOID SURGERY      INCISION AND DRAINAGE PERIRECTAL ABSCESS      MASTECTOMY W/ SENTINEL NODE BIOPSY Left 2/8/2022    Procedure: MASTECTOMY WITH BIOPSY LYMPH NODE SENTINEL; [address] MED;  Surgeon: Mahendra Sanford MD;  [facility];  Service: Surgical Oncology    MOUTH SURGERY      OVARIAN CYST REMOVAL      ROTATOR CUFF REPAIR Right     TONSILLECTOMY      US GUIDANCE BREAST BIOPSY LEFT EACH ADDITIONAL Left 9/21/2021    US GUIDED BREAST BIOPSY LEFT COMPLETE Left 9/21/2021        Current Outpatient Medications:     aspirin (ASPIRIN LOW DOSE) 81 mg EC tablet, Take 1 tablet by mouth daily, Disp: , Rfl:     Blood Glucose Monitoring Suppl (ONE TOUCH ULTRA 2) w/Device KIT, Use 3 (three) times a day, Disp: 1 kit, Rfl: 0    cholecalciferol (VITAMIN D3) 1,000 units tablet, Take 4 tablets (4,000 Units total) by mouth daily, Disp: 90 tablet, Rfl: 1    Dulaglutide (Trulicity) 3 LM/6 5OE SOPN, Inject 0 5 mL (3 mg total) under the skin once a week, Disp: 2 mL, Rfl: 5    FARXIGA 10 MG TABS, TAKE 1 TABLET BY MOUTH EVERY DAY, Disp: 90 tablet, Rfl: 3    Ferrous Gluconate (IRON 27 PO), Take by mouth  , Disp: , Rfl:     gabapentin (NEURONTIN) 300 mg capsule, Take 1 capsule (300 mg total) by mouth 2 (two) times a day, Disp: 180 capsule, Rfl: 0    glucose blood (OneTouch Ultra) test strip, Use to test 3x daily, Disp: 100 strip, Rfl: 4    levothyroxine 100 mcg tablet, Take 1 tablet (100 mcg total) by mouth daily, Disp: 90 tablet, Rfl: 1    lisinopril-hydrochlorothiazide (PRINZIDE,ZESTORETIC) 20-25 MG per tablet, TAKE 1 TABLET BY MOUTH TWICE A DAY, Disp: 180 tablet, Rfl: 3    meloxicam (MOBIC) 15 mg tablet, Take 1 tablet (15 mg total) by mouth daily, Disp: 90 tablet, Rfl: 3    metFORMIN (GLUCOPHAGE) 1000 MG tablet, Take 1 tablet (1,000 mg total) by mouth 2 (two) times a day with meals, Disp: 180 tablet, Rfl: 3    ONETOUCH DELICA LANCETS FINE MISC, by Does not apply route 2 (two) times a day, Disp: , Rfl:     pantoprazole (PROTONIX) 40 mg tablet, Take 1 tablet (40 mg total) by mouth daily, Disp: 90 tablet, Rfl: 3    polyethylene glycol (MIRALAX) powder, Take by mouth, Disp: , Rfl:     rosuvastatin (CRESTOR) 20 MG tablet, Take 1 tablet (20 mg total) by mouth daily, Disp: 90 tablet, Rfl: 3  Allergies: Patient has no known allergies  Objective    Wound measures 3 cm long, no change since previous visit  There is fibrinous and serous exudate in the wound bed, without well formed granulation tissue  No purulence        Assessment/Plan   Diagnoses and all orders for this visit:    Ductal carcinoma in situ (DCIS) of left breast    S/p mastectomy, expander, with wound dehiscence  No change in wound size - I think there is too much serous exudate in the wound  Wound debrided with silver nitrate today  Calcium alginate with silver applied to open wound  ABD secondary dressing  If wound becomes dry, pt instructed to switch to Bacitracin and Xeroform  Followup in one week      Morgan Roche PA-C

## 2022-04-01 DIAGNOSIS — E11.65 UNCONTROLLED DIABETES MELLITUS WITH HYPERGLYCEMIA, WITH LONG-TERM CURRENT USE OF INSULIN (HCC): ICD-10-CM

## 2022-04-01 DIAGNOSIS — Z79.4 UNCONTROLLED DIABETES MELLITUS WITH HYPERGLYCEMIA, WITH LONG-TERM CURRENT USE OF INSULIN (HCC): ICD-10-CM

## 2022-04-01 RX ORDER — DULAGLUTIDE 3 MG/.5ML
3 INJECTION, SOLUTION SUBCUTANEOUS WEEKLY
Qty: 6 ML | Refills: 0 | Status: SHIPPED | OUTPATIENT
Start: 2022-04-01 | End: 2022-06-14 | Stop reason: SDUPTHER

## 2022-04-01 RX ORDER — MELOXICAM 15 MG/1
TABLET ORAL
Qty: 90 TABLET | Refills: 0 | Status: SHIPPED | OUTPATIENT
Start: 2022-04-01 | End: 2022-07-08

## 2022-04-07 ENCOUNTER — OFFICE VISIT (OUTPATIENT)
Dept: PLASTIC SURGERY | Facility: CLINIC | Age: 62
End: 2022-04-07

## 2022-04-07 DIAGNOSIS — Z98.890 POST-OPERATIVE STATE: Primary | ICD-10-CM

## 2022-04-07 DIAGNOSIS — S21.002D OPEN WOUND OF LEFT BREAST, SUBSEQUENT ENCOUNTER: ICD-10-CM

## 2022-04-07 PROCEDURE — 99024 POSTOP FOLLOW-UP VISIT: CPT | Performed by: PHYSICIAN ASSISTANT

## 2022-04-07 NOTE — PROGRESS NOTES
POST-OP EVALUATION  4/7/2022    Jorden Hayes is a 64 y o  female is here today for routine post-operative follow up  She is status post immediate left breast reconstruction with tissue expander and de-epithelialized flap done in conjunction with Dr Ilana Barr on 02/08/2022  Pt does not feel that there has been much change in her wound      Past Medical History:   Diagnosis Date    Achrochordon     Acute bronchitis     Allergic rhinitis     Cancer (HCC)     L breast-mastectomy w/ reconstruction today 2/8/2022    Diabetes ([facility] [address] [facility]     Diabetes mellitus ([facility] [address] )     Disease of thyroid gland     Dysuria     Hypothyroid     IBS (irritable bowel syndrome)     Polycystic ovarian syndrome     Rotator cuff tear     right side     Sleep apnea     pt chooses not to use CPAP-better sinvr tonsil removal    URI (upper respiratory infection)     Wears glasses      Past Surgical History:   Procedure Laterality Date    ANAL FISTULOTOMY      subcutaneous     BREAST LUMPECTOMY Left 09/23/2021    BREAST RECONSTRUCTION Left 2/8/2022    Procedure: IMMEDIATE RECONSTRUCTION WITH TISSUE EXPANDER PLACEMENT;  Surgeon: Jacques Suarez MD;  [facility];  Service: Plastics    CHOLECYSTECTOMY      FLAP LOCAL TRUNK Left 2/8/2022    Procedure: DE-EPITHELIALIZED FLAP BREAST;  Surgeon: Jacques Suarez MD;  [facility];  Service: Plastics    HEMORRHOID SURGERY      INCISION AND DRAINAGE PERIRECTAL ABSCESS      MASTECTOMY W/ SENTINEL NODE BIOPSY Left 2/8/2022    Procedure: MASTECTOMY WITH BIOPSY LYMPH NODE SENTINEL; [address];  Surgeon: Mendy Jarrell MD;  [facility];  Service: Surgical Oncology    MOUTH SURGERY      OVARIAN CYST REMOVAL      ROTATOR CUFF REPAIR Right     TONSILLECTOMY      US GUIDANCE BREAST BIOPSY LEFT EACH ADDITIONAL Left 9/21/2021    US GUIDED BREAST BIOPSY LEFT COMPLETE Left 9/21/2021        Current Outpatient Medications:     aspirin (ASPIRIN LOW DOSE) 81 mg EC tablet, Take 1 tablet by mouth daily, Disp: , Rfl:     Blood Glucose Monitoring Suppl (ONE TOUCH ULTRA 2) w/Device KIT, Use 3 (three) times a day, Disp: 1 kit, Rfl: 0    cholecalciferol (VITAMIN D3) 1,000 units tablet, Take 4 tablets (4,000 Units total) by mouth daily, Disp: 90 tablet, Rfl: 1    collagenase (SANTYL) ointment, Apply topically daily for 14 days, Disp: 15 g, Rfl: 0    Dulaglutide (Trulicity) 3 HN/7 7EI SOPN, Inject 0 5 mL (3 mg total) under the skin once a week, Disp: 6 mL, Rfl: 0    FARXIGA 10 MG TABS, TAKE 1 TABLET BY MOUTH EVERY DAY, Disp: 90 tablet, Rfl: 3    Ferrous Gluconate (IRON 27 PO), Take by mouth  , Disp: , Rfl:     gabapentin (NEURONTIN) 300 mg capsule, Take 1 capsule (300 mg total) by mouth 2 (two) times a day, Disp: 180 capsule, Rfl: 0    glucose blood (OneTouch Ultra) test strip, Use to test 3x daily, Disp: 100 strip, Rfl: 4    levothyroxine 100 mcg tablet, Take 1 tablet (100 mcg total) by mouth daily, Disp: 90 tablet, Rfl: 1    lisinopril-hydrochlorothiazide (PRINZIDE,ZESTORETIC) 20-25 MG per tablet, TAKE 1 TABLET BY MOUTH TWICE A DAY, Disp: 180 tablet, Rfl: 3    meloxicam (MOBIC) 15 mg tablet, TAKE ONE TABLET BY MOUTH EVERY DAY, Disp: 90 tablet, Rfl: 0    metFORMIN (GLUCOPHAGE) 1000 MG tablet, Take 1 tablet (1,000 mg total) by mouth 2 (two) times a day with meals, Disp: 180 tablet, Rfl: 3    ONETOUCH DELICA LANCETS FINE MISC, by Does not apply route 2 (two) times a day, Disp: , Rfl:     pantoprazole (PROTONIX) 40 mg tablet, Take 1 tablet (40 mg total) by mouth daily, Disp: 90 tablet, Rfl: 3    polyethylene glycol (MIRALAX) powder, Take by mouth, Disp: , Rfl:     rosuvastatin (CRESTOR) 20 MG tablet, Take 1 tablet (20 mg total) by mouth daily, Disp: 90 tablet, Rfl: 3  Allergies: Patient has no known allergies  Objective      Wound is 3 cm long (no change) x 1 5 cm, 3 mm deep  Fibrinous and serous exudate in the wound bed  No purulence    No tunneling or undermining  Expander remains appropriately positioned  Assessment/Plan   Diagnoses and all orders for this visit:    Post-operative state    Open wound of left breast, subsequent encounter  -     collagenase (SANTYL) ointment; Apply topically daily for 14 days    Wound was sharply debrided today  Minimal visible granulation tissue  Given the amount of slough and serous drainage, will try Santyl so that the wound remains debrided  Cover with moist gauze and secondary dressing  Followup in one week      Conner Singh PA-C

## 2022-04-13 ENCOUNTER — PREP FOR PROCEDURE (OUTPATIENT)
Dept: PLASTIC SURGERY | Facility: CLINIC | Age: 62
End: 2022-04-13

## 2022-04-13 ENCOUNTER — OFFICE VISIT (OUTPATIENT)
Dept: PLASTIC SURGERY | Facility: CLINIC | Age: 62
End: 2022-04-13

## 2022-04-13 DIAGNOSIS — Z85.3 PERSONAL HISTORY OF BREAST CANCER: Primary | ICD-10-CM

## 2022-04-13 DIAGNOSIS — S21.002D OPEN WOUND OF LEFT BREAST, SUBSEQUENT ENCOUNTER: ICD-10-CM

## 2022-04-13 DIAGNOSIS — D05.12 DUCTAL CARCINOMA IN SITU (DCIS) OF LEFT BREAST: Primary | ICD-10-CM

## 2022-04-13 DIAGNOSIS — S21.002D OPEN WOUND OF LEFT BREAST, SUBSEQUENT ENCOUNTER: Primary | ICD-10-CM

## 2022-04-13 PROCEDURE — 99024 POSTOP FOLLOW-UP VISIT: CPT | Performed by: PHYSICIAN ASSISTANT

## 2022-04-13 NOTE — PROGRESS NOTES
Assessment/Plan:    Nonhealing surgical wound of left breast s/p immediate left breast reconstruction with tissue expander and de-epithelialized flap done by Dr Niki Gann in conjunction with Dr Ivory Reyes on 02/08/2022  Due to continued nonhealing,   I discussed surgical debridement with possible split-thickness skin graft verses local advancement flap  Patient understood and agreed  This will be done under general anesthesia  Discussed options, including forgoing surgery, as well as benefits and risks of surgery including but not limited to anesthesia, bleeding, infection, scarring and potential need for additional procedures  Consent was obtained and all questions answered to their satisfaction  We will plan for surgery at their earliest convenience  The patient is on aspirin as well as multiple other medications  We will obtain medical clearance prior to surgery  No problem-specific Assessment & Plan notes found for this encounter  Diagnoses and all orders for this visit:    Ductal carcinoma in situ (DCIS) of left breast          Subjective:      Patient ID: Sherif Amzequita is a 64 y o  female  HPI     The patient is a 77-year-old female who is status post immediate left breast reconstruction with tissue expander and de-epithelialized flap done in conjunction with Dr Ivory Reyes on 02/20/2022  The patient's postoperative course has been complicated by a nonhealing T incision  The patient has been performing local wound care for the past several weeks without improvement  On the patient's visit today, the patient demonstrates continued expansion of nonhealing portion of the T-incision, now measuring approximately 3 5 cm by 1 5 cm with approximately 5 mm of depth at its deepest portion  Additionally, the patient has 1 5 cm of tunneling at the inferior, medial portion of the wound  Please see photo in media      In discussion with Dr Niki Gann, the patient would most benefit from surgical debridement and revision  I discussed this with the patient and she is in agreement  Of note, the patient denies pain, fever, chills, erythema, tenderness to touch of the site  Tissue expander is not visible  The following portions of the patient's history were reviewed and updated as appropriate: allergies, current medications, past family history, past medical history, past social history, past surgical history and problem list     Review of Systems    A 12 point review systems was completed and is negative except as per HPI    Objective:      LMP  (LMP Unknown)          Physical Exam  Vitals and nursing note reviewed  Constitutional:       General: She is not in acute distress  Appearance: Normal appearance  She is obese  She is not ill-appearing  HENT:      Head: Normocephalic and atraumatic  Nose: Nose normal       Mouth/Throat:      Mouth: Mucous membranes are moist    Eyes:      Extraocular Movements: Extraocular movements intact  Conjunctiva/sclera: Conjunctivae normal       Pupils: Pupils are equal, round, and reactive to light  Cardiovascular:      Rate and Rhythm: Normal rate and regular rhythm  Pulses: Normal pulses  Heart sounds: Normal heart sounds  Pulmonary:      Effort: Pulmonary effort is normal  No respiratory distress  Breath sounds: Normal breath sounds  Abdominal:      General: Abdomen is flat  Palpations: Abdomen is soft  Musculoskeletal:         General: No swelling, tenderness, deformity or signs of injury  Normal range of motion  Cervical back: Normal range of motion and neck supple  No rigidity or tenderness  Skin:     General: Skin is warm and dry  Comments:   Nonhealing surgical wound left breast   See HPI  See photo in media  Neurological:      General: No focal deficit present  Mental Status: She is alert and oriented to person, place, and time  Cranial Nerves: No cranial nerve deficit        Sensory: No sensory deficit  Motor: No weakness     Psychiatric:         Mood and Affect: Mood normal          Behavior: Behavior normal

## 2022-04-13 NOTE — H&P (VIEW-ONLY)
Assessment/Plan:    Nonhealing surgical wound of left breast s/p immediate left breast reconstruction with tissue expander and de-epithelialized flap done by Dr Mamadou Daniel in conjunction with Dr Anita Cassidy on 02/08/2022  Due to continued nonhealing,   I discussed surgical debridement with possible split-thickness skin graft verses local advancement flap  Patient understood and agreed  This will be done under general anesthesia  Discussed options, including forgoing surgery, as well as benefits and risks of surgery including but not limited to anesthesia, bleeding, infection, scarring and potential need for additional procedures  Consent was obtained and all questions answered to their satisfaction  We will plan for surgery at their earliest convenience  The patient is on aspirin as well as multiple other medications  We will obtain medical clearance prior to surgery  No problem-specific Assessment & Plan notes found for this encounter  Diagnoses and all orders for this visit:    Ductal carcinoma in situ (DCIS) of left breast          Subjective:      Patient ID: Mel Fry is a 64 y o  female  HPI     The patient is a 71-year-old female who is status post immediate left breast reconstruction with tissue expander and de-epithelialized flap done in conjunction with Dr Anita Cassidy on 02/20/2022  The patient's postoperative course has been complicated by a nonhealing T incision  The patient has been performing local wound care for the past several weeks without improvement  On the patient's visit today, the patient demonstrates continued expansion of nonhealing portion of the T-incision, now measuring approximately 3 5 cm by 1 5 cm with approximately 5 mm of depth at its deepest portion  Additionally, the patient has 1 5 cm of tunneling at the inferior, medial portion of the wound  Please see photo in media      In discussion with Dr Mamadou Daniel, the patient would most benefit from surgical debridement and revision  I discussed this with the patient and she is in agreement  Of note, the patient denies pain, fever, chills, erythema, tenderness to touch of the site  Tissue expander is not visible  The following portions of the patient's history were reviewed and updated as appropriate: allergies, current medications, past family history, past medical history, past social history, past surgical history and problem list     Review of Systems    A 12 point review systems was completed and is negative except as per HPI    Objective:      LMP  (LMP Unknown)          Physical Exam  Vitals and nursing note reviewed  Constitutional:       General: She is not in acute distress  Appearance: Normal appearance  She is obese  She is not ill-appearing  HENT:      Head: Normocephalic and atraumatic  Nose: Nose normal       Mouth/Throat:      Mouth: Mucous membranes are moist    Eyes:      Extraocular Movements: Extraocular movements intact  Conjunctiva/sclera: Conjunctivae normal       Pupils: Pupils are equal, round, and reactive to light  Cardiovascular:      Rate and Rhythm: Normal rate and regular rhythm  Pulses: Normal pulses  Heart sounds: Normal heart sounds  Pulmonary:      Effort: Pulmonary effort is normal  No respiratory distress  Breath sounds: Normal breath sounds  Abdominal:      General: Abdomen is flat  Palpations: Abdomen is soft  Musculoskeletal:         General: No swelling, tenderness, deformity or signs of injury  Normal range of motion  Cervical back: Normal range of motion and neck supple  No rigidity or tenderness  Skin:     General: Skin is warm and dry  Comments:   Nonhealing surgical wound left breast   See HPI  See photo in media  Neurological:      General: No focal deficit present  Mental Status: She is alert and oriented to person, place, and time  Cranial Nerves: No cranial nerve deficit        Sensory: No sensory deficit  Motor: No weakness     Psychiatric:         Mood and Affect: Mood normal          Behavior: Behavior normal

## 2022-04-14 ENCOUNTER — CONSULT (OUTPATIENT)
Dept: FAMILY MEDICINE CLINIC | Facility: CLINIC | Age: 62
End: 2022-04-14
Payer: COMMERCIAL

## 2022-04-14 ENCOUNTER — TELEPHONE (OUTPATIENT)
Dept: ADMINISTRATIVE | Facility: OTHER | Age: 62
End: 2022-04-14

## 2022-04-14 VITALS
HEART RATE: 80 BPM | HEIGHT: 64 IN | WEIGHT: 262 LBS | TEMPERATURE: 98 F | DIASTOLIC BLOOD PRESSURE: 78 MMHG | OXYGEN SATURATION: 98 % | RESPIRATION RATE: 16 BRPM | SYSTOLIC BLOOD PRESSURE: 124 MMHG | BODY MASS INDEX: 44.73 KG/M2

## 2022-04-14 DIAGNOSIS — N18.32 TYPE 2 DIABETES MELLITUS WITH STAGE 3B CHRONIC KIDNEY DISEASE, WITHOUT LONG-TERM CURRENT USE OF INSULIN (HCC): ICD-10-CM

## 2022-04-14 DIAGNOSIS — Z01.818 PREOPERATIVE CLEARANCE: Primary | ICD-10-CM

## 2022-04-14 DIAGNOSIS — E78.5 HYPERLIPIDEMIA, UNSPECIFIED HYPERLIPIDEMIA TYPE: ICD-10-CM

## 2022-04-14 DIAGNOSIS — E11.22 TYPE 2 DIABETES MELLITUS WITH STAGE 3B CHRONIC KIDNEY DISEASE, WITHOUT LONG-TERM CURRENT USE OF INSULIN (HCC): ICD-10-CM

## 2022-04-14 DIAGNOSIS — I10 BENIGN ESSENTIAL HYPERTENSION: ICD-10-CM

## 2022-04-14 DIAGNOSIS — E66.01 OBESITY, CLASS III, BMI 40-49.9 (MORBID OBESITY) (HCC): ICD-10-CM

## 2022-04-14 DIAGNOSIS — T81.89XA NON-HEALING SURGICAL WOUND, INITIAL ENCOUNTER: ICD-10-CM

## 2022-04-14 DIAGNOSIS — D05.12 DUCTAL CARCINOMA IN SITU (DCIS) OF LEFT BREAST: ICD-10-CM

## 2022-04-14 PROCEDURE — 3008F BODY MASS INDEX DOCD: CPT | Performed by: FAMILY MEDICINE

## 2022-04-14 PROCEDURE — 3725F SCREEN DEPRESSION PERFORMED: CPT | Performed by: FAMILY MEDICINE

## 2022-04-14 PROCEDURE — 3074F SYST BP LT 130 MM HG: CPT | Performed by: FAMILY MEDICINE

## 2022-04-14 PROCEDURE — 3078F DIAST BP <80 MM HG: CPT | Performed by: FAMILY MEDICINE

## 2022-04-14 PROCEDURE — 3066F NEPHROPATHY DOC TX: CPT | Performed by: FAMILY MEDICINE

## 2022-04-14 PROCEDURE — 1036F TOBACCO NON-USER: CPT | Performed by: FAMILY MEDICINE

## 2022-04-14 PROCEDURE — 99214 OFFICE O/P EST MOD 30 MIN: CPT | Performed by: FAMILY MEDICINE

## 2022-04-14 NOTE — PROGRESS NOTES
Assessment/Plan:  Patient is medically cleared for proposed surgery pending CBC results  Patient to continue present treatment  Addendum: CBC reviewed, patient is medically cleared for surgery  Diagnoses and all orders for this visit:    Preoperative clearance    Non-healing surgical wound, initial encounter    Ductal carcinoma in situ (DCIS) of left breast    Type 2 diabetes mellitus with stage 3b chronic kidney disease, without long-term current use of insulin (HCC)    Benign essential hypertension    Hyperlipidemia, unspecified hyperlipidemia type    Obesity, Class III, BMI 40-49 9 (morbid obesity) ([facility] [address] )          Subjective:      Patient ID: Padmini Davis is a 64 y o  female  Patient is here for preoperative medical clearance exam for debridement of nonhealing surgical wound of the left breast with skin graft versus advanced flap scheduled on 04/18/2022 with Dr Bin Holguin, plastic surgeon at [facility]  Patient had CBC ordered results pending  Reviewed EKG from 01/21/2022 which was normal   Most recent hemoglobin A1c improved at 7 4  Patient denies bleeding problems or problems with anesthesia in the past       The following portions of the patient's history were reviewed and updated as appropriate: allergies, current medications, past family history, past medical history, past social history, past surgical history and problem list     Review of Systems   Constitutional: Negative for activity change, appetite change, chills, diaphoresis, fatigue, fever and unexpected weight change  HENT: Negative  Eyes: Negative  Respiratory: Negative for cough, chest tightness, shortness of breath and wheezing  Cardiovascular: Positive for leg swelling  Negative for chest pain and palpitations  Gastrointestinal: Positive for constipation  Negative for abdominal pain, blood in stool, diarrhea, nausea and vomiting  Endocrine: Negative for cold intolerance and heat intolerance     Genitourinary: Negative for difficulty urinating, dysuria, frequency and hematuria  Musculoskeletal: Positive for arthralgias  Negative for back pain, gait problem, joint swelling, myalgias, neck pain and neck stiffness  Skin: Negative  Neurological: Negative for dizziness, syncope, weakness, light-headedness and headaches  Hematological: Negative for adenopathy  Does not bruise/bleed easily  Psychiatric/Behavioral: Negative for decreased concentration, dysphoric mood and sleep disturbance  The patient is not nervous/anxious  Objective:      /78 (BP Location: Right arm, Patient Position: Sitting, Cuff Size: Standard)   Pulse 80   Temp 98 °F (36 7 °C) (Skin)   Resp 16   Ht 5' 4" (1 626 m)   Wt 119 kg (262 lb)   LMP  (LMP Unknown)   SpO2 98%   BMI 44 97 kg/m²          Physical Exam  Constitutional:       General: She is not in acute distress  Appearance: Normal appearance  She is obese  She is not ill-appearing, toxic-appearing or diaphoretic  HENT:      Head: Normocephalic  Mouth/Throat:      Mouth: Mucous membranes are moist    Eyes:      General: No scleral icterus  Conjunctiva/sclera: Conjunctivae normal    Neck:      Vascular: No carotid bruit  Cardiovascular:      Rate and Rhythm: Normal rate and regular rhythm  Pulmonary:      Effort: Pulmonary effort is normal       Breath sounds: Normal breath sounds  Abdominal:      Palpations: Abdomen is soft  Tenderness: There is no abdominal tenderness  Musculoskeletal:      Cervical back: Neck supple  Right lower leg: Edema present  Left lower leg: Edema present  Comments: Nonpitting pretibial and ankle edema bilaterally  Lymphadenopathy:      Cervical: No cervical adenopathy  Skin:     General: Skin is warm and dry  Neurological:      General: No focal deficit present  Mental Status: She is alert and oriented to person, place, and time     Psychiatric:         Mood and Affect: Mood normal  Behavior: Behavior normal          Thought Content: Thought content normal          Judgment: Judgment normal          BMI Counseling: Body mass index is 44 97 kg/m²  The BMI is above normal  Nutrition recommendations include reducing portion sizes, decreasing overall calorie intake, 3-5 servings of fruits/vegetables daily, reducing fast food intake, consuming healthier snacks, decreasing soda and/or juice intake, moderation in carbohydrate intake, increasing intake of lean protein, reducing intake of saturated fat and trans fat and reducing intake of cholesterol  Exercise recommendations include moderate aerobic physical activity for 150 minutes/week

## 2022-04-14 NOTE — TELEPHONE ENCOUNTER
----- Message from [facility] sent at 4/14/2022 11:25 AM EDT -----  Regarding: - Foot exam  04/14/22 11:25 AM    Hello, our patient attached above has had Diabetic Foot Exam completed/performed  Please assist in updating the patient chart by making an External outreach to [facility] facility located in [facility]  The date of service is beginning of March      Thank you,  SAKSHI Fragoso 20 FP

## 2022-04-14 NOTE — TELEPHONE ENCOUNTER
Upon review of the In Basket request and the patient's chart, initial outreach has been made via fax, please see Contacts section for details        x1 foot    Thank you  Demond Jay

## 2022-04-14 NOTE — LETTER
Diabetic Foot Exam Form    Date Requested: 22  Patient: Cailin Dodd  Patient : 1960   Referring Provider: Shiraz Bledsoe DO    Diabetic Foot Exam Performed with shoes and socks removed        Yes         No     Date of Diabetic Foot Exam ______________________________  Risk Score ____________________________________________    Left Foot       Visual Inspection         Monofilament Testing Sensory Exam        Pedal Pulses         Additional Comments         Right Foot      Visual Inspection         Monofilament Testing Sensory Exam       Pedal Pulses         Additional Comments         Comments __________________________________________________________    Practice Providing Exam ______________________________________________    Exam Performed By (print name) _______________________________________      Provider Signature ___________________________________________________      These reports are needed for  compliance  Please fax this completed form and a copy of the Diabetic Foot Exam report to our office located at [place] [address] as soon as possible via [phone] attention Curt Rosario: Phone [phone]    We thank you for your assistance in treating our mutual patient

## 2022-04-15 ENCOUNTER — APPOINTMENT (OUTPATIENT)
Dept: LAB | Facility: HOSPITAL | Age: 62
End: 2022-04-15
Payer: COMMERCIAL

## 2022-04-15 ENCOUNTER — ANESTHESIA EVENT (OUTPATIENT)
Dept: PERIOP | Facility: HOSPITAL | Age: 62
End: 2022-04-15
Payer: COMMERCIAL

## 2022-04-18 ENCOUNTER — HOSPITAL ENCOUNTER (OUTPATIENT)
Facility: HOSPITAL | Age: 62
Setting detail: OUTPATIENT SURGERY
Discharge: HOME | End: 2022-04-18
Attending: SURGERY | Admitting: SURGERY
Payer: COMMERCIAL

## 2022-04-18 ENCOUNTER — ANESTHESIA (OUTPATIENT)
Dept: PERIOP | Facility: HOSPITAL | Age: 62
End: 2022-04-18
Payer: COMMERCIAL

## 2022-04-18 VITALS
OXYGEN SATURATION: 97 % | RESPIRATION RATE: 18 BRPM | HEART RATE: 84 BPM | TEMPERATURE: 97.1 F | DIASTOLIC BLOOD PRESSURE: 64 MMHG | SYSTOLIC BLOOD PRESSURE: 122 MMHG

## 2022-04-18 DIAGNOSIS — D05.12 DUCTAL CARCINOMA IN SITU (DCIS) OF LEFT BREAST: Primary | ICD-10-CM

## 2022-04-18 LAB — GLUCOSE SERPL-MCNC: 123 MG/DL (ref 65–140)

## 2022-04-18 PROCEDURE — 14001 TIS TRNFR TRUNK 10.1-30SQCM: CPT | Performed by: SURGERY

## 2022-04-18 PROCEDURE — 82948 REAGENT STRIP/BLOOD GLUCOSE: CPT

## 2022-04-18 PROCEDURE — 15002 WOUND PREP TRK/ARM/LEG: CPT | Performed by: SURGERY

## 2022-04-18 RX ORDER — PROPOFOL 10 MG/ML
INJECTION, EMULSION INTRAVENOUS AS NEEDED
Status: DISCONTINUED | OUTPATIENT
Start: 2022-04-18 | End: 2022-04-18

## 2022-04-18 RX ORDER — IBUPROFEN 600 MG/1
600 TABLET ORAL EVERY 6 HOURS PRN
Qty: 21 TABLET | Refills: 0 | Status: SHIPPED | OUTPATIENT
Start: 2022-04-18 | End: 2022-07-08

## 2022-04-18 RX ORDER — EPHEDRINE SULFATE 50 MG/ML
INJECTION INTRAVENOUS AS NEEDED
Status: DISCONTINUED | OUTPATIENT
Start: 2022-04-18 | End: 2022-04-18

## 2022-04-18 RX ORDER — GLYCOPYRROLATE 0.2 MG/ML
INJECTION INTRAMUSCULAR; INTRAVENOUS AS NEEDED
Status: DISCONTINUED | OUTPATIENT
Start: 2022-04-18 | End: 2022-04-18

## 2022-04-18 RX ORDER — OXYCODONE HYDROCHLORIDE 5 MG/1
5 TABLET ORAL EVERY 4 HOURS PRN
Qty: 10 TABLET | Refills: 0 | Status: SHIPPED | OUTPATIENT
Start: 2022-04-18 | End: 2022-07-08

## 2022-04-18 RX ORDER — CEFAZOLIN SODIUM 1 G/3ML
INJECTION, POWDER, FOR SOLUTION INTRAMUSCULAR; INTRAVENOUS AS NEEDED
Status: DISCONTINUED | OUTPATIENT
Start: 2022-04-18 | End: 2022-04-18

## 2022-04-18 RX ORDER — FENTANYL CITRATE 50 UG/ML
INJECTION, SOLUTION INTRAMUSCULAR; INTRAVENOUS AS NEEDED
Status: DISCONTINUED | OUTPATIENT
Start: 2022-04-18 | End: 2022-04-18

## 2022-04-18 RX ORDER — LIDOCAINE HYDROCHLORIDE AND EPINEPHRINE 10; 10 MG/ML; UG/ML
INJECTION, SOLUTION INFILTRATION; PERINEURAL AS NEEDED
Status: DISCONTINUED | OUTPATIENT
Start: 2022-04-18 | End: 2022-04-18 | Stop reason: HOSPADM

## 2022-04-18 RX ORDER — ONDANSETRON 2 MG/ML
4 INJECTION INTRAMUSCULAR; INTRAVENOUS ONCE AS NEEDED
Status: DISCONTINUED | OUTPATIENT
Start: 2022-04-18 | End: 2022-04-18 | Stop reason: HOSPADM

## 2022-04-18 RX ORDER — SODIUM CHLORIDE, SODIUM LACTATE, POTASSIUM CHLORIDE, CALCIUM CHLORIDE 600; 310; 30; 20 MG/100ML; MG/100ML; MG/100ML; MG/100ML
INJECTION, SOLUTION INTRAVENOUS CONTINUOUS PRN
Status: DISCONTINUED | OUTPATIENT
Start: 2022-04-18 | End: 2022-04-18

## 2022-04-18 RX ORDER — MIDAZOLAM HYDROCHLORIDE 2 MG/2ML
INJECTION, SOLUTION INTRAMUSCULAR; INTRAVENOUS AS NEEDED
Status: DISCONTINUED | OUTPATIENT
Start: 2022-04-18 | End: 2022-04-18

## 2022-04-18 RX ORDER — ONDANSETRON 2 MG/ML
INJECTION INTRAMUSCULAR; INTRAVENOUS AS NEEDED
Status: DISCONTINUED | OUTPATIENT
Start: 2022-04-18 | End: 2022-04-18

## 2022-04-18 RX ORDER — LIDOCAINE HYDROCHLORIDE 10 MG/ML
INJECTION, SOLUTION EPIDURAL; INFILTRATION; INTRACAUDAL; PERINEURAL AS NEEDED
Status: DISCONTINUED | OUTPATIENT
Start: 2022-04-18 | End: 2022-04-18

## 2022-04-18 RX ORDER — ACETAMINOPHEN 325 MG/1
650 TABLET ORAL EVERY 6 HOURS
Status: DISCONTINUED | OUTPATIENT
Start: 2022-04-18 | End: 2022-04-18 | Stop reason: HOSPADM

## 2022-04-18 RX ORDER — FENTANYL CITRATE/PF 50 MCG/ML
25 SYRINGE (ML) INJECTION
Status: DISCONTINUED | OUTPATIENT
Start: 2022-04-18 | End: 2022-04-18 | Stop reason: HOSPADM

## 2022-04-18 RX ORDER — OXYCODONE HYDROCHLORIDE 5 MG/1
5 TABLET ORAL ONCE AS NEEDED
Status: CANCELLED | OUTPATIENT
Start: 2022-04-18

## 2022-04-18 RX ORDER — DEXAMETHASONE SODIUM PHOSPHATE 10 MG/ML
INJECTION, SOLUTION INTRAMUSCULAR; INTRAVENOUS AS NEEDED
Status: DISCONTINUED | OUTPATIENT
Start: 2022-04-18 | End: 2022-04-18

## 2022-04-18 RX ORDER — ACETAMINOPHEN 325 MG/1
975 TABLET ORAL ONCE AS NEEDED
Status: CANCELLED | OUTPATIENT
Start: 2022-04-18

## 2022-04-18 RX ORDER — MAGNESIUM HYDROXIDE 1200 MG/15ML
LIQUID ORAL AS NEEDED
Status: DISCONTINUED | OUTPATIENT
Start: 2022-04-18 | End: 2022-04-18 | Stop reason: HOSPADM

## 2022-04-18 RX ORDER — SODIUM CHLORIDE, SODIUM LACTATE, POTASSIUM CHLORIDE, CALCIUM CHLORIDE 600; 310; 30; 20 MG/100ML; MG/100ML; MG/100ML; MG/100ML
100 INJECTION, SOLUTION INTRAVENOUS CONTINUOUS
Status: CANCELLED | OUTPATIENT
Start: 2022-04-18

## 2022-04-18 RX ADMIN — EPHEDRINE SULFATE 10 MG: 50 INJECTION, SOLUTION INTRAVENOUS at 13:23

## 2022-04-18 RX ADMIN — LIDOCAINE HYDROCHLORIDE 50 MG: 10 INJECTION, SOLUTION EPIDURAL; INFILTRATION; INTRACAUDAL at 12:58

## 2022-04-18 RX ADMIN — ONDANSETRON 4 MG: 2 INJECTION INTRAMUSCULAR; INTRAVENOUS at 13:03

## 2022-04-18 RX ADMIN — FENTANYL CITRATE 25 MCG: 50 INJECTION, SOLUTION INTRAMUSCULAR; INTRAVENOUS at 13:11

## 2022-04-18 RX ADMIN — PHENYLEPHRINE HYDROCHLORIDE 40 MCG/MIN: 10 INJECTION INTRAVENOUS at 13:23

## 2022-04-18 RX ADMIN — PROPOFOL 200 MG: 10 INJECTION, EMULSION INTRAVENOUS at 12:58

## 2022-04-18 RX ADMIN — GLYCOPYRROLATE 0.2 MG: 0.2 INJECTION, SOLUTION INTRAMUSCULAR; INTRAVENOUS at 13:03

## 2022-04-18 RX ADMIN — EPHEDRINE SULFATE 10 MG: 50 INJECTION, SOLUTION INTRAVENOUS at 13:13

## 2022-04-18 RX ADMIN — CEFAZOLIN SODIUM 2000 MG: 1 INJECTION, POWDER, FOR SOLUTION INTRAMUSCULAR; INTRAVENOUS at 13:00

## 2022-04-18 RX ADMIN — SODIUM CHLORIDE, SODIUM LACTATE, POTASSIUM CHLORIDE, AND CALCIUM CHLORIDE: .6; .31; .03; .02 INJECTION, SOLUTION INTRAVENOUS at 12:53

## 2022-04-18 RX ADMIN — MIDAZOLAM HYDROCHLORIDE 2 MG: 1 INJECTION, SOLUTION INTRAMUSCULAR; INTRAVENOUS at 12:53

## 2022-04-18 RX ADMIN — DEXAMETHASONE SODIUM PHOSPHATE 10 MG: 10 INJECTION, SOLUTION INTRAMUSCULAR; INTRAVENOUS at 13:03

## 2022-04-18 NOTE — ANESTHESIA PREPROCEDURE EVALUATION
Procedure:  DEBRIDEMENT OF NON-HEALING SURGICAL WOUND, LEFT BREAST, POSSIBLE STSG, POSSIBLE ADVANCEMENT FLAP (Left Breast)  SKIN GRAFT SPLIT THICKNESS (STSG)  TRUNK (Left Breast)    Relevant Problems   ANESTHESIA (within normal limits)      CARDIO   (+) Benign essential hypertension   (+) Hyperlipidemia   (+) Mixed diabetic hyperlipidemia associated with type 2 diabetes mellitus (HCC)      ENDO   (+) Hypothyroidism   (+) Type 2 diabetes mellitus with stage 3b chronic kidney disease (HCC)      GI/HEPATIC   (+) Fatty liver   (+) GERD without esophagitis      /RENAL (within normal limits)      GYN (within normal limits)      HEMATOLOGY   (+) Anemia      MUSCULOSKELETAL   (+) Arthritis   (+) Disc degeneration, lumbar   (+) Primary osteoarthritis of both knees      NEURO/PSYCH (within normal limits)      PULMONARY   (+) MARIA FERNANDA (obstructive sleep apnea)   (-) Asthma        Physical Exam    Airway    Mallampati score: III  TM Distance: >3 FB  Neck ROM: full     Dental       Cardiovascular  Rhythm: regular, Rate: normal, No murmur,     Pulmonary  Breath sounds clear to auscultation,     Other Findings        Anesthesia Plan  ASA Score- 3     Anesthesia Type- general with ASA Monitors  Additional Monitors:   Airway Plan: LMA  Plan Factors-Exercise tolerance (METS): >4 METS  Chart reviewed  Patient summary reviewed  Patient is not a current smoker  Induction- intravenous  Postoperative Plan- Plan for postoperative opioid use  Informed Consent- Anesthetic plan and risks discussed with patient  I personally reviewed this patient with the CRNA  Discussed and agreed on the Anesthesia Plan with the CRNA  Venus Kahn

## 2022-04-18 NOTE — ANESTHESIA POSTPROCEDURE EVALUATION
Post-Op Assessment Note    CV Status:  Stable    Pain management: adequate     Mental Status:  Alert and awake   Hydration Status:  Euvolemic   PONV Controlled:  Controlled   Airway Patency:  Patent      Post Op Vitals Reviewed: Yes      Staff: CRNA   Comments: VSS report RN        No complications documented      BP   123/61   Temp     Pulse  94   Resp      SpO2   100 RA

## 2022-04-18 NOTE — DISCHARGE INSTRUCTIONS
[facility]  Dr Isaias Silva   [facility], [address]  Phone: [phone]     Postoperative Instructions for Outpatient Surgery     These instructions are being provided by your doctor to give you basic guidelines during your post-op recovery  Please let our office know if your contact information has changed       Please call the office today for an appointment in 2 weeks for suture removal      Dressings:  Steri strips, replace if they fall off       Activity Restrictions:  Nothing strenuous      Bathing:  Shower tomorrow afternoon  Pat incision dry afterwards       Medications:    Resume pre-op medications  You may take tylenol, aleve, or ibuprofen for pain control  Other: May apply ice to incision area at 10 minute intervals as needed for pain or swelling

## 2022-04-18 NOTE — TELEPHONE ENCOUNTER
Upon review of the In Basket request we have found as a result of outreach that patient did not have the requested item(s) completed  Any additional questions or concerns should be emailed to the Practice Liaisons via [email or web address] email, please do not reply via In Basket      Thank you  Allison Mathew

## 2022-04-18 NOTE — INTERIM OP NOTE
DEBRIDEMENT OF NON-HEALING SURGICAL WOUND, LEFT BREAST, POSSIBLE STSG, POSSIBLE ADVANCEMENT FLAP, SKIN GRAFT SPLIT THICKNESS (STSG)  TRUNK  Postoperative Note  PATIENT NAME: Sherif Amezquita  : 1960  MRN: [id number]  [facility] OR ROOM 03    Surgery Date: 2022    Preop Diagnosis:  Personal history of breast cancer [Z85 3]  Open wound of left breast, subsequent encounter [S21 002D]    Post-Op Diagnosis Codes:     * Personal history of breast cancer [Z85 3]     * Open wound of left breast, subsequent encounter [S21 002D]    Procedure(s) (LRB):  DEBRIDEMENT OF NON-HEALING SURGICAL WOUND, LEFT BREAST, POSSIBLE STSG, POSSIBLE ADVANCEMENT FLAP (Left)  SKIN GRAFT SPLIT THICKNESS (STSG)  TRUNK (Left)    Surgeon(s) and Role:     * Brent Tony MD - Primary    Specimens:  * No specimens in log *    Estimated Blood Loss:   Minimal    Anesthesia Type:   General     Findings:    None  Complications:   None    SIGNATURE: Angel Harvey PA-C   DATE: 2022   TIME: 1:13 PM

## 2022-04-18 NOTE — OP NOTE
OPERATIVE REPORT  PATIENT NAME: Briseida Whitmore    :  1960  MRN: [id number]  Pt Location: [facility]    SURGERY DATE: 2022    Surgeon(s) and Role:     * Sharifa Sinha MD - Primary     * Marcsu Galvez MD - Assisting    Preop Diagnosis:  Personal history of breast cancer [Z85 3]  Open wound of left breast, subsequent encounter [S21 002D]    Post-Op Diagnosis Codes:     * Personal history of breast cancer [Z85 3]     * Open wound of left breast, subsequent encounter [S21 002D]    Procedure:  1  Excisional debridement left breast wound, full-thickness skin 4 cm by 2 cm 2  Adjacent tissue transfer/local flap reconstruction left breast wound 4 5 cm x 3 cm  Specimen(s):  * No specimens in log *    Estimated Blood Loss:   Minimal    Drains:  Closed/Suction Drain Breast Bulb 15 Fr  (Active)   Number of days: 69       Closed/Suction Drain Breast Bulb 15 Fr  (Active)   Number of days: 69       Anesthesia Type:   General    Operative Indications:  Personal history of breast cancer [Z85 3]  Open wound of left breast, subsequent encounter [S21 002D]      Operative Findings:  As above    Complications:   None    Procedure and Technique:  Paige Holguin was seen preoperatively in the holding area, the surgical site was marked with her participation, and I reviewed with her the planned procedure, potential risks, complications limitations  She was taken to the operating room and underwent induction of general anesthesia by the anesthesia personnel  The operative field was prepped and draped in sterile fashion a proper time-out was performed  Local anesthesia was administered and a 15 blade was then used to sharply debride full-thickness skin around the perimeter and base of the wound, dimensions of full-thickness skin debridement were 4 cm x 2 cm    Following this flaps were developed superior to the defect, dissection then proceeded superficial to the previously de-epithelialized flap and deep in the plane of subcutaneous fat  Dissection proceeded from distal to proximal toward the base of the flaps superficial to the underlying de-epithelialized flap which had been dissected at the initial time of the mastectomy and reconstruction  Once adequate flap elevation and dissection had been completed the wound was irrigated and hemostasis was assured  The flap was then advanced inferiorly over the defect and it was secured at the level of the deep dermis utilizing multiple interrupted 3-0 Vicryl sutures  Once the deep closure had been completed the skin closure was performed utilizing multiple interrupted 4-0 nylon skin sutures  The flap margins appeared to be fully viable and the wound was protected with benzoin and Steri-Strips along the leading edge of the flap  This was followed by application of a dry sterile dressing  The patient was then transferred to the recovery room     I was present for the entire procedure    Patient Disposition:  PACU       SIGNATURE: Micky Starkey MD  DATE: April 18, 2022  TIME: 1:31 PM

## 2022-04-19 DIAGNOSIS — E11.42 TYPE 2 DIABETES MELLITUS WITH DIABETIC POLYNEUROPATHY, WITHOUT LONG-TERM CURRENT USE OF INSULIN (HCC): ICD-10-CM

## 2022-04-19 DIAGNOSIS — I10 ESSENTIAL HYPERTENSION: ICD-10-CM

## 2022-04-19 RX ORDER — LISINOPRIL AND HYDROCHLOROTHIAZIDE 25; 20 MG/1; MG/1
1 TABLET ORAL 2 TIMES DAILY
Qty: 180 TABLET | Refills: 3 | OUTPATIENT
Start: 2022-04-19

## 2022-04-21 DIAGNOSIS — E11.42 TYPE 2 DIABETES MELLITUS WITH DIABETIC POLYNEUROPATHY, WITHOUT LONG-TERM CURRENT USE OF INSULIN (HCC): ICD-10-CM

## 2022-04-26 ENCOUNTER — OFFICE VISIT (OUTPATIENT)
Dept: PLASTIC SURGERY | Facility: CLINIC | Age: 62
End: 2022-04-26

## 2022-04-26 DIAGNOSIS — D05.12 DUCTAL CARCINOMA IN SITU (DCIS) OF LEFT BREAST: Primary | ICD-10-CM

## 2022-04-26 PROCEDURE — 99024 POSTOP FOLLOW-UP VISIT: CPT | Performed by: PHYSICIAN ASSISTANT

## 2022-04-26 NOTE — PROGRESS NOTES
POST-OP EVALUATION  4/26/2022    Jorden Parsons is a 64 y o  female is here today for routine post-operative follow up  She is status post debridement of a nonhealing surgical wound of her left breast, advancement flap on 04/18/2022  She states that she has minimal pain      Past Medical History:   Diagnosis Date    Achrochordon     Acute bronchitis     Allergic rhinitis     Arthritis     Cancer (HCC)     L breast-mastectomy w/ reconstruction today 2/8/2022    Chronic kidney disease     Diabetes ([facility] )     Diabetes mellitus ([facility] )     Disease of thyroid gland     Dysuria     GERD (gastroesophageal reflux disease)     Headache(784 0)     Hypertension     Hypothyroid     IBS (irritable bowel syndrome)     Inflammatory bowel disease     Obesity     Polycystic ovarian syndrome     Rotator cuff tear     right side     Sleep apnea     pt chooses not to use CPAP-better sinvr tonsil removal    URI (upper respiratory infection)     Wears glasses      Past Surgical History:   Procedure Laterality Date    ANAL FISTULOTOMY      subcutaneous     BREAST LUMPECTOMY Left 09/23/2021    BREAST RECONSTRUCTION Left 2/8/2022    Procedure: IMMEDIATE RECONSTRUCTION WITH TISSUE EXPANDER PLACEMENT;  Surgeon: Malick Wray MD;  [facility];  Service: Plastics    CHOLECYSTECTOMY      FLAP LOCAL TRUNK Left 2/8/2022    Procedure: DE-EPITHELIALIZED FLAP BREAST;  Surgeon: Malick Wray MD;  [facility];  Service: Plastics    HEMORRHOID SURGERY      INCISION AND DRAINAGE PERIRECTAL ABSCESS      MASTECTOMY W/ SENTINEL NODE BIOPSY Left 2/8/2022    Procedure: MASTECTOMY WITH BIOPSY LYMPH NODE SENTINEL; 1200 NUC MED;  Surgeon: Norbert Cordova MD;  [facility];  Service: Surgical Oncology    MOUTH SURGERY      OVARIAN CYST REMOVAL      OH DEBRIDEMENT, SKIN, SUB-Q TISSUE,=<20 SQ CM Left 4/18/2022    Procedure: DEBRIDEMENT OF NON-HEALING SURGICAL WOUND, LEFT BREAST, ADVANCEMENT FLAP;  Surgeon: Brent Tony MD;  [facility];  Service: Plastics    ROTATOR CUFF REPAIR Right     TONSILLECTOMY      US GUIDANCE BREAST BIOPSY LEFT EACH ADDITIONAL Left 9/21/2021    US GUIDED BREAST BIOPSY LEFT COMPLETE Left 9/21/2021        Current Outpatient Medications:     Acetaminophen 500 MG, Take 2 capsules (1,000 mg total) by mouth every 6 (six) hours as needed for mild pain for up to 21 doses, Disp: 21 capsule, Rfl: 0    aspirin (ASPIRIN LOW DOSE) 81 mg EC tablet, Take 1 tablet by mouth daily, Disp: , Rfl:     Blood Glucose Monitoring Suppl (ONE TOUCH ULTRA 2) w/Device KIT, Use 3 (three) times a day, Disp: 1 kit, Rfl: 0    cholecalciferol (VITAMIN D3) 1,000 units tablet, Take 4 tablets (4,000 Units total) by mouth daily, Disp: 90 tablet, Rfl: 1    collagenase (SANTYL) ointment, Apply topically daily for 14 days, Disp: 15 g, Rfl: 0    Dapagliflozin Propanediol (Farxiga) 10 MG TABS, Take 1 tablet (10 mg total) by mouth daily, Disp: 90 tablet, Rfl: 0    Dulaglutide (Trulicity) 3 XQ/4 9NE SOPN, Inject 0 5 mL (3 mg total) under the skin once a week, Disp: 6 mL, Rfl: 0    Ferrous Gluconate (IRON 27 PO), Take by mouth  , Disp: , Rfl:     gabapentin (NEURONTIN) 300 mg capsule, Take 1 capsule (300 mg total) by mouth 2 (two) times a day, Disp: 180 capsule, Rfl: 0    glucose blood (OneTouch Ultra) test strip, Use to test 3x daily, Disp: 100 strip, Rfl: 4    ibuprofen (MOTRIN) 600 mg tablet, Take 1 tablet (600 mg total) by mouth every 6 (six) hours as needed for mild pain for up to 21 doses, Disp: 21 tablet, Rfl: 0    levothyroxine 100 mcg tablet, Take 1 tablet (100 mcg total) by mouth daily, Disp: 90 tablet, Rfl: 1    lisinopril-hydrochlorothiazide (PRINZIDE,ZESTORETIC) 20-25 MG per tablet, TAKE 1 TABLET BY MOUTH TWICE A DAY, Disp: 180 tablet, Rfl: 3    meloxicam (MOBIC) 15 mg tablet, TAKE ONE TABLET BY MOUTH EVERY DAY, Disp: 90 tablet, Rfl: 0    metFORMIN (GLUCOPHAGE) 1000 MG tablet, Take 1 tablet (1,000 mg total) by mouth 2 (two) times a day with meals, Disp: 180 tablet, Rfl: 3    ONETOUCH DELICA LANCETS FINE MISC, by Does not apply route 2 (two) times a day, Disp: , Rfl:     oxyCODONE (Roxicodone) 5 immediate release tablet, Take 1 tablet (5 mg total) by mouth every 4 (four) hours as needed for moderate pain for up to 10 doses Max Daily Amount: 30 mg, Disp: 10 tablet, Rfl: 0    pantoprazole (PROTONIX) 40 mg tablet, Take 1 tablet (40 mg total) by mouth daily, Disp: 90 tablet, Rfl: 3    polyethylene glycol (MIRALAX) powder, Take by mouth, Disp: , Rfl:     rosuvastatin (CRESTOR) 20 MG tablet, Take 1 tablet (20 mg total) by mouth daily, Disp: 90 tablet, Rfl: 3  Allergies: Patient has no known allergies  Objective    Steristrip removed  Sutures in place  Incision is clean, dry, intact  No erythema, edema  No drainage  Assessment/Plan   Diagnoses and all orders for this visit:    Ductal carcinoma in situ (DCIS) of left breast    Wound Care:  Soap and water  Bacitracin, cover with bandaid  Suture removal in one week      Craig Barrientos PA-C

## 2022-05-02 ENCOUNTER — OFFICE VISIT (OUTPATIENT)
Dept: PLASTIC SURGERY | Facility: CLINIC | Age: 62
End: 2022-05-02

## 2022-05-02 DIAGNOSIS — D05.12 DUCTAL CARCINOMA IN SITU (DCIS) OF LEFT BREAST: Primary | ICD-10-CM

## 2022-05-02 PROCEDURE — 99024 POSTOP FOLLOW-UP VISIT: CPT | Performed by: PHYSICIAN ASSISTANT

## 2022-05-02 NOTE — PROGRESS NOTES
Assessment/Plan:     Patient is a 42-year-old female with a nonhealing surgical wound of left breast s/p immediate left breast reconstruction with tissue expander and de-epithelialized flap done by Dr Antonio Schmitz in conjunction with Dr Eris Leonardo on 02/08/2022  She is now status post excisional debridement of the left breast wound with local tissue flap with Dr Antonio Schmitz on 04/18/2022  Please see HPI  The patient presents to the office today for a postoperative check and suture removal   Patient's incision is healing well  No concern for continued opening or dehiscence  The patient will return to the office in approximately 4 weeks for incision check and possible tissue expander fill  She was instructed to call the office if any questions or concerns arise prior to her next scheduled appointment  Diagnoses and all orders for this visit:    Ductal carcinoma in situ (DCIS) of left breast          Subjective:     Patient ID: Sue Jean is a 64 y o  female  HPI     Patient states that she is doing well  She has had no issues postoperatively  The patient had several questions in regards to steps of reconstruction  All questions answered the patient's satisfaction  She states that she is very pleased overall with our practice and with the care she has received  Review of Systems    HPI    Objective:     Physical Exam      Left breast incision and sutures are clean, dry and intact  No bleeding, oozing, erythema, dehiscence

## 2022-05-16 DIAGNOSIS — E03.9 HYPOTHYROIDISM, UNSPECIFIED TYPE: ICD-10-CM

## 2022-05-16 DIAGNOSIS — I10 ESSENTIAL HYPERTENSION: ICD-10-CM

## 2022-05-16 RX ORDER — LISINOPRIL AND HYDROCHLOROTHIAZIDE 25; 20 MG/1; MG/1
1 TABLET ORAL 2 TIMES DAILY
Qty: 180 TABLET | Refills: 3 | Status: SHIPPED | OUTPATIENT
Start: 2022-05-16

## 2022-05-16 RX ORDER — LEVOTHYROXINE SODIUM 0.1 MG/1
100 TABLET ORAL DAILY
Qty: 90 TABLET | Refills: 0 | Status: SHIPPED | OUTPATIENT
Start: 2022-05-16 | End: 2022-07-14 | Stop reason: SDUPTHER

## 2022-05-16 NOTE — TELEPHONE ENCOUNTER
Pt called requesting a refill on  lisinopril-hydrochlorothiazide (PRINZIDE,ZESTORETIC) 20-25 MG per tablet   levothyroxine 100 mcg tablet   To be refilled at [facility] DR WENDI CLEARY in [place]  Please advise

## 2022-05-16 NOTE — TELEPHONE ENCOUNTER
Failed Protocol       Endocrinology:  Hypothyroid Agents Failed    TSH within 360 days    Valid encounter within last 12 months

## 2022-05-27 ENCOUNTER — OFFICE VISIT (OUTPATIENT)
Dept: PLASTIC SURGERY | Facility: CLINIC | Age: 62
End: 2022-05-27

## 2022-05-27 DIAGNOSIS — D05.12 DUCTAL CARCINOMA IN SITU (DCIS) OF LEFT BREAST: Primary | ICD-10-CM

## 2022-05-27 PROCEDURE — 99024 POSTOP FOLLOW-UP VISIT: CPT | Performed by: PHYSICIAN ASSISTANT

## 2022-05-27 NOTE — PROGRESS NOTES
Assessment/Plan:     Patient is a 61-year-old female with a nonhealing surgical wound of left breast s/p immediate left breast reconstruction with tissue expander and de-epithelialized flap done by Dr Pavan Scales in conjunction with Dr Amanda Snider on 02/08/2022  She is now status post excisional debridement of the left breast wound with local tissue flap with Dr Pavan Scales on 04/18/2022  Please see HPI  Patient presents to the office today for 1st tissue expander fill  60 cc were placed in the patient's mentor  cc ultra high-profile tissue expander for a total of 260 cc  With 32 cm, height 15 cm  The patient will return to the office in 2 weeks for an additional tissue expander fill  Diagnoses and all orders for this visit:    Ductal carcinoma in situ (DCIS) of left breast          Subjective:     Patient ID: Briseida Whitmore is a 64 y o  female  HPI   Patient reports that she has been doing well  She has no questions or concerns today, with exception of areas of redundant skin tissue under her left axilla, and dependent edema on her left medial breast   I did explain to the patient that there would be cosmetic changes as the tissue expander was filled, and that remaining cosmetic changes that she wished to address could be addressed after fill was completed  The patient voiced understanding  Review of Systems    See HPI    Objective:     Physical Exam      Left breast incision is completely healed  Tissue expander palpable and in place  Areas of dependent edema noted on the medial breast mound

## 2022-06-10 ENCOUNTER — OFFICE VISIT (OUTPATIENT)
Dept: PLASTIC SURGERY | Facility: CLINIC | Age: 62
End: 2022-06-10

## 2022-06-10 DIAGNOSIS — D05.12 DUCTAL CARCINOMA IN SITU (DCIS) OF LEFT BREAST: Primary | ICD-10-CM

## 2022-06-10 PROCEDURE — 99024 POSTOP FOLLOW-UP VISIT: CPT | Performed by: PHYSICIAN ASSISTANT

## 2022-06-10 NOTE — PROGRESS NOTES
Assessment/Plan:   Archie Mathis is a 64year old female who is status post left breast immediate reconstruction with tissue expander and de-epithelialized flap done in conjunction with Dr Halie Good on 02/08/2022  Most recently, she is status post excisional debridement of the left breast wound with local tissue flap done on 04/18/2022  Please see HPI  In the office today, I injected 60 cc into her left breast tissue expander to a total of 320 cc in a 650 cc expander with a base diameter of 13 cm and projection of 8 2 cm  She can use Tylenol, ibuprofen or ice as needed for pain  We will see her back in 2 week intervals for additional fills  Diagnoses and all orders for this visit:    Ductal carcinoma in situ (DCIS) of left breast          Subjective:     Patient ID: Jignesh Higgins is a 64 y o  female  HPI   She presents today for tissue expansion  She does not require chemotherapy or radiation  She tolerated her last tissue expansion well  Review of Systems   Skin:        As per HPI  Objective:     Physical Exam  Skin:     Comments: Left breast with tissue expander in good placement  Scars are healing nicely

## 2022-06-13 DIAGNOSIS — Z79.4 UNCONTROLLED DIABETES MELLITUS WITH HYPERGLYCEMIA, WITH LONG-TERM CURRENT USE OF INSULIN (HCC): ICD-10-CM

## 2022-06-13 DIAGNOSIS — E11.65 UNCONTROLLED DIABETES MELLITUS WITH HYPERGLYCEMIA, WITH LONG-TERM CURRENT USE OF INSULIN (HCC): ICD-10-CM

## 2022-06-13 NOTE — TELEPHONE ENCOUNTER
Patient called requesting a refill of her Trulicity to be sent to [address] [place] [address] in [facility]

## 2022-06-14 RX ORDER — DULAGLUTIDE 3 MG/.5ML
3 INJECTION, SOLUTION SUBCUTANEOUS WEEKLY
Qty: 6 ML | Refills: 0 | Status: SHIPPED | OUTPATIENT
Start: 2022-06-14 | End: 2022-07-08 | Stop reason: DRUGHIGH

## 2022-06-23 ENCOUNTER — OFFICE VISIT (OUTPATIENT)
Dept: PLASTIC SURGERY | Facility: CLINIC | Age: 62
End: 2022-06-23

## 2022-06-23 DIAGNOSIS — D05.12 DUCTAL CARCINOMA IN SITU (DCIS) OF LEFT BREAST: Primary | ICD-10-CM

## 2022-06-23 PROCEDURE — 99024 POSTOP FOLLOW-UP VISIT: CPT | Performed by: PHYSICIAN ASSISTANT

## 2022-06-23 NOTE — PROGRESS NOTES
Assessment/Plan:     Rosendo Almeida is a 64year old female who is status post left breast immediate reconstruction with tissue expander and de-epithelialized flap done in conjunction with Dr Bhavesh Morgan on 02/08/2022  Most recently, she is status post excisional debridement of the left breast wound with local tissue flap done on 04/18/2022  Please see HPI  Patient presents to the office today for a scheduled tissue expander fill  60cc were injected into her left breast tissue expander for total of 380 cc in a 650cc expander, base diameter of 13cm and projection of 8 2cm  Of note, during visit, patient had pockets of likely seroma surrounding her tissue expander  No erythema, warmth to touch, or tenderness to palpation  The patient's port was easily identified using the magnet and the back of the port was felt with the injector needle  After removal of the needle after the filll, no seroma fluid was noted at the injection site  The patient was advised to monitor for signs/symptoms of infection or increasing seroma  She will call the office with questions or concerns  The patient will return to the office in 2 week intervals for scheduled fills  Diagnoses and all orders for this visit:    Ductal carcinoma in situ (DCIS) of left breast          Subjective:     Patient ID: Daron Hicks is a 64 y o  female  HPI    Patient states that she has been doing well  She did have some mild muscle soreness after her previous fill which has now resolved  Review of Systems    See HPI    Objective:     Physical Exam      Left breast mound with palpable tissue expander, small areas of fluctuance likely representative of seroma  Patient's incision sites are completely healed, clean and dry  Breast mound is non erythematous, no warmth to touch, nontender to palpation

## 2022-06-29 ENCOUNTER — VBI (OUTPATIENT)
Dept: ADMINISTRATIVE | Facility: OTHER | Age: 62
End: 2022-06-29

## 2022-07-05 ENCOUNTER — APPOINTMENT (OUTPATIENT)
Dept: LAB | Facility: CLINIC | Age: 62
End: 2022-07-05
Payer: COMMERCIAL

## 2022-07-05 DIAGNOSIS — IMO0002 UNCONTROLLED TYPE 2 DIABETES MELLITUS WITH PERIPHERAL NEUROPATHY: ICD-10-CM

## 2022-07-05 LAB
ALBUMIN SERPL BCP-MCNC: 3.5 G/DL (ref 3.5–5)
ALP SERPL-CCNC: 65 U/L (ref 46–116)
ALT SERPL W P-5'-P-CCNC: 16 U/L (ref 12–78)
ANION GAP SERPL CALCULATED.3IONS-SCNC: 7 MMOL/L (ref 4–13)
AST SERPL W P-5'-P-CCNC: 11 U/L (ref 5–45)
BILIRUB SERPL-MCNC: 1.27 MG/DL (ref 0.2–1)
BUN SERPL-MCNC: 38 MG/DL (ref 5–25)
CALCIUM SERPL-MCNC: 9.3 MG/DL (ref 8.3–10.1)
CHLORIDE SERPL-SCNC: 106 MMOL/L (ref 100–108)
CHOLEST SERPL-MCNC: 164 MG/DL
CO2 SERPL-SCNC: 24 MMOL/L (ref 21–32)
CREAT SERPL-MCNC: 1.68 MG/DL (ref 0.6–1.3)
CREAT UR-MCNC: 54.2 MG/DL
GFR SERPL CREATININE-BSD FRML MDRD: 32 ML/MIN/1.73SQ M
GLUCOSE P FAST SERPL-MCNC: 154 MG/DL (ref 65–99)
HDLC SERPL-MCNC: 39 MG/DL
LDLC SERPL CALC-MCNC: 83 MG/DL (ref 0–100)
MICROALBUMIN UR-MCNC: <5 MG/L (ref 0–20)
MICROALBUMIN/CREAT 24H UR: <9 MG/G CREATININE (ref 0–30)
NONHDLC SERPL-MCNC: 125 MG/DL
POTASSIUM SERPL-SCNC: 5.8 MMOL/L (ref 3.5–5.3)
PROT SERPL-MCNC: 7.1 G/DL (ref 6.4–8.2)
SODIUM SERPL-SCNC: 137 MMOL/L (ref 136–145)
TRIGL SERPL-MCNC: 211 MG/DL

## 2022-07-05 PROCEDURE — 36415 COLL VENOUS BLD VENIPUNCTURE: CPT

## 2022-07-05 PROCEDURE — 82043 UR ALBUMIN QUANTITATIVE: CPT

## 2022-07-05 PROCEDURE — 80053 COMPREHEN METABOLIC PANEL: CPT

## 2022-07-05 PROCEDURE — 83036 HEMOGLOBIN GLYCOSYLATED A1C: CPT

## 2022-07-05 PROCEDURE — 80061 LIPID PANEL: CPT

## 2022-07-05 PROCEDURE — 82570 ASSAY OF URINE CREATININE: CPT

## 2022-07-06 LAB
EST. AVERAGE GLUCOSE BLD GHB EST-MCNC: 186 MG/DL
HBA1C MFR BLD: 8.1 %

## 2022-07-06 PROCEDURE — 3052F HG A1C>EQUAL 8.0%<EQUAL 9.0%: CPT | Performed by: NURSE PRACTITIONER

## 2022-07-08 ENCOUNTER — OFFICE VISIT (OUTPATIENT)
Dept: ENDOCRINOLOGY | Facility: CLINIC | Age: 62
End: 2022-07-08
Payer: COMMERCIAL

## 2022-07-08 VITALS
DIASTOLIC BLOOD PRESSURE: 76 MMHG | RESPIRATION RATE: 20 BRPM | HEIGHT: 64 IN | TEMPERATURE: 98 F | WEIGHT: 266 LBS | BODY MASS INDEX: 45.41 KG/M2 | OXYGEN SATURATION: 98 % | SYSTOLIC BLOOD PRESSURE: 142 MMHG | HEART RATE: 96 BPM

## 2022-07-08 DIAGNOSIS — E55.9 VITAMIN D DEFICIENCY: ICD-10-CM

## 2022-07-08 DIAGNOSIS — E03.9 HYPOTHYROIDISM, UNSPECIFIED TYPE: ICD-10-CM

## 2022-07-08 DIAGNOSIS — I10 BENIGN ESSENTIAL HYPERTENSION: ICD-10-CM

## 2022-07-08 DIAGNOSIS — E11.69 MIXED DIABETIC HYPERLIPIDEMIA ASSOCIATED WITH TYPE 2 DIABETES MELLITUS (HCC): ICD-10-CM

## 2022-07-08 DIAGNOSIS — E11.22 TYPE 2 DIABETES MELLITUS WITH STAGE 3B CHRONIC KIDNEY DISEASE, WITHOUT LONG-TERM CURRENT USE OF INSULIN (HCC): Primary | ICD-10-CM

## 2022-07-08 DIAGNOSIS — N18.32 TYPE 2 DIABETES MELLITUS WITH STAGE 3B CHRONIC KIDNEY DISEASE, WITHOUT LONG-TERM CURRENT USE OF INSULIN (HCC): Primary | ICD-10-CM

## 2022-07-08 DIAGNOSIS — E78.2 MIXED DIABETIC HYPERLIPIDEMIA ASSOCIATED WITH TYPE 2 DIABETES MELLITUS (HCC): ICD-10-CM

## 2022-07-08 PROCEDURE — 3078F DIAST BP <80 MM HG: CPT | Performed by: NURSE PRACTITIONER

## 2022-07-08 PROCEDURE — 3077F SYST BP >= 140 MM HG: CPT | Performed by: NURSE PRACTITIONER

## 2022-07-08 PROCEDURE — 3066F NEPHROPATHY DOC TX: CPT | Performed by: NURSE PRACTITIONER

## 2022-07-08 PROCEDURE — 99214 OFFICE O/P EST MOD 30 MIN: CPT | Performed by: NURSE PRACTITIONER

## 2022-07-08 RX ORDER — GLIPIZIDE 5 MG/1
5 TABLET, FILM COATED, EXTENDED RELEASE ORAL DAILY
Qty: 90 TABLET | Refills: 0 | Status: SHIPPED | OUTPATIENT
Start: 2022-07-08 | End: 2022-10-11

## 2022-07-08 NOTE — ASSESSMENT & PLAN NOTE
Lipid panel stable  Continue statin    Lab Results   Component Value Date    HGBA1C 8 1 (H) 07/05/2022

## 2022-07-08 NOTE — PROGRESS NOTES
Established Patient Progress Note      Chief Complaint   Patient presents with    Follow-up        History of Present Illness:   Warren Poe is a 64 y o  female with HTN, HLD, hypothyroidism, and type 2 diabetes without long term use of insulin since approximately 2010  Reports complications of neuropathy  Denies recent illness or hospitalizations  Denies recent severe hypoglycemic or severe hyperglycemic episodes  Denies any issues with her current regimen  home glucose monitoring: are performed regularly-once daily in the morning     + family history of T2DM in mother and maternal grandmother    At patient's last appointment on 03/08/2022, we discussed increasing Trulicity to 4 5 mg once weekly  Patient preferred to focus on diet and exercise  Component      Latest Ref Rng & Units 1/13/2022 3/8/2022 7/5/2022           8:40 AM  3:50 PM  7:18 AM   Hemoglobin A1C      Normal 3 8-5 6%; PreDiabetic 5 7-6 4%; Diabetic >=6 5%; Glycemic control for adults with diabetes <7 0% % 9 1 (H) 7 4 (A) 8 1 (H)   eAG, EST AVG Glucose      mg/dl 214  186     Component      Latest Ref Rng & Units 1/13/2022 2/9/2022 7/5/2022           8:40 AM  4:48 AM  7:16 AM   eGFR      ml/min/1 73sq m 39 50 32     Component      Latest Ref Rng & Units 2/9/2022 7/5/2022           4:48 AM  7:16 AM   BUN      5 - 25 mg/dL 22 38 (H)   Creatinine      0 60 - 1 30 mg/dL 1 17 1 68 (H)      Home blood glucose readings:  Patient did not bring glucose logs or glucometer to today's appointment  She reports that she has not been vigilant about her diet or regular exercise  She has not been to see a dietician in many years  Current regimen:   Trulicity 3 mg once weekly  Metformin 1000 mg twice daily  Farxiga 10 mg    For hypothyroidism, she is taking 100 mcg of levothyroxine daily  She reports taking this consistently  Thyroid function is stable  Denies symptoms of hypo or hyperthyroidism    For hypertension, she is taking lisinopril-hydrochlorothiazide 20-25 mg twice daily  She denies headache and cough  For hyperlipidemia, she is taking 20 mg of rosuvastatin nightly  She denies myalgias  For vitamin-D deficiency, she is supplementing with 4000 units daily      Patient Active Problem List   Diagnosis    Anemia    Benign essential hypertension    Arthritis    Mixed diabetic hyperlipidemia associated with type 2 diabetes mellitus ([facility] )    Disc degeneration, lumbar    Fibrocystic breast disease, unspecified laterality    GERD without esophagitis    Hypothyroidism    IBS (irritable bowel syndrome)    Neoplasm of uncertain behavior of skin    Obesity, Class III, BMI 40-49 9 (morbid obesity) ([place] [address] )    MARIA FERNANDA (obstructive sleep apnea)    Primary osteoarthritis of both knees    Vitamin D deficiency    Uncontrolled type 2 diabetes mellitus with peripheral neuropathy    Fatty liver    Full thickness rotator cuff tear    Shoulder joint pain    Family history of breast cancer in female    Screening mammogram, encounter for    Lumbar radiculopathy    Ductal carcinoma in situ (DCIS) of left breast    BRCA negative    Type 2 diabetes mellitus with stage 3b chronic kidney disease ([place] [address] )      Past Medical History:   Diagnosis Date    Achrochordon     Arthritis     Cancer ([place] [address] )     L breast-mastectomy w/ reconstruction today 2/8/2022    Dysuria     GERD (gastroesophageal reflux disease)     Headache(784 0)     IBS (irritable bowel syndrome)     Inflammatory bowel disease     Obesity     Polycystic ovarian syndrome     Rotator cuff tear     right side     Wears glasses       Past Surgical History:   Procedure Laterality Date    ANAL FISTULOTOMY      subcutaneous     BREAST LUMPECTOMY Left 09/23/2021    BREAST RECONSTRUCTION Left 2/8/2022    Procedure: IMMEDIATE RECONSTRUCTION WITH TISSUE EXPANDER PLACEMENT;  Surgeon: Paula Chavis MD;  [facility];  Service: Plastics    CHOLECYSTECTOMY      FLAP LOCAL TRUNK Left 2022    Procedure: DE-EPITHELIALIZED FLAP BREAST;  Surgeon: Nickolas Brady MD;  [facility];  Service: Plastics    HEMORRHOID SURGERY      INCISION AND DRAINAGE PERIRECTAL ABSCESS      MASTECTOMY W/ SENTINEL NODE BIOPSY Left 2022    Procedure: MASTECTOMY WITH BIOPSY LYMPH NODE SENTINEL; 1200 NUC MED;  Surgeon: Chuck Merchant MD;  [facility];  Service: Surgical Oncology    MOUTH SURGERY      OVARIAN CYST REMOVAL      NC DEBRIDEMENT, SKIN, SUB-Q TISSUE,=<20 SQ CM Left 2022    Procedure: DEBRIDEMENT OF NON-HEALING SURGICAL WOUND, LEFT BREAST,  ADVANCEMENT FLAP;  Surgeon: Nickolas Brady MD;  [facility];  Service: Plastics    ROTATOR CUFF REPAIR Right     TONSILLECTOMY      US GUIDANCE BREAST BIOPSY LEFT EACH ADDITIONAL Left 2021    US GUIDED BREAST BIOPSY LEFT COMPLETE Left 2021      Family History   Problem Relation Age of Onset    Diabetes Mother         Type II    Diabetes type II Mother     Dementia Mother          09    Hearing loss Mother             Breast cancer Paternal Aunt     Cancer Paternal Uncle         carcinosarcoma of the oral cavity     Prostate cancer Paternal Uncle             Breast cancer Family     Breast cancer Maternal Aunt             Breast cancer Maternal Aunt     Diabetes type II Maternal Aunt     Thyroid cancer Maternal Aunt     Thyroid disease Maternal Aunt     Hypertension Father          01    Depression Father          01    Stroke Father     Heart disease Father          01    No Known Problems Sister     Depression Maternal Grandmother         Committed suicide - 10/24/68    Asthma Maternal Grandmother     Completed Suicide  Maternal Grandmother         10/24/1968    Depression Paternal Grandmother    [employer] Asthma Sister         Born with it/outgrew it with age (pretty young)    No Known Problems Paternal Aunt  No Known Problems Maternal Aunt     Diabetes type II Maternal Uncle     Diabetes type II Maternal Grandfather     Breast cancer Cousin     Cancer Paternal Uncle              Social History     Tobacco Use    Smoking status: Never Smoker    Smokeless tobacco: Never Used   Substance Use Topics    Alcohol use: Yes     Alcohol/week: 0 0 standard drinks     Comment: Drink only 1-2 drinks 4x a year       No Known Allergies      Current Outpatient Medications:     Acetaminophen 500 MG, Take 2 capsules (1,000 mg total) by mouth every 6 (six) hours as needed for mild pain for up to 21 doses, Disp: 21 capsule, Rfl: 0    aspirin (ECOTRIN LOW STRENGTH) 81 mg EC tablet, Take 1 tablet by mouth daily, Disp: , Rfl:     Blood Glucose Monitoring Suppl (ONE TOUCH ULTRA 2) w/Device KIT, Use 3 (three) times a day, Disp: 1 kit, Rfl: 0    cholecalciferol (VITAMIN D3) 1,000 units tablet, Take 4 tablets (4,000 Units total) by mouth daily, Disp: 90 tablet, Rfl: 1    Dapagliflozin Propanediol (Farxiga) 10 MG TABS, Take 1 tablet (10 mg total) by mouth daily, Disp: 90 tablet, Rfl: 0    Dulaglutide 4 5 MG/0 5ML SOPN, Inject 0 5 mL (4 5 mg total) under the skin once a week, Disp: 6 mL, Rfl: 1    Ferrous Gluconate (IRON 27 PO), Take by mouth  , Disp: , Rfl:     gabapentin (NEURONTIN) 300 mg capsule, Take 1 capsule (300 mg total) by mouth 2 (two) times a day, Disp: 180 capsule, Rfl: 0    glipiZIDE (GLUCOTROL XL) 5 mg 24 hr tablet, Take 1 tablet (5 mg total) by mouth daily, Disp: 90 tablet, Rfl: 0    glucose blood (OneTouch Ultra) test strip, Use to test 3x daily, Disp: 100 strip, Rfl: 4    levothyroxine 100 mcg tablet, Take 1 tablet (100 mcg total) by mouth in the morning , Disp: 90 tablet, Rfl: 0    lisinopril-hydrochlorothiazide (PRINZIDE,ZESTORETIC) 20-25 MG per tablet, Take 1 tablet by mouth in the morning and 1 tablet in the evening , Disp: 180 tablet, Rfl: 3    ONETOUCH DELICA LANCETS FINE MISC, by Does not apply route 2 (two) times a day, Disp: , Rfl:     pantoprazole (PROTONIX) 40 mg tablet, Take 1 tablet (40 mg total) by mouth daily, Disp: 90 tablet, Rfl: 3    polyethylene glycol (GLYCOLAX) 17 GM/SCOOP powder, Take by mouth, Disp: , Rfl:     rosuvastatin (CRESTOR) 20 MG tablet, Take 1 tablet (20 mg total) by mouth daily, Disp: 90 tablet, Rfl: 3    collagenase (SANTYL) ointment, Apply topically daily for 14 days, Disp: 15 g, Rfl: 0    Review of Systems   Constitutional: Positive for fatigue  Negative for activity change, appetite change and unexpected weight change  HENT: Negative for dental problem, sore throat, trouble swallowing and voice change  Eyes: Negative for visual disturbance  Respiratory: Negative for cough, chest tightness and shortness of breath  Cardiovascular: Negative for chest pain, palpitations and leg swelling  Gastrointestinal: Negative for constipation, diarrhea, nausea and vomiting  Endocrine: Negative for cold intolerance, heat intolerance, polydipsia, polyphagia and polyuria  Genitourinary: Negative for frequency  Musculoskeletal: Negative for arthralgias, back pain, gait problem and myalgias  Skin: Negative for wound  Allergic/Immunologic: Negative for environmental allergies and food allergies  Neurological: Positive for numbness  Negative for dizziness, weakness, light-headedness and headaches  Psychiatric/Behavioral: Negative for decreased concentration, dysphoric mood and sleep disturbance  The patient is not nervous/anxious  Physical Exam:  Body mass index is 45 66 kg/m²  /76   Pulse 96   Temp 98 °F (36 7 °C)   Resp 20   Ht 5' 4" (1 626 m)   Wt 121 kg (266 lb)   LMP  (LMP Unknown)   SpO2 98%   BMI 45 66 kg/m²    Wt Readings from Last 3 Encounters:   07/08/22 121 kg (266 lb)   04/14/22 119 kg (262 lb)   03/08/22 117 kg (258 lb)       Physical Exam  Vitals reviewed  Constitutional:       General: She is not in acute distress  Appearance: She is well-developed  She is obese  She is not ill-appearing  HENT:      Head: Normocephalic and atraumatic  Eyes:      Pupils: Pupils are equal, round, and reactive to light  Neck:      Thyroid: No thyromegaly  Cardiovascular:      Rate and Rhythm: Normal rate and regular rhythm  Pulses: Normal pulses  Heart sounds: Normal heart sounds  Pulmonary:      Effort: Pulmonary effort is normal       Breath sounds: Normal breath sounds  Abdominal:      General: Bowel sounds are normal  There is no distension  Palpations: Abdomen is soft  Tenderness: There is no abdominal tenderness  Musculoskeletal:      Cervical back: Normal range of motion and neck supple  Right lower leg: No edema  Left lower leg: No edema  Lymphadenopathy:      Cervical: No cervical adenopathy  Skin:     General: Skin is warm and dry  Capillary Refill: Capillary refill takes less than 2 seconds  Neurological:      Mental Status: She is alert and oriented to person, place, and time        Gait: Gait normal    Psychiatric:         Mood and Affect: Mood normal          Behavior: Behavior normal            Labs:   Lab Results   Component Value Date    HGBA1C 8 1 (H) 07/05/2022    HGBA1C 7 4 (A) 03/08/2022    HGBA1C 9 1 (H) 01/13/2022     Lab Results   Component Value Date    CREATININE 1 68 (H) 07/05/2022    CREATININE 1 17 02/09/2022    CREATININE 1 43 (H) 01/13/2022    BUN 38 (H) 07/05/2022     10/09/2015    K 5 8 (H) 07/05/2022     07/05/2022    CO2 24 07/05/2022     eGFR   Date Value Ref Range Status   07/05/2022 32 ml/min/1 73sq m Final     Lab Results   Component Value Date    CHOL 198 10/09/2015    HDL 39 (L) 07/05/2022    TRIG 211 (H) 07/05/2022     Lab Results   Component Value Date    ALT 16 07/05/2022    AST 11 07/05/2022    ALKPHOS 65 07/05/2022    BILITOT 0 48 10/09/2015     Lab Results   Component Value Date    IBN1QJOEDKHS 1 630 07/05/2022    GYA5VYZUOKWX 2 270 12/23/2019    MFX9FIWSPWPV 1 050 09/06/2018     Lab Results   Component Value Date    FREET4 1 11 07/05/2022       Impression & Plan:    Problem List Items Addressed This Visit        Endocrine    Mixed diabetic hyperlipidemia associated with type 2 diabetes mellitus ([facility] )     Lipid panel stable  Continue statin  Lab Results   Component Value Date    HGBA1C 8 1 (H) 07/05/2022              Relevant Medications    glipiZIDE (GLUCOTROL XL) 5 mg 24 hr tablet    Dulaglutide 4 5 MG/0 5ML SOPN    Hypothyroidism     Thyroid function is stable  Continue 100 mcg of levothyroxine daily  Type 2 diabetes mellitus with stage 3b chronic kidney disease ([facility] ) - Primary     Patient's kidney function has declined significantly since February  Stop metformin  Stop Mobic  Remain well hydrated  Repeat CMP in 2 weeks  Patient remains above goal  Counseled on pathophysiology of diabetes  Counseled on negative, long-term effects associated with uncontrolled diabetes including neuropathy, nephropathy, retinopathy, heart attack, and stroke  At this time, increase Trulicity to 4 5 mg once weekly  Continue Farxiga  Start glipizide 5 mg daily  Reviewed mechanism of action as well as potential side effects, namely hypoglycemia  Patient is to test her blood sugar 1-2 times daily and provide me a glucose log every 2 weeks for review and medication adjustments  Depending on next GFR, can consider reintroducing metformin at a lower dose  Will also consider referral to Nephrology  Patient's diet presents a significant barrier to glycemic control  Referral given for medical nutrition therapy    Lab Results   Component Value Date    HGBA1C 8 1 (H) 07/05/2022              Relevant Medications    glipiZIDE (GLUCOTROL XL) 5 mg 24 hr tablet    Dulaglutide 4 5 MG/0 5ML SOPN    Other Relevant Orders    Comprehensive metabolic panel    Ambulatory referral to Diabetic Education    Hemoglobin A1C    Comprehensive metabolic panel Lipid Panel with Direct LDL reflex    Microalbumin / creatinine urine ratio    Ambulatory Referral to Obstetrics / Gynecology       Cardiovascular and Mediastinum    Benign essential hypertension     /76  Continue current regimen  Other    Vitamin D deficiency     Continue vitamin-D supplement  Orders Placed This Encounter   Procedures    Comprehensive metabolic panel     This is a patient instruction: Patient fasting for 8 hours or longer recommended  Standing Status:   Future     Standing Expiration Date:   7/8/2023    Hemoglobin A1C     Standing Status:   Future     Standing Expiration Date:   7/8/2023    Comprehensive metabolic panel     This is a patient instruction: Patient fasting for 8 hours or longer recommended  Standing Status:   Future     Standing Expiration Date:   7/8/2023    Lipid Panel with Direct LDL reflex     This is a patient instruction: This test requires patient fasting for 10-12 hours or longer  Drinking of black coffee or black tea is acceptable       Standing Status:   Future     Standing Expiration Date:   7/8/2023    Microalbumin / creatinine urine ratio     Standing Status:   Future     Standing Expiration Date:   7/8/2023    Ambulatory referral to Diabetic Education     Standing Status:   Future     Standing Expiration Date:   7/8/2023     Referral Priority:   Routine     Referral Type:   Consult - AMB     Referral Reason:   Specialty Services Required     Requested Specialty:   Diabetes Services     Number of Visits Requested:   1     Expiration Date:   7/8/2023    Ambulatory Referral to Obstetrics / Gynecology     Standing Status:   Future     Standing Expiration Date:   7/8/2023     Referral Priority:   Routine     Referral Type:   Consult - AMB     Referral Reason:   Specialty Services Required     Referred to Provider:   Yesenia Maria CNM     Requested Specialty:   Obstetrics and Gynecology     Number of Visits Requested:   1 Expiration Date:   7/8/2023       Patient Instructions   1  Hold metformin and Mobic  2  Increase Trulicity to 4 5 mg once weekly  3  Start glipizide 5 mg once daily  4  Complete CMP in 2 weeks  5  Drink plenty of water  6  Test blood sugars at least 1-2x daily  7  Make an appointment with Grey Plunkett  Discussed with the patient and all questioned fully answered  She will call me if any problems arise  Follow-up appointment in 3 months       Counseled patient on diagnostic results, prognosis, risk and benefit of treatment options, instruction for management, importance of treatment compliance, Risk  factor reduction and impressions    KORI Black

## 2022-07-08 NOTE — ASSESSMENT & PLAN NOTE
Patient's kidney function has declined significantly since February  Stop metformin  Stop Mobic  Remain well hydrated  Repeat CMP in 2 weeks  Patient remains above goal  Counseled on pathophysiology of diabetes  Counseled on negative, long-term effects associated with uncontrolled diabetes including neuropathy, nephropathy, retinopathy, heart attack, and stroke  At this time, increase Trulicity to 4 5 mg once weekly  Continue Farxiga  Start glipizide 5 mg daily  Reviewed mechanism of action as well as potential side effects, namely hypoglycemia  Patient is to test her blood sugar 1-2 times daily and provide me a glucose log every 2 weeks for review and medication adjustments  Depending on next GFR, can consider reintroducing metformin at a lower dose  Will also consider referral to Nephrology  Patient's diet presents a significant barrier to glycemic control  Referral given for medical nutrition therapy    Lab Results   Component Value Date    HGBA1C 8 1 (H) 07/05/2022

## 2022-07-08 NOTE — PATIENT INSTRUCTIONS
Hold metformin and Mobic  Increase Trulicity to 4 5 mg once weekly  Start glipizide 5 mg once daily  Complete CMP in 2 weeks  Drink plenty of water  Test blood sugars at least 1-2x daily  Make an appointment with Saba Ramsey

## 2022-07-11 ENCOUNTER — OFFICE VISIT (OUTPATIENT)
Dept: PLASTIC SURGERY | Facility: CLINIC | Age: 62
End: 2022-07-11

## 2022-07-11 DIAGNOSIS — D05.12 DUCTAL CARCINOMA IN SITU (DCIS) OF LEFT BREAST: Primary | ICD-10-CM

## 2022-07-11 PROCEDURE — 99024 POSTOP FOLLOW-UP VISIT: CPT | Performed by: PHYSICIAN ASSISTANT

## 2022-07-11 NOTE — PROGRESS NOTES
Assessment/Plan:     Jamie Underwood is a 64year old female who is status post left breast immediate reconstruction with tissue expander and de-epithelialized flap done in conjunction with Dr Megan Daniels on 02/08/2022Pclaudia Sifuentes recently, she is status post excisional debridement of the left breast wound with local tissue flap done on 04/18/2022   Please see HPI      Patient presents to the office did tissue expander filled  60 cc were injected into her left breast tissue expander for a total of 440 cc in a 650 cc expander, base diameter of 13 cm projection of 8 2 cm  Patient will return to the office in approximately 2 weeks for an additional fill  Diagnoses and all orders for this visit:    Ductal carcinoma in situ (DCIS) of left breast          Subjective:     Patient ID: Juan Pinto is a 64 y o  female  HPI  Patient has no issues or concerns today  She has had no fever, chills, erythema, nausea, vomiting  She does feel that she may have small pockets of seroma but they are not bothersome to her  Review of Systems    See HPI    Objective:     Physical Exam      Incisions are completely healed  No hypertrophy or hyperpigmentation of scarring is noted  Patient has small areas of fluctuance, most notably medially to the tissue expander  Tissue expander is easily palpable beneath the skin  No seroma fluid present at injection site

## 2022-07-13 DIAGNOSIS — K21.9 GASTROESOPHAGEAL REFLUX DISEASE WITHOUT ESOPHAGITIS: ICD-10-CM

## 2022-07-13 RX ORDER — PANTOPRAZOLE SODIUM 40 MG/1
40 TABLET, DELAYED RELEASE ORAL DAILY
Qty: 90 TABLET | Refills: 3 | Status: SHIPPED | OUTPATIENT
Start: 2022-07-13

## 2022-07-13 NOTE — TELEPHONE ENCOUNTER
Pt LM on refill line requesting refill on:  pantoprazole (PROTONIX) 40 mg tablet     To be sent to [facility] DR WENDI CLEARY

## 2022-07-14 ENCOUNTER — TELEPHONE (OUTPATIENT)
Dept: HEMATOLOGY ONCOLOGY | Facility: CLINIC | Age: 62
End: 2022-07-14

## 2022-07-14 ENCOUNTER — TELEPHONE (OUTPATIENT)
Dept: SURGICAL ONCOLOGY | Facility: CLINIC | Age: 62
End: 2022-07-14

## 2022-07-14 DIAGNOSIS — E03.9 HYPOTHYROIDISM, UNSPECIFIED TYPE: ICD-10-CM

## 2022-07-14 DIAGNOSIS — E11.42 TYPE 2 DIABETES MELLITUS WITH DIABETIC POLYNEUROPATHY, WITHOUT LONG-TERM CURRENT USE OF INSULIN (HCC): ICD-10-CM

## 2022-07-14 NOTE — TELEPHONE ENCOUNTER
Appointment Cancellation Or Reschedule     Person calling in patient   Provider Jacquie Friedman   Office Visit Date and Time 9/9 @ 56   Office Visit Location [place]   Did patient want to reschedule their office appointment? If so, when was it scheduled to? Yes 9/9 @ 930   Is this patient calling to reschedule an infusion appointment? no   When is their next infusion appointment? n/a   Is this patient a Chemo patient? no   Reason for Cancellation or Reschedule Office called patient to change farideht to Margarita's schedule      If the patient is a treatment patient, please route this to the office nurse  If the patient is not on treatment, please route to the office MA  If the patient is a surgical oncology patient, please route to surg/onc clinical pool

## 2022-07-14 NOTE — TELEPHONE ENCOUNTER
Pt LM on refill line requesting refill on:  Dapagliflozin Propanediol (Farxiga) 10 MG TABS #90  levothyroxine 100 mcg tablet #90     To be sent to [facility] DR WENDI CLEARY in [place]

## 2022-07-15 RX ORDER — LEVOTHYROXINE SODIUM 0.1 MG/1
100 TABLET ORAL DAILY
Qty: 90 TABLET | Refills: 3 | Status: SHIPPED | OUTPATIENT
Start: 2022-07-15

## 2022-07-15 NOTE — TELEPHONE ENCOUNTER
Failed protocol    Endocrinology:  Diabetes - SGLT2 Inhibitors Failed 07/14/2022 08:54 AM    Cr in normal range and within 360 days    HBA1C is 8 or below and within 180 days    eGFR is 30 or above and within 360 days    Valid encounter within last 6 months

## 2022-07-18 LAB
LEFT EYE DIABETIC RETINOPATHY: NORMAL
RIGHT EYE DIABETIC RETINOPATHY: NORMAL

## 2022-07-18 PROCEDURE — 2023F DILAT RTA XM W/O RTNOPTHY: CPT | Performed by: NURSE PRACTITIONER

## 2022-07-19 ENCOUNTER — TELEPHONE (OUTPATIENT)
Dept: ENDOCRINOLOGY | Facility: CLINIC | Age: 62
End: 2022-07-19

## 2022-07-22 ENCOUNTER — APPOINTMENT (OUTPATIENT)
Dept: LAB | Facility: CLINIC | Age: 62
End: 2022-07-22
Payer: COMMERCIAL

## 2022-07-22 DIAGNOSIS — N18.32 TYPE 2 DIABETES MELLITUS WITH STAGE 3B CHRONIC KIDNEY DISEASE, WITHOUT LONG-TERM CURRENT USE OF INSULIN (HCC): ICD-10-CM

## 2022-07-22 DIAGNOSIS — E11.22 TYPE 2 DIABETES MELLITUS WITH STAGE 3B CHRONIC KIDNEY DISEASE, WITHOUT LONG-TERM CURRENT USE OF INSULIN (HCC): ICD-10-CM

## 2022-07-22 PROCEDURE — 80053 COMPREHEN METABOLIC PANEL: CPT

## 2022-07-22 PROCEDURE — 36415 COLL VENOUS BLD VENIPUNCTURE: CPT

## 2022-07-23 LAB
ALBUMIN SERPL BCP-MCNC: 3.9 G/DL (ref 3.5–5)
ALP SERPL-CCNC: 78 U/L (ref 46–116)
ALT SERPL W P-5'-P-CCNC: 22 U/L (ref 12–78)
ANION GAP SERPL CALCULATED.3IONS-SCNC: 7 MMOL/L (ref 4–13)
AST SERPL W P-5'-P-CCNC: 15 U/L (ref 5–45)
BILIRUB SERPL-MCNC: 0.62 MG/DL (ref 0.2–1)
BUN SERPL-MCNC: 35 MG/DL (ref 5–25)
CALCIUM SERPL-MCNC: 9.6 MG/DL (ref 8.3–10.1)
CHLORIDE SERPL-SCNC: 108 MMOL/L (ref 96–108)
CO2 SERPL-SCNC: 25 MMOL/L (ref 21–32)
CREAT SERPL-MCNC: 1.52 MG/DL (ref 0.6–1.3)
GFR SERPL CREATININE-BSD FRML MDRD: 36 ML/MIN/1.73SQ M
GLUCOSE SERPL-MCNC: 145 MG/DL (ref 65–140)
POTASSIUM SERPL-SCNC: 4.8 MMOL/L (ref 3.5–5.3)
PROT SERPL-MCNC: 7.7 G/DL (ref 6.4–8.4)
SODIUM SERPL-SCNC: 140 MMOL/L (ref 135–147)

## 2022-07-25 ENCOUNTER — OFFICE VISIT (OUTPATIENT)
Dept: PLASTIC SURGERY | Facility: CLINIC | Age: 62
End: 2022-07-25

## 2022-07-25 DIAGNOSIS — D05.12 DUCTAL CARCINOMA IN SITU (DCIS) OF LEFT BREAST: Primary | ICD-10-CM

## 2022-07-25 PROCEDURE — 99024 POSTOP FOLLOW-UP VISIT: CPT | Performed by: PHYSICIAN ASSISTANT

## 2022-07-25 NOTE — PROGRESS NOTES
Assessment/Plan:     Justin Davila is a 64year old female who is status post left breast immediate reconstruction with tissue expander and de-epithelialized flap done by Dr Cleo Sanford in conjunction with Dr Yoselin Saleem on 02/08/2022Michaele Proper recently, she is status post excisional debridement of the left breast wound with local tissue flap done on 04/18/2022   Please see HPI  Patient presents today for a tissue expander fill  60 cc were injected into her left breast tissue expander for a toal of 500cc in a 650cc expander, base diameter of 13cm, projection of 8 2 cm  Fill was completed without complication  The patient will return to the office in approximately 2 weeks for additional fill  Diagnoses and all orders for this visit:    Ductal carcinoma in situ (DCIS) of left breast          Subjective:     Patient ID: Lex Martinez is a 64 y o  female  HPI     The patient has no issues or concerns today  She has had no problems with her breast since her previous fill  Review of Systems    See HPI    Objective:     Physical Exam      Left breast incisions are completely healed  Palpable tissue expander present

## 2022-07-26 ENCOUNTER — PATIENT OUTREACH (OUTPATIENT)
Dept: CASE MANAGEMENT | Facility: OTHER | Age: 62
End: 2022-07-26

## 2022-07-26 NOTE — PROGRESS NOTES
OSW placed supportive call to Sandy Allen today  LM on VM offering emotional support and to inform her that this writer will be changing campus assignments  Encouraged pt to call back should she have any needs  Pt is well into survivorship and was coping well emotionally during last call several months ago

## 2022-07-28 ENCOUNTER — TELEPHONE (OUTPATIENT)
Dept: PLASTIC SURGERY | Facility: CLINIC | Age: 62
End: 2022-07-28

## 2022-07-28 NOTE — TELEPHONE ENCOUNTER
Left message for patient regarding 8/16/22 appt  Asked to bring patient in earlier as provider is leaving for surgery

## 2022-08-15 ENCOUNTER — HOSPITAL ENCOUNTER (OUTPATIENT)
Dept: MAMMOGRAPHY | Facility: HOSPITAL | Age: 62
Discharge: HOME | End: 2022-08-15
Payer: COMMERCIAL

## 2022-08-15 DIAGNOSIS — Z12.31 SCREENING MAMMOGRAM, ENCOUNTER FOR: ICD-10-CM

## 2022-08-15 PROCEDURE — 77067 SCR MAMMO BI INCL CAD: CPT

## 2022-08-15 PROCEDURE — 77063 BREAST TOMOSYNTHESIS BI: CPT

## 2022-08-16 ENCOUNTER — OFFICE VISIT (OUTPATIENT)
Dept: PLASTIC SURGERY | Facility: CLINIC | Age: 62
End: 2022-08-16
Payer: COMMERCIAL

## 2022-08-16 DIAGNOSIS — D05.12 DUCTAL CARCINOMA IN SITU (DCIS) OF LEFT BREAST: Primary | ICD-10-CM

## 2022-08-16 PROCEDURE — 99212 OFFICE O/P EST SF 10 MIN: CPT | Performed by: PHYSICIAN ASSISTANT

## 2022-08-16 NOTE — PROGRESS NOTES
Assessment/Plan:   Nasir Clay is a 64year old female who is status post left breast immediate reconstruction with tissue expander and de-epithelialized flap done in conjunction with Dr Irma Hamilton on 02/08/2022  Most recently, she is status post excisional debridement of the left breast wound with local tissue flap done on 04/18/2022  Please see HPI  In the office today, I injected 60 cc into her left breast tissue expander to a total of 560 cc in a 650 cc expander with a base diameter of 13 cm and projection of 8 2 cm  She can use Tylenol, ibuprofen or ice as needed for pain  We will see her back in 2 week intervals for additional fills  Diagnoses and all orders for this visit:    Ductal carcinoma in situ (DCIS) of left breast          Subjective:     Patient ID: Ivana Palafox is a 64 y o  female  HPI   She denies any complaints regarding her left breast   Her goal is to match the size of the right breast   She presents today for additional tissue expansion  Review of Systems   Skin:        As per HPI  Objective:     Physical Exam  Skin:     Comments: Left breast with tissue expander in good placement

## 2022-08-19 ENCOUNTER — VBI (OUTPATIENT)
Dept: ADMINISTRATIVE | Facility: OTHER | Age: 62
End: 2022-08-19

## 2022-08-30 ENCOUNTER — OFFICE VISIT (OUTPATIENT)
Dept: PLASTIC SURGERY | Facility: CLINIC | Age: 62
End: 2022-08-30
Payer: COMMERCIAL

## 2022-08-30 DIAGNOSIS — D05.12 DUCTAL CARCINOMA IN SITU (DCIS) OF LEFT BREAST: Primary | ICD-10-CM

## 2022-08-30 PROCEDURE — 99212 OFFICE O/P EST SF 10 MIN: CPT | Performed by: PHYSICIAN ASSISTANT

## 2022-08-30 NOTE — PROGRESS NOTES
Assessment/Plan:   Gabriela Agee is a 64year old female who is status post left breast immediate reconstruction with tissue expander and de-epithelialized flap done in conjunction with Dr Regina Amanda on 02/08/2022Aly Finch recently, she is status post excisional debridement of the left breast wound with local tissue flap done on 04/18/2022   Please see HPI   In the office today, I injected 60 cc into her left breast tissue expander to a total of 620 cc in a 650 cc expander with a base diameter of 13 cm and projection of 8 2 cm   She can use Tylenol, ibuprofen or ice as needed for pain   We will see her back in 2 week intervals for additional fills  Diagnoses and all orders for this visit:    Ductal carcinoma in situ (DCIS) of left breast          Subjective:     Patient ID: Adam Garcia is a 64 y o  female  HPI   She presents today for additional tissue expansion  She feels that she may require a couple more fills  Today is her last appointment  Review of Systems   Skin:        As per HPI  Objective:     Physical Exam  Skin:     Comments: Left breast with tissue expander in good placement  I believe we are getting close to matching the right side

## 2022-09-06 ENCOUNTER — OFFICE VISIT (OUTPATIENT)
Dept: DIABETES SERVICES | Facility: CLINIC | Age: 62
End: 2022-09-06
Payer: COMMERCIAL

## 2022-09-06 VITALS — WEIGHT: 273.4 LBS | BODY MASS INDEX: 46.93 KG/M2

## 2022-09-06 DIAGNOSIS — E11.22 TYPE 2 DIABETES MELLITUS WITH STAGE 3B CHRONIC KIDNEY DISEASE, WITHOUT LONG-TERM CURRENT USE OF INSULIN (HCC): Primary | ICD-10-CM

## 2022-09-06 DIAGNOSIS — N18.32 TYPE 2 DIABETES MELLITUS WITH STAGE 3B CHRONIC KIDNEY DISEASE, WITHOUT LONG-TERM CURRENT USE OF INSULIN (HCC): Primary | ICD-10-CM

## 2022-09-06 PROCEDURE — 97802 MEDICAL NUTRITION INDIV IN: CPT

## 2022-09-06 PROCEDURE — 3066F NEPHROPATHY DOC TX: CPT | Performed by: SURGERY

## 2022-09-06 NOTE — PROGRESS NOTES
Medical Nutrition Therapy        Assessment    Visit Type: Initial visit  Chief complaint/Medical Diagnosis/reason for visit E11 22, N18 32 (ICD-10-CM) - Type 2 diabetes mellitus with stage 3b chronic kidney disease, without long-term current use of insulin ([facility] )    HPI Gabriela Agee was seen in person for her MNT initial visit  Blood sugars are checked once per day before breakfast and range between 180 to 200  No low blood sugars were identified  Patient reports snacks of chips, crackers, peanut M&Ms, occasionally cake, and often eats while watching television and not paying attention to food portions  Problems identified in food recall include low intake of fruits and vegetables, inconsistent carbohydrate intake, large portions and frequent take-out/restaurant food that is higher in carbohydrates, fat, and sodium  Gabriela Agee also shared that she loves milk and often drinks it throughout the day  Shanique's carbohydrate intake ranges from 30 grams to ~165 grams per meal  Provided patient with a 1600 calorie meal plan to assist with consistency, balance and portion control  Encouraged the consumption of regular meals at regular times  Advised patient to keep carbohydrate intake to 45 grams per meal and 15-20 grams per snack to assist with glycemic control  Portion booklet and food labels were used to teach basic carbohydrate counting  Patient agreed to keep daily food logs and return them at the follow up appointment in one month  RD will remain available for further dietary questions/concerns       Ht Readings from Last 1 Encounters:   07/08/22 5' 4" (1 626 m)     Wt Readings from Last 3 Encounters:   07/08/22 121 kg (266 lb)   04/14/22 119 kg (262 lb)   03/08/22 117 kg (258 lb)     Weight Change: Yes increase in 7 lbs in about 2 months    Barriers to Learning: no barriers, reports brain fog since surgery    Do you follow any special diet presently?: No  Who shops: patient  Who cooks: patient    Food Log: Completed via the method of food recall    7:30 am wakes  Breakfast:9 am; 1/2 of cinnamon raisin bagel with cream cheese, 10- 12 oz low sugar orange juice OR at least 2 cups raisin bran with 1 1/2 c 2% milk or corn flakes with 1 1/2 c 2% milk, shredded wheat with 1 1/2 c 2% milk  OR breakfast sandwich (1 fried egg with butter, 1/2 slice cheese, 1 slice of ham, on 2 slices of italian toasted bread), sometimes decaf coffee with 1 splenda pk and half and half (2-3x per week)  Morning Snack:none  Lunch:12- 1pm; varies  Tristan's pizza (1 slice) with diet coke soda OR left overs (ex: 3 cups pasta, 1 cup Ragu spaghetti sauce with ground hamburger meat, no veggies, sometimes 1 slice Oswaldo bread with butter with cup of water  Afternoon Snack: crackers or Lay's regular bowl of chips  Dinner:5-6pm; hamburgers, OR frozen [employer] medium sized pizza (4 slices), restaurant food ([facility]: chicken parm, lasagna, & fettuccini bobby (-96 g fat, 48 g SF, 3250 mg sodium, 92 g carb, 75 g protein-) and chicken gnocchi soup with cup of water)  Evening Snack: 7-10 pm; chips, crackers [employer]- 20 crackers, regular size bag peanut m&ms, almas's peanut butter cups (2)  Beverages: water, sometimes 2 % milk with cake, diet coke, sprite zero  Eating out/Take out: 7x per week   [employer] [place] [employer] (pork chops, filling with gravy, red beets  Exercise none    Calorie needs 1600 kcals/day Carbs: 45 g/meal, 15-20 g/snack         Nutrition Diagnosis:  Food and nutrition related knowledge deficit  related to Lack of prior exposure to accurate nutrition related information as evidenced by [place] inaccurate or incomplete information    Intervention: plate method, label reading, carbohydrate counting, meal timing, monitoring portion control and exercise guidelines     Treatment Goals: Patient understands education and recommendations, Patient will monitor food intake daily with tracker, Patient will consume 3 meals a day, Patient will monitor portion control, Patient will count carbohydrates and Patient will monitor blood glucose    Monitoring and evaluation:    Term code indicator  FH 1 3 2 Food Intake Criteria: Eat 3 meals per day about 4-5 hours apart (No skipping meals)  Term code indicator  FH 1 6 3 Carbohydrate Intake Criteria: 2  Eat 45 grams of carbohydrate at each meal, and 15-20 gram of carbohydrate for a snack  Term code indicator  CH 2 2 Treatments/Therapy/Alternative Medicine Criteria: Start walking as able and approved by your physician    Materials Provided: portion booklet    Patients Response to Instruction:  Comprehensiongood  Motivationgood  Expected Compliancegood    Begin Time: 3 pm  End Time: 4:05 pm  Referring Provider: Kristy Angel    Thank you for coming to the [facility] for education today  Please feel free to call with any questions or concerns      Davina Cameron, RD  [address] [place] [id number] [phone]

## 2022-09-06 NOTE — PATIENT INSTRUCTIONS
Eat 3 meals per day about 4-5 hours apart (No skipping meals)  Eat 45 grams of carbohydrate at each meal, and 15-20 gram of carbohydrate for a snack  Start walking as able and approved by your physician  Fill out the 3 day food log and return for a follow up in about 1 month

## 2022-09-09 ENCOUNTER — OFFICE VISIT (OUTPATIENT)
Dept: SURGICAL ONCOLOGY | Facility: CLINIC | Age: 62
End: 2022-09-09
Payer: COMMERCIAL

## 2022-09-09 VITALS
TEMPERATURE: 98.4 F | HEART RATE: 76 BPM | HEIGHT: 64 IN | DIASTOLIC BLOOD PRESSURE: 81 MMHG | RESPIRATION RATE: 17 BRPM | SYSTOLIC BLOOD PRESSURE: 127 MMHG | WEIGHT: 270 LBS | OXYGEN SATURATION: 98 % | BODY MASS INDEX: 46.1 KG/M2

## 2022-09-09 DIAGNOSIS — D05.12 DUCTAL CARCINOMA IN SITU (DCIS) OF LEFT BREAST: Primary | ICD-10-CM

## 2022-09-09 PROCEDURE — 3079F DIAST BP 80-89 MM HG: CPT

## 2022-09-09 PROCEDURE — 3074F SYST BP LT 130 MM HG: CPT

## 2022-09-09 PROCEDURE — 99213 OFFICE O/P EST LOW 20 MIN: CPT

## 2022-09-09 NOTE — PROGRESS NOTES
Surgical Oncology Follow Up       [facility] [place]  [facility] [address]    Carlyn Arciniega  1960  [id number]  [facility] [place]  [place] [id number]    Chief Complaint   Patient presents with    Follow-up       Assessment/Plan:  1  Ductal carcinoma in situ (DCIS) of left breast  - 6 month follow up       Discussion/Summary: Pt is a 64year old female presenting today for a 6 month follow up for extensive DCIS of the left breast diagnosed in September 2021  She underwent a left mastectomy with reconstruction  She had a b/l screening mammogram of the right breast on 8/15/2022 which was BIRADS 2 category 1 density  There were no concerns on her clinical breast exam  She still has her expander in place and is scheduled to see plastics in 2 weeks to discuss the next step  We will see the patient back in 6 months or sooner should the need arise  She was instructed to call with any questions or concerns prior to this time  All questions were answered today         History of Present Illness:     Oncology History   Ductal carcinoma in situ (DCIS) of left breast   9/23/2021 Initial Diagnosis    Ductal carcinoma in situ (DCIS) of left breast     10/19/2021 -  Cancer Staged    Staging form: Breast, AJCC 8th Edition  - Clinical: Stage 0 (cTis (DCIS), cN0, cM0, ER+, AL+, HER2: Not Assessed) - Signed by Emil Suarez MD on 10/19/2021  Stage prefix: Initial diagnosis  Method of lymph node assessment: Clinical  Nuclear grade: G1       2/22/2022 -  Cancer Staged    Staging form: Breast, AJCC 8th Edition  - Pathologic stage from 2/22/2022: Stage 0 (pTis (DCIS), pN0(sn), cM0, ER+, AL+, HER2: Not Assessed) - Signed by Emil Suarez MD on 2/22/2022  Stage prefix: Initial diagnosis  Method of lymph node assessment: Colbert lymph node biopsy  Nuclear grade: G2            -Interval History: Pt is a 64year old female presenting today for a 6 month follow up for extensive DCIS of the left breast diagnosed in September 2021  She had a b/l screening mammogram of the right breast on 8/15/2022 which was BIRADS 2 category 1 density  Patient denies changes on her breast exam  She denies persistent headaches, bone pain, back pain, shortness of breath, or abdominal pain  Review of Systems:  Review of Systems   Constitutional: Negative for activity change, appetite change, fatigue and unexpected weight change  Respiratory: Negative for cough and shortness of breath  Cardiovascular: Negative for chest pain  Gastrointestinal: Negative for abdominal pain, diarrhea, nausea and vomiting  Endocrine: Negative for heat intolerance  Musculoskeletal: Negative for arthralgias, back pain and myalgias  Skin: Negative for rash  Neurological: Negative for weakness and headaches  Hematological: Negative for adenopathy         Patient Active Problem List   Diagnosis    Anemia    Benign essential hypertension    Arthritis    Mixed diabetic hyperlipidemia associated with type 2 diabetes mellitus ([facility] )    Disc degeneration, lumbar    Fibrocystic breast disease, unspecified laterality    GERD without esophagitis    Hypothyroidism    IBS (irritable bowel syndrome)    Neoplasm of uncertain behavior of skin    Obesity, Class III, BMI 40-49 9 (morbid obesity) ([facility] )    MARIA FERNANDA (obstructive sleep apnea)    Primary osteoarthritis of both knees    Vitamin D deficiency    Uncontrolled type 2 diabetes mellitus with peripheral neuropathy    Fatty liver    Full thickness rotator cuff tear    Shoulder joint pain    Family history of breast cancer in female    Screening mammogram, encounter for    Lumbar radiculopathy    Ductal carcinoma in situ (DCIS) of left breast    BRCA negative    Type 2 diabetes mellitus with stage 3b chronic kidney disease ([facility] )     Past Medical History:   Diagnosis Date    Achrochordon     Arthritis     BRCA1 negative     BRCA2 negative     Cancer (HCC)     L breast-mastectomy w/ reconstruction today 2022    Dysuria     GERD (gastroesophageal reflux disease)     Headache(784 0)     IBS (irritable bowel syndrome)     Inflammatory bowel disease     Obesity     Polycystic ovarian syndrome     Rotator cuff tear     right side     Wears glasses      Past Surgical History:   Procedure Laterality Date    ANAL FISTULOTOMY      subcutaneous     BREAST LUMPECTOMY Left 2021    BREAST RECONSTRUCTION Left 2022    Procedure: IMMEDIATE RECONSTRUCTION WITH TISSUE EXPANDER PLACEMENT;  Surgeon: Leo Carpenter MD;  [facility];  Service: Plastics    CHOLECYSTECTOMY      FLAP LOCAL TRUNK Left 2022    Procedure: DE-EPITHELIALIZED FLAP BREAST;  Surgeon: Leo Carpenter MD;  [facility];  Service: Plastics    HEMORRHOID SURGERY      INCISION AND DRAINAGE PERIRECTAL ABSCESS      MASTECTOMY Left 2022    MASTECTOMY W/ SENTINEL NODE BIOPSY Left 2022    Procedure: MASTECTOMY WITH BIOPSY LYMPH NODE SENTINEL; 1200 NUC MED;  Surgeon: Vernell Casarez MD;  [facility];  Service: Surgical Oncology    MOUTH SURGERY      OVARIAN CYST REMOVAL      VA DEBRIDEMENT, SKIN, SUB-Q TISSUE,=<20 SQ CM Left 2022    Procedure: DEBRIDEMENT OF NON-HEALING SURGICAL WOUND, LEFT BREAST,  ADVANCEMENT FLAP;  Surgeon: Leo Carpenter MD;  [facility];  Service: Plastics    ROTATOR CUFF REPAIR Right     TONSILLECTOMY      US GUIDANCE BREAST BIOPSY LEFT EACH ADDITIONAL Left 2021    US GUIDED BREAST BIOPSY LEFT COMPLETE Left 2021     Family History   Problem Relation Age of Onset    Diabetes Mother         Type II    Diabetes type II Mother     Dementia Mother          09    Hearing loss Mother             Breast cancer Paternal Aunt     Cancer Paternal Uncle         carcinosarcoma of the oral cavity     Prostate cancer Paternal Uncle             Breast cancer Family     Breast cancer Maternal Aunt             Breast cancer Maternal Aunt     Diabetes type II Maternal Aunt     Thyroid cancer Maternal Aunt     Thyroid disease Maternal Aunt     Hypertension Father          01    Depression Father          01    Stroke Father     Heart disease Father          01    No Known Problems Sister     Depression Maternal Grandmother         Committed suicide - 10/24/68    Asthma Maternal Grandmother     Completed Suicide  Maternal Grandmother         10/24/1968    Depression Paternal Grandmother    Uriel Liming Asthma Sister         Born with it/outgrew it with age (pretty young)    No Known Problems Paternal Aunt     No Known Problems Maternal Aunt     Diabetes type II Maternal Uncle     Diabetes type II Maternal Grandfather     Breast cancer Cousin     Cancer Paternal Uncle              Social History     Socioeconomic History    Marital status: Single     Spouse name: Not on file    Number of children: Not on file    Years of education: Not on file    Highest education level: Not on file   Occupational History    Not on file   Tobacco Use    Smoking status: Never Smoker    Smokeless tobacco: Never Used   Vaping Use    Vaping Use: Never used   Substance and Sexual Activity    Alcohol use: Yes     Alcohol/week: 0 0 standard drinks     Comment: Drink only 1-2 drinks 4x a year      Drug use: No    Sexual activity: Not Currently     Partners: Male     Birth control/protection: None   Other Topics Concern    Not on file   Social History Narrative    Caffeine - denies    Retired     Social Determinants of Health     Financial Resource Strain: Not on file   Food Insecurity: Not on file   Transportation Needs: Not on file   Physical Activity: Not on file   Stress: Not on file   Social Connections: Not on file   Intimate Partner Violence: Not on file   Housing Stability: Not on file       Current Outpatient Medications:     levothyroxine 100 mcg tablet, Take 1 tablet (100 mcg total) by mouth daily, Disp: 90 tablet, Rfl: 3    Acetaminophen 500 MG, Take 2 capsules (1,000 mg total) by mouth every 6 (six) hours as needed for mild pain for up to 21 doses, Disp: 21 capsule, Rfl: 0    aspirin (ECOTRIN LOW STRENGTH) 81 mg EC tablet, Take 1 tablet by mouth daily, Disp: , Rfl:     Blood Glucose Monitoring Suppl (ONE TOUCH ULTRA 2) w/Device KIT, Use 3 (three) times a day, Disp: 1 kit, Rfl: 0    cholecalciferol (VITAMIN D3) 1,000 units tablet, Take 4 tablets (4,000 Units total) by mouth daily, Disp: 90 tablet, Rfl: 1    collagenase (SANTYL) ointment, Apply topically daily for 14 days, Disp: 15 g, Rfl: 0    Dapagliflozin Propanediol (Farxiga) 10 MG TABS, Take 1 tablet (10 mg total) by mouth daily, Disp: 90 tablet, Rfl: 3    Dulaglutide 4 5 MG/0 5ML SOPN, Inject 0 5 mL (4 5 mg total) under the skin once a week, Disp: 6 mL, Rfl: 1    Ferrous Gluconate (IRON 27 PO), Take by mouth  , Disp: , Rfl:     gabapentin (NEURONTIN) 300 mg capsule, Take 1 capsule (300 mg total) by mouth 2 (two) times a day, Disp: 180 capsule, Rfl: 0    glipiZIDE (GLUCOTROL XL) 5 mg 24 hr tablet, Take 1 tablet (5 mg total) by mouth daily, Disp: 90 tablet, Rfl: 0    glucose blood (OneTouch Ultra) test strip, Use to test 3x daily, Disp: 100 strip, Rfl: 4    lisinopril-hydrochlorothiazide (PRINZIDE,ZESTORETIC) 20-25 MG per tablet, Take 1 tablet by mouth in the morning and 1 tablet in the evening , Disp: 180 tablet, Rfl: 3    ONETOUCH DELICA LANCETS FINE MISC, by Does not apply route 2 (two) times a day, Disp: , Rfl:     pantoprazole (PROTONIX) 40 mg tablet, Take 1 tablet (40 mg total) by mouth daily, Disp: 90 tablet, Rfl: 3    polyethylene glycol (GLYCOLAX) 17 GM/SCOOP powder, Take by mouth, Disp: , Rfl:     rosuvastatin (CRESTOR) 20 MG tablet, Take 1 tablet (20 mg total) by mouth daily, Disp: 90 tablet, Rfl: 3  No Known Allergies  There were no vitals filed for this visit  Physical Exam  Constitutional:       General: She is not in acute distress  Appearance: Normal appearance  Cardiovascular:      Rate and Rhythm: Normal rate and regular rhythm  Pulses: Normal pulses  Heart sounds: Normal heart sounds  Pulmonary:      Effort: Pulmonary effort is normal       Breath sounds: Normal breath sounds  Chest:      Chest wall: No mass  Breasts:      Right: No swelling, bleeding, inverted nipple, mass, nipple discharge, skin change, tenderness, axillary adenopathy or supraclavicular adenopathy  Left: No swelling, bleeding, mass, skin change, tenderness, axillary adenopathy or supraclavicular adenopathy  Comments: Left mastectomy scar with expander in place  No masses, nodularity, skin changes, nipple changes or discharge (of right breast), or adenopathy appreciated on physical exam      Abdominal:      General: Abdomen is flat  Palpations: Abdomen is soft  Lymphadenopathy:      Upper Body:      Right upper body: No supraclavicular, axillary or pectoral adenopathy  Left upper body: No supraclavicular, axillary or pectoral adenopathy  Skin:     General: Skin is warm  Neurological:      General: No focal deficit present  Mental Status: She is alert and oriented to person, place, and time  Psychiatric:         Mood and Affect: Mood normal          Behavior: Behavior normal            Results:    Imaging  Mammo screening right w 3d & cad    Result Date: 8/17/2022  Narrative: DIAGNOSIS: Screening mammogram, encounter for TECHNIQUE: Digital screening mammography was performed  Computer Aided Detection (CAD) analyzed all applicable images   COMPARISONS: Prior breast imaging dated: 09/21/2021, 09/21/2021, 09/21/2021, 09/21/2021, 09/21/2021, 09/07/2021, 09/07/2021, 08/13/2021, 08/12/2020, 02/16/2019, 02/15/2018, 02/07/2017, 02/07/2017, 01/30/2017, 01/28/2016, 12/30/2014, 12/19/2013, 12/17/2012, 12/16/2011, and 12/15/2010 RELEVANT HISTORY: Family Breast Cancer History: History of breast cancer in Paternal Aunt, Family, Maternal Aunt, Maternal Aunt, Cousin  Family Medical History: Family medical history includes breast cancer in 5 relatives (cousin, family, maternal aunt, maternal aunt, paternal aunt)  Personal History: Hormone history includes other  Surgical history includes lumpectomy, mastectomy, and breast explant  Medical history includes BRCA 1 negative and BRCA 2 negative  The patient is scheduled in a reminder system for screening mammography  8-10% of cancers will be missed on mammography  Management of a palpable abnormality must be based on clinical grounds  Patients will be notified of their results via letter from our facility  Accredited by [employer] of Radiology and FDA  RISK ASSESSMENT: 5 Year Tyrer-Cuzick: 2 98 % 10 Year Tyrer-Cuzick: 5 94 % Lifetime Tyrer-Cuzick: 13 98 % TISSUE DENSITY: The breasts are almost entirely fatty  INDICATION: Rosalba Holguin is a 64 y o  female presenting for screening mammography  FINDINGS: There are no suspicious masses, grouped microcalcifications or areas of unexplained architectural distortion  The skin and nipple areolar complex are unremarkable  There are scattered calcifications present  Impression: No mammographic evidence of malignancy  ASSESSMENT/BI-RADS CATEGORY: Right: 2 - Benign Overall: 2 - Benign RECOMMENDATION:      - Routine screening mammogram in 1 year for the right breast  Workstation ID: [id number]       I reviewed the above imaging data  Advance Care Planning/Advance Directives:  Discussed disease status, cancer treatment plans and/or cancer treatment goals with the patient

## 2022-09-23 ENCOUNTER — OFFICE VISIT (OUTPATIENT)
Dept: PLASTIC SURGERY | Facility: CLINIC | Age: 62
End: 2022-09-23
Payer: COMMERCIAL

## 2022-09-23 DIAGNOSIS — Z42.1 AFTERCARE POSTMASTECTOMY FOR BREAST RECONSTRUCTION: Primary | ICD-10-CM

## 2022-09-23 PROCEDURE — 99214 OFFICE O/P EST MOD 30 MIN: CPT | Performed by: SURGERY

## 2022-09-23 NOTE — PROGRESS NOTES
Assessment/Plan:  Please see HPI  Today's visit an additional 60 cc was injected into the left breast tissue expander, where she now has 680 cc in a 650 cc expander with a 13 cm base diameter and 8 2 cm of projection  We briefly discussed the next steps from a surgical perspective, namely removal of the expander, capsulectomy and reconstruction with a breast implant  Our goal with expansion is for the expanded left breast to be slightly armas than the right breast , examination following today's fill reveals nearly equal volume with the right breast, and she may benefit from 1 additional expansion and then we will discuss in more detail neck surgical steps, differences between saline and silicone implant options, etcetera etcetera  There are no diagnoses linked to this encounter  Subjective:  Breast reconstruction     Patient ID: Delta Talley is a 64 y o  female  HPI Romulo Mckeon returns for tissue expansion, she had undergone left breast mastectomy and immediate left breast reconstruction with a tissue expander (Dr Milady Carr) July 8, 2022  The following portions of the patient's history were reviewed and updated as appropriate: allergies, current medications, past family history, past medical history, past social history, past surgical history and problem list     Review of Systems   Constitutional: Negative for chills and fever  HENT: Negative for hearing loss  Eyes: Positive for visual disturbance  Negative for discharge  Respiratory: Negative for chest tightness and shortness of breath  Cardiovascular: Positive for leg swelling  Negative for chest pain  Feet swell   Gastrointestinal: Negative for blood in stool, constipation, diarrhea and nausea  Genitourinary: Negative for dysuria  Musculoskeletal: Negative for gait problem  Skin: Negative for rash  Allergic/Immunologic: Negative for immunocompromised state  Neurological: Positive for headaches  Negative for seizures  Hematological: Does not bruise/bleed easily  Psychiatric/Behavioral: Negative for dysphoric mood  The patient is not nervous/anxious  Objective:      LMP  (LMP Unknown)          Physical Exam  HENT:      Head: Normocephalic and atraumatic  Eyes:      Extraocular Movements: Extraocular movements intact  Pupils: Pupils are equal, round, and reactive to light  Cardiovascular:      Rate and Rhythm: Normal rate  Abdominal:      Palpations: Abdomen is soft  Musculoskeletal:         General: Normal range of motion  Cervical back: Normal range of motion  Skin:     General: Skin is warm  Comments: Reconstructed left breast with tissue expander in good position, there are no signs of unusual inflammation or infection   Neurological:      Mental Status: She is alert and oriented to person, place, and time     Psychiatric:         Mood and Affect: Mood normal

## 2022-10-04 ENCOUNTER — VBI (OUTPATIENT)
Dept: ADMINISTRATIVE | Facility: OTHER | Age: 62
End: 2022-10-04

## 2022-10-10 ENCOUNTER — OFFICE VISIT (OUTPATIENT)
Dept: PLASTIC SURGERY | Facility: CLINIC | Age: 62
End: 2022-10-10
Payer: COMMERCIAL

## 2022-10-10 DIAGNOSIS — D05.12 DUCTAL CARCINOMA IN SITU (DCIS) OF LEFT BREAST: Primary | ICD-10-CM

## 2022-10-10 PROCEDURE — 99212 OFFICE O/P EST SF 10 MIN: CPT | Performed by: PHYSICIAN ASSISTANT

## 2022-10-10 NOTE — PROGRESS NOTES
Assessment/Plan:   Ashley Gonzalez is a 64year old female who is status post left breast immediate reconstruction with tissue expander and de-epithelialized flap done in conjunction with Dr Liliane Denise on 02/08/2022Duane Ser recently, she is status post excisional debridement of the left breast wound with local tissue flap done on 04/18/2022   Please see HPI   In the office today, I injected 60 cc into her left breast tissue expander to a total of 740 cc in a 650 cc expander with a base diameter of 13 cm and projection of 8 2 cm   She can use Tylenol, ibuprofen or ice as needed for pain  She has a follow up appointment to discuss surgery  Diagnoses and all orders for this visit:    Ductal carcinoma in situ (DCIS) of left breast          Subjective:     Patient ID: Yon Fitgzerald is a 64 y o  female  HPI   She presents today for tissue expansion  Review of Systems   Skin:        As per HPI  Objective:     Physical Exam  Skin:     Comments: Left breast with tissue expander in good placement

## 2022-10-11 DIAGNOSIS — N18.32 TYPE 2 DIABETES MELLITUS WITH STAGE 3B CHRONIC KIDNEY DISEASE, WITHOUT LONG-TERM CURRENT USE OF INSULIN (HCC): ICD-10-CM

## 2022-10-11 DIAGNOSIS — E11.22 TYPE 2 DIABETES MELLITUS WITH STAGE 3B CHRONIC KIDNEY DISEASE, WITHOUT LONG-TERM CURRENT USE OF INSULIN (HCC): ICD-10-CM

## 2022-10-11 RX ORDER — GLIPIZIDE 5 MG/1
TABLET, FILM COATED, EXTENDED RELEASE ORAL
Qty: 90 TABLET | Refills: 0 | Status: SHIPPED | OUTPATIENT
Start: 2022-10-11

## 2022-10-17 ENCOUNTER — APPOINTMENT (OUTPATIENT)
Dept: LAB | Facility: CLINIC | Age: 62
End: 2022-10-17
Payer: COMMERCIAL

## 2022-10-17 ENCOUNTER — OFFICE VISIT (OUTPATIENT)
Dept: FAMILY MEDICINE CLINIC | Facility: CLINIC | Age: 62
End: 2022-10-17
Payer: COMMERCIAL

## 2022-10-17 VITALS
RESPIRATION RATE: 16 BRPM | SYSTOLIC BLOOD PRESSURE: 126 MMHG | HEIGHT: 64 IN | HEART RATE: 80 BPM | WEIGHT: 269 LBS | OXYGEN SATURATION: 97 % | BODY MASS INDEX: 45.93 KG/M2 | DIASTOLIC BLOOD PRESSURE: 80 MMHG | TEMPERATURE: 97.4 F

## 2022-10-17 DIAGNOSIS — Z12.12 SCREENING FOR COLORECTAL CANCER: ICD-10-CM

## 2022-10-17 DIAGNOSIS — Z12.11 SCREENING FOR COLORECTAL CANCER: ICD-10-CM

## 2022-10-17 DIAGNOSIS — E11.22 TYPE 2 DIABETES MELLITUS WITH STAGE 3B CHRONIC KIDNEY DISEASE, WITHOUT LONG-TERM CURRENT USE OF INSULIN (HCC): ICD-10-CM

## 2022-10-17 DIAGNOSIS — N18.32 TYPE 2 DIABETES MELLITUS WITH STAGE 3B CHRONIC KIDNEY DISEASE, WITHOUT LONG-TERM CURRENT USE OF INSULIN (HCC): ICD-10-CM

## 2022-10-17 DIAGNOSIS — Z11.4 SCREENING FOR HIV (HUMAN IMMUNODEFICIENCY VIRUS): ICD-10-CM

## 2022-10-17 DIAGNOSIS — J06.9 UPPER RESPIRATORY TRACT INFECTION, UNSPECIFIED TYPE: Primary | ICD-10-CM

## 2022-10-17 DIAGNOSIS — Z11.59 NEED FOR HEPATITIS C SCREENING TEST: ICD-10-CM

## 2022-10-17 LAB
ALBUMIN SERPL BCP-MCNC: 3.5 G/DL (ref 3.5–5)
ALP SERPL-CCNC: 73 U/L (ref 46–116)
ALT SERPL W P-5'-P-CCNC: 18 U/L (ref 12–78)
ANION GAP SERPL CALCULATED.3IONS-SCNC: 5 MMOL/L (ref 4–13)
AST SERPL W P-5'-P-CCNC: 7 U/L (ref 5–45)
BILIRUB SERPL-MCNC: 0.35 MG/DL (ref 0.2–1)
BUN SERPL-MCNC: 46 MG/DL (ref 5–25)
CALCIUM SERPL-MCNC: 9.6 MG/DL (ref 8.3–10.1)
CHLORIDE SERPL-SCNC: 107 MMOL/L (ref 96–108)
CHOLEST SERPL-MCNC: 160 MG/DL
CO2 SERPL-SCNC: 25 MMOL/L (ref 21–32)
CREAT SERPL-MCNC: 1.51 MG/DL (ref 0.6–1.3)
CREAT UR-MCNC: 175 MG/DL
EST. AVERAGE GLUCOSE BLD GHB EST-MCNC: 177 MG/DL
GFR SERPL CREATININE-BSD FRML MDRD: 37 ML/MIN/1.73SQ M
GLUCOSE P FAST SERPL-MCNC: 188 MG/DL (ref 65–99)
HBA1C MFR BLD: 7.8 %
HDLC SERPL-MCNC: 34 MG/DL
LDLC SERPL CALC-MCNC: 77 MG/DL (ref 0–100)
MICROALBUMIN UR-MCNC: 13.7 MG/L (ref 0–20)
MICROALBUMIN/CREAT 24H UR: 8 MG/G CREATININE (ref 0–30)
POTASSIUM SERPL-SCNC: 4.9 MMOL/L (ref 3.5–5.3)
PROT SERPL-MCNC: 7.2 G/DL (ref 6.4–8.4)
SODIUM SERPL-SCNC: 137 MMOL/L (ref 135–147)
TRIGL SERPL-MCNC: 243 MG/DL

## 2022-10-17 PROCEDURE — 82570 ASSAY OF URINE CREATININE: CPT

## 2022-10-17 PROCEDURE — 36415 COLL VENOUS BLD VENIPUNCTURE: CPT

## 2022-10-17 PROCEDURE — 80061 LIPID PANEL: CPT

## 2022-10-17 PROCEDURE — 82043 UR ALBUMIN QUANTITATIVE: CPT

## 2022-10-17 PROCEDURE — 83036 HEMOGLOBIN GLYCOSYLATED A1C: CPT

## 2022-10-17 PROCEDURE — 80053 COMPREHEN METABOLIC PANEL: CPT

## 2022-10-17 PROCEDURE — 99213 OFFICE O/P EST LOW 20 MIN: CPT | Performed by: FAMILY MEDICINE

## 2022-10-17 RX ORDER — COVID-19 MOLECULAR TEST ASSAY
KIT MISCELLANEOUS
COMMUNITY
Start: 2022-08-01

## 2022-10-17 RX ORDER — AZITHROMYCIN 250 MG/1
TABLET, FILM COATED ORAL
Qty: 6 TABLET | Refills: 0 | Status: SHIPPED | OUTPATIENT
Start: 2022-10-17 | End: 2022-10-21

## 2022-10-17 NOTE — PROGRESS NOTES
Name: Estefani Rainey      : 1960      MRN: [id number]  Encounter Provider: Dominik Granda DO  Encounter Date: 10/17/2022   Encounter department: [address] [place] [address]   Patient was started on a Z-Ender and recommend she try Claritin or Zyrtec daily  Patient may continue Mucinex DM or Robitussin DM p r n  Recommend increase fluids and rest   Return the office in 1 week or call sooner p r n  1  Upper respiratory tract infection, unspecified type  -     azithromycin (ZITHROMAX) 250 mg tablet; Take 2 tablets today then 1 tablet daily x 4 days    2  Need for hepatitis C screening test  -     Hepatitis C Antibody ([facility]); Future    3  Screening for HIV (human immunodeficiency virus)  -     HIV 1/2 Antigen/Antibody (4th Generation) w Reflex [facility]; Future    4  Screening for colorectal cancer  -     Ambulatory referral for colonoscopy; Future           Subjective      Patient complains of cold symptoms for the past 2 weeks  Patient took home COVID test which was negative 1 week ago  URI   This is a new problem  The current episode started 1 to 4 weeks ago  The problem has been unchanged  There has been no fever  Associated symptoms include congestion, coughing, rhinorrhea and sneezing  Pertinent negatives include no ear pain, headaches, plugged ear sensation, sinus pain, sore throat or wheezing  She has tried increased fluids and acetaminophen ([place] [address] DM) for the symptoms  The treatment provided mild relief  Review of Systems   HENT: Positive for congestion, rhinorrhea and sneezing  Negative for ear pain, sinus pain and sore throat  Respiratory: Positive for cough  Negative for wheezing  Neurological: Negative for headaches         Current Outpatient Medications on File Prior to Visit   Medication Sig   • Acetaminophen 500 MG Take 2 capsules (1,000 mg total) by mouth every 6 (six) hours as needed for mild pain for up to 21 doses   • aspirin (ECOTRIN LOW STRENGTH) 81 mg EC tablet Take 1 tablet by mouth daily   • BinaxNOW COVID-19 Ag Home Test KIT Use as Directed on the Package   • Blood Glucose Monitoring Suppl (ONE TOUCH ULTRA 2) w/Device KIT Use 3 (three) times a day   • cholecalciferol (VITAMIN D3) 1,000 units tablet Take 4 tablets (4,000 Units total) by mouth daily   • Dapagliflozin Propanediol (Farxiga) 10 MG TABS Take 1 tablet (10 mg total) by mouth daily   • Dulaglutide 4 5 MG/0 5ML SOPN Inject 0 5 mL (4 5 mg total) under the skin once a week   • gabapentin (NEURONTIN) 300 mg capsule Take 1 capsule (300 mg total) by mouth 2 (two) times a day   • glipiZIDE (GLUCOTROL XL) 5 mg 24 hr tablet Take 1 tablet by mouth once daily   • glucose blood (OneTouch Ultra) test strip Use to test 3x daily   • levothyroxine 100 mcg tablet Take 1 tablet (100 mcg total) by mouth daily   • lisinopril-hydrochlorothiazide (PRINZIDE,ZESTORETIC) 20-25 MG per tablet Take 1 tablet by mouth in the morning and 1 tablet in the evening  • ONETOUCH DELICA LANCETS FINE MISC by Does not apply route 2 (two) times a day   • pantoprazole (PROTONIX) 40 mg tablet Take 1 tablet (40 mg total) by mouth daily   • polyethylene glycol (GLYCOLAX) 17 GM/SCOOP powder Take by mouth   • rosuvastatin (CRESTOR) 20 MG tablet Take 1 tablet (20 mg total) by mouth daily   • collagenase (SANTYL) ointment Apply topically daily for 14 days   • Ferrous Gluconate (IRON 27 PO) Take by mouth   (Patient not taking: Reported on 10/17/2022)       Objective     /80   Pulse 80   Temp (!) 97 4 °F (36 3 °C) (Skin)   Resp 16   Ht 5' 4" (1 626 m)   Wt 122 kg (269 lb)   LMP  (LMP Unknown)   SpO2 97%   BMI 46 17 kg/m²     Physical Exam  Constitutional:       General: She is not in acute distress  Appearance: Normal appearance  She is obese  She is not ill-appearing, toxic-appearing or diaphoretic  HENT:      Head: Normocephalic        Right Ear: Tympanic membrane normal       Left Ear: Tympanic membrane normal  Nose: Congestion present  Mouth/Throat:      Mouth: Mucous membranes are moist       Pharynx: Oropharynx is clear  Eyes:      General: No scleral icterus  Conjunctiva/sclera: Conjunctivae normal    Cardiovascular:      Rate and Rhythm: Normal rate and regular rhythm  Pulmonary:      Effort: Pulmonary effort is normal  No respiratory distress  Breath sounds: Normal breath sounds  No wheezing  Abdominal:      Palpations: Abdomen is soft  Tenderness: There is no abdominal tenderness  Musculoskeletal:      Cervical back: Neck supple  Right lower leg: No edema  Left lower leg: No edema  Lymphadenopathy:      Cervical: No cervical adenopathy  Skin:     General: Skin is warm and dry  Neurological:      General: No focal deficit present  Mental Status: She is alert and oriented to person, place, and time  Psychiatric:         Mood and Affect: Mood normal          Behavior: Behavior normal          Thought Content:  Thought content normal          Judgment: Judgment normal        Charissa Lubna, DO

## 2022-10-20 NOTE — PROGRESS NOTES
Established Patient Progress Note      Chief Complaint   Patient presents with   • Diabetes Type 2        History of Present Illness:   Erica Rinaldi is a 64 y o  female with HTN, HLD, hypothyroidism, and type 2 diabetes without long term use of insulin since approximately 2010  Reports complications of neuropathy  Denies recent illness or hospitalizations  Denies recent severe hypoglycemic or severe hyperglycemic episodes  Denies any issues with her current regimen  home glucose monitoring: are performed regularly-once daily in the morning     + family history of T2DM in mother and maternal grandmother    At patient's last appointment on 28/02/9106, Trulicity was increased to 4 5 mg once weekly  She denies any s/e  HgA1C has improved slightly  Patient admits to dietary noncompliance  She is also having more arthritic pain given recent d/c of meloxicam d/t decreased GFR  Component      Latest Ref Rng & Units 7/5/2022 10/17/2022           7:18 AM  9:58 AM   Hemoglobin A1C      Normal 3 8-5 6%; PreDiabetic 5 7-6 4%; Diabetic >=6 5%; Glycemic control for adults with diabetes <7 0% % 8 1 (H) 7 8 (H)   eAG, EST AVG Glucose      mg/dl 186 177     Component      Latest Ref Rng & Units 7/22/2022 10/17/2022           1:21 PM  9:58 AM   eGFR      ml/min/1 73sq m 36 37     Of note, patient is  Taking farxiga which is not contributing to glycemic control given patient's GFR  Historically, negative for microalbuminuria  Home blood glucose readings: Glucometer reviewed; fasting sugars only  "good mornings" 140-190;   "Bad mornings" 215-560     Current regimen:   Trulicity 4 5 mg weekly  Glipizide extended release 5 mg daily  Farxiga 10 mg daily    Last Eye Exam: 7/2022  Last Foot Exam: 10/5/2022    For vitamin-D deficiency, she is supplementing with 4000 units daily  For hyperlipidemia, she is taking 20 mg of rosuvastatin nightly  She denies myalgias    For hypertension, she is taking lisinopril-hydrochlorothiazide 20-25 mg twice daily  She denies headache and cough  For hypothyroidism, she is taking 100 mcg of levothyroxine daily  She reports taking this consistently and correctly  Thyroid function from July is stable  Denies symptoms of hyper/hypoglycmia       Patient Active Problem List   Diagnosis   • Anemia   • Benign essential hypertension   • Arthritis   • Mixed diabetic hyperlipidemia associated with type 2 diabetes mellitus (HCC)   • Disc degeneration, lumbar   • Fibrocystic breast disease, unspecified laterality   • GERD without esophagitis   • Hypothyroidism   • IBS (irritable bowel syndrome)   • Neoplasm of uncertain behavior of skin   • Obesity, Class III, BMI 40-49 9 (morbid obesity) ([facility])   • MARIA FERNANDA (obstructive sleep apnea)   • Primary osteoarthritis of both knees   • Vitamin D deficiency   • Uncontrolled type 2 diabetes mellitus with peripheral neuropathy   • Fatty liver   • Full thickness rotator cuff tear   • Shoulder joint pain   • Family history of breast cancer in female   • Screening mammogram, encounter for   • Lumbar radiculopathy   • Ductal carcinoma in situ (DCIS) of left breast   • BRCA negative   • Type 2 diabetes mellitus with stage 3b chronic kidney disease (HCC)   • Chronic kidney disease (CKD) stage G3b/A1, moderately decreased glomerular filtration rate (GFR) between 30-44 mL/min/1 73 square meter and albuminuria creatinine ratio less than 30 mg/g (HCC)      Past Medical History:   Diagnosis Date   • Achrochordon    • Arthritis    • BRCA1 negative    • BRCA2 negative    • Cancer (HCC)     L breast-mastectomy w/ reconstruction today 2/8/2022   • Dysuria    • GERD (gastroesophageal reflux disease)    • Headache(784 0)    • IBS (irritable bowel syndrome)    • Inflammatory bowel disease    • Obesity    • Polycystic ovarian syndrome    • Rotator cuff tear     right side    • Wears glasses       Past Surgical History:   Procedure Laterality Date   • ANAL FISTULOTOMY      subcutaneous    • BREAST LUMPECTOMY Left 2021   • BREAST RECONSTRUCTION Left 2022    Procedure: IMMEDIATE RECONSTRUCTION WITH TISSUE EXPANDER PLACEMENT;  Surgeon: Domitila Germain MD;  Location: [facility];  Service: Plastics   • CHOLECYSTECTOMY     • FLAP LOCAL TRUNK Left 2022    Procedure: DE-EPITHELIALIZED FLAP BREAST;  Surgeon: Domitila Germain MD;  [facility];  Service: Plastics   • HEMORRHOID SURGERY     • INCISION AND DRAINAGE PERIRECTAL ABSCESS     • MASTECTOMY Left 2022   • MASTECTOMY W/ SENTINEL NODE BIOPSY Left 2022    Procedure: MASTECTOMY WITH BIOPSY LYMPH NODE SENTINEL; 1200 NUC MED;  Surgeon: Bela Thurston MD;  [facility];  Service: Surgical Oncology   • MOUTH SURGERY     • OVARIAN CYST REMOVAL     • MA DEBRIDEMENT, SKIN, SUB-Q TISSUE,=<20 SQ CM Left 2022    Procedure: DEBRIDEMENT OF NON-HEALING SURGICAL WOUND, LEFT BREAST,  ADVANCEMENT FLAP;  Surgeon: Domitila Germain MD;  [facility];  Service: Plastics   • ROTATOR CUFF REPAIR Right    • TONSILLECTOMY     • US GUIDANCE BREAST BIOPSY LEFT EACH ADDITIONAL Left 2021   • US GUIDED BREAST BIOPSY LEFT COMPLETE Left 2021      Family History   Problem Relation Age of Onset   • Diabetes Mother         Type II   • Diabetes type II Mother    • Dementia Mother          09   • Hearing loss Mother            • Breast cancer Paternal Aunt    • Cancer Paternal Uncle         carcinosarcoma of the oral cavity    • Prostate cancer Paternal Uncle            • Breast cancer Family    • Breast cancer Maternal Aunt            • Breast cancer Maternal Aunt    • Diabetes type II Maternal Aunt    • Thyroid cancer Maternal Aunt    • Thyroid disease Maternal Aunt    • Hypertension Father          01   • Depression Father          01   • Stroke Father    • Heart disease Father          01   • No Known Problems Sister    • Depression Maternal Grandmother         Committed suicide - 10/24/68   • Asthma Maternal Grandmother    • Completed Suicide  Maternal Grandmother         10/24/1968   • Depression Paternal Grandmother    • Asthma Sister         Born with it/outgrew it with age (pretty young)   • No Known Problems Paternal Aunt    • No Known Problems Maternal Aunt    • Diabetes type II Maternal Uncle    • Diabetes type II Maternal Grandfather    • Breast cancer Cousin    • Cancer Paternal Uncle              Social History     Tobacco Use   • Smoking status: Never Smoker   • Smokeless tobacco: Never Used   Substance Use Topics   • Alcohol use: Yes     Alcohol/week: 0 0 standard drinks     Comment: Drink only 1-2 drinks 4x a year       No Known Allergies      Current Outpatient Medications:   •  Acetaminophen 500 MG, Take 2 capsules (1,000 mg total) by mouth every 6 (six) hours as needed for mild pain for up to 21 doses, Disp: 21 capsule, Rfl: 0  •  aspirin (ECOTRIN LOW STRENGTH) 81 mg EC tablet, Take 1 tablet by mouth daily, Disp: , Rfl:   •  azithromycin (ZITHROMAX) 250 mg tablet, Take 2 tablets today then 1 tablet daily x 4 days, Disp: 6 tablet, Rfl: 0  •  BinaxNOW COVID-19 Ag Home Test KIT, Use as Directed on the Package, Disp: , Rfl:   •  Blood Glucose Monitoring Suppl (ONE TOUCH ULTRA 2) w/Device KIT, Use 3 (three) times a day, Disp: 1 kit, Rfl: 0  •  cholecalciferol (VITAMIN D3) 1,000 units tablet, Take 4 tablets (4,000 Units total) by mouth daily, Disp: 90 tablet, Rfl: 1  •  Dulaglutide 4 5 MG/0 5ML SOPN, Inject 0 5 mL (4 5 mg total) under the skin once a week, Disp: 6 mL, Rfl: 1  •  gabapentin (NEURONTIN) 300 mg capsule, Take 1 capsule (300 mg total) by mouth 2 (two) times a day, Disp: 180 capsule, Rfl: 0  •  glipiZIDE (GLUCOTROL XL) 5 mg 24 hr tablet, Take 1 tablet by mouth once daily, Disp: 90 tablet, Rfl: 0  •  glucose blood (OneTouch Ultra) test strip, Use to test 3x daily, Disp: 100 strip, Rfl: 4  •  levothyroxine 100 mcg tablet, Take 1 tablet (100 mcg total) by mouth daily, Disp: 90 tablet, Rfl: 3  •  lisinopril-hydrochlorothiazide (PRINZIDE,ZESTORETIC) 20-25 MG per tablet, Take 1 tablet by mouth in the morning and 1 tablet in the evening , Disp: 180 tablet, Rfl: 3  •  ONETOUCH DELICA LANCETS FINE MISC, by Does not apply route 2 (two) times a day, Disp: , Rfl:   •  pantoprazole (PROTONIX) 40 mg tablet, Take 1 tablet (40 mg total) by mouth daily, Disp: 90 tablet, Rfl: 3  •  polyethylene glycol (GLYCOLAX) 17 GM/SCOOP powder, Take by mouth, Disp: , Rfl:   •  rosuvastatin (CRESTOR) 20 MG tablet, Take 1 tablet (20 mg total) by mouth daily, Disp: 90 tablet, Rfl: 3  •  collagenase (SANTYL) ointment, Apply topically daily for 14 days, Disp: 15 g, Rfl: 0    Review of Systems   Constitutional: Positive for fatigue  Negative for activity change, appetite change and unexpected weight change  HENT: Negative for dental problem, sore throat, trouble swallowing and voice change  Eyes: Negative for visual disturbance  Respiratory: Negative for cough, chest tightness and shortness of breath  Cardiovascular: Negative for chest pain, palpitations and leg swelling  Gastrointestinal: Negative for constipation, diarrhea, nausea and vomiting  Endocrine: Negative for cold intolerance, heat intolerance, polydipsia, polyphagia and polyuria  Genitourinary: Negative for frequency  Musculoskeletal: Positive for arthralgias and back pain  Negative for gait problem and myalgias  Skin: Negative for wound  Allergic/Immunologic: Negative for environmental allergies and food allergies  Neurological: Positive for numbness  Negative for dizziness, tremors, weakness, light-headedness and headaches  Hematological: Does not bruise/bleed easily  Psychiatric/Behavioral: Negative for decreased concentration, dysphoric mood and sleep disturbance  The patient is not nervous/anxious  Physical Exam:  Body mass index is 46 17 kg/m²    /82   Pulse 78 Ht 5' 4" (1 626 m)   Wt 122 kg (269 lb)   LMP  (LMP Unknown)   SpO2 98%   BMI 46 17 kg/m²    Wt Readings from Last 3 Encounters:   10/21/22 122 kg (269 lb)   10/17/22 122 kg (269 lb)   09/09/22 122 kg (270 lb)       Physical Exam  Vitals reviewed  Constitutional:       General: She is not in acute distress  Appearance: She is well-developed  She is obese  She is not ill-appearing  HENT:      Head: Normocephalic and atraumatic  Eyes:      Pupils: Pupils are equal, round, and reactive to light  Neck:      Thyroid: No thyromegaly  Cardiovascular:      Rate and Rhythm: Normal rate and regular rhythm  Pulses: Normal pulses  Heart sounds: Normal heart sounds  Pulmonary:      Effort: Pulmonary effort is normal       Breath sounds: Normal breath sounds  Abdominal:      General: Bowel sounds are normal  There is no distension  Palpations: Abdomen is soft  Tenderness: There is no abdominal tenderness  Musculoskeletal:      Cervical back: Normal range of motion and neck supple  Right lower leg: No edema  Left lower leg: No edema  Lymphadenopathy:      Cervical: No cervical adenopathy  Skin:     General: Skin is warm and dry  Capillary Refill: Capillary refill takes less than 2 seconds  Neurological:      Mental Status: She is alert and oriented to person, place, and time        Gait: Gait normal    Psychiatric:         Mood and Affect: Mood normal          Behavior: Behavior normal            Labs:   Lab Results   Component Value Date    HGBA1C 7 8 (H) 10/17/2022    HGBA1C 8 1 (H) 07/05/2022    HGBA1C 7 4 (A) 03/08/2022     Lab Results   Component Value Date    CREATININE 1 51 (H) 10/17/2022    CREATININE 1 52 (H) 07/22/2022    CREATININE 1 68 (H) 07/05/2022    BUN 46 (H) 10/17/2022     10/09/2015    K 4 9 10/17/2022     10/17/2022    CO2 25 10/17/2022     eGFR   Date Value Ref Range Status   10/17/2022 37 ml/min/1 73sq m Final     Lab Results Component Value Date    CHOL 198 10/09/2015    HDL 34 (L) 10/17/2022    TRIG 243 (H) 10/17/2022     Lab Results   Component Value Date    ALT 18 10/17/2022    AST 7 10/17/2022    ALKPHOS 73 10/17/2022    BILITOT 0 48 10/09/2015     Lab Results   Component Value Date    IMJ3MDMVBUCB 1 630 07/05/2022    HNU0BAUPLNWD 2 270 12/23/2019    HDO8PWETBPQD 1 050 09/06/2018     Lab Results   Component Value Date    FREET4 1 11 07/05/2022       Impression & Plan:    Problem List Items Addressed This Visit        Endocrine    Mixed diabetic hyperlipidemia associated with type 2 diabetes mellitus ([facility] )     LDL is stable  TG are likely elevated due to high carb diet  Continue statin  Lab Results   Component Value Date    HGBA1C 7 8 (H) 10/17/2022            Hypothyroidism     Stable  Continue 100 mcg of LT4 daily  Check TSH with reflex prior to next appointment  Type 2 diabetes mellitus with stage 3b chronic kidney disease ([facility] ) - Primary     Patient remains above goal  She continues to struggle with following a healthy, low carbohydrate diet  Counseled on pathophysiology of diabetes  Counseled on negative, long-term effects associated with uncontrolled diabetes including neuropathy, nephropathy, retinopathy, heart attack, and stroke  As GFR is 37, farxiga is no longer providing assistance with glycemic control  Discontinue  Discussed addition of basal insulin  Patient would prefer to focus on diet and lifestyle modification at this time  Goal HgA1C is 6 9% or less  Recommended testing blood sugars at various times of the day so she may have a better understanding of her trends and the impact of food choices on her blood sugars  Check labs prior to next appointment in 4 months     Lab Results   Component Value Date    HGBA1C 7 8 (H) 10/17/2022            Relevant Orders    Ambulatory referral to Nephrology    Basic metabolic panel    Hemoglobin A1C    TSH, 3rd generation with Free T4 reflex       Respiratory    MARIA FERNANDA (obstructive sleep apnea)     Unable to tolerate CPAP or nasal pillows  Cardiovascular and Mediastinum    Benign essential hypertension     BP stable at 120 8/82  Continue current regimen  Genitourinary    Chronic kidney disease (CKD) stage G3b/A1, moderately decreased glomerular filtration rate (GFR) between 30-44 mL/min/1 73 square meter and albuminuria creatinine ratio less than 30 mg/g [facility])     Lab Results   Component Value Date    EGFR 37 10/17/2022    EGFR 36 07/22/2022    EGFR 32 07/05/2022    CREATININE 1 51 (H) 10/17/2022    CREATININE 1 52 (H) 07/22/2022    CREATININE 1 68 (H) 07/05/2022   Referral given for nephrology evaluation  Relevant Orders    Ambulatory referral to Nephrology       Other    Vitamin D deficiency     Continue vitamin-D supplements  Orders Placed This Encounter   Procedures   • Basic metabolic panel     This is a patient instruction: Patient fasting for 8 hours or longer recommended  Standing Status:   Future     Standing Expiration Date:   10/21/2023   • Hemoglobin A1C     Standing Status:   Future     Standing Expiration Date:   10/21/2023   • TSH, 3rd generation with Free T4 reflex     Standing Status:   Future     Standing Expiration Date:   10/21/2023   • Ambulatory referral to Nephrology     Standing Status:   Future     Standing Expiration Date:   10/21/2023     Referral Priority:   Routine     Referral Type:   Consult - AMB     Referral Reason:   Specialty Services Required     Requested Specialty:   Nephrology     Number of Visits Requested:   1     Expiration Date:   10/21/2023       Patient Instructions   1  Charu Akins      Discussed with the patient and all questioned fully answered  She will call me if any problems arise  Follow-up appointment in 4 months       Counseled patient on diagnostic results, prognosis, risk and benefit of treatment options, instruction for management, importance of treatment compliance, Risk factor reduction and impressions    Marylene Epstein, CRNP

## 2022-10-21 ENCOUNTER — OFFICE VISIT (OUTPATIENT)
Dept: ENDOCRINOLOGY | Facility: CLINIC | Age: 62
End: 2022-10-21
Payer: COMMERCIAL

## 2022-10-21 VITALS
OXYGEN SATURATION: 98 % | SYSTOLIC BLOOD PRESSURE: 128 MMHG | DIASTOLIC BLOOD PRESSURE: 82 MMHG | BODY MASS INDEX: 45.93 KG/M2 | WEIGHT: 269 LBS | HEART RATE: 78 BPM | HEIGHT: 64 IN

## 2022-10-21 DIAGNOSIS — N18.32 TYPE 2 DIABETES MELLITUS WITH STAGE 3B CHRONIC KIDNEY DISEASE, WITHOUT LONG-TERM CURRENT USE OF INSULIN (HCC): Primary | ICD-10-CM

## 2022-10-21 DIAGNOSIS — E11.22 TYPE 2 DIABETES MELLITUS WITH STAGE 3B CHRONIC KIDNEY DISEASE, WITHOUT LONG-TERM CURRENT USE OF INSULIN (HCC): Primary | ICD-10-CM

## 2022-10-21 DIAGNOSIS — I10 BENIGN ESSENTIAL HYPERTENSION: ICD-10-CM

## 2022-10-21 DIAGNOSIS — E78.2 MIXED DIABETIC HYPERLIPIDEMIA ASSOCIATED WITH TYPE 2 DIABETES MELLITUS (HCC): ICD-10-CM

## 2022-10-21 DIAGNOSIS — E11.69 MIXED DIABETIC HYPERLIPIDEMIA ASSOCIATED WITH TYPE 2 DIABETES MELLITUS (HCC): ICD-10-CM

## 2022-10-21 DIAGNOSIS — G47.33 OSA (OBSTRUCTIVE SLEEP APNEA): ICD-10-CM

## 2022-10-21 DIAGNOSIS — E03.9 HYPOTHYROIDISM, UNSPECIFIED TYPE: ICD-10-CM

## 2022-10-21 DIAGNOSIS — E55.9 VITAMIN D DEFICIENCY: ICD-10-CM

## 2022-10-21 DIAGNOSIS — N18.32 CHRONIC KIDNEY DISEASE (CKD) STAGE G3B/A1, MODERATELY DECREASED GLOMERULAR FILTRATION RATE (GFR) BETWEEN 30-44 ML/MIN/1.73 SQUARE METER AND ALBUMINURIA CREATININE RATIO LESS THAN 30 MG/G (HCC): ICD-10-CM

## 2022-10-21 PROCEDURE — 99214 OFFICE O/P EST MOD 30 MIN: CPT | Performed by: NURSE PRACTITIONER

## 2022-10-21 NOTE — ASSESSMENT & PLAN NOTE
LDL is stable  TG are likely elevated due to high carb diet  Continue statin    Lab Results   Component Value Date    HGBA1C 7 8 (H) 10/17/2022

## 2022-10-21 NOTE — ASSESSMENT & PLAN NOTE
Patient remains above goal  She continues to struggle with following a healthy, low carbohydrate diet  Counseled on pathophysiology of diabetes  Counseled on negative, long-term effects associated with uncontrolled diabetes including neuropathy, nephropathy, retinopathy, heart attack, and stroke  As GFR is 37, farxiga is no longer providing assistance with glycemic control  Discontinue  Discussed addition of basal insulin  Patient would prefer to focus on diet and lifestyle modification at this time  Goal HgA1C is 6 9% or less  Recommended testing blood sugars at various times of the day so she may have a better understanding of her trends and the impact of food choices on her blood sugars  Check labs prior to next appointment in 4 months     Lab Results   Component Value Date    HGBA1C 7 8 (H) 10/17/2022

## 2022-10-21 NOTE — ASSESSMENT & PLAN NOTE
Lab Results   Component Value Date    EGFR 37 10/17/2022    EGFR 36 07/22/2022    EGFR 32 07/05/2022    CREATININE 1 51 (H) 10/17/2022    CREATININE 1 52 (H) 07/22/2022    CREATININE 1 68 (H) 07/05/2022   Referral given for nephrology evaluation

## 2022-11-02 ENCOUNTER — VBI (OUTPATIENT)
Dept: ADMINISTRATIVE | Facility: OTHER | Age: 62
End: 2022-11-02

## 2022-11-14 ENCOUNTER — TELEPHONE (OUTPATIENT)
Dept: ENDOCRINOLOGY | Facility: CLINIC | Age: 62
End: 2022-11-14

## 2022-11-14 DIAGNOSIS — E11.22 TYPE 2 DIABETES MELLITUS WITH STAGE 3B CHRONIC KIDNEY DISEASE, WITHOUT LONG-TERM CURRENT USE OF INSULIN (HCC): ICD-10-CM

## 2022-11-14 DIAGNOSIS — N18.32 TYPE 2 DIABETES MELLITUS WITH STAGE 3B CHRONIC KIDNEY DISEASE, WITHOUT LONG-TERM CURRENT USE OF INSULIN (HCC): ICD-10-CM

## 2022-11-15 NOTE — TELEPHONE ENCOUNTER
Patient called to get Trulicity 4 5 refilled, but it will be getting filled at [facility] in [facility]  This isn't her normal pharmacy  Patient also asked if we could make sure [facility] has the right phone number on file, so she can get the texts when it is ready  Phone number for patient [phone] 
Please disregard the number in the previous note the right number is [phone] 
Sent to the [facility] in [place] as requested they are out to lunch till 2 will call the pharmacy and make sure they have the correct phone number for pt 
No

## 2022-12-07 ENCOUNTER — TELEPHONE (OUTPATIENT)
Dept: PLASTIC SURGERY | Facility: CLINIC | Age: 62
End: 2022-12-07

## 2022-12-09 ENCOUNTER — TELEPHONE (OUTPATIENT)
Dept: OTHER | Facility: OTHER | Age: 62
End: 2022-12-09

## 2022-12-09 ENCOUNTER — OFFICE VISIT (OUTPATIENT)
Dept: FAMILY MEDICINE CLINIC | Facility: CLINIC | Age: 62
End: 2022-12-09

## 2022-12-09 VITALS
WEIGHT: 268 LBS | DIASTOLIC BLOOD PRESSURE: 74 MMHG | OXYGEN SATURATION: 97 % | HEIGHT: 64 IN | BODY MASS INDEX: 45.75 KG/M2 | TEMPERATURE: 97.4 F | HEART RATE: 96 BPM | SYSTOLIC BLOOD PRESSURE: 136 MMHG

## 2022-12-09 DIAGNOSIS — R05.1 ACUTE COUGH: ICD-10-CM

## 2022-12-09 DIAGNOSIS — J01.00 ACUTE NON-RECURRENT MAXILLARY SINUSITIS: Primary | ICD-10-CM

## 2022-12-09 RX ORDER — PROMETHAZINE HYDROCHLORIDE AND CODEINE PHOSPHATE 6.25; 1 MG/5ML; MG/5ML
5 SYRUP ORAL EVERY 4 HOURS PRN
Qty: 118 ML | Refills: 0 | Status: SHIPPED | OUTPATIENT
Start: 2022-12-09

## 2022-12-09 RX ORDER — AZITHROMYCIN 250 MG/1
TABLET, FILM COATED ORAL
Qty: 6 TABLET | Refills: 0 | Status: SHIPPED | OUTPATIENT
Start: 2022-12-09 | End: 2022-12-13

## 2022-12-09 NOTE — TELEPHONE ENCOUNTER
Patient is calling regarding cancelling an appointment      Date/Time:12/9/22 at 3pm    Patient was rescheduled: YES [] NO [x]    Patient requesting call back to reschedule: YES [] NO [x]    Patient will call back to reschedule

## 2022-12-09 NOTE — PROGRESS NOTES
Name: Khadra Awan      : 1960      MRN: [id number]  Encounter Provider: KORI Chavez  Encounter Date: 2022   Encounter department: [address] [place] [address]  Acute non-recurrent maxillary sinusitis  Assessment & Plan:  New diagnoses acute symptomatic greater than 10 days not responding to conservative treatments  Denies shortness of breath chest pain fever  Does have associated cough  Will recommend increase fluids, vitamin C, start Z-Ender, and will provide prescription for Phenergan with codeine to use primarily prior to bed  Educated on side effects and proper use of medication and will recommend call office with any worsening symptoms    Orders:  -     azithromycin (ZITHROMAX) 250 mg tablet; Take 2 tablets today then 1 tablet daily x 4 days    2  Acute cough  -     promethazine-codeine (PHENERGAN WITH CODEINE) 6 25-10 mg/5 mL syrup; Take 5 mL by mouth every 4 (four) hours as needed for cough           Subjective      Patient arrives with complaints of nasal congestion rhinorrhea chills cough body aches headache nausea x2 weeks  Not responding to conservative treatments  Denies shortness of breath chest pain fever  Review of Systems   Constitutional: Positive for activity change, chills and fatigue  Negative for appetite change and fever  HENT: Positive for congestion, postnasal drip, rhinorrhea and sinus pressure  Negative for sneezing and sore throat  Respiratory: Positive for cough  Negative for chest tightness, shortness of breath and wheezing  Cardiovascular: Negative for chest pain and palpitations  Gastrointestinal: Positive for nausea  Negative for abdominal pain, constipation, diarrhea and vomiting  Musculoskeletal: Negative for arthralgias and myalgias  Skin: Negative for color change, pallor and rash  Neurological: Negative for dizziness, weakness, light-headedness and headaches     Hematological: Negative for adenopathy  Psychiatric/Behavioral: Negative for agitation and confusion  Current Outpatient Medications on File Prior to Visit   Medication Sig   • Acetaminophen 500 MG Take 2 capsules (1,000 mg total) by mouth every 6 (six) hours as needed for mild pain for up to 21 doses   • aspirin (ECOTRIN LOW STRENGTH) 81 mg EC tablet Take 1 tablet by mouth daily   • Blood Glucose Monitoring Suppl (ONE TOUCH ULTRA 2) w/Device KIT Use 3 (three) times a day   • cholecalciferol (VITAMIN D3) 1,000 units tablet Take 4 tablets (4,000 Units total) by mouth daily   • Dulaglutide 4 5 MG/0 5ML SOPN Inject 0 5 mL (4 5 mg total) under the skin once a week   • gabapentin (NEURONTIN) 300 mg capsule Take 1 capsule (300 mg total) by mouth 2 (two) times a day   • glipiZIDE (GLUCOTROL XL) 5 mg 24 hr tablet Take 1 tablet by mouth once daily   • glucose blood (OneTouch Ultra) test strip Use to test 3x daily   • levothyroxine 100 mcg tablet Take 1 tablet (100 mcg total) by mouth daily   • lisinopril-hydrochlorothiazide (PRINZIDE,ZESTORETIC) 20-25 MG per tablet Take 1 tablet by mouth in the morning and 1 tablet in the evening     • ONETOUCH DELICA LANCETS FINE MISC by Does not apply route 2 (two) times a day   • pantoprazole (PROTONIX) 40 mg tablet Take 1 tablet (40 mg total) by mouth daily   • polyethylene glycol (GLYCOLAX) 17 GM/SCOOP powder Take by mouth   • rosuvastatin (CRESTOR) 20 MG tablet Take 1 tablet (20 mg total) by mouth daily   • BinaxNOW COVID-19 Ag Home Test KIT Use as Directed on the Package (Patient not taking: Reported on 12/9/2022)   • collagenase (SANTYL) ointment Apply topically daily for 14 days (Patient not taking: Reported on 12/9/2022)       Objective     /74 (BP Location: Left arm, Patient Position: Sitting, Cuff Size: Standard)   Pulse 96   Temp (!) 97 4 °F (36 3 °C) (Temporal)   Ht 5' 4" (1 626 m)   Wt 122 kg (268 lb)   LMP  (LMP Unknown)   SpO2 97%   BMI 46 00 kg/m²     Physical Exam  Vitals and nursing note reviewed  Constitutional:       General: She is not in acute distress  Appearance: Normal appearance  She is ill-appearing  She is not diaphoretic  HENT:      Head: Normocephalic and atraumatic  Right Ear: Tympanic membrane, ear canal and external ear normal  There is no impacted cerumen  Left Ear: Tympanic membrane, ear canal and external ear normal  There is no impacted cerumen  Nose: No congestion or rhinorrhea  Mouth/Throat:      Pharynx: Posterior oropharyngeal erythema present  Eyes:      General: No scleral icterus  Right eye: No discharge  Left eye: No discharge  Conjunctiva/sclera: Conjunctivae normal    Cardiovascular:      Rate and Rhythm: Normal rate and regular rhythm  Pulmonary:      Effort: Pulmonary effort is normal  No respiratory distress  Breath sounds: Normal breath sounds  No stridor  No wheezing, rhonchi or rales  Musculoskeletal:         General: Normal range of motion  Cervical back: Normal range of motion  Lymphadenopathy:      Cervical: Cervical adenopathy present  Skin:     Coloration: Skin is not jaundiced or pale  Findings: No bruising, erythema or rash  Neurological:      General: No focal deficit present  Mental Status: She is alert and oriented to person, place, and time  Psychiatric:         Mood and Affect: Mood normal          Behavior: Behavior normal          Thought Content:  Thought content normal          Judgment: Judgment normal        Enedelia Grey

## 2022-12-09 NOTE — ASSESSMENT & PLAN NOTE
New diagnoses acute symptomatic greater than 10 days not responding to conservative treatments  Denies shortness of breath chest pain fever  Does have associated cough  Will recommend increase fluids, vitamin C, start Z-Ender, and will provide prescription for Phenergan with codeine to use primarily prior to bed    Educated on side effects and proper use of medication and will recommend call office with any worsening symptoms

## 2022-12-27 ENCOUNTER — PREP FOR PROCEDURE (OUTPATIENT)
Dept: PLASTIC SURGERY | Facility: CLINIC | Age: 62
End: 2022-12-27

## 2022-12-27 DIAGNOSIS — Z85.3 PERSONAL HISTORY OF BREAST CANCER: Primary | ICD-10-CM

## 2023-01-02 DIAGNOSIS — N18.32 TYPE 2 DIABETES MELLITUS WITH STAGE 3B CHRONIC KIDNEY DISEASE, WITHOUT LONG-TERM CURRENT USE OF INSULIN (HCC): ICD-10-CM

## 2023-01-02 DIAGNOSIS — E11.22 TYPE 2 DIABETES MELLITUS WITH STAGE 3B CHRONIC KIDNEY DISEASE, WITHOUT LONG-TERM CURRENT USE OF INSULIN (HCC): ICD-10-CM

## 2023-01-03 RX ORDER — DULAGLUTIDE 4.5 MG/.5ML
INJECTION, SOLUTION SUBCUTANEOUS
Qty: 2 ML | Refills: 10 | Status: SHIPPED | OUTPATIENT
Start: 2023-01-03

## 2023-01-08 DIAGNOSIS — N18.32 TYPE 2 DIABETES MELLITUS WITH STAGE 3B CHRONIC KIDNEY DISEASE, WITHOUT LONG-TERM CURRENT USE OF INSULIN (HCC): ICD-10-CM

## 2023-01-08 DIAGNOSIS — E11.22 TYPE 2 DIABETES MELLITUS WITH STAGE 3B CHRONIC KIDNEY DISEASE, WITHOUT LONG-TERM CURRENT USE OF INSULIN (HCC): ICD-10-CM

## 2023-01-09 RX ORDER — GLIPIZIDE 5 MG/1
TABLET, FILM COATED, EXTENDED RELEASE ORAL
Qty: 90 TABLET | Refills: 0 | Status: SHIPPED | OUTPATIENT
Start: 2023-01-09

## 2023-01-18 ENCOUNTER — TELEPHONE (OUTPATIENT)
Dept: NEPHROLOGY | Facility: CLINIC | Age: 63
End: 2023-01-18

## 2023-01-18 DIAGNOSIS — N18.31 STAGE 3A CHRONIC KIDNEY DISEASE (HCC): Primary | ICD-10-CM

## 2023-01-19 ENCOUNTER — TELEPHONE (OUTPATIENT)
Dept: NEPHROLOGY | Facility: CLINIC | Age: 63
End: 2023-01-19

## 2023-01-19 NOTE — TELEPHONE ENCOUNTER
Patient called asking if you are able to go off her labs from October  She is a new patient  Patient stated do to insurance

## 2023-01-23 ENCOUNTER — APPOINTMENT (OUTPATIENT)
Dept: LAB | Facility: CLINIC | Age: 63
End: 2023-01-23

## 2023-01-23 DIAGNOSIS — Z11.59 NEED FOR HEPATITIS C SCREENING TEST: ICD-10-CM

## 2023-01-23 DIAGNOSIS — Z85.3 PERSONAL HISTORY OF BREAST CANCER: ICD-10-CM

## 2023-01-23 DIAGNOSIS — Z11.4 SCREENING FOR HIV (HUMAN IMMUNODEFICIENCY VIRUS): ICD-10-CM

## 2023-01-23 DIAGNOSIS — N18.31 STAGE 3A CHRONIC KIDNEY DISEASE (HCC): ICD-10-CM

## 2023-01-23 DIAGNOSIS — E11.22 TYPE 2 DIABETES MELLITUS WITH STAGE 3B CHRONIC KIDNEY DISEASE, WITHOUT LONG-TERM CURRENT USE OF INSULIN (HCC): ICD-10-CM

## 2023-01-23 DIAGNOSIS — N18.32 TYPE 2 DIABETES MELLITUS WITH STAGE 3B CHRONIC KIDNEY DISEASE, WITHOUT LONG-TERM CURRENT USE OF INSULIN (HCC): ICD-10-CM

## 2023-01-23 LAB
ALBUMIN SERPL BCP-MCNC: 3.6 G/DL (ref 3.5–5)
ALP SERPL-CCNC: 73 U/L (ref 46–116)
ALT SERPL W P-5'-P-CCNC: 21 U/L (ref 12–78)
ANION GAP SERPL CALCULATED.3IONS-SCNC: 1 MMOL/L (ref 4–13)
AST SERPL W P-5'-P-CCNC: 13 U/L (ref 5–45)
BASOPHILS # BLD AUTO: 0.09 THOUSANDS/ÂΜL (ref 0–0.1)
BASOPHILS NFR BLD AUTO: 1 % (ref 0–1)
BILIRUB SERPL-MCNC: 0.31 MG/DL (ref 0.2–1)
BUN SERPL-MCNC: 26 MG/DL (ref 5–25)
CALCIUM SERPL-MCNC: 9.6 MG/DL (ref 8.3–10.1)
CHLORIDE SERPL-SCNC: 108 MMOL/L (ref 96–108)
CO2 SERPL-SCNC: 28 MMOL/L (ref 21–32)
CREAT SERPL-MCNC: 1.13 MG/DL (ref 0.6–1.3)
CREAT UR-MCNC: 125 MG/DL
CREAT UR-MCNC: 125 MG/DL
EOSINOPHIL # BLD AUTO: 0.18 THOUSAND/ÂΜL (ref 0–0.61)
EOSINOPHIL NFR BLD AUTO: 3 % (ref 0–6)
ERYTHROCYTE [DISTWIDTH] IN BLOOD BY AUTOMATED COUNT: 13.3 % (ref 11.6–15.1)
GFR SERPL CREATININE-BSD FRML MDRD: 52 ML/MIN/1.73SQ M
GLUCOSE P FAST SERPL-MCNC: 160 MG/DL (ref 65–99)
HCT VFR BLD AUTO: 34.5 % (ref 34.8–46.1)
HGB BLD-MCNC: 10.5 G/DL (ref 11.5–15.4)
IMM GRANULOCYTES # BLD AUTO: 0.03 THOUSAND/UL (ref 0–0.2)
IMM GRANULOCYTES NFR BLD AUTO: 0 % (ref 0–2)
LYMPHOCYTES # BLD AUTO: 2.57 THOUSANDS/ÂΜL (ref 0.6–4.47)
LYMPHOCYTES NFR BLD AUTO: 36 % (ref 14–44)
MCH RBC QN AUTO: 28.5 PG (ref 26.8–34.3)
MCHC RBC AUTO-ENTMCNC: 30.4 G/DL (ref 31.4–37.4)
MCV RBC AUTO: 94 FL (ref 82–98)
MICROALBUMIN UR-MCNC: 22.3 MG/L (ref 0–20)
MICROALBUMIN/CREAT 24H UR: 18 MG/G CREATININE (ref 0–30)
MONOCYTES # BLD AUTO: 0.66 THOUSAND/ÂΜL (ref 0.17–1.22)
MONOCYTES NFR BLD AUTO: 9 % (ref 4–12)
NEUTROPHILS # BLD AUTO: 3.64 THOUSANDS/ÂΜL (ref 1.85–7.62)
NEUTS SEG NFR BLD AUTO: 51 % (ref 43–75)
NRBC BLD AUTO-RTO: 0 /100 WBCS
PLATELET # BLD AUTO: 224 THOUSANDS/UL (ref 149–390)
PMV BLD AUTO: 12.2 FL (ref 8.9–12.7)
POTASSIUM SERPL-SCNC: 5.3 MMOL/L (ref 3.5–5.3)
PROT SERPL-MCNC: 7.4 G/DL (ref 6.4–8.4)
PROT UR-MCNC: 15 MG/DL
PROT/CREAT UR: 0.12 MG/G{CREAT} (ref 0–0.1)
RBC # BLD AUTO: 3.69 MILLION/UL (ref 3.81–5.12)
SODIUM SERPL-SCNC: 137 MMOL/L (ref 135–147)
WBC # BLD AUTO: 7.17 THOUSAND/UL (ref 4.31–10.16)

## 2023-01-25 ENCOUNTER — CONSULT (OUTPATIENT)
Dept: NEPHROLOGY | Facility: CLINIC | Age: 63
End: 2023-01-25

## 2023-01-25 VITALS
HEART RATE: 80 BPM | DIASTOLIC BLOOD PRESSURE: 70 MMHG | OXYGEN SATURATION: 99 % | BODY MASS INDEX: 44.73 KG/M2 | WEIGHT: 262 LBS | SYSTOLIC BLOOD PRESSURE: 132 MMHG | HEIGHT: 64 IN

## 2023-01-25 DIAGNOSIS — N18.30 STAGE 3 CHRONIC KIDNEY DISEASE, UNSPECIFIED WHETHER STAGE 3A OR 3B CKD (HCC): Primary | ICD-10-CM

## 2023-01-25 DIAGNOSIS — E66.01 OBESITY, CLASS III, BMI 40-49.9 (MORBID OBESITY) (HCC): ICD-10-CM

## 2023-01-25 DIAGNOSIS — N18.30 TYPE 2 DIABETES MELLITUS WITH STAGE 3 CHRONIC KIDNEY DISEASE, WITHOUT LONG-TERM CURRENT USE OF INSULIN, UNSPECIFIED WHETHER STAGE 3A OR 3B CKD (HCC): ICD-10-CM

## 2023-01-25 DIAGNOSIS — I10 ESSENTIAL HYPERTENSION: ICD-10-CM

## 2023-01-25 DIAGNOSIS — K21.9 GERD WITHOUT ESOPHAGITIS: ICD-10-CM

## 2023-01-25 DIAGNOSIS — E11.22 TYPE 2 DIABETES MELLITUS WITH STAGE 3 CHRONIC KIDNEY DISEASE, WITHOUT LONG-TERM CURRENT USE OF INSULIN, UNSPECIFIED WHETHER STAGE 3A OR 3B CKD (HCC): ICD-10-CM

## 2023-01-25 RX ORDER — LISINOPRIL 20 MG/1
20 TABLET ORAL 2 TIMES DAILY
Qty: 60 TABLET | Refills: 3 | Status: SHIPPED | OUTPATIENT
Start: 2023-01-25 | End: 2023-01-26 | Stop reason: ALTCHOICE

## 2023-01-25 NOTE — PATIENT INSTRUCTIONS
Most recent kidney function is 52%  You likely have kidney disease from long standing diabetes and blood pressure  Meloxicam can also contribute to kidney disease with long term use  Can take short term ibuprofen if needed for back pain  Limit use as best you can-- try to take sparingly like 1-3 times  a week  Encourage fluid intake to 2 quarts per day, mainly water  Follow up in 4-5 months, with blood work prior  Check a kidney ultrasound for baseline screening  If you are sick, not eating or drinking, vomiting or diarrhea, hold lisinopril-HCTZ

## 2023-01-25 NOTE — PROGRESS NOTES
[facility]  Consultation - Nephrology    Joselito Alfaro 58 y o  female MRN: [id number]      A/P:  1      1  Stage 3 chronic kidney disease, unspecified whether stage 3a or 3b CKD ([place] [address] )  -     Ambulatory referral to Nephrology  -     Comprehensive metabolic panel; Future; Expected date: 04/20/2023  -     PTH, intact; Future; Expected date: 04/20/2023  -     Phosphorus; Future; Expected date: 04/20/2023  -     Uric acid; Future; Expected date: 04/20/2023  -     Urinalysis with microscopic; Future; Expected date: 04/20/2023  -     Microalbumin / creatinine urine ratio; Future; Expected date: 04/20/2023  -     BILL Screen w/ Reflex to Titer/Pattern; Future; Expected date: 04/20/2023  -     Protein electrophoresis, serum; Future; Expected date: 04/20/2023  -     US kidney and bladder; Future; Expected date: 04/20/2023  -     lisinopril (ZESTRIL) 20 mg tablet; Take 1 tablet (20 mg total) by mouth 2 (two) times a day  -     dapagliflozin (Farxiga) 5 MG TABS; Take 1 tablet (5 mg total) by mouth daily    2  Type 2 diabetes mellitus with stage 3 chronic kidney disease, without long-term current use of insulin, unspecified whether stage 3a or 3b CKD ([place] [address] )  -     Ambulatory referral to Nephrology  -     Comprehensive metabolic panel; Future; Expected date: 04/20/2023  -     PTH, intact; Future; Expected date: 04/20/2023  -     Phosphorus; Future; Expected date: 04/20/2023  -     Uric acid; Future; Expected date: 04/20/2023  -     Urinalysis with microscopic; Future; Expected date: 04/20/2023  -     Microalbumin / creatinine urine ratio; Future; Expected date: 04/20/2023  -     BILL Screen w/ Reflex to Titer/Pattern; Future; Expected date: 04/20/2023  -     Protein electrophoresis, serum; Future; Expected date: 04/20/2023  -     US kidney and bladder; Future; Expected date: 04/20/2023  -     dapagliflozin (Farxiga) 5 MG TABS; Take 1 tablet (5 mg total) by mouth daily    3   Essential hypertension  -     lisinopril (ZESTRIL) 20 mg tablet; Take 1 tablet (20 mg total) by mouth 2 (two) times a day    4  Obesity, Class III, BMI 40-49 9 (morbid obesity) ([address] )    5  GERD without esophagitis      1  CKD3 unspecified, A1   Cr had been running in 1 5-1 7 range in July and October  Winferd Rochelle in October and NSAIDs stopped  Likely prerenal/vasomotor component of HCTZ/ACE/Farxiga  Baseline still TBD  Appears euvolemic at present, she may have chronic lymphedema ( but this is not treated with diuretics)  Metabolic stable, potassium is upper endo of normal     Etiology likely DM/htn nephrosclerosis  NSAID can be associated with vasomotor effect and chronic interstitial nephritis  Recommend  1  Check baseline US for screening  2  Check PTH/Uric acid/UA/ spep/BILL for screening  3  Discussed CKD stages and Cr/eGFR trends  4  Panchito Pea ideal for pt for CKD progression/volume management/ Diabetic kidney disease  I think she became too prerenal on HCTZ BID and Farxiga  Elect to stop HCTZ and change to just lisinopril 20mg BID  She denies issues with swelling  Resume Farxiga at previous 5mg/day dose  She has medication at home and was not cost prohibitive for her  Updated endocrine about plan for change  Check chem in 2 weeks after changes  2  DM type 2 with CKD3  Recommend to restart Farxiga at 5mg/day  If tolerates can go up to 10mg/day  Stop HCTZ which is BID  Urine microalbumin < 30mg/g cr which is excellent  Goal A1C ~7 to avoid hypoglycemia  Needs an updated A1C  3  essential HTN  Appears euvolemic, off HCTZ will need to monitor volume status/edema  Goal<130/80  BP under reasonable control  Continue lisinopril --taking BID, so 40mg total dose  Resume Panchito Pea and stop HCTZ  previously tolerated without issue, discussed side effect profile  Farxiga dose can be increased to 10mg as well  Check chem in 2 weeks  Does not monitor BP at home    4   Morbid obesity  Encourage weight loss  DASH diet may not be ideal due to borderline high potassium  5  GERD   PPI use can be associated with ORQUIDEA,however, needed to tx patient GERD  The patient and any family members present were counseled today on risks benefits and alternatives of any medications, and were agreeable to the treatment plan  They were counseled on diagnostic testing if necessary, and projected outcomes as are available and medically indicated  All questions were asked and answered during the encounter  If more questions are to arise the patient will call the office  Alarm and return precautions discussed and accepted  Thank you for allowing us to participate in the care of your patient  History of Present Illness   Physician Requesting Consult: No att  providers found    HPI: Anders Garcia is a 58y o  year old female who presents for CKD evaluation at request of endocrinology in the [place] office  She has hx type 2 DM for 10-15 years  Not very controlled per pt  Most recent A1C 7 8  Previously on metformin and [place] but this was stopped several months ago  Endocrinology said it was not effective for her and changed to glipizide  Denies diabetic retinopathy, she has yearly eye exam  She has diabetic neuropathy in her feet  Taking trulicity as well  Denies insulin use  She is on lisinopril-HCTZ 20-25 mg BID for HTN  -130 in provider's offices, does not check BP at home  /70 in office  Reports high blood pressure since age 36  Denies hx resistant HTN or very uncontrolled BP  Denies hx pregnancies  Denies hx autoimmune diseases  She noticed CKD in her history but denies that it was truly explained in the past      Niece had kidney stones, but no other family hx of kidney disease  Denies hematuria/proteinuria/nephrolithaisis/ /pyelo  History of breast cancer last Feb  Stage 0 and did not require any chemo/radiation  She had a UTI last year       Previously on meloxicam for 3 year and took aleve on top of it  Does not take NSAID anymore  Stopped taking meloxicam 6 months ago  She has dry skin on LE  Does not apply lotion to legs routinely  Denies skin rashes  Denies kidney images  Constitutional ROS- + fatigue   HEENT ROS- Denies headaches or history of trauma, blurred vision, occasional dry eye  Endocrine ROS- No history diabetes mellitus /hypothyroid   Cardiovascular ROS- Denies chest pain, palpitation, dyspnea exertion, orthopnea, claudication  Pulmonary ROS-  Denies cough, hemoptysis, shortness of breath  GI ROS- +nausea intermittently  Hematological ROS- Denies history of easy bruising, blood clots, bleeding or blood transfusions  Genitourinary ROS- Denies recent hematuria, pyuria, flank pain, change in urinary stream, decreased urinary output, increased urinary frequency, nocturia, foamy urine, or urinary incontinence  Lymphatic ROS- Denies lymphadenopathy  Musculoskeletal ROS- + joint pain   Dermatological ROS- Denies rash, wounds, ulcers, itching, jaundice  Psychiatric ROS- Denies hallucinations, disorientation  Neurological ROS- No stroke or TIA symptoms       Historical Information   Past Medical History:   Diagnosis Date   • Achrochordon    • Arthritis    • BRCA1 negative    • BRCA2 negative    • Cancer (HCC)     L breast-mastectomy w/ reconstruction today 2/8/2022   • Dysuria    • GERD (gastroesophageal reflux disease)    • Headache(784 0)    • IBS (irritable bowel syndrome)    • Inflammatory bowel disease    • Obesity    • Polycystic ovarian syndrome    • Rotator cuff tear     right side    • Wears glasses      Past Surgical History:   Procedure Laterality Date   • ANAL FISTULOTOMY      subcutaneous    • BREAST LUMPECTOMY Left 09/23/2021   • BREAST RECONSTRUCTION Left 02/08/2022    Procedure: IMMEDIATE RECONSTRUCTION WITH TISSUE EXPANDER PLACEMENT;  Surgeon: Sherley Chapin MD;  Location: [facility];  Service: Plastics   • CHOLECYSTECTOMY     • FLAP LOCAL TRUNK Left 2022    Procedure: DE-EPITHELIALIZED FLAP BREAST;  Surgeon: Eriberto Arizmendi MD;  Location: [facility];  Service: Plastics   • HEMORRHOID SURGERY     • INCISION AND DRAINAGE PERIRECTAL ABSCESS     • MASTECTOMY Left 2022   • MASTECTOMY W/ SENTINEL NODE BIOPSY Left 2022    Procedure: MASTECTOMY WITH BIOPSY LYMPH NODE SENTINEL; 1200 NUC MED;  Surgeon: Kavya Hdez MD;  Location: [facility];  Service: Surgical Oncology   • MOUTH SURGERY     • OVARIAN CYST REMOVAL     • PA DEBRIDEMENT SUBCUTANEOUS TISSUE 20 SQ CM/< Left 2022    Procedure: DEBRIDEMENT OF NON-HEALING SURGICAL WOUND, LEFT BREAST,  ADVANCEMENT FLAP;  Surgeon: Eribetro Arizmendi MD;  [facility]: [facility];  Service: Plastics   • ROTATOR CUFF REPAIR Right    • TONSILLECTOMY     • US GUIDANCE BREAST BIOPSY LEFT EACH ADDITIONAL Left 2021   • US GUIDED BREAST BIOPSY LEFT COMPLETE Left 2021     Social History   Social History     Substance and Sexual Activity   Alcohol Use Yes   • Alcohol/week: 0 0 standard drinks    Comment: Drink only 1-2 drinks 4x a year       Social History     Substance and Sexual Activity   Drug Use No     Social History     Tobacco Use   Smoking Status Never   Smokeless Tobacco Never     Family History   Problem Relation Age of Onset   • Diabetes Mother         Type II   • Diabetes type II Mother    • Dementia Mother          09   • Hearing loss Mother            • Breast cancer Paternal Aunt    • Cancer Paternal Uncle         carcinosarcoma of the oral cavity    • Prostate cancer Paternal Uncle            • Breast cancer Family    • Breast cancer Maternal Aunt            • Breast cancer Maternal Aunt    • Diabetes type II Maternal Aunt    • Thyroid cancer Maternal Aunt    • Thyroid disease Maternal Aunt    • Hypertension Father          01   • Depression Father          01   • Stroke Father    • Heart disease Father          01   • No Known Problems Sister    • Depression Maternal Grandmother         Committed suicide - 10/24/68   • Asthma Maternal Grandmother    • Completed Suicide  Maternal Grandmother         10/24/1968   • Depression Paternal Grandmother    • Asthma Sister         Born with it/outgrew it with age (pretty young)   • No Known Problems Paternal Aunt    • No Known Problems Maternal Aunt    • Diabetes type II Maternal Uncle    • Diabetes type II Maternal Grandfather    • Breast cancer Cousin    • Cancer Paternal Uncle                Meds/Allergies   all current active meds have been reviewed, current meds:     Current Outpatient Medications:   •  Acetaminophen 500 MG, Take 2 capsules (1,000 mg total) by mouth every 6 (six) hours as needed for mild pain for up to 21 doses, Disp: 21 capsule, Rfl: 0  •  aspirin (ECOTRIN LOW STRENGTH) 81 mg EC tablet, Take 1 tablet by mouth daily, Disp: , Rfl:   •  Blood Glucose Monitoring Suppl (ONE TOUCH ULTRA 2) w/Device KIT, Use 3 (three) times a day, Disp: 1 kit, Rfl: 0  •  dapagliflozin (Farxiga) 5 MG TABS, Take 1 tablet (5 mg total) by mouth daily, Disp: 30 tablet, Rfl: 2  •  gabapentin (NEURONTIN) 300 mg capsule, Take 1 capsule (300 mg total) by mouth 2 (two) times a day, Disp: 180 capsule, Rfl: 0  •  glipiZIDE (GLUCOTROL XL) 5 mg 24 hr tablet, Take 1 tablet by mouth once daily, Disp: 90 tablet, Rfl: 0  •  glucose blood (OneTouch Ultra) test strip, Use to test 3x daily, Disp: 100 strip, Rfl: 4  •  levothyroxine 100 mcg tablet, Take 1 tablet (100 mcg total) by mouth daily, Disp: 90 tablet, Rfl: 3  •  lisinopril (ZESTRIL) 20 mg tablet, Take 1 tablet (20 mg total) by mouth 2 (two) times a day, Disp: 60 tablet, Rfl: 3  •  ONETOUCH DELICA LANCETS FINE MISC, by Does not apply route 2 (two) times a day, Disp: , Rfl:   •  pantoprazole (PROTONIX) 40 mg tablet, Take 1 tablet (40 mg total) by mouth daily, Disp: 90 tablet, Rfl: 3  •  polyethylene glycol (GLYCOLAX) 17 GM/SCOOP powder, Take by mouth, Disp: , Rfl:   •  rosuvastatin (CRESTOR) 20 MG tablet, Take 1 tablet (20 mg total) by mouth daily, Disp: 90 tablet, Rfl: 3  •  Trulicity 4 5 LH/3 4XO SOPN, INJECT 4 5MG SUBCUTANEOUSLY ONCE EVERY 7 DAYS, Disp: 2 mL, Rfl: 10  •  BinaxNOW COVID-19 Ag Home Test KIT, Use as Directed on the Package (Patient not taking: Reported on 12/9/2022), Disp: , Rfl:   •  cholecalciferol (VITAMIN D3) 1,000 units tablet, Take 4 tablets (4,000 Units total) by mouth daily (Patient not taking: Reported on 1/25/2023), Disp: 90 tablet, Rfl: 1  •  collagenase (SANTYL) ointment, Apply topically daily for 14 days (Patient not taking: Reported on 12/9/2022), Disp: 15 g, Rfl: 0  •  promethazine-codeine (PHENERGAN WITH CODEINE) 6 25-10 mg/5 mL syrup, Take 5 mL by mouth every 4 (four) hours as needed for cough (Patient not taking: Reported on 1/25/2023), Disp: 118 mL, Rfl: 0     No Known Allergies    Objective     Vitals:    01/25/23 1352   BP: 132/70   Pulse: 80   SpO2: 99%       General Appearance:    No acute distress  Cooperative  Appears stated age  Head:    Normocephalic  Atraumatic  Eyes:    Lids, conjunctiva normal  No scleral icterus  Ears:    Normal external ears  Nose:   Nares normal  No drainage  Mouth:   Lips, tongue normal  Mucosa normal      Neck:   Supple  Symmetrical    Back:     Symmetric  No CVA tenderness  Lungs:     Normal respiratory effort  Clear to auscultation bilaterally  Chest wall:    No tenderness or deformity  Heart:    Regular rate and rhythm  Normal S1 and S2  No murmur  No JVD  No edema  Abdomen:     Soft  Non-tender  Bowel sounds active  Genitourinary:   No Mcfarland catheter present  Extremities:   Extremities normal  Atraumatic  No cyanosis  Skin:   Dry skin BL LE    Neurologic:   Alert and oriented to person, place, time  No focal deficit           Current Weight: Weight - Scale: 119 kg (262 lb)    First Weight:    Wt Readings from Last 3 Encounters:   01/25/23 119 kg (262 lb)   12/09/22 122 kg (268 lb)   10/21/22 122 kg (269 lb)        Lab Results:  I have personally reviewed pertinent labs  1/23/23  Na 137, K 5 3, Cl 108, TCO2 28, BUN 26, Cr 1 13, glucose 160, Ca 9 6, eGFR 52 ml/min  Radiology review:  No results found  [place], DO      This consultation note was produced in part using a dictation device which may document imprecise wording from author's original intent

## 2023-01-26 ENCOUNTER — TELEPHONE (OUTPATIENT)
Dept: NEPHROLOGY | Facility: CLINIC | Age: 63
End: 2023-01-26

## 2023-01-26 DIAGNOSIS — I10 ESSENTIAL HYPERTENSION: Primary | ICD-10-CM

## 2023-01-26 RX ORDER — LISINOPRIL 20 MG/1
20 TABLET ORAL DAILY
Qty: 90 TABLET | Refills: 2 | Status: SHIPPED | OUTPATIENT
Start: 2023-01-26

## 2023-01-26 NOTE — TELEPHONE ENCOUNTER
Patient called stating you had left a message for her to call back  I did not see any notes to relay to patient

## 2023-01-26 NOTE — PROGRESS NOTES
Informed Patient of changes to med by phone  Lisinopril is only once a day, changed several months ago  Weight 272 lb per pt  Changing Rx to lisinopril 20mg once a day without HCTZ  Restart low dose Farxiga at 5mg/day  Check blood work in 2 weeks  She will call with updates on swelling/weight gain if any concerns

## 2023-02-07 PROBLEM — J01.00 ACUTE NON-RECURRENT MAXILLARY SINUSITIS: Status: RESOLVED | Noted: 2022-12-09 | Resolved: 2023-02-07

## 2023-02-13 DIAGNOSIS — E11.65 POORLY CONTROLLED TYPE 2 DIABETES MELLITUS WITH PERIPHERAL NEUROPATHY (HCC): ICD-10-CM

## 2023-02-13 DIAGNOSIS — E11.42 POORLY CONTROLLED TYPE 2 DIABETES MELLITUS WITH PERIPHERAL NEUROPATHY (HCC): ICD-10-CM

## 2023-02-13 RX ORDER — GABAPENTIN 300 MG/1
300 CAPSULE ORAL 2 TIMES DAILY
Qty: 180 CAPSULE | Refills: 0 | Status: SHIPPED | OUTPATIENT
Start: 2023-02-13

## 2023-02-13 NOTE — TELEPHONE ENCOUNTER
Pt LM on refill line requesting refill on:  gabapentin (NEURONTIN) 300 mg capsule #180    To be sent to [address] in [place]

## 2023-02-13 NOTE — TELEPHONE ENCOUNTER
Failed protocol    Neurology: Anticonvulsants - gabapentin Failed 02/13/2023 12:44 PM    eGFR is 60 or above and within 360 days    Valid encounter within last 12 months

## 2023-02-20 ENCOUNTER — CONSULT (OUTPATIENT)
Dept: FAMILY MEDICINE CLINIC | Facility: CLINIC | Age: 63
End: 2023-02-20

## 2023-02-20 VITALS
BODY MASS INDEX: 47.19 KG/M2 | HEIGHT: 64 IN | RESPIRATION RATE: 16 BRPM | HEART RATE: 80 BPM | WEIGHT: 276.4 LBS | OXYGEN SATURATION: 99 % | TEMPERATURE: 97.8 F | DIASTOLIC BLOOD PRESSURE: 84 MMHG | SYSTOLIC BLOOD PRESSURE: 134 MMHG

## 2023-02-20 DIAGNOSIS — E66.01 MORBID OBESITY (HCC): ICD-10-CM

## 2023-02-20 DIAGNOSIS — E78.2 MIXED DIABETIC HYPERLIPIDEMIA ASSOCIATED WITH TYPE 2 DIABETES MELLITUS (HCC): ICD-10-CM

## 2023-02-20 DIAGNOSIS — Z01.818 PREOPERATIVE CLEARANCE: Primary | ICD-10-CM

## 2023-02-20 DIAGNOSIS — E11.40 CONTROLLED TYPE 2 DIABETES MELLITUS WITH DIABETIC NEUROPATHY, UNSPECIFIED WHETHER LONG TERM INSULIN USE (HCC): ICD-10-CM

## 2023-02-20 DIAGNOSIS — E11.69 MIXED DIABETIC HYPERLIPIDEMIA ASSOCIATED WITH TYPE 2 DIABETES MELLITUS (HCC): ICD-10-CM

## 2023-02-20 DIAGNOSIS — I10 BENIGN ESSENTIAL HYPERTENSION: ICD-10-CM

## 2023-02-20 DIAGNOSIS — C50.912 MALIGNANT NEOPLASM OF LEFT FEMALE BREAST, UNSPECIFIED ESTROGEN RECEPTOR STATUS, UNSPECIFIED SITE OF BREAST (HCC): ICD-10-CM

## 2023-02-20 DIAGNOSIS — E03.9 HYPOTHYROIDISM, UNSPECIFIED TYPE: ICD-10-CM

## 2023-02-20 DIAGNOSIS — K21.9 GERD WITHOUT ESOPHAGITIS: ICD-10-CM

## 2023-02-20 DIAGNOSIS — N18.32 CHRONIC KIDNEY DISEASE (CKD) STAGE G3B/A1, MODERATELY DECREASED GLOMERULAR FILTRATION RATE (GFR) BETWEEN 30-44 ML/MIN/1.73 SQUARE METER AND ALBUMINURIA CREATININE RATIO LESS THAN 30 MG/G (HCC): ICD-10-CM

## 2023-02-20 PROBLEM — C50.919 MALIGNANT NEOPLASM OF BREAST (HCC): Status: ACTIVE | Noted: 2023-02-20

## 2023-02-20 NOTE — PROGRESS NOTES
Name: Mingo Smith      : 1960      MRN: [id number]  Encounter Provider: Kelsi Childress DO  Encounter Date: 2023   Encounter department: [address] [place] [address]   EKG is normal   PAT labs pending  Patient is medically cleared for surgery pending lab results  Continue present treatment  Follow-up with specialist as scheduled  Return to the office in 4 to 6 months  1  Preoperative clearance  -     POCT ECG    2  Malignant neoplasm of left female breast, unspecified estrogen receptor status, unspecified site of breast ([place] [address] )    3  Controlled type 2 diabetes mellitus with diabetic neuropathy, unspecified whether long term insulin use ([place] [address] )    4  Chronic kidney disease (CKD) stage G3b/A1, moderately decreased glomerular filtration rate (GFR) between 30-44 mL/min/1 73 square meter and albuminuria creatinine ratio less than 30 mg/g ([facility])    5  Benign essential hypertension  -     POCT ECG    6  Morbid obesity ([place] [address] )    7  Mixed diabetic hyperlipidemia associated with type 2 diabetes mellitus ([place] [address] )    8  Hypothyroidism, unspecified type    9  GERD without esophagitis           Subjective      Patient is here for preoperative medical clearance exam for removal of left breast tissue expander and placement of implant scheduled on 3/27/2023 under general anesthesia at [address] [place] [address] with Dr Svetlana Shaw, plastic surgeon  PAT EKG ordered  Labs ordered per nephrology and endocrinology which have not been completed yet  Patient is feeling fairly well overall  Patient denies history of bleeding problems or problems with anesthesia in the past   Walking somewhat limited by arthritic knee pains and chronic low back pain  Review of Systems   Constitutional: Negative for activity change, appetite change, chills, diaphoresis, fatigue, fever and unexpected weight change  HENT: Negative  Eyes: Negative      Respiratory: Negative for cough, chest tightness, shortness of breath and wheezing  Cardiovascular: Positive for leg swelling  Negative for chest pain and palpitations  Gastrointestinal: Positive for constipation  Negative for abdominal pain, blood in stool, diarrhea, nausea and vomiting  Endocrine: Negative for cold intolerance and heat intolerance  Genitourinary: Positive for urgency  Negative for difficulty urinating, dysuria, frequency and hematuria  Musculoskeletal: Positive for arthralgias, back pain and joint swelling  Negative for gait problem, myalgias, neck pain and neck stiffness  Skin: Negative  Neurological: Negative for dizziness, syncope, weakness, light-headedness and headaches  Hematological: Negative for adenopathy  Does not bruise/bleed easily  Psychiatric/Behavioral: Negative for decreased concentration, dysphoric mood and sleep disturbance  The patient is not nervous/anxious          Current Outpatient Medications on File Prior to Visit   Medication Sig   • Acetaminophen 500 MG Take 2 capsules (1,000 mg total) by mouth every 6 (six) hours as needed for mild pain for up to 21 doses   • aspirin (ECOTRIN LOW STRENGTH) 81 mg EC tablet Take 1 tablet by mouth daily   • Blood Glucose Monitoring Suppl (ONE TOUCH ULTRA 2) w/Device KIT Use 3 (three) times a day   • dapagliflozin (Farxiga) 5 MG TABS Take 1 tablet (5 mg total) by mouth daily   • gabapentin (NEURONTIN) 300 mg capsule Take 1 capsule (300 mg total) by mouth 2 (two) times a day   • glipiZIDE (GLUCOTROL XL) 5 mg 24 hr tablet Take 1 tablet by mouth once daily   • glucose blood (OneTouch Ultra) test strip Use to test 3x daily   • levothyroxine 100 mcg tablet Take 1 tablet (100 mcg total) by mouth daily   • lisinopril (ZESTRIL) 20 mg tablet Take 1 tablet (20 mg total) by mouth daily   • ONETOUCH DELICA LANCETS FINE MISC by Does not apply route 2 (two) times a day   • pantoprazole (PROTONIX) 40 mg tablet Take 1 tablet (40 mg total) by mouth daily   • polyethylene glycol (GLYCOLAX) 17 GM/SCOOP powder Take by mouth   • rosuvastatin (CRESTOR) 20 MG tablet Take 1 tablet (20 mg total) by mouth daily   • Trulicity 4 5 NU/3 6RC SOPN INJECT 4 5MG SUBCUTANEOUSLY ONCE EVERY 7 DAYS   • BinaxNOW COVID-19 Ag Home Test KIT Use as Directed on the Package (Patient not taking: Reported on 12/9/2022)   • cholecalciferol (VITAMIN D3) 1,000 units tablet Take 4 tablets (4,000 Units total) by mouth daily (Patient not taking: Reported on 1/25/2023)   • collagenase (SANTYL) ointment Apply topically daily for 14 days (Patient not taking: Reported on 12/9/2022)   • [DISCONTINUED] promethazine-codeine (PHENERGAN WITH CODEINE) 6 25-10 mg/5 mL syrup Take 5 mL by mouth every 4 (four) hours as needed for cough (Patient not taking: Reported on 1/25/2023)       Objective     /84 (BP Location: Left arm, Patient Position: Sitting, Cuff Size: Large)   Pulse 80   Temp 97 8 °F (36 6 °C) (Temporal)   Resp 16   Ht 5' 4" (1 626 m)   Wt 125 kg (276 lb 6 4 oz)   LMP  (LMP Unknown)   SpO2 99%   BMI 47 44 kg/m²     Physical Exam  Constitutional:       General: She is not in acute distress  Appearance: Normal appearance  She is obese  She is not ill-appearing, toxic-appearing or diaphoretic  HENT:      Head: Normocephalic  Right Ear: Tympanic membrane normal       Left Ear: Tympanic membrane normal       Nose: Nose normal       Mouth/Throat:      Mouth: Mucous membranes are moist       Pharynx: Oropharynx is clear  Eyes:      General: No scleral icterus  Conjunctiva/sclera: Conjunctivae normal    Neck:      Vascular: No carotid bruit  Cardiovascular:      Rate and Rhythm: Normal rate and regular rhythm  Pulmonary:      Effort: Pulmonary effort is normal       Breath sounds: Normal breath sounds  Abdominal:      Palpations: Abdomen is soft  Tenderness: There is no abdominal tenderness  Musculoskeletal:      Cervical back: Neck supple  Right lower leg: Edema present  Left lower leg: Edema present  Lymphadenopathy:      Cervical: No cervical adenopathy  Skin:     General: Skin is warm and dry  Neurological:      General: No focal deficit present  Mental Status: She is alert and oriented to person, place, and time  Psychiatric:         Mood and Affect: Mood normal          Behavior: Behavior normal          Thought Content: Thought content normal          Judgment: Judgment normal        Ksenia Isabel DO  BMI Counseling: Body mass index is 47 44 kg/m²  The BMI is above normal  Nutrition recommendations include reducing portion sizes, decreasing overall calorie intake, 3-5 servings of fruits/vegetables daily, reducing fast food intake, consuming healthier snacks, decreasing soda and/or juice intake, moderation in carbohydrate intake, increasing intake of lean protein, reducing intake of saturated fat and trans fat and reducing intake of cholesterol  Exercise recommendations include moderate aerobic physical activity for 150 minutes/week

## 2023-02-21 ENCOUNTER — APPOINTMENT (OUTPATIENT)
Dept: LAB | Facility: CLINIC | Age: 63
End: 2023-02-21

## 2023-02-21 DIAGNOSIS — N18.30 STAGE 3 CHRONIC KIDNEY DISEASE, UNSPECIFIED WHETHER STAGE 3A OR 3B CKD (HCC): ICD-10-CM

## 2023-02-21 LAB
ANION GAP SERPL CALCULATED.3IONS-SCNC: 5 MMOL/L (ref 4–13)
BUN SERPL-MCNC: 21 MG/DL (ref 5–25)
CALCIUM SERPL-MCNC: 9.4 MG/DL (ref 8.3–10.1)
CHLORIDE SERPL-SCNC: 107 MMOL/L (ref 96–108)
CO2 SERPL-SCNC: 24 MMOL/L (ref 21–32)
CREAT SERPL-MCNC: 1.19 MG/DL (ref 0.6–1.3)
GFR SERPL CREATININE-BSD FRML MDRD: 49 ML/MIN/1.73SQ M
GLUCOSE P FAST SERPL-MCNC: 175 MG/DL (ref 65–99)
POTASSIUM SERPL-SCNC: 4.6 MMOL/L (ref 3.5–5.3)
SODIUM SERPL-SCNC: 136 MMOL/L (ref 135–147)
TSH SERPL DL<=0.05 MIU/L-ACNC: 2 UIU/ML (ref 0.45–4.5)

## 2023-02-23 ENCOUNTER — TELEPHONE (OUTPATIENT)
Dept: FAMILY MEDICINE CLINIC | Facility: CLINIC | Age: 63
End: 2023-02-23

## 2023-02-23 ENCOUNTER — APPOINTMENT (OUTPATIENT)
Dept: LAB | Facility: CLINIC | Age: 63
End: 2023-02-23

## 2023-02-23 NOTE — PROGRESS NOTES
Established Patient Progress Note      Chief Complaint   Patient presents with   • Diabetes Type 2     Pt checking once daily fasting - avg 200        History of Present Illness:   Katy Echevarria is a 58 y o  female with HTN, HLD, hypothyroidism, and type 2 diabetes without long term use of insulin since approximately   Reports complications of neuropathy  Denies recent illness or hospitalizations  Denies recent severe hypoglycemic or severe hyperglycemic episodes  Denies any issues with her current regimen  home glucose monitoring: are performed regularly-once daily in the morning     + family history of T2DM in mother and maternal grandmother    Since patient's last appointment on 10/21/2022, Orpha Ready was resumed by nephrology to assist with renal protection  A1C remains elevated  Patient reports that she continues to struggle with following a healthy diet  She has not noticed any significant reduction in appetite with her Trulicity even at the maximum dose  Component      Latest Ref Rng & Units 2022 10/17/2022 2023           7:18 AM  9:58 AM  8:41 AM   Hemoglobin A1C      Normal 3 8-5 6%; PreDiabetic 5 7-6 4%; Diabetic >=6 5%; Glycemic control for adults with diabetes <7 0% % 8 1 (H) 7 8 (H) 8 1 (H)   eAG, EST AVG Glucose      mg/dl 186 177 186       Home blood glucose readings: reports that her fasting sugar is never below 200 mg/dl     Fastin, 191, 185, 208, 171, 173, 155, 149, 198, 158, 164, 196, 246, 228, 213, 206, 179    Current regimen:   Glipizide 5 mg daily  Trulicity 4 5 mg once weekly  Farxiga 5 mg daily    Last Eye Exam: Up-to-date  Last Foot Exam: Up-to-date    For hypothyroidism, she is taking 100 mcg of levothyroxine daily  Thyroid function is stable  Component      Latest Ref Rng & Units 2022           7:16 AM 10:44 AM   TSH 3RD GENERATON      0 450 - 4 500 uIU/mL 1 630 2 000       For hypertension, she is taking 20 mg of lisinopril daily    She denies headache and cough  For hyperlipidemia, she is taking 20 mg of rosuvastatin nightly  She denies myalgias      Patient Active Problem List   Diagnosis   • Anemia   • Benign essential hypertension   • Arthritis   • Mixed diabetic hyperlipidemia associated with type 2 diabetes mellitus (HCC)   • Disc degeneration, lumbar   • Fibrocystic breast disease, unspecified laterality   • GERD without esophagitis   • Hypothyroidism   • IBS (irritable bowel syndrome)   • Neoplasm of uncertain behavior of skin   • Obesity, Class III, BMI 40-49 9 (morbid obesity) ([facility])   • MARIA FERNANDA (obstructive sleep apnea)   • Primary osteoarthritis of both knees   • Vitamin D deficiency   • Type 2 diabetes mellitus with hyperglycemia, without long-term current use of insulin ([facility])   • Fatty liver   • Full thickness rotator cuff tear   • Shoulder joint pain   • Family history of breast cancer in female   • Screening mammogram, encounter for   • Lumbar radiculopathy   • Ductal carcinoma in situ (DCIS) of left breast   • BRCA negative   • Chronic kidney disease (CKD) stage G3b/A1, moderately decreased glomerular filtration rate (GFR) between 30-44 mL/min/1 73 square meter and albuminuria creatinine ratio less than 30 mg/g ([facility])   • Malignant neoplasm of breast ([address] )   • Morbid obesity ([address] )      Past Medical History:   Diagnosis Date   • Achrochordon    • Arthritis    • BRCA1 negative    • BRCA2 negative    • Cancer ([address] )     L breast-mastectomy w/ reconstruction today 2/8/2022   • Dysuria    • GERD (gastroesophageal reflux disease)    • Headache(784 0)    • IBS (irritable bowel syndrome)    • Inflammatory bowel disease    • Obesity    • Polycystic ovarian syndrome    • Rotator cuff tear     right side    • Wears glasses       Past Surgical History:   Procedure Laterality Date   • ANAL FISTULOTOMY      subcutaneous    • BREAST LUMPECTOMY Left 09/23/2021   • BREAST RECONSTRUCTION Left 02/08/2022    Procedure: IMMEDIATE RECONSTRUCTION WITH TISSUE EXPANDER PLACEMENT;  Surgeon: Teola Alpers, MD;  Location: [facility];  Service: Plastics   • CHOLECYSTECTOMY     • FLAP LOCAL TRUNK Left 2022    Procedure: DE-EPITHELIALIZED FLAP BREAST;  Surgeon: Teola Alpers, MD;  [facility];  Service: Plastics   • HEMORRHOID SURGERY     • INCISION AND DRAINAGE PERIRECTAL ABSCESS     • MASTECTOMY Left 2022   • MASTECTOMY W/ SENTINEL NODE BIOPSY Left 2022    Procedure: MASTECTOMY WITH BIOPSY LYMPH NODE SENTINEL; 1200 NUC MED;  Surgeon: Marylene Parrot, MD;  [facility];  Service: Surgical Oncology   • MOUTH SURGERY     • OVARIAN CYST REMOVAL     • MT DEBRIDEMENT SUBCUTANEOUS TISSUE 20 SQ CM/< Left 2022    Procedure: DEBRIDEMENT OF NON-HEALING SURGICAL WOUND, LEFT BREAST,  ADVANCEMENT FLAP;  Surgeon: Teola Alpers, MD;  [facility];  Service: Plastics   • ROTATOR CUFF REPAIR Right    • TONSILLECTOMY     • US GUIDANCE BREAST BIOPSY LEFT EACH ADDITIONAL Left 2021   • US GUIDED BREAST BIOPSY LEFT COMPLETE Left 2021      Family History   Problem Relation Age of Onset   • Diabetes Mother         Type II   • Diabetes type II Mother    • Dementia Mother          09   • Hearing loss Mother            • Breast cancer Paternal Aunt    • Cancer Paternal Uncle         carcinosarcoma of the oral cavity    • Prostate cancer Paternal Uncle            • Breast cancer Family    • Breast cancer Maternal Aunt            • Breast cancer Maternal Aunt    • Diabetes type II Maternal Aunt    • Thyroid cancer Maternal Aunt    • Thyroid disease Maternal Aunt    • Hypertension Father          01   • Depression Father          01   • Stroke Father    • Heart disease Father          01   • No Known Problems Sister    • Depression Maternal Grandmother         Committed suicide - 10/24/68   • Asthma Maternal Grandmother    • Completed Suicide  Maternal Grandmother 10/24/1968   • Depression Paternal [de-identified]    • Asthma Sister         Born with it/outgrew it with age (pretty young)   • No Known Problems Paternal Aunt    • No Known Problems Maternal Aunt    • Diabetes type II Maternal Uncle    • Diabetes type II Maternal Grandfather    • Breast cancer Cousin    • Cancer Paternal Uncle              Social History     Tobacco Use   • Smoking status: Never   • Smokeless tobacco: Never   Substance Use Topics   • Alcohol use: Yes     Alcohol/week: 0 0 standard drinks     Comment: Drink only 1-2 drinks 4x a year       No Known Allergies      Current Outpatient Medications:   •  Acetaminophen 500 MG, Take 2 capsules (1,000 mg total) by mouth every 6 (six) hours as needed for mild pain for up to 21 doses, Disp: 21 capsule, Rfl: 0  •  aspirin (ECOTRIN LOW STRENGTH) 81 mg EC tablet, Take 1 tablet by mouth daily, Disp: , Rfl:   •  BinaxNOW COVID-19 Ag Home Test KIT, , Disp: , Rfl:   •  Blood Glucose Monitoring Suppl (ONE TOUCH ULTRA 2) w/Device KIT, Use 3 (three) times a day, Disp: 1 kit, Rfl: 0  •  cholecalciferol (VITAMIN D3) 1,000 units tablet, Take 4 tablets (4,000 Units total) by mouth daily, Disp: 90 tablet, Rfl: 1  •  dapagliflozin (Farxiga) 5 MG TABS, Take 1 tablet (5 mg total) by mouth daily, Disp: 30 tablet, Rfl: 2  •  gabapentin (NEURONTIN) 300 mg capsule, Take 1 capsule (300 mg total) by mouth 2 (two) times a day, Disp: 180 capsule, Rfl: 0  •  glipiZIDE (GLUCOTROL XL) 5 mg 24 hr tablet, Take 1 tablet by mouth once daily, Disp: 90 tablet, Rfl: 0  •  glucose blood (OneTouch Ultra) test strip, Use to test 3x daily, Disp: 100 strip, Rfl: 4  •  levothyroxine 100 mcg tablet, Take 1 tablet (100 mcg total) by mouth daily, Disp: 90 tablet, Rfl: 3  •  lisinopril (ZESTRIL) 20 mg tablet, Take 1 tablet (20 mg total) by mouth daily, Disp: 90 tablet, Rfl: 2  •  ONETOUCH DELICA LANCETS FINE MISC, by Does not apply route 2 (two) times a day, Disp: , Rfl:   •  pantoprazole (PROTONIX) 40 mg tablet, Take 1 tablet (40 mg total) by mouth daily, Disp: 90 tablet, Rfl: 3  •  polyethylene glycol (GLYCOLAX) 17 GM/SCOOP powder, Take by mouth, Disp: , Rfl:   •  rosuvastatin (CRESTOR) 20 MG tablet, Take 1 tablet (20 mg total) by mouth daily, Disp: 90 tablet, Rfl: 3  •  semaglutide, 2 mg/dose, (Ozempic, 2 MG/DOSE,) 8 mg/ mL injection pen, Inject 0 75 mL (2 mg total) under the skin every 7 days, Disp: 9 mL, Rfl: 1    Review of Systems   Constitutional: Positive for fatigue  Negative for activity change, appetite change and unexpected weight change  HENT: Negative for dental problem, sore throat, trouble swallowing and voice change  Eyes: Negative for visual disturbance  Respiratory: Negative for cough, chest tightness and shortness of breath  Cardiovascular: Negative for chest pain, palpitations and leg swelling  Gastrointestinal: Negative for constipation, diarrhea, nausea and vomiting  Endocrine: Negative for polydipsia, polyphagia and polyuria  Genitourinary: Negative for frequency  Musculoskeletal: Negative for arthralgias, back pain, gait problem and myalgias  Skin: Negative for wound  Allergic/Immunologic: Negative for environmental allergies and food allergies  Neurological: Positive for numbness  Negative for dizziness, weakness, light-headedness and headaches  Psychiatric/Behavioral: Negative for decreased concentration, dysphoric mood and sleep disturbance  The patient is not nervous/anxious  Physical Exam:  Body mass index is 47 38 kg/m²  /78   Pulse 80   Resp 18   Ht 5' 4" (1 626 m)   Wt 125 kg (276 lb)   LMP  (LMP Unknown)   SpO2 97%   BMI 47 38 kg/m²    Wt Readings from Last 3 Encounters:   02/24/23 125 kg (276 lb)   02/20/23 125 kg (276 lb 6 4 oz)   01/25/23 119 kg (262 lb)       Physical Exam  Vitals reviewed  Constitutional:       General: She is not in acute distress  Appearance: She is well-developed  She is obese   She is not ill-appearing  HENT:      Head: Normocephalic and atraumatic  Eyes:      Pupils: Pupils are equal, round, and reactive to light  Neck:      Thyroid: No thyromegaly  Cardiovascular:      Rate and Rhythm: Normal rate and regular rhythm  Pulses: Normal pulses  Heart sounds: Normal heart sounds  Pulmonary:      Effort: Pulmonary effort is normal       Breath sounds: Normal breath sounds  Abdominal:      General: Bowel sounds are normal  There is no distension  Palpations: Abdomen is soft  Tenderness: There is no abdominal tenderness  Musculoskeletal:      Cervical back: Normal range of motion and neck supple  Right lower leg: No edema  Left lower leg: No edema  Lymphadenopathy:      Cervical: No cervical adenopathy  Skin:     General: Skin is warm and dry  Capillary Refill: Capillary refill takes less than 2 seconds  Neurological:      Mental Status: She is alert and oriented to person, place, and time        Gait: Gait normal    Psychiatric:         Mood and Affect: Mood normal          Behavior: Behavior normal            Labs:   Lab Results   Component Value Date    HGBA1C 8 1 (H) 02/23/2023    HGBA1C 7 8 (H) 10/17/2022    HGBA1C 8 1 (H) 07/05/2022     Lab Results   Component Value Date    CREATININE 1 19 02/21/2023    CREATININE 1 13 01/23/2023    CREATININE 1 51 (H) 10/17/2022    BUN 21 02/21/2023     10/09/2015    K 4 6 02/21/2023     02/21/2023    CO2 24 02/21/2023     eGFR   Date Value Ref Range Status   02/21/2023 49 ml/min/1 73sq m Final     Lab Results   Component Value Date    CHOL 198 10/09/2015    HDL 34 (L) 10/17/2022    TRIG 243 (H) 10/17/2022     Lab Results   Component Value Date    ALT 21 01/23/2023    AST 13 01/23/2023    ALKPHOS 73 01/23/2023    BILITOT 0 48 10/09/2015     Lab Results   Component Value Date    PVY9EGUSMYRL 2 000 02/21/2023    JGH5SDAQACFJ 1 630 07/05/2022    KYX5VSKXRHQM 2 270 12/23/2019     Lab Results   Component Value Date    FREET4 1 11 07/05/2022       Impression & Plan:    Problem List Items Addressed This Visit        Endocrine    Mixed diabetic hyperlipidemia associated with type 2 diabetes mellitus ([facility] )     Continue statin  Lab Results   Component Value Date    HGBA1C 8 1 (H) 02/23/2023            Relevant Medications    semaglutide, 2 mg/dose, (Ozempic, 2 MG/DOSE,) 8 mg/ mL injection pen    rosuvastatin (CRESTOR) 20 MG tablet    Hypothyroidism     Stable  Continue 100 mcg of LT4 daily  Type 2 diabetes mellitus with hyperglycemia, without long-term current use of insulin ([facility] ) - Primary     Patient remains poorly controlled with frequent hyperglycemia  She has not benefited from the highest dose of Trulicity  Her blood sugars remain elevated and her appetite remains high  She understands that her diet must improve  I recommend transitioning to Ozempic to determine if that GLP-1 provides more glycemic control and weight reduction  Start 2 mg once weekly  Patient knows to notify me if she should experience s/e of nausea  We also discussed the addition of basal insulin  Patient will return in 2 months  Should her blood sugars remain elevated, she is agreeable to starting basal insulin in order to provide tighter glycemic control while she continues to focus on diet and lifestyle modification  Test blood sugars 1-2x daily  Patient knows to notify me with persistent hyperglycemia or episodes of hypoglycemia  F/U in 2 months  Lab Results   Component Value Date    HGBA1C 8 1 (H) 02/23/2023            Relevant Medications    semaglutide, 2 mg/dose, (Ozempic, 2 MG/DOSE,) 8 mg/ mL injection pen       Other    Vitamin D deficiency     Resume Vit D supplements  Morbid obesity ([facility] )     Transition to 2 mg of Ozempic once weekly  Counseled on the importance of reducing intake of foods high in sugar  Increase physical activity          Other Visit Diagnoses     Hyperlipidemia, unspecified hyperlipidemia type Lipids not well controlled  Discontinue generic Vytorin and start rosuvastatin 20 mg daily  Relevant Medications    rosuvastatin (CRESTOR) 20 MG tablet          No orders of the defined types were placed in this encounter  There are no Patient Instructions on file for this visit  Discussed with the patient and all questioned fully answered  She will call me if any problems arise  Follow-up appointment in 2 months       Counseled patient on diagnostic results, prognosis, risk and benefit of treatment options, instruction for management, importance of treatment compliance, Risk  factor reduction and impressions    KORI Muro

## 2023-02-23 NOTE — TELEPHONE ENCOUNTER
LMOM for Chely Caban to return my call  Tried faxing pre-op clearance paperwork and ekg to [phone]   Will not go thru, is there another fax # to try or can pull documents from media tab

## 2023-02-23 NOTE — TELEPHONE ENCOUNTER
2nd call placed to office   LMOM to notify that fax is not going thru, documents in media tab, and to call with any questions

## 2023-02-24 ENCOUNTER — OFFICE VISIT (OUTPATIENT)
Dept: ENDOCRINOLOGY | Facility: CLINIC | Age: 63
End: 2023-02-24

## 2023-02-24 VITALS
BODY MASS INDEX: 47.12 KG/M2 | WEIGHT: 276 LBS | DIASTOLIC BLOOD PRESSURE: 78 MMHG | RESPIRATION RATE: 18 BRPM | HEART RATE: 80 BPM | OXYGEN SATURATION: 97 % | SYSTOLIC BLOOD PRESSURE: 138 MMHG | HEIGHT: 64 IN

## 2023-02-24 DIAGNOSIS — E66.01 MORBID OBESITY (HCC): ICD-10-CM

## 2023-02-24 DIAGNOSIS — E78.2 MIXED DIABETIC HYPERLIPIDEMIA ASSOCIATED WITH TYPE 2 DIABETES MELLITUS (HCC): ICD-10-CM

## 2023-02-24 DIAGNOSIS — E11.69 MIXED DIABETIC HYPERLIPIDEMIA ASSOCIATED WITH TYPE 2 DIABETES MELLITUS (HCC): ICD-10-CM

## 2023-02-24 DIAGNOSIS — E55.9 VITAMIN D DEFICIENCY: ICD-10-CM

## 2023-02-24 DIAGNOSIS — E78.5 HYPERLIPIDEMIA, UNSPECIFIED HYPERLIPIDEMIA TYPE: ICD-10-CM

## 2023-02-24 DIAGNOSIS — E11.65 TYPE 2 DIABETES MELLITUS WITH HYPERGLYCEMIA, WITHOUT LONG-TERM CURRENT USE OF INSULIN (HCC): Primary | ICD-10-CM

## 2023-02-24 DIAGNOSIS — E03.9 HYPOTHYROIDISM, UNSPECIFIED TYPE: ICD-10-CM

## 2023-02-24 LAB
EST. AVERAGE GLUCOSE BLD GHB EST-MCNC: 186 MG/DL
HBA1C MFR BLD: 8.1 %

## 2023-02-24 RX ORDER — ROSUVASTATIN CALCIUM 20 MG/1
20 TABLET, COATED ORAL DAILY
Qty: 90 TABLET | Refills: 3 | Status: SHIPPED | OUTPATIENT
Start: 2023-02-24

## 2023-02-24 NOTE — ASSESSMENT & PLAN NOTE
Patient remains poorly controlled with frequent hyperglycemia  She has not benefited from the highest dose of Trulicity  Her blood sugars remain elevated and her appetite remains high  She understands that her diet must improve  I recommend transitioning to Ozempic to determine if that GLP-1 provides more glycemic control and weight reduction  Start 2 mg once weekly  Patient knows to notify me if she should experience s/e of nausea  We also discussed the addition of basal insulin  Patient will return in 2 months  Should her blood sugars remain elevated, she is agreeable to starting basal insulin in order to provide tighter glycemic control while she continues to focus on diet and lifestyle modification  Test blood sugars 1-2x daily  Patient knows to notify me with persistent hyperglycemia or episodes of hypoglycemia  F/U in 2 months     Lab Results   Component Value Date    HGBA1C 8 1 (H) 02/23/2023

## 2023-02-28 NOTE — ASSESSMENT & PLAN NOTE
Transition to 2 mg of Ozempic once weekly  Counseled on the importance of reducing intake of foods high in sugar  Increase physical activity

## 2023-03-01 ENCOUNTER — TELEPHONE (OUTPATIENT)
Dept: SURGICAL ONCOLOGY | Facility: CLINIC | Age: 63
End: 2023-03-01

## 2023-03-01 NOTE — TELEPHONE ENCOUNTER
Called patient and left her a message that we need to reschedule her 3/30/23 appointment with Dr Dede Haddad due to her being in the OR all day  Direct number given for her to return my call  Patient returned call  Patient would like to see Dr Dede Haddad only this time and prefers before surgery with Dr Denilson Sprague if possible  Patient is scheduled with Dr Dede Haddad on 3/21/23 at 11:15am in the [facility] office  Expect abdominal cramping/pain and spotting for the next week. Take ibuprofen and Tylenol for cramping. Use a pad as needed. Call your physician or go to the emergency room if you experience any of the following: heavy vaginal bleeding (soaking more than 2 pads in 1 hour for 2 hours), fever, chills, nausea, vomiting, or pain that is not controlled by medication. No need to follow up with Dr. Farrell unless there are issues. You will see Dr. Mcmahan for radiation treatment. Expect abdominal cramping/pain and spotting for the next week. Take Ibuprofen and Tylenol for cramping. Use a pad as needed. Call your physician or go to the emergency room if you experience any of the following: heavy vaginal bleeding (soaking more than 2 pads in 1 hour for 2 hours), fever, chills, nausea, vomiting, or pain that is not controlled by medication. No need to follow up with Dr. Farrell unless there are issues. You will see Dr. Mcmahan for radiation treatment.

## 2023-03-02 ENCOUNTER — TELEPHONE (OUTPATIENT)
Dept: ADMINISTRATIVE | Facility: OTHER | Age: 63
End: 2023-03-02

## 2023-03-02 ENCOUNTER — TELEPHONE (OUTPATIENT)
Dept: GASTROENTEROLOGY | Facility: CLINIC | Age: 63
End: 2023-03-02

## 2023-03-02 DIAGNOSIS — E11.65 TYPE 2 DIABETES MELLITUS WITH HYPERGLYCEMIA, WITHOUT LONG-TERM CURRENT USE OF INSULIN (HCC): Primary | ICD-10-CM

## 2023-03-02 NOTE — TELEPHONE ENCOUNTER
----- Message from Prachi Etienne sent at 3/1/2023 12:43 PM EST -----  Regarding: care gap request [facility]  03/01/23 12:43 PM    Hello, our patient attached above has had CRC: Colonoscopy completed/performed  Please assist in updating the patient chart by pulling the Care Everywhere (CE) document  The date of service is 01/30/2023       Thank you,  Prachi GARCIA [facility]

## 2023-03-02 NOTE — TELEPHONE ENCOUNTER
Pt called that she is at the point of having to refill [place] but did not want to refill 5MG if it is getting bumped up to 10MG  She stated she seen Dneg Valadez on 2/24 and did not hear anything back if Deng Valadez has checked with Dr Hilda Mckee

## 2023-03-02 NOTE — TELEPHONE ENCOUNTER
Please let the patient know I will reorder Farxiga to 10 mg  It is a safe dose and will potentially assist blood sugars  It will certainly continue to protect the kidneys  I will cc myself on her upcoming lab work for Dr Hilda Mckee as well  Thank you

## 2023-03-02 NOTE — TELEPHONE ENCOUNTER
Upon review of the In Basket request we were able to locate, review, and update the patient chart as requested for CRC: Colonoscopy  Any additional questions or concerns should be emailed to the Practice Liaisons via the appropriate education email address, please do not reply via In Basket      Thank you  Megan Ribeiro

## 2023-03-06 NOTE — TELEPHONE ENCOUNTER
Pt called back and said everything is going well with the medication and there are no issues at this time

## 2023-03-10 ENCOUNTER — PATIENT MESSAGE (OUTPATIENT)
Dept: ENDOCRINOLOGY | Facility: CLINIC | Age: 63
End: 2023-03-10

## 2023-03-10 ENCOUNTER — TELEPHONE (OUTPATIENT)
Dept: ENDOCRINOLOGY | Facility: CLINIC | Age: 63
End: 2023-03-10

## 2023-03-10 NOTE — TELEPHONE ENCOUNTER
Pharmacy is sending key code for prior auth for ozempic,, she is also stopping by the office to  some samples of ozempic to hold over

## 2023-03-10 NOTE — PATIENT COMMUNICATION
(Key: [id number])    Please check on prior Auth Monday via cover my meds I was not able to upload patient last office note as cover my meds requested and I could not bypass,

## 2023-03-16 NOTE — TELEPHONE ENCOUNTER
Olamide Radford 3/16/2023 10:53 AM EDT    Tiff can you check for an update please I dont have her on my active list    Please and thank you   Daniel Nguyen  ----- Message -----  From: Lennox Sin  Sent: 3/13/2023 9:33 AM EDT  To: Olamide Radford  Subject: No message from patient     ----- Message from Lennox Sin sent at 3/13/2023 9:33 AM EDT -----    This encounter does not contain a message from the patient

## 2023-03-21 ENCOUNTER — OFFICE VISIT (OUTPATIENT)
Dept: SURGICAL ONCOLOGY | Facility: CLINIC | Age: 63
End: 2023-03-21

## 2023-03-21 VITALS
DIASTOLIC BLOOD PRESSURE: 82 MMHG | TEMPERATURE: 98.2 F | BODY MASS INDEX: 46.61 KG/M2 | HEIGHT: 64 IN | WEIGHT: 273 LBS | SYSTOLIC BLOOD PRESSURE: 148 MMHG

## 2023-03-21 DIAGNOSIS — Z08 ENCOUNTER FOR FOLLOW-UP SURVEILLANCE OF BREAST CANCER: Primary | ICD-10-CM

## 2023-03-21 DIAGNOSIS — B37.31 YEAST INFECTION INVOLVING THE VAGINA AND SURROUNDING AREA: ICD-10-CM

## 2023-03-21 DIAGNOSIS — Z86.000 HISTORY OF DUCTAL CARCINOMA IN SITU (DCIS) OF BREAST: ICD-10-CM

## 2023-03-21 DIAGNOSIS — Z12.31 SCREENING MAMMOGRAM, ENCOUNTER FOR: ICD-10-CM

## 2023-03-21 DIAGNOSIS — Z13.71 BRCA NEGATIVE: ICD-10-CM

## 2023-03-21 DIAGNOSIS — N60.19 FIBROCYSTIC BREAST DISEASE, UNSPECIFIED LATERALITY: ICD-10-CM

## 2023-03-21 DIAGNOSIS — Z85.3 ENCOUNTER FOR FOLLOW-UP SURVEILLANCE OF BREAST CANCER: Primary | ICD-10-CM

## 2023-03-21 RX ORDER — FLUCONAZOLE 150 MG/1
150 TABLET ORAL ONCE
Qty: 1 TABLET | Refills: 1 | Status: SHIPPED | OUTPATIENT
Start: 2023-03-21 | End: 2023-03-21

## 2023-03-21 NOTE — PROGRESS NOTES
Surgical Oncology Follow Up       [facility] SURGICAL ONCOLOGY [place]  [facility] [id number]    [place] Standard  1960  [id number]  [address]  [facility] SURGICAL ONCOLOGY [place]  [place] [address]    Chief Complaint   Patient presents with   • Follow-up       Assessment/Plan   Diagnoses and all orders for this visit:    Encounter for follow-up surveillance of breast cancer    Fibrocystic breast disease, unspecified laterality    Screening mammogram, encounter for  -     Mammo screening right w 3d & cad; Future    History of ductal carcinoma in situ (DCIS) of breast    BRCA negative    Yeast infection involving the vagina and surrounding area  -     fluconazole (DIFLUCAN) 150 mg tablet; Take 1 tablet (150 mg total) by mouth once for 1 dose        Advance Care Planning/Advance Directives:  Discussed disease status, cancer treatment plans and/or cancer treatment goals with the patient       Oncology History:    Oncology History   History of ductal carcinoma in situ (DCIS) of breast   9/23/2021 Initial Diagnosis    Ductal carcinoma in situ (DCIS) of left breast     10/19/2021 -  Cancer Staged    Staging form: Breast, AJCC 8th Edition  - Clinical: Stage 0 (cTis (DCIS), cN0, cM0, ER+, ND+, HER2: Not Assessed) - Signed by Mahendra Sanford MD on 10/19/2021  Stage prefix: Initial diagnosis  Method of lymph node assessment: Clinical  Nuclear grade: G1       2/22/2022 -  Cancer Staged    Staging form: Breast, AJCC 8th Edition  - Pathologic stage from 2/22/2022: Stage 0 (pTis (DCIS), pN0(sn), cM0, ER+, ND+, HER2: Not Assessed) - Signed by Mahendra Sanford MD on 2/22/2022  Stage prefix: Initial diagnosis  Method of lymph node assessment: Mears lymph node biopsy  Nuclear grade: G2           History of Present Illness: Breast cancer follow-up, no breast referable concerns, states that she is scheduled for her implant exchange on Monday, other issues as noted in ROS  -Interval History: As noted    Review of Systems:  Review of Systems   Constitutional: Negative  Negative for appetite change and fever  Eyes: Negative  Respiratory: Negative for shortness of breath  Cardiovascular: Negative  Gastrointestinal: Negative  Endocrine: Negative  Genitourinary: Positive for vaginal discharge  Musculoskeletal: Negative  Negative for arthralgias and myalgias  Skin: Negative  Allergic/Immunologic: Negative  Neurological: Negative  Hematological: Negative  Negative for adenopathy  Does not bruise/bleed easily  Psychiatric/Behavioral: Negative          Patient Active Problem List   Diagnosis   • Anemia   • Benign essential hypertension   • Arthritis   • Mixed diabetic hyperlipidemia associated with type 2 diabetes mellitus (formerly [facility])   • Disc degeneration, lumbar   • Fibrocystic breast disease, unspecified laterality   • GERD without esophagitis   • Hypothyroidism   • IBS (irritable bowel syndrome)   • Neoplasm of uncertain behavior of skin   • Obesity, Class III, BMI 40-49 9 (morbid obesity) (formerly [facility])   • MARIA FERNANDA (obstructive sleep apnea)   • Primary osteoarthritis of both knees   • Vitamin D deficiency   • Type 2 diabetes mellitus with hyperglycemia, without long-term current use of insulin (formerly [facility])   • Fatty liver   • Full thickness rotator cuff tear   • Shoulder joint pain   • Family history of breast cancer in female   • Screening mammogram, encounter for   • Lumbar radiculopathy   • History of ductal carcinoma in situ (DCIS) of breast   • BRCA negative   • Chronic kidney disease (CKD) stage G3b/A1, moderately decreased glomerular filtration rate (GFR) between 30-44 mL/min/1 73 square meter and albuminuria creatinine ratio less than 30 mg/g (formerly [facility])   • Morbid obesity ([address] )   • Yeast infection involving the vagina and surrounding area   • Encounter for follow-up surveillance of breast cancer     Past Medical History:   Diagnosis Date   • Achrochordon    • Arthritis    • Dysuria    • GERD (gastroesophageal reflux disease)    • Headache(784 0)    • IBS (irritable bowel syndrome)    • Inflammatory bowel disease    • Obesity    • Polycystic ovarian syndrome    • Rotator cuff tear     right side    • Wears glasses      Past Surgical History:   Procedure Laterality Date   • ANAL FISTULOTOMY      subcutaneous    • BREAST LUMPECTOMY Left 2021   • BREAST RECONSTRUCTION Left 2022    Procedure: IMMEDIATE RECONSTRUCTION WITH TISSUE EXPANDER PLACEMENT;  Surgeon: Christopher Dee MD;  Location: [facility];  Service: Plastics   • CHOLECYSTECTOMY     • FLAP LOCAL TRUNK Left 2022    Procedure: DE-EPITHELIALIZED FLAP BREAST;  Surgeon: Christopher Dee MD;  Location: [facility];  Service: Plastics   • HEMORRHOID SURGERY     • INCISION AND DRAINAGE PERIRECTAL ABSCESS     • MASTECTOMY Left 2022   • MASTECTOMY W/ SENTINEL NODE BIOPSY Left 2022    Procedure: MASTECTOMY WITH BIOPSY LYMPH NODE SENTINEL; 1200 NUC MED;  Surgeon: Mukul Marcelino MD;  Location: [facility];  Service: Surgical Oncology   • MOUTH SURGERY     • OVARIAN CYST REMOVAL     • MO DEBRIDEMENT SUBCUTANEOUS TISSUE 20 SQ CM/< Left 2022    Procedure: DEBRIDEMENT OF NON-HEALING SURGICAL WOUND, LEFT BREAST,  ADVANCEMENT FLAP;  Surgeon: Christopher Dee MD;  [facility];  Service: Plastics   • ROTATOR CUFF REPAIR Right    • TONSILLECTOMY     • US GUIDANCE BREAST BIOPSY LEFT EACH ADDITIONAL Left 2021   • US GUIDED BREAST BIOPSY LEFT COMPLETE Left 2021     Family History   Problem Relation Age of Onset   • Diabetes Mother         Type II   • Diabetes type II Mother    • Dementia Mother          09   • Hearing loss Mother            • Breast cancer Paternal Aunt    • Cancer Paternal Uncle         carcinosarcoma of the oral cavity    • Prostate cancer Paternal Uncle            • Breast cancer Family    • Breast cancer Maternal Aunt            • Breast cancer Maternal Aunt    • Diabetes type II Maternal Aunt    • Thyroid cancer Maternal Aunt    • Thyroid disease Maternal Aunt    • Hypertension Father          01   • Depression Father          01   • Stroke Father    • Heart disease Father          01   • No Known Problems Sister    • Depression Maternal Grandmother         Committed suicide - 10/24/68   • Asthma Maternal Grandmother    • Completed Suicide  Maternal Grandmother         10/24/1968   • Depression Paternal Grandmother    • Asthma Sister         Born with it/outgrew it with age (pretty young)   • No Known Problems Paternal Aunt    • No Known Problems Maternal Aunt    • Diabetes type II Maternal Uncle    • Diabetes type II Maternal Grandfather    • Breast cancer Cousin    • Cancer Paternal Uncle              Social History     Socioeconomic History   • Marital status: Single     Spouse name: Not on file   • Number of children: Not on file   • Years of education: Not on file   • Highest education level: Not on file   Occupational History   • Not on file   Tobacco Use   • Smoking status: Never   • Smokeless tobacco: Never   Vaping Use   • Vaping Use: Never used   Substance and Sexual Activity   • Alcohol use: Yes     Alcohol/week: 0 0 standard drinks     Comment: Drink only 1-2 drinks 4x a year     • Drug use: No   • Sexual activity: Not Currently     Partners: Male     Birth control/protection: None   Other Topics Concern   • Not on file   Social History Narrative    Caffeine - denies    Retired     Social Determinants of Health     Financial Resource Strain: Not on file   Food Insecurity: Not on file   Transportation Needs: Not on file   Physical Activity: Not on file   Stress: Not on file   Social Connections: Not on file   Intimate Partner Violence: Not on file   Housing Stability: Not on file       Current Outpatient Medications:   •  Acetaminophen 500 MG, Take 2 capsules (1,000 mg total) by mouth every 6 (six) hours as needed for mild pain for up to 21 doses, Disp: 21 capsule, Rfl: 0  •  aspirin (ECOTRIN LOW STRENGTH) 81 mg EC tablet, Take 1 tablet by mouth daily, Disp: , Rfl:   •  BinaxNOW COVID-19 Ag Home Test KIT, , Disp: , Rfl:   •  Blood Glucose Monitoring Suppl (ONE TOUCH ULTRA 2) w/Device KIT, Use 3 (three) times a day, Disp: 1 kit, Rfl: 0  •  cholecalciferol (VITAMIN D3) 1,000 units tablet, Take 4 tablets (4,000 Units total) by mouth daily, Disp: 90 tablet, Rfl: 1  •  dapagliflozin (Farxiga) 10 MG tablet, Take 1 tablet (10 mg total) by mouth daily, Disp: 90 tablet, Rfl: 1  •  fluconazole (DIFLUCAN) 150 mg tablet, Take 1 tablet (150 mg total) by mouth once for 1 dose, Disp: 1 tablet, Rfl: 1  •  gabapentin (NEURONTIN) 300 mg capsule, Take 1 capsule (300 mg total) by mouth 2 (two) times a day, Disp: 180 capsule, Rfl: 0  •  glipiZIDE (GLUCOTROL XL) 5 mg 24 hr tablet, Take 1 tablet by mouth once daily, Disp: 90 tablet, Rfl: 0  •  glucose blood (OneTouch Ultra) test strip, Use to test 3x daily, Disp: 100 strip, Rfl: 4  •  levothyroxine 100 mcg tablet, Take 1 tablet (100 mcg total) by mouth daily, Disp: 90 tablet, Rfl: 3  •  lisinopril (ZESTRIL) 20 mg tablet, Take 1 tablet (20 mg total) by mouth daily, Disp: 90 tablet, Rfl: 2  •  ONETOUCH DELICA LANCETS FINE MISC, by Does not apply route 2 (two) times a day, Disp: , Rfl:   •  pantoprazole (PROTONIX) 40 mg tablet, Take 1 tablet (40 mg total) by mouth daily, Disp: 90 tablet, Rfl: 3  •  polyethylene glycol (GLYCOLAX) 17 GM/SCOOP powder, Take by mouth, Disp: , Rfl:   •  rosuvastatin (CRESTOR) 20 MG tablet, Take 1 tablet (20 mg total) by mouth daily, Disp: 90 tablet, Rfl: 3  •  semaglutide, 2 mg/dose, (Ozempic, 2 MG/DOSE,) 8 mg/ mL injection pen, Inject 0 75 mL (2 mg total) under the skin every 7 days, Disp: 9 mL, Rfl: 1  No Known Allergies    The following portions of the patient's history were reviewed and updated as appropriate: allergies, current medications, past family history, past medical history, past social history, past surgical history and problem list         Vitals:    03/21/23 1056   BP: 148/82   Temp: 98 2 °F (36 8 °C)       Physical Exam  Constitutional:       General: She is not in acute distress  Appearance: Normal appearance  She is well-developed  HENT:      Head: Normocephalic and atraumatic  Cardiovascular:      Heart sounds: Normal heart sounds  Pulmonary:      Breath sounds: Normal breath sounds  Chest:   Breasts:     Breasts are asymmetrical       Right: No inverted nipple, mass, nipple discharge, skin change or tenderness  Left: Skin change ( Mastectomy scar with expander) present  No mass or tenderness  Abdominal:      Palpations: Abdomen is soft  Lymphadenopathy:      Upper Body:      Right upper body: No supraclavicular, axillary or pectoral adenopathy  Left upper body: No supraclavicular, axillary or pectoral adenopathy  Neurological:      Mental Status: She is alert and oriented to person, place, and time  Psychiatric:         Mood and Affect: Mood normal            Results:  Labs:    Imaging  8/15/2022 right 3D screening mammogram was benign    I reviewed the above imaging data  Discussion/Summary: 59-year-old female status post left mastectomy and expander placement for extensive DCIS  She states that she is scheduled to undergo implant exchange within a week  She does have a mild yeast infection  I will prescribe Diflucan for her  I advised her to discuss this with her plastic surgeon  There were otherwise no concerns on exam today  She will be due for her right-sided mammogram in August   I will make these arrangements for her  She will eventually be contralateral symmetry surgery  If this should occur prior to August, she will need to bump that mammogram out  We will see her again in roughly 6 months for another exam or sooner should the need arise

## 2023-04-20 ENCOUNTER — HOSPITAL ENCOUNTER (OUTPATIENT)
Dept: ULTRASOUND IMAGING | Facility: HOSPITAL | Age: 63
Discharge: HOME | End: 2023-04-20
Attending: INTERNAL MEDICINE

## 2023-04-20 DIAGNOSIS — E11.22 TYPE 2 DIABETES MELLITUS WITH STAGE 3 CHRONIC KIDNEY DISEASE, WITHOUT LONG-TERM CURRENT USE OF INSULIN, UNSPECIFIED WHETHER STAGE 3A OR 3B CKD (HCC): ICD-10-CM

## 2023-04-20 DIAGNOSIS — N18.30 STAGE 3 CHRONIC KIDNEY DISEASE, UNSPECIFIED WHETHER STAGE 3A OR 3B CKD (HCC): ICD-10-CM

## 2023-04-20 DIAGNOSIS — N18.30 TYPE 2 DIABETES MELLITUS WITH STAGE 3 CHRONIC KIDNEY DISEASE, WITHOUT LONG-TERM CURRENT USE OF INSULIN, UNSPECIFIED WHETHER STAGE 3A OR 3B CKD (HCC): ICD-10-CM

## 2023-05-09 DIAGNOSIS — E11.65 POORLY CONTROLLED TYPE 2 DIABETES MELLITUS WITH PERIPHERAL NEUROPATHY (HCC): ICD-10-CM

## 2023-05-09 DIAGNOSIS — E11.42 POORLY CONTROLLED TYPE 2 DIABETES MELLITUS WITH PERIPHERAL NEUROPATHY (HCC): ICD-10-CM

## 2023-05-09 RX ORDER — GABAPENTIN 300 MG/1
CAPSULE ORAL
Qty: 180 CAPSULE | Refills: 0 | Status: SHIPPED | OUTPATIENT
Start: 2023-05-09

## 2023-05-12 ENCOUNTER — TELEPHONE (OUTPATIENT)
Dept: ENDOCRINOLOGY | Facility: CLINIC | Age: 63
End: 2023-05-12

## 2023-05-12 DIAGNOSIS — E11.65 TYPE 2 DIABETES MELLITUS WITH HYPERGLYCEMIA, WITHOUT LONG-TERM CURRENT USE OF INSULIN (HCC): ICD-10-CM

## 2023-05-12 DIAGNOSIS — E66.01 CLASS 3 SEVERE OBESITY DUE TO EXCESS CALORIES WITH SERIOUS COMORBIDITY AND BODY MASS INDEX (BMI) OF 45.0 TO 49.9 IN ADULT (HCC): Primary | ICD-10-CM

## 2023-05-12 NOTE — TELEPHONE ENCOUNTER
Routing to provider for advise  We have yet to get ozempic approved we are giving patients samples, we have tried to appeal its gets denied  What are your thoughts on Wegovy? Also her insurance will not cover basaglar now they want eith lantus or levemir  Please advise

## 2023-05-12 NOTE — TELEPHONE ENCOUNTER
Happy to transition to [place]  Lantus is also fine  Dose of Wegovy that is similar to her Ozempic is 2 4 mg once weekly

## 2023-05-18 PROBLEM — E66.813 CLASS 3 SEVERE OBESITY DUE TO EXCESS CALORIES WITH SERIOUS COMORBIDITY AND BODY MASS INDEX (BMI) OF 45.0 TO 49.9 IN ADULT (HCC): Status: ACTIVE | Noted: 2023-02-20

## 2023-05-18 RX ORDER — INSULIN GLARGINE 100 [IU]/ML
INJECTION, SOLUTION SUBCUTANEOUS
Qty: 15 ML | Refills: 2 | Status: SHIPPED | OUTPATIENT
Start: 2023-05-18

## 2023-05-18 NOTE — TELEPHONE ENCOUNTER
Patient called back regarding arnaud's previous call and note  Patient was made aware of the medication changes being sent over  Patient will finish out her ozempic that she has left then start the wegovy

## 2023-05-18 NOTE — TELEPHONE ENCOUNTER
Voicemail left for patient to return our call to go over the other medications sent to her pharmacy to replace 8 Steph Zamudio and alda

## 2023-05-18 NOTE — TELEPHONE ENCOUNTER
Prescription for [facility] sent  Will titrate quickly up to 2 4 mg once weekly  Basaglar switched to Lantus per insurance preference

## 2023-05-25 DIAGNOSIS — Z42.1 AFTERCARE POSTMASTECTOMY FOR BREAST RECONSTRUCTION: ICD-10-CM

## 2023-05-25 DIAGNOSIS — Z85.3 HISTORY OF BREAST CANCER: Primary | ICD-10-CM

## 2023-06-13 ENCOUNTER — TELEPHONE (OUTPATIENT)
Dept: NEPHROLOGY | Facility: CLINIC | Age: 63
End: 2023-06-13

## 2023-06-15 DIAGNOSIS — N18.32 TYPE 2 DIABETES MELLITUS WITH STAGE 3B CHRONIC KIDNEY DISEASE, WITHOUT LONG-TERM CURRENT USE OF INSULIN (HCC): ICD-10-CM

## 2023-06-15 DIAGNOSIS — E11.22 TYPE 2 DIABETES MELLITUS WITH STAGE 3B CHRONIC KIDNEY DISEASE, WITHOUT LONG-TERM CURRENT USE OF INSULIN (HCC): ICD-10-CM

## 2023-06-15 RX ORDER — BLOOD SUGAR DIAGNOSTIC
STRIP MISCELLANEOUS
Qty: 50 EACH | Refills: 0 | Status: SHIPPED | OUTPATIENT
Start: 2023-06-15

## 2023-06-19 ENCOUNTER — TELEPHONE (OUTPATIENT)
Dept: GASTROENTEROLOGY | Facility: CLINIC | Age: 63
End: 2023-06-19

## 2023-06-19 ENCOUNTER — APPOINTMENT (OUTPATIENT)
Dept: LAB | Facility: CLINIC | Age: 63
End: 2023-06-19
Payer: COMMERCIAL

## 2023-06-19 DIAGNOSIS — N18.30 TYPE 2 DIABETES MELLITUS WITH STAGE 3 CHRONIC KIDNEY DISEASE, WITHOUT LONG-TERM CURRENT USE OF INSULIN, UNSPECIFIED WHETHER STAGE 3A OR 3B CKD (HCC): ICD-10-CM

## 2023-06-19 DIAGNOSIS — Z85.3 HISTORY OF BREAST CANCER: ICD-10-CM

## 2023-06-19 DIAGNOSIS — E11.22 TYPE 2 DIABETES MELLITUS WITH STAGE 3 CHRONIC KIDNEY DISEASE, WITHOUT LONG-TERM CURRENT USE OF INSULIN, UNSPECIFIED WHETHER STAGE 3A OR 3B CKD (HCC): ICD-10-CM

## 2023-06-19 DIAGNOSIS — E11.65 TYPE 2 DIABETES MELLITUS WITH HYPERGLYCEMIA, WITHOUT LONG-TERM CURRENT USE OF INSULIN (HCC): ICD-10-CM

## 2023-06-19 DIAGNOSIS — Z42.1 AFTERCARE POSTMASTECTOMY FOR BREAST RECONSTRUCTION: ICD-10-CM

## 2023-06-19 DIAGNOSIS — N18.30 STAGE 3 CHRONIC KIDNEY DISEASE, UNSPECIFIED WHETHER STAGE 3A OR 3B CKD (HCC): ICD-10-CM

## 2023-06-19 DIAGNOSIS — E03.9 HYPOTHYROIDISM, UNSPECIFIED TYPE: ICD-10-CM

## 2023-06-19 LAB
ALBUMIN SERPL BCP-MCNC: 3.5 G/DL (ref 3.5–5)
ALP SERPL-CCNC: 72 U/L (ref 46–116)
ALT SERPL W P-5'-P-CCNC: 19 U/L (ref 12–78)
ANA SER QL IA: NEGATIVE
ANION GAP SERPL CALCULATED.3IONS-SCNC: 6 MMOL/L (ref 4–13)
AST SERPL W P-5'-P-CCNC: 12 U/L (ref 5–45)
BACTERIA UR QL AUTO: ABNORMAL /HPF
BILIRUB SERPL-MCNC: 0.42 MG/DL (ref 0.2–1)
BILIRUB UR QL STRIP: NEGATIVE
BUN SERPL-MCNC: 38 MG/DL (ref 5–25)
CALCIUM SERPL-MCNC: 9.2 MG/DL (ref 8.3–10.1)
CHLORIDE SERPL-SCNC: 107 MMOL/L (ref 96–108)
CLARITY UR: CLEAR
CO2 SERPL-SCNC: 25 MMOL/L (ref 21–32)
COLOR UR: ABNORMAL
CREAT SERPL-MCNC: 1.43 MG/DL (ref 0.6–1.3)
CREAT UR-MCNC: 85.8 MG/DL
GFR SERPL CREATININE-BSD FRML MDRD: 39 ML/MIN/1.73SQ M
GLUCOSE P FAST SERPL-MCNC: 163 MG/DL (ref 65–99)
GLUCOSE UR STRIP-MCNC: ABNORMAL MG/DL
HGB UR QL STRIP.AUTO: NEGATIVE
KETONES UR STRIP-MCNC: NEGATIVE MG/DL
LEUKOCYTE ESTERASE UR QL STRIP: ABNORMAL
MICROALBUMIN UR-MCNC: 17.8 MG/L (ref 0–20)
MICROALBUMIN/CREAT 24H UR: 21 MG/G CREATININE (ref 0–30)
NITRITE UR QL STRIP: NEGATIVE
NON-SQ EPI CELLS URNS QL MICRO: ABNORMAL /HPF
PH UR STRIP.AUTO: 5.5 [PH]
PHOSPHATE SERPL-MCNC: 3.6 MG/DL (ref 2.3–4.1)
POTASSIUM SERPL-SCNC: 4.6 MMOL/L (ref 3.5–5.3)
PROT SERPL-MCNC: 7.3 G/DL (ref 6.4–8.4)
PROT UR STRIP-MCNC: NEGATIVE MG/DL
PTH-INTACT SERPL-MCNC: 115.6 PG/ML (ref 12–88)
RBC #/AREA URNS AUTO: ABNORMAL /HPF
SODIUM SERPL-SCNC: 138 MMOL/L (ref 135–147)
SP GR UR STRIP.AUTO: 1.02 (ref 1–1.03)
URATE SERPL-MCNC: 6.8 MG/DL (ref 2–7.5)
UROBILINOGEN UR STRIP-ACNC: <2 MG/DL
WBC #/AREA URNS AUTO: ABNORMAL /HPF

## 2023-06-19 PROCEDURE — 84165 PROTEIN E-PHORESIS SERUM: CPT

## 2023-06-19 PROCEDURE — 84100 ASSAY OF PHOSPHORUS: CPT

## 2023-06-19 PROCEDURE — 83970 ASSAY OF PARATHORMONE: CPT

## 2023-06-19 PROCEDURE — 36415 COLL VENOUS BLD VENIPUNCTURE: CPT

## 2023-06-19 PROCEDURE — 82570 ASSAY OF URINE CREATININE: CPT

## 2023-06-19 PROCEDURE — 80053 COMPREHEN METABOLIC PANEL: CPT

## 2023-06-19 PROCEDURE — 86038 ANTINUCLEAR ANTIBODIES: CPT

## 2023-06-19 PROCEDURE — 82043 UR ALBUMIN QUANTITATIVE: CPT

## 2023-06-19 PROCEDURE — 81001 URINALYSIS AUTO W/SCOPE: CPT

## 2023-06-19 PROCEDURE — 84550 ASSAY OF BLOOD/URIC ACID: CPT

## 2023-06-19 NOTE — TELEPHONE ENCOUNTER
Patient calling requesting return phone call from [facility]  Please call back at earliest convenience   #[phone]

## 2023-06-20 LAB
ALBUMIN SERPL ELPH-MCNC: 4 G/DL (ref 3.2–5.1)
ALBUMIN SERPL ELPH-MCNC: 56.3 % (ref 48–70)
ALPHA1 GLOB SERPL ELPH-MCNC: 0.29 G/DL (ref 0.15–0.47)
ALPHA1 GLOB SERPL ELPH-MCNC: 4.1 % (ref 1.8–7)
ALPHA2 GLOB SERPL ELPH-MCNC: 0.88 G/DL (ref 0.42–1.04)
ALPHA2 GLOB SERPL ELPH-MCNC: 12.4 % (ref 5.9–14.9)
BETA GLOB ABNORMAL SERPL ELPH-MCNC: 0.49 G/DL (ref 0.31–0.57)
BETA1 GLOB SERPL ELPH-MCNC: 6.9 % (ref 4.7–7.7)
BETA2 GLOB SERPL ELPH-MCNC: 6.3 % (ref 3.1–7.9)
BETA2+GAMMA GLOB SERPL ELPH-MCNC: 0.45 G/DL (ref 0.2–0.58)
GAMMA GLOB ABNORMAL SERPL ELPH-MCNC: 0.99 G/DL (ref 0.4–1.66)
GAMMA GLOB SERPL ELPH-MCNC: 14 % (ref 6.9–22.3)
IGG/ALB SER: 1.29 {RATIO} (ref 1.1–1.8)
PROT PATTERN SERPL ELPH-IMP: NORMAL
PROT SERPL-MCNC: 7.1 G/DL (ref 6.4–8.2)

## 2023-06-20 PROCEDURE — 84165 PROTEIN E-PHORESIS SERUM: CPT | Performed by: PATHOLOGY

## 2023-06-21 ENCOUNTER — RA CDI HCC (OUTPATIENT)
Dept: OTHER | Facility: HOSPITAL | Age: 63
End: 2023-06-21

## 2023-06-21 DIAGNOSIS — Z85.3 PERSONAL HISTORY OF MALIGNANT NEOPLASM OF BREAST: Primary | ICD-10-CM

## 2023-06-21 NOTE — PROGRESS NOTES
[facility]  coding opportunities          Chart Reviewed number of suggestions sent to Provider: 1   [phone]    Patients Insurance        Commercial Insurance: Martín Van

## 2023-06-22 ENCOUNTER — OFFICE VISIT (OUTPATIENT)
Dept: NEPHROLOGY | Facility: CLINIC | Age: 63
End: 2023-06-22
Payer: COMMERCIAL

## 2023-06-22 VITALS
SYSTOLIC BLOOD PRESSURE: 138 MMHG | BODY MASS INDEX: 45.93 KG/M2 | HEIGHT: 64 IN | OXYGEN SATURATION: 97 % | HEART RATE: 82 BPM | DIASTOLIC BLOOD PRESSURE: 82 MMHG | WEIGHT: 269 LBS

## 2023-06-22 DIAGNOSIS — E11.22 TYPE 2 DIABETES MELLITUS WITH STAGE 3 CHRONIC KIDNEY DISEASE, WITHOUT LONG-TERM CURRENT USE OF INSULIN, UNSPECIFIED WHETHER STAGE 3A OR 3B CKD (HCC): ICD-10-CM

## 2023-06-22 DIAGNOSIS — N25.81 SECONDARY HYPERPARATHYROIDISM (HCC): ICD-10-CM

## 2023-06-22 DIAGNOSIS — N18.30 STAGE 3 CHRONIC KIDNEY DISEASE, UNSPECIFIED WHETHER STAGE 3A OR 3B CKD (HCC): Primary | ICD-10-CM

## 2023-06-22 DIAGNOSIS — I10 ESSENTIAL HYPERTENSION: ICD-10-CM

## 2023-06-22 DIAGNOSIS — N18.30 TYPE 2 DIABETES MELLITUS WITH STAGE 3 CHRONIC KIDNEY DISEASE, WITHOUT LONG-TERM CURRENT USE OF INSULIN, UNSPECIFIED WHETHER STAGE 3A OR 3B CKD (HCC): ICD-10-CM

## 2023-06-22 DIAGNOSIS — R60.0 LOCALIZED EDEMA: ICD-10-CM

## 2023-06-22 DIAGNOSIS — E66.9 OBESITY WITH SERIOUS COMORBIDITY, UNSPECIFIED CLASSIFICATION, UNSPECIFIED OBESITY TYPE: ICD-10-CM

## 2023-06-22 PROCEDURE — 99214 OFFICE O/P EST MOD 30 MIN: CPT | Performed by: INTERNAL MEDICINE

## 2023-06-22 RX ORDER — FUROSEMIDE 20 MG/1
20 TABLET ORAL DAILY PRN
Qty: 30 TABLET | Refills: 1 | Status: SHIPPED | OUTPATIENT
Start: 2023-06-22

## 2023-06-22 NOTE — PROGRESS NOTES
Assessment & Plan:    1  Stage 3 chronic kidney disease, unspecified whether stage 3a or 3b CKD ([facility])  -     Uric acid; Future; Expected date: 10/11/2023  -     Urinalysis with microscopic; Future; Expected date: 10/11/2023  -     Comprehensive metabolic panel; Future; Expected date: 10/11/2023  -     Albumin / creatinine urine ratio; Future; Expected date: 10/11/2023  -     Magnesium; Future; Expected date: 10/11/2023    2  Obesity with serious comorbidity, unspecified classification, unspecified obesity type    3  Localized edema  -     furosemide (LASIX) 20 mg tablet; Take 1 tablet (20 mg total) by mouth daily as needed (swelling)    4  Secondary hyperparathyroidism ([facility] )    5  Essential hypertension    6  Type 2 diabetes mellitus with stage 3 chronic kidney disease, without long-term current use of insulin, unspecified whether stage 3a or 3b CKD ([facility] )       1  CKD3 unspecified  A1  Appears mildly hypervolemic at present, she has chronic LE edema  Cr baselineTBD  1 5-1 7 July-October  Cr improved to 1 1-1 2 Jan-Feb   Cr 1 4 6/19/23  Reviewed CKD stages/ Cr and eGFR trends  Goal BP<130/80  PRN lasix 20mg 2-3 times a week  Check chemistry 1-2 week after using  She does not want to start preoperatively  She needs a repeat A1C to see if she can have surgery done in July  Renal function is at baseline, alb normal  -Metabolics: acceptable  -Bone mineral :PTH mildly elevated, follow trends  -Hgb within an acceptable range  -U/A >1000 glucose and mod leuk due to SGLT2i  -normal microabuminuria cr ratio    Follow up in 3-4 months  Follow up CMP,mag,uric acid,ua, alb/cr ratio prior to next visit  Protocol for OR reviewed with holding lisinopril/Farxiga  Hydrate to 2 quart per day  2  Localized edema  Maintain a low sodium diet  encourage weight loss  Keep legs elevated  Recommend lasix 20mg PRN  Limit use to 2-3 times a week  HCTZ stopped with concern for rising Cr and prerenal EVELINA   This is not as effective of diuretic to target edema either  3  Type 2 DM with CKD  Continue Farxiga 10mg/day  Continue lisinopril 20mg/day  Goal BP <130/80  Diabetic management per PCP  Hold SGLT-2I 3 days prior to OR  4  Secondary hyperparathyroidism  Phos 3 6 and  6  Goal 35-70, trend  If >150 may consider activated vitamin D  Continue D3 4000 unit per day  5  Essential HTN  BP under reasonable control, goal <130/80  Not following BP at home  Given LE edema, add lasix 20mg PRN  Limit use to 2 times a week initially  She does not want to start preoperatively  Her edema and weight is unchanged per pt report  Weight up 7 lb from January actually  If she does not have a diuretic response she will notify office  Hold lisinopril 24 hr prior to OR and on day or OR  6  Obesity, encourage weight loss  Low sodium diet  The benefits, risks and alternatives to the treatment plan were discussed at this visit  Patient was advised of common adverse effects of any medical therapies prescribed  All questions were answered and discussed with the patient and any accompanying family members or caretakers  Subjective:      Patient ID: Shagufta Ford is a 58 y o  female presenting for follow up in the [place] office  Initially seen in consult on 1/25/23 for CKD 3 unspecified  Cr had been running in 1 5-1 7 range in July and October  NSAID/farxiga stopped in October  HCTZ stopped at last visit and Evita Nara resumed  HPI    IN interim since last week ago, she was started on lantus 6 weeks ago by endocrinology  She was supposed to have breast expander removed in March but could not due to the DM  She is going to have A1C repeated on Saturday to determine if she can get Surgery  Surgery scheduled in July  Goal mid to 7 low for A1C  She is on Farxiga 10mg at bedtime  For HTN takes lisiniopril 20mg/day  Reports BP under reasonable control  /82 in the office      For DM taking lantus 10 unit, "ozempic, glipizide 5mg/day and Farxiga 10mg/day  She was [facility] by endocrinology but insurance has been refusing to pay  Diabetes not under the best control  A1C 8 1 in Feb     Her HCTZ was stopped last time and her edema is unchanged  Occasionally feels tightness in LE  June testing revealed:  BILL testing negative  Microabuminuria/cr ratio 21 mg/g cr   6, phos 3 6    UA moderate leuk, >1000 glucose, 30-50 wbc  Cr 1 42 with eGFR 39 ml/min, metabolic stable  The following portions of the patient's history were reviewed and updated as appropriate: allergies, current medications, past family history, past medical history, past social history, past surgical history, and problem list     Review of Systems   Respiratory: Negative  Cardiovascular: Positive for leg swelling  Gastrointestinal: Negative  Genitourinary: Negative  Neurological: Negative  All other systems reviewed and are negative  Objective:      /82 (BP Location: Right arm, Patient Position: Sitting, Cuff Size: Large) Comment (BP Location): right arm only due to mastectomy  Pulse 82   Ht 5' 4\" (1 626 m)   Wt 122 kg (269 lb)   LMP  (LMP Unknown)   SpO2 97%   BMI 46 17 kg/m²          Physical Exam  Vitals reviewed  Constitutional:       General: She is not in acute distress  Appearance: She is obese  HENT:      Head: Normocephalic  Mouth/Throat:      Mouth: Mucous membranes are moist       Pharynx: Oropharynx is clear  Eyes:      Extraocular Movements: Extraocular movements intact  Conjunctiva/sclera: Conjunctivae normal    Cardiovascular:      Rate and Rhythm: Normal rate and regular rhythm  Heart sounds: No friction rub  Pulmonary:      Breath sounds: No wheezing, rhonchi or rales  Abdominal:      General: There is no distension  Palpations: Abdomen is soft  Tenderness: There is no abdominal tenderness  There is no guarding     Musculoskeletal:      " Right lower leg: Edema present  Left lower leg: Edema present  Skin:     General: Skin is warm  Coloration: Skin is not jaundiced  Findings: No rash  Neurological:      General: No focal deficit present  Mental Status: She is alert and oriented to person, place, and time  Mental status is at baseline  Cranial Nerves: No cranial nerve deficit     Psychiatric:         Mood and Affect: Mood normal          Behavior: Behavior normal              Lab Results   Component Value Date     10/09/2015    SODIUM 138 06/19/2023    K 4 6 06/19/2023     06/19/2023    CO2 25 06/19/2023    ANIONGAP 6 10/09/2015    AGAP 6 06/19/2023    BUN 38 (H) 06/19/2023    CREATININE 1 43 (H) 06/19/2023    GLUC 145 (H) 07/22/2022    GLUF 163 (H) 06/19/2023    CALCIUM 9 2 06/19/2023    AST 12 06/19/2023    ALT 19 06/19/2023    ALKPHOS 72 06/19/2023    PROT 7 2 10/09/2015    TP 7 3 06/19/2023    TP 7 1 06/19/2023    BILITOT 0 48 10/09/2015    TBILI 0 42 06/19/2023    EGFR 39 06/19/2023      Lab Results   Component Value Date    CREATININE 1 43 (H) 06/19/2023    CREATININE 1 19 02/21/2023    CREATININE 1 13 01/23/2023    CREATININE 1 51 (H) 10/17/2022    CREATININE 1 52 (H) 07/22/2022    CREATININE 1 68 (H) 07/05/2022    CREATININE 1 17 02/09/2022    CREATININE 1 43 (H) 01/13/2022    CREATININE 1 22 12/23/2019    CREATININE 1 19 08/16/2019    CREATININE 1 23 12/17/2018    CREATININE 1 22 09/06/2018    CREATININE 1 28 (H) 07/11/2018    CREATININE 1 44 (H) 07/10/2018    CREATININE 1 53 (H) 07/03/2018      Lab Results   Component Value Date    COLORU Light Yellow 06/19/2023    CLARITYU Clear 06/19/2023    SPECGRAV 1 019 06/19/2023    PHUR 5 5 06/19/2023    LEUKOCYTESUR Moderate (A) 06/19/2023    NITRITE Negative 06/19/2023    PROTEIN UA Negative 06/19/2023    GLUCOSEU >=1000 (1%) (A) 06/19/2023    KETONESU Negative 06/19/2023    UROBILINOGEN <2 0 06/19/2023    BILIRUBINUR Negative 06/19/2023    BLOODU Negative "06/19/2023    RBCUA 1-2 06/19/2023    WBCUA 30-50 (A) 06/19/2023    EPIS Occasional 06/19/2023    BACTERIA None Seen 06/19/2023      No results found for: \"LABPROT\"  No results found for: \"MICROALBUR\", \"LRMY13EOQ\"  Lab Results   Component Value Date    WBC 7 17 01/23/2023    HGB 10 5 (L) 01/23/2023    HCT 34 5 (L) 01/23/2023    MCV 94 01/23/2023     01/23/2023      Lab Results   Component Value Date    HGB 10 5 (L) 01/23/2023    HGB 10 1 (L) 04/15/2022    HGB 9 4 (L) 02/09/2022    HGB 11 1 (L) 01/13/2022    HGB 10 5 (L) 12/23/2019      No results found for: \"IRON\", \"TIBC\", \"FERRITIN\"   No results found for: \"PTHCALCIUM\", \"[id number]\", \"PHOSPHORUS\"   Lab Results   Component Value Date    CHOLESTEROL 160 10/17/2022    HDL 34 (L) 10/17/2022    LDLCALC 77 10/17/2022    TRIG 243 (H) 10/17/2022      Lab Results   Component Value Date    URICACID 6 8 06/19/2023      Lab Results   Component Value Date    HGBA1C 8 1 (H) 02/23/2023      Lab Results   Component Value Date    FREET4 1 11 07/05/2022      Lab Results   Component Value Date    BILL Negative 06/19/2023      Lab Results   Component Value Date    PROT 7 2 10/09/2015        Portions of the record may have been created with voice recognition software  Occasional wrong word or \"sound a like\" substitutions may have occurred due to the inherent limitations of voice recognition software  Read the chart carefully and recognize, using context, where substitutions have occurred  If you have any questions, please contact the dictating provider        "

## 2023-06-22 NOTE — PATIENT INSTRUCTIONS
Most recent kidney function mildly weaker at 39%  You have kidney disease stage 3 which is likely due to diabetes, high blood pressure and getting older  Recommend as needed lasix to help with swelling  Would limit use to 2 times a week to start  Notify the office if you do take it and have a response  24 hr prior to surgery hold lisinopril and on the day of surgery, hold lisinopril  Three days prior to surgery you should hold [place]  Avoid motrin/advil/aleve/ibuprofen  Hydrate to 2 quart per day

## 2023-06-23 NOTE — TELEPHONE ENCOUNTER
Spoke to [facility] rx rep, She explained that Ginger [place] is unfortunately plan excluded due to being a weight loss medication, however we can send the appeal paperwork in again for Ozempic 1mg with attached documents from previous appeal, ekaterina as urgent and patient should be able to have it available within 24 hours  All forms have been completed, faxed and will be scanned  Patient has been made aware

## 2023-06-24 ENCOUNTER — APPOINTMENT (OUTPATIENT)
Dept: LAB | Facility: CLINIC | Age: 63
End: 2023-06-24
Payer: COMMERCIAL

## 2023-06-24 DIAGNOSIS — Z85.3 PERSONAL HISTORY OF MALIGNANT NEOPLASM OF BREAST: ICD-10-CM

## 2023-06-24 PROCEDURE — 85025 COMPLETE CBC W/AUTO DIFF WBC: CPT

## 2023-06-24 PROCEDURE — 84443 ASSAY THYROID STIM HORMONE: CPT

## 2023-06-24 PROCEDURE — 83036 HEMOGLOBIN GLYCOSYLATED A1C: CPT

## 2023-06-24 PROCEDURE — 80053 COMPREHEN METABOLIC PANEL: CPT

## 2023-06-24 PROCEDURE — 36415 COLL VENOUS BLD VENIPUNCTURE: CPT

## 2023-06-25 LAB
ALBUMIN SERPL BCP-MCNC: 3.6 G/DL (ref 3.5–5)
ALP SERPL-CCNC: 70 U/L (ref 46–116)
ALT SERPL W P-5'-P-CCNC: 17 U/L (ref 12–78)
ANION GAP SERPL CALCULATED.3IONS-SCNC: 4 MMOL/L
AST SERPL W P-5'-P-CCNC: 15 U/L (ref 5–45)
BASOPHILS # BLD AUTO: 0.06 THOUSANDS/ÂΜL (ref 0–0.1)
BASOPHILS NFR BLD AUTO: 1 % (ref 0–1)
BILIRUB SERPL-MCNC: 0.53 MG/DL (ref 0.2–1)
BUN SERPL-MCNC: 34 MG/DL (ref 5–25)
CALCIUM SERPL-MCNC: 9.1 MG/DL (ref 8.3–10.1)
CHLORIDE SERPL-SCNC: 108 MMOL/L (ref 96–108)
CO2 SERPL-SCNC: 27 MMOL/L (ref 21–32)
CREAT SERPL-MCNC: 1.33 MG/DL (ref 0.6–1.3)
EOSINOPHIL # BLD AUTO: 0.18 THOUSAND/ÂΜL (ref 0–0.61)
EOSINOPHIL NFR BLD AUTO: 2 % (ref 0–6)
ERYTHROCYTE [DISTWIDTH] IN BLOOD BY AUTOMATED COUNT: 15 % (ref 11.6–15.1)
EST. AVERAGE GLUCOSE BLD GHB EST-MCNC: 189 MG/DL
GFR SERPL CREATININE-BSD FRML MDRD: 42 ML/MIN/1.73SQ M
GLUCOSE P FAST SERPL-MCNC: 169 MG/DL (ref 65–99)
HBA1C MFR BLD: 8.2 %
HCT VFR BLD AUTO: 36.9 % (ref 34.8–46.1)
HGB BLD-MCNC: 11.3 G/DL (ref 11.5–15.4)
IMM GRANULOCYTES # BLD AUTO: 0.04 THOUSAND/UL (ref 0–0.2)
IMM GRANULOCYTES NFR BLD AUTO: 1 % (ref 0–2)
LYMPHOCYTES # BLD AUTO: 2.23 THOUSANDS/ÂΜL (ref 0.6–4.47)
LYMPHOCYTES NFR BLD AUTO: 30 % (ref 14–44)
MCH RBC QN AUTO: 28.3 PG (ref 26.8–34.3)
MCHC RBC AUTO-ENTMCNC: 30.6 G/DL (ref 31.4–37.4)
MCV RBC AUTO: 93 FL (ref 82–98)
MONOCYTES # BLD AUTO: 0.7 THOUSAND/ÂΜL (ref 0.17–1.22)
MONOCYTES NFR BLD AUTO: 9 % (ref 4–12)
NEUTROPHILS # BLD AUTO: 4.35 THOUSANDS/ÂΜL (ref 1.85–7.62)
NEUTS SEG NFR BLD AUTO: 57 % (ref 43–75)
NRBC BLD AUTO-RTO: 0 /100 WBCS
PLATELET # BLD AUTO: 246 THOUSANDS/UL (ref 149–390)
PMV BLD AUTO: 12.3 FL (ref 8.9–12.7)
POTASSIUM SERPL-SCNC: 5.1 MMOL/L (ref 3.5–5.3)
PROT SERPL-MCNC: 7.3 G/DL (ref 6.4–8.4)
RBC # BLD AUTO: 3.99 MILLION/UL (ref 3.81–5.12)
SODIUM SERPL-SCNC: 139 MMOL/L (ref 135–147)
TSH SERPL DL<=0.05 MIU/L-ACNC: 1.37 UIU/ML (ref 0.45–4.5)
WBC # BLD AUTO: 7.56 THOUSAND/UL (ref 4.31–10.16)

## 2023-06-30 ENCOUNTER — TELEPHONE (OUTPATIENT)
Dept: OTHER | Facility: OTHER | Age: 63
End: 2023-06-30

## 2023-06-30 DIAGNOSIS — E11.65 TYPE 2 DIABETES MELLITUS WITH HYPERGLYCEMIA, WITHOUT LONG-TERM CURRENT USE OF INSULIN (HCC): Primary | ICD-10-CM

## 2023-06-30 NOTE — TELEPHONE ENCOUNTER
SHANIQUE MORALES (Key: [id number])  Ozempic (1 MG/DOSE) 4MG/3ML pen-injectors  Status: PA RequestCreated: June 30th, 2023Sent: June 30th, 2023    Shanique's PA has been submitted via cover my meds and marked as urgent,

## 2023-06-30 NOTE — TELEPHONE ENCOUNTER
Shanique's Ozempic needs prior Authorization and they needs to be started at [employer] web site  ASAP

## 2023-07-07 NOTE — TELEPHONE ENCOUNTER
Pt called that she just spoke with [facility] and Ozempic 1mg has been approved. Pt is asking if she starts over on 1mg since she was on this in the past but was not on it for 3 weeks while waiting for approval?     Pt is also asking if a script will be sent over to her pharmacy?

## 2023-07-07 NOTE — TELEPHONE ENCOUNTER
Gave patient sample pen of Ozempic   With directions to use the 0.25 mg, for 2 injections then use the rest of the pen at the .5 dose. After that she can start the 1mg pen and when we see her for her next follow up we will most likely be able to increase to the 2mg. Per provider.  New script was also sent to [facility] [place] pharmacy

## 2023-07-13 DIAGNOSIS — E11.22 TYPE 2 DIABETES MELLITUS WITH STAGE 3B CHRONIC KIDNEY DISEASE, WITHOUT LONG-TERM CURRENT USE OF INSULIN (HCC): ICD-10-CM

## 2023-07-13 DIAGNOSIS — N18.32 TYPE 2 DIABETES MELLITUS WITH STAGE 3B CHRONIC KIDNEY DISEASE, WITHOUT LONG-TERM CURRENT USE OF INSULIN (HCC): ICD-10-CM

## 2023-07-13 RX ORDER — GLIPIZIDE 5 MG/1
5 TABLET, FILM COATED, EXTENDED RELEASE ORAL DAILY
Qty: 90 TABLET | Refills: 1 | Status: SHIPPED | OUTPATIENT
Start: 2023-07-13

## 2023-07-13 NOTE — TELEPHONE ENCOUNTER
[facility] called requesting scrip for glipizide 5 mg sent to there pharmacy, they states patient does not want to use [facility] any longer.

## 2023-07-17 DIAGNOSIS — R60.0 LOCALIZED EDEMA: ICD-10-CM

## 2023-07-17 RX ORDER — FUROSEMIDE 20 MG/1
TABLET ORAL
Qty: 30 TABLET | Refills: 1 | Status: SHIPPED | OUTPATIENT
Start: 2023-07-17

## 2023-07-19 ENCOUNTER — OFFICE VISIT (OUTPATIENT)
Dept: PLASTIC SURGERY | Facility: CLINIC | Age: 63
End: 2023-07-19

## 2023-07-19 DIAGNOSIS — Z86.000 HISTORY OF DUCTAL CARCINOMA IN SITU (DCIS) OF BREAST: Primary | ICD-10-CM

## 2023-07-19 NOTE — PROGRESS NOTES
Assessment/Plan:     Diagnoses and all orders for this visit:    History of ductal carcinoma in situ (DCIS) of breast  She is s/p left breast mastectomy & immediate reconstruction with tissue expander & de-epithelialized flap with Dr.'s Ko Else Dr. Keith Diaz on 2/8/2022. She is interested in proceeding with expander/ implant exchange however the surgery was cancelled do to a persistently elevated hemoglobin A1c, most recently 8.2. We did discuss that she would be high risk for wound healing complications. She is to see endocrinology next month. I did recommend continuing to follow with them to get her numbers under better control. We will see her in 3 months to again discuss surgery & review her labs. Subjective:      Patient ID: Diego Cantor is a 58 y.o. female. HPI     Pt is here to discuss breast reconstruction. She is s/p left breast mastectomy & immediate reconstruction with tissue expander & de-epithelialized flap with Dr.'s Ko Else Dr. Keith Diaz on 2/8/2022. She is interested in proceeding with expander/ implant exchange however the surgery was cancelled do to a persistently elevated hemoglobin A1c. Most recently checked in June, 8.2. She is on Ozempic & insulin but knows she doesn't eat as well as she should. She also has a bad knee which makes it difficult to exercise. She states she is under a lot of stress as her boyfriends son recently passed away.      Patient Active Problem List   Diagnosis   • Anemia   • Benign essential hypertension   • Arthritis   • Mixed diabetic hyperlipidemia associated with type 2 diabetes mellitus (HCC)   • Disc degeneration, lumbar   • Fibrocystic breast disease, unspecified laterality   • GERD without esophagitis   • Hypothyroidism   • IBS (irritable bowel syndrome)   • Neoplasm of uncertain behavior of skin   • Obesity, Class III, BMI 40-49.9 (morbid obesity) ([facility])   • MARIA FERNANDA (obstructive sleep apnea)   • Primary osteoarthritis of both knees   • Vitamin D deficiency • Type 2 diabetes mellitus with hyperglycemia, without long-term current use of insulin (HCC)   • Fatty liver   • Full thickness rotator cuff tear   • Shoulder joint pain   • Family history of breast cancer in female   • Screening mammogram, encounter for   • Lumbar radiculopathy   • History of ductal carcinoma in situ (DCIS) of breast   • BRCA negative   • Chronic kidney disease (CKD) stage G3b/A1, moderately decreased glomerular filtration rate (GFR) between 30-44 mL/min/1.73 square meter and albuminuria creatinine ratio less than 30 mg/g (HCC)   • Class 3 severe obesity due to excess calories with serious comorbidity and body mass index (BMI) of 45.0 to 49.9 in adult [facility])   • Yeast infection involving the vagina and surrounding area   • Encounter for follow-up surveillance of breast cancer     No Known Allergies  Current Outpatient Medications on File Prior to Visit   Medication Sig   • Acetaminophen 500 MG Take 2 capsules (1,000 mg total) by mouth every 6 (six) hours as needed for mild pain for up to 21 doses   • aspirin (ECOTRIN LOW STRENGTH) 81 mg EC tablet Take 1 tablet by mouth daily   • Blood Glucose Monitoring Suppl (ONE TOUCH ULTRA 2) w/Device KIT Use 3 (three) times a day   • cholecalciferol (VITAMIN D3) 1,000 units tablet Take 4 tablets (4,000 Units total) by mouth daily   • dapagliflozin (Farxiga) 10 MG tablet Take 1 tablet (10 mg total) by mouth daily (Patient taking differently: Take 10 mg by mouth daily at bedtime)   • furosemide (LASIX) 20 mg tablet TAKE ONE TABLET BY MOUTH DAILY AS NEEDED FOR SWELLING   • gabapentin (NEURONTIN) 300 mg capsule Take 1 capsule by mouth twice daily   • glipiZIDE (GLUCOTROL XL) 5 mg 24 hr tablet Take 1 tablet (5 mg total) by mouth daily   • Insulin Glargine Solostar (Lantus SoloStar) 100 UNIT/ML SOPN Inject 10 units nightly. • Insulin Pen Needle (BD Pen Needle Wendy U/F) 32G X 4 MM MISC Use to inject insulin once daily.    • levothyroxine 100 mcg tablet Take 1 tablet (100 mcg total) by mouth daily   • lisinopril (ZESTRIL) 20 mg tablet Take 1 tablet (20 mg total) by mouth daily   • ONETOUCH DELICA LANCETS FINE MISC by Does not apply route 2 (two) times a day   • OneTouch Ultra test strip USE 1 STRIP TO CHECK GLUCOSE THREE TIMES DAILY   • pantoprazole (PROTONIX) 40 mg tablet Take 1 tablet (40 mg total) by mouth daily   • polyethylene glycol (GLYCOLAX) 17 GM/SCOOP powder Take by mouth as needed   • rosuvastatin (CRESTOR) 20 MG tablet Take 1 tablet (20 mg total) by mouth daily (Patient taking differently: Take 20 mg by mouth daily at bedtime)   • Semaglutide (OZEMPIC, 2 MG/DOSE, SC) Inject under the skin   • semaglutide, 1 mg/dose, (Ozempic, 1 MG/DOSE,) 4 mg/3 mL injection pen Inject 0.75 mL (1 mg total) under the skin every 7 days for 28 days     No current facility-administered medications on file prior to visit.      Family History   Problem Relation Age of Onset   • Diabetes Mother         Type II   • Diabetes type II Mother    • Dementia Mother          09   • Hearing loss Mother            • Breast cancer Paternal Aunt    • Cancer Paternal Uncle         carcinosarcoma of the oral cavity    • Prostate cancer Paternal Uncle            • Breast cancer Family    • Breast cancer Maternal Aunt            • Breast cancer Maternal Aunt    • Diabetes type II Maternal Aunt    • Thyroid cancer Maternal Aunt    • Thyroid disease Maternal Aunt    • Hypertension Father          01   • Depression Father          01   • Stroke Father    • Heart disease Father          01   • No Known Problems Sister    • Depression Maternal Grandmother         Committed suicide - 10/24/68   • Asthma Maternal Grandmother    • Completed Suicide  Maternal Grandmother         10/24/1968   • Depression Paternal Grandmother    • Asthma Sister         Born with it/outgrew it with age (pretty young)   • No Known Problems Paternal Aunt    • No Known Problems Maternal Aunt    • Diabetes type II Maternal Uncle    • Diabetes type II Maternal Grandfather    • Breast cancer Cousin    • Cancer Paternal Uncle              Past Medical History:   Diagnosis Date   • Achrochordon    • Arthritis    • Chronic kidney disease    • Colon polyp    • Diabetes mellitus ([address])    • Dysuria    • GERD (gastroesophageal reflux disease)    • Headache(784.0)    • Hyperlipidemia    • Hypertension    • IBS (irritable bowel syndrome)    • Inflammatory bowel disease    • Obesity    • Polycystic ovarian syndrome    • Rotator cuff tear     right side    • Sleep apnea    • Wears glasses      Social History     Socioeconomic History   • Marital status: Single     Spouse name: Not on file   • Number of children: Not on file   • Years of education: Not on file   • Highest education level: Not on file   Occupational History   • Not on file   Tobacco Use   • Smoking status: Never   • Smokeless tobacco: Never   Vaping Use   • Vaping Use: Never used   Substance and Sexual Activity   • Alcohol use: Yes     Alcohol/week: 0.0 standard drinks of alcohol     Comment: Drink only 1-2 drinks 4x a year.    • Drug use: Never   • Sexual activity: Not Currently     Partners: Male     Birth control/protection: None   Other Topics Concern   • Not on file   Social History Narrative    Caffeine - denies    Retired     Social Determinants of Health     Financial Resource Strain: Not on file   Food Insecurity: Not on file   Transportation Needs: Not on file   Physical Activity: Not on file   Stress: Not on file   Social Connections: Not on file   Intimate Partner Violence: Not on file   Housing Stability: Not on file     Past Surgical History:   Procedure Laterality Date   • ANAL FISTULOTOMY      subcutaneous    • BREAST LUMPECTOMY Left 2021   • BREAST RECONSTRUCTION Left 2022    Procedure: [address], [address];  Surgeon: Elsa Gonzalez MD;  Location: [facility];  Service: Plastics   • CHOLECYSTECTOMY     • COLONOSCOPY     • FLAP LOCAL TRUNK Left 02/08/2022    Procedure: DE-EPITHELIALIZED FLAP BREAST;  Surgeon: Joshua Murphy MD;  [facility];  Service: Plastics   • HEMORRHOID SURGERY     • INCISION AND DRAINAGE PERIRECTAL ABSCESS     • MASTECTOMY Left 02/08/2022   • MASTECTOMY W/ SENTINEL NODE BIOPSY Left 02/08/2022    Procedure: MASTECTOMY WITH BIOPSY LYMPH NODE SENTINEL; [address];  Surgeon: Gaby Rossi MD;  Location[facility];  Service: Surgical Oncology   • MOUTH SURGERY     • OVARIAN CYST REMOVAL     • VA DEBRIDEMENT SUBCUTANEOUS TISSUE 20 SQ CM/< Left 04/18/2022    Procedure: DEBRIDEMENT OF NON-HEALING SURGICAL WOUND, LEFT BREAST,  ADVANCEMENT FLAP;  Surgeon: Joshua Murphy MD;  [facility];  Service: Plastics   • ROTATOR CUFF REPAIR Right    • TONSILLECTOMY     • US GUIDANCE BREAST BIOPSY LEFT EACH ADDITIONAL Left 09/21/2021   • US GUIDED BREAST BIOPSY LEFT COMPLETE Left 09/21/2021         Review of Systems   All other systems reviewed and are negative. Objective:      LMP  (LMP Unknown)          Physical Exam  Constitutional:       Appearance: She is well-developed. She is obese. HENT:      Head: Normocephalic and atraumatic. Eyes:      Conjunctiva/sclera: Conjunctivae normal.   Pulmonary:      Effort: Pulmonary effort is normal.   Musculoskeletal:         General: Normal range of motion. Cervical back: Normal range of motion. Skin:     General: Skin is warm and dry. Neurological:      Mental Status: She is alert and oriented to person, place, and time.    Psychiatric:         Mood and Affect: Mood normal.         Behavior: Behavior normal.

## 2023-07-21 ENCOUNTER — TELEPHONE (OUTPATIENT)
Dept: ADMINISTRATIVE | Facility: OTHER | Age: 63
End: 2023-07-21

## 2023-07-21 NOTE — TELEPHONE ENCOUNTER
07/21/23 9:46 AM     [facility] Care[employer] SmartForm used to document caregap status.     Reji Grant MA Patient calling requesting a call back stating when she had a bowel movement this morning she noticed bright red blood in her stool.  Patient states she has had a colonoscopy and her test results were normal but now she's very concerned.  Patient can be reached at [phone].

## 2023-07-21 NOTE — TELEPHONE ENCOUNTER
Upon review of the In Basket request we were able to locate, review, and update the patient chart as requested for Diabetic Foot Exam.    Any additional questions or concerns should be emailed to the Practice Liaisons via the appropriate education email address, please do not reply via In Basket.     Thank you  Federica Lozano MA

## 2023-07-21 NOTE — TELEPHONE ENCOUNTER
----- Message from Tolu Gan sent at 7/20/2023  4:02 PM EDT -----  Regarding: care gap request DM foot exam  07/20/23 4:02 PM    Hello, our patient attached above has had Diabetic Foot Exam completed/performed. Please assist in updating the patient chart by pulling the document from the Media Tab. The date of service is 10/31/2022.      Thank you,  Tolu GARCIA [facility]

## 2023-07-24 ENCOUNTER — OFFICE VISIT (OUTPATIENT)
Dept: FAMILY MEDICINE CLINIC | Facility: CLINIC | Age: 63
End: 2023-07-24
Payer: COMMERCIAL

## 2023-07-24 VITALS
RESPIRATION RATE: 16 BRPM | TEMPERATURE: 96.9 F | DIASTOLIC BLOOD PRESSURE: 80 MMHG | BODY MASS INDEX: 46.61 KG/M2 | HEIGHT: 64 IN | SYSTOLIC BLOOD PRESSURE: 138 MMHG | HEART RATE: 76 BPM | WEIGHT: 273 LBS | OXYGEN SATURATION: 98 %

## 2023-07-24 DIAGNOSIS — M54.50 CHRONIC BILATERAL LOW BACK PAIN WITHOUT SCIATICA: ICD-10-CM

## 2023-07-24 DIAGNOSIS — N18.32 CHRONIC KIDNEY DISEASE (CKD) STAGE G3B/A1, MODERATELY DECREASED GLOMERULAR FILTRATION RATE (GFR) BETWEEN 30-44 ML/MIN/1.73 SQUARE METER AND ALBUMINURIA CREATININE RATIO LESS THAN 30 MG/G (HCC): ICD-10-CM

## 2023-07-24 DIAGNOSIS — E55.9 VITAMIN D DEFICIENCY: ICD-10-CM

## 2023-07-24 DIAGNOSIS — E11.42 POORLY CONTROLLED TYPE 2 DIABETES MELLITUS WITH PERIPHERAL NEUROPATHY (HCC): ICD-10-CM

## 2023-07-24 DIAGNOSIS — E03.9 HYPOTHYROIDISM, UNSPECIFIED TYPE: ICD-10-CM

## 2023-07-24 DIAGNOSIS — I10 BENIGN ESSENTIAL HYPERTENSION: Primary | ICD-10-CM

## 2023-07-24 DIAGNOSIS — E11.65 POORLY CONTROLLED TYPE 2 DIABETES MELLITUS WITH PERIPHERAL NEUROPATHY (HCC): ICD-10-CM

## 2023-07-24 DIAGNOSIS — E66.01 CLASS 3 SEVERE OBESITY DUE TO EXCESS CALORIES WITH SERIOUS COMORBIDITY AND BODY MASS INDEX (BMI) OF 45.0 TO 49.9 IN ADULT (HCC): ICD-10-CM

## 2023-07-24 DIAGNOSIS — Z12.4 SCREENING FOR CERVICAL CANCER: ICD-10-CM

## 2023-07-24 DIAGNOSIS — K21.9 GASTROESOPHAGEAL REFLUX DISEASE WITHOUT ESOPHAGITIS: ICD-10-CM

## 2023-07-24 DIAGNOSIS — G89.29 CHRONIC BILATERAL LOW BACK PAIN WITHOUT SCIATICA: ICD-10-CM

## 2023-07-24 DIAGNOSIS — Z86.000 HISTORY OF DUCTAL CARCINOMA IN SITU (DCIS) OF BREAST: ICD-10-CM

## 2023-07-24 DIAGNOSIS — M51.36 DISC DEGENERATION, LUMBAR: ICD-10-CM

## 2023-07-24 PROCEDURE — 99214 OFFICE O/P EST MOD 30 MIN: CPT | Performed by: FAMILY MEDICINE

## 2023-07-24 RX ORDER — PANTOPRAZOLE SODIUM 40 MG/1
40 TABLET, DELAYED RELEASE ORAL DAILY
Qty: 90 TABLET | Refills: 3 | Status: SHIPPED | OUTPATIENT
Start: 2023-07-24

## 2023-07-24 RX ORDER — LEVOTHYROXINE SODIUM 0.1 MG/1
100 TABLET ORAL DAILY
Qty: 90 TABLET | Refills: 3 | Status: SHIPPED | OUTPATIENT
Start: 2023-07-24

## 2023-07-24 RX ORDER — GABAPENTIN 300 MG/1
300 CAPSULE ORAL 2 TIMES DAILY
Qty: 180 CAPSULE | Refills: 3 | Status: SHIPPED | OUTPATIENT
Start: 2023-07-24

## 2023-07-24 NOTE — PROGRESS NOTES
Name: Declan Riddle      : 1960      MRN: [id number]  Encounter Provider: Petra Keyes DO  Encounter Date: 2023   Encounter department: [address] [place] [address]   Patient is being sent for updated x-ray of the lumbar spine. We will heed results. Patient to continue present treatment. Patient may take Tylenol and apply ice alternating with heat as needed. Discussed physical therapy. Patient is directed to follow a low-fat, low-salt and a low sugar/carbohydrate diet more carefully and get regular aerobic exercise walking or swimming as tolerated. Weight loss encouraged. Follow-up with specialist as scheduled and return to the office in 4 months. 1. Benign essential hypertension    2. Poorly controlled type 2 diabetes mellitus with peripheral neuropathy (HCC)  -     gabapentin (NEURONTIN) 300 mg capsule; Take 1 capsule (300 mg total) by mouth 2 (two) times a day    3. Hypothyroidism, unspecified type  -     levothyroxine 100 mcg tablet; Take 1 tablet (100 mcg total) by mouth daily    4. Chronic bilateral low back pain without sciatica  -     XR spine lumbar minimum 4 views non injury; Future; Expected date: 2023    5. Disc degeneration, lumbar  -     XR spine lumbar minimum 4 views non injury; Future; Expected date: 2023    6. Gastroesophageal reflux disease without esophagitis  -     pantoprazole (PROTONIX) 40 mg tablet; Take 1 tablet (40 mg total) by mouth daily    7. Chronic kidney disease (CKD) stage G3b/A1, moderately decreased glomerular filtration rate (GFR) between 30-44 mL/min/1.73 square meter and albuminuria creatinine ratio less than 30 mg/g (HCC)    8. History of ductal carcinoma in situ (DCIS) of breast    9. Class 3 severe obesity due to excess calories with serious comorbidity and body mass index (BMI) of 45.0 to 49.9 in adult ([address] )    10. Vitamin D deficiency    11.  Screening for cervical cancer  -     Ambulatory Referral to Gynecology; Future           Subjective      Patient is here for follow-up appoint for chronic conditions and reviewed recent labs. Patient's been feeling fairly well overall although complains of chronic bilateral low back pain. She denies any radicular symptoms. Patient concerned about her bones secondary to recent breast cancer diagnosis. Patient recently saw plastic surgeon to schedule reconstructive breast surgery although surgery is on hold at this time as her diabetes is not well controlled with recent hemoglobin A1c of 8.2. Patient has a follow-up appoint with endocrinology in 1 week. Patient also saw nephrology and has labs ordered. Hypertension  This is a chronic problem. The problem is controlled. Associated symptoms include anxiety and peripheral edema. Pertinent negatives include no blurred vision, chest pain, headaches, orthopnea, palpitations, PND or shortness of breath. Risk factors for coronary artery disease include dyslipidemia, diabetes mellitus, family history, post-menopausal state and obesity. Past treatments include ACE inhibitors and diuretics. The current treatment provides significant improvement. Compliance problems include exercise and diet. There is no history of CAD/MI or CVA. Back Pain  This is a chronic problem. The problem occurs daily. The problem is unchanged. The pain is present in the lumbar spine (bilat). The quality of the pain is described as aching (sharp). The pain does not radiate. The pain is worse during the day. The symptoms are aggravated by standing. Stiffness is present in the morning. Pertinent negatives include no bladder incontinence, bowel incontinence, chest pain, headaches, leg pain, numbness, paresthesias, pelvic pain, perianal numbness, tingling or weakness. She has tried ice (tylenol) for the symptoms. The treatment provided mild relief. Review of Systems   Eyes: Negative for blurred vision. Respiratory: Negative for shortness of breath. Cardiovascular: Negative for chest pain, palpitations, orthopnea and PND. Gastrointestinal: Negative for bowel incontinence. Genitourinary: Negative for bladder incontinence and pelvic pain. Musculoskeletal: Positive for back pain. Neurological: Negative for tingling, weakness, numbness, headaches and paresthesias. Current Outpatient Medications on File Prior to Visit   Medication Sig   • Acetaminophen 500 MG Take 2 capsules (1,000 mg total) by mouth every 6 (six) hours as needed for mild pain for up to 21 doses   • aspirin (ECOTRIN LOW STRENGTH) 81 mg EC tablet Take 1 tablet by mouth daily   • Blood Glucose Monitoring Suppl (ONE TOUCH ULTRA 2) w/Device KIT Use 3 (three) times a day   • cholecalciferol (VITAMIN D3) 1,000 units tablet Take 4 tablets (4,000 Units total) by mouth daily   • dapagliflozin (Farxiga) 10 MG tablet Take 1 tablet (10 mg total) by mouth daily (Patient taking differently: Take 10 mg by mouth daily at bedtime)   • furosemide (LASIX) 20 mg tablet TAKE ONE TABLET BY MOUTH DAILY AS NEEDED FOR SWELLING (Patient taking differently: Did not start yet)   • glipiZIDE (GLUCOTROL XL) 5 mg 24 hr tablet Take 1 tablet (5 mg total) by mouth daily   • Insulin Glargine Solostar (Lantus SoloStar) 100 UNIT/ML SOPN Inject 10 units nightly. • Insulin Pen Needle (BD Pen Needle Wendy U/F) 32G X 4 MM MISC Use to inject insulin once daily.    • lisinopril (ZESTRIL) 20 mg tablet Take 1 tablet (20 mg total) by mouth daily   • ONETOUCH DELICA LANCETS FINE MISC by Does not apply route 2 (two) times a day   • OneTouch Ultra test strip USE 1 STRIP TO CHECK GLUCOSE THREE TIMES DAILY   • polyethylene glycol (GLYCOLAX) 17 GM/SCOOP powder Take by mouth as needed   • rosuvastatin (CRESTOR) 20 MG tablet Take 1 tablet (20 mg total) by mouth daily (Patient taking differently: Take 20 mg by mouth daily at bedtime)   • semaglutide, 1 mg/dose, (Ozempic, 1 MG/DOSE,) 4 mg/3 mL injection pen Inject 0.75 mL (1 mg total) under the skin every 7 days for 28 days   • [DISCONTINUED] gabapentin (NEURONTIN) 300 mg capsule Take 1 capsule by mouth twice daily   • [DISCONTINUED] levothyroxine 100 mcg tablet Take 1 tablet (100 mcg total) by mouth daily   • [DISCONTINUED] pantoprazole (PROTONIX) 40 mg tablet Take 1 tablet (40 mg total) by mouth daily   • Semaglutide (OZEMPIC, 2 MG/DOSE, SC) Inject under the skin (Patient not taking: Reported on 7/24/2023)       Objective     /80 (BP Location: Left arm, Patient Position: Sitting, Cuff Size: Large)   Pulse 76   Temp (!) 96.9 °F (36.1 °C) (Tympanic)   Resp 16   Ht 5' 4" (1.626 m)   Wt 124 kg (273 lb)   LMP  (LMP Unknown)   SpO2 98%   BMI 46.86 kg/m²     Physical Exam  Constitutional:       General: She is not in acute distress. Appearance: Normal appearance. She is obese. HENT:      Head: Normocephalic. Mouth/Throat:      Mouth: Mucous membranes are moist.   Eyes:      General: No scleral icterus. Conjunctiva/sclera: Conjunctivae normal.   Neck:      Vascular: No carotid bruit. Cardiovascular:      Rate and Rhythm: Normal rate and regular rhythm. Pulmonary:      Effort: Pulmonary effort is normal.      Breath sounds: Normal breath sounds. Abdominal:      Palpations: Abdomen is soft. Tenderness: There is no abdominal tenderness. Musculoskeletal:         General: Tenderness present. Cervical back: Neck supple. Right lower leg: Edema present. Left lower leg: Edema present. Comments: Mild bilateral lumbar paravertebral tenderness. Negative spinous process tenderness. Lymphadenopathy:      Cervical: No cervical adenopathy. Skin:     General: Skin is warm and dry. Neurological:      General: No focal deficit present. Mental Status: She is alert and oriented to person, place, and time. Psychiatric:         Mood and Affect: Mood normal.         Behavior: Behavior normal.         Thought Content:  Thought content normal. Judgment: Judgment normal.       Ebony Urias, DO

## 2023-08-02 ENCOUNTER — APPOINTMENT (OUTPATIENT)
Dept: RADIOLOGY | Facility: CLINIC | Age: 63
End: 2023-08-02
Payer: COMMERCIAL

## 2023-08-02 DIAGNOSIS — M54.50 CHRONIC BILATERAL LOW BACK PAIN WITHOUT SCIATICA: ICD-10-CM

## 2023-08-02 DIAGNOSIS — M51.36 DISC DEGENERATION, LUMBAR: ICD-10-CM

## 2023-08-02 DIAGNOSIS — G89.29 CHRONIC BILATERAL LOW BACK PAIN WITHOUT SCIATICA: ICD-10-CM

## 2023-08-02 PROCEDURE — 72110 X-RAY EXAM L-2 SPINE 4/>VWS: CPT

## 2023-08-02 NOTE — PROGRESS NOTES
Established Patient Progress Note      Chief Complaint   Patient presents with   • Diabetes Type 2     Pt reports bg are elevated again in the 200+s, due to the stress. Impression & Plan:    Problem List Items Addressed This Visit        Endocrine    Mixed diabetic hyperlipidemia associated with type 2 diabetes mellitus ([address])     Check fasting lipid panel. Continue 20 mg of rosuvastatin nightly. Lab Results   Component Value Date    HGBA1C 8.2 (H) 06/24/2023            Relevant Medications    Insulin Glargine Solostar (Lantus SoloStar) 100 UNIT/ML SOPN    Hypothyroidism     Thyroid function is stable. Continue 100 mcg of levothyroxine daily. Repeat thyroid function test in 6 months. Type 2 diabetes mellitus with hyperglycemia, without long-term current use of insulin ([address]) - Primary     Patient remains poorly controlled with frequent hyperglycemia. She is about to start 1 mg of Ozempic, which will certainly assist with glycemic management. Increase Lantus to 18 units nightly. Continue farxiga and glipizide. Patient should have a CGM. Order a freestyle carter 2. The patient is a candidate for CGM use: Indications: Diabetes Type 2  The patient is treated 1 injection of insulin daily. SMBG data is being used to make adjustments to the insulin regimen  In the meantime, test blood sugar twice daily and provide a glucose log in 2 weeks at which time, we can adjust her basal insulin again if needed. Once she has completed 1 month of 1 mg of Ozempic, will increase to 2mg once weekly which will reduce insulin needs and assist with hyperglycemia and weight loss. Encouraged patient to be more mindful of following a healthy, consistent carbohydrate diet. Increase physical activity as tolerated. F/U in 3 months.    Lab Results   Component Value Date    HGBA1C 8.2 (H) 06/24/2023            Relevant Medications    Insulin Glargine Solostar (Lantus SoloStar) 100 UNIT/ML SOPN       Respiratory    MARIA FERNANDA (obstructive sleep apnea)     Reports unable to tolerate CPAP or nasal pillows. Cardiovascular and Mediastinum    Benign essential hypertension     BP is elevated today at 148/80. However, patient took her senna Geanie Civatte shortly before arriving at her appointment. As of July 24, 2023, BP was 138/80. Continue 20 mg of lisinopril daily. Genitourinary    Chronic kidney disease (CKD) stage G3b/A1, moderately decreased glomerular filtration rate (GFR) between 30-44 mL/min/1.73 square meter and albuminuria creatinine ratio less than 30 mg/g [facility])     Lab Results   Component Value Date    EGFR 42 06/24/2023    EGFR 39 06/19/2023    EGFR 49 02/21/2023    CREATININE 1.33 (H) 06/24/2023    CREATININE 1.43 (H) 06/19/2023    CREATININE 1.19 02/21/2023   Reviewed CMP. GFR is slightly increased to 42. Avoid nephrotoxic agents. Continue to follow with nephrology. Other    Vitamin D deficiency     Continue supplements. Class 3 severe obesity due to excess calories with serious comorbidity and body mass index (BMI) of 45.0 to 49.9 in adult [facility])     Patient has resumed Ozempic. Complete 1 month of 1 mg once weekly of Ozempic and then increase to 2 mg once weekly. Encourage patient to follow a healthy diet and increase physical activity.         Other Visit Diagnoses     Type 2 diabetes mellitus with stage 3b chronic kidney disease, without long-term current use of insulin (HCC)        Relevant Medications    Insulin Glargine Solostar (Lantus SoloStar) 100 UNIT/ML SOPN    glucose blood (OneTouch Ultra) test strip    Continuous Blood Gluc Sensor (FreeStyle Sam 2 Sensor) [facility] [place]    Continuous Blood Gluc  (FreeStyle [place] 2 [place]) WARNER    Other Relevant Orders    Basic metabolic panel    Lipid Panel with Direct LDL reflex    Hemoglobin A1C          Orders Placed This Encounter   Procedures   • Basic metabolic panel     This is a patient instruction: Patient fasting for 8 hours or longer recommended. Standing Status:   Future     Standing Expiration Date:   8/3/2024   • Lipid Panel with Direct LDL reflex     This is a patient instruction: This test requires patient fasting for 10-12 hours or longer. Drinking of black coffee or black tea is acceptable. Standing Status:   Future     Standing Expiration Date:   8/3/2024   • Hemoglobin A1C     Standing Status:   Future     Standing Expiration Date:   8/3/2024       History of Present Illness:   Yenni Goyal is a 58 y.o. female with  HTN, HLD, hypothyroidism, and type 2 diabetes without long term use of insulin since approximately 2010 presenting for an early follow-up. Reports complications of neuropathy. Denies recent illness or hospitalizations. Denies recent severe hypoglycemic or severe hyperglycemic episodes. Denies any issues with her current regimen. home glucose monitoring: are performed regularly-once daily in the morning     + family history of T2DM in mother and maternal grandmother    At patient's last appointment, basal insulin was initiated. There is also some discussion of transitioning patient from 2 mg of Ozempic over to 2.4 mg of Wegovy. Unfortunately, it took several months to receive clearance from patient's insurance company for [employer]. She was maintained on samples but now has her own prescription. She was without any GLP-1 for the month of June. She is finishing her 0.5 mg dose and is about to start 1 mg once weekly. She admits to dietary indiscretion. She notes that she has been under increased stress as her partner's son recently passed away. Hemoglobin A1C   Latest Ref Rng Normal 3.8-5.6%; PreDiabetic 5.7-6.4%;  Diabetic >=6.5%; Glycemic control for adults with diabetes <7.0% %   10/17/2022 7.8 (H)    2/23/2023 8.1 (H)    6/24/2023 8.2 (H)       eAG, EST AVG Glucose   Latest Ref Rng mg/dl   10/17/2022 177    2/23/2023 186    6/24/2023 189       Legend:  (H) High     eGFR   Latest Ref Rng ml/min/1.73sq m 6/24/2023 42          Home blood glucose readings: 180-220    Current regimen:   Farxiga 10 mg nightly  Glipizide 5 mg daily  Lantus 10 units nightly  Ozempic 1 mg once weekly    Last Eye Exam: UTD  Last Foot Exam: UTD    For hyperlipidemia, she is taking 20 mg of rosuvastatin nightly. She denies myalgias. For hypertension, she is taking 20 mg of lisinopril daily. She denies headache and cough. For hypothyroidism, she is taking 100 mcg of levothyroxine daily. She reports taking this consistently and correctly. Thyroid function is stable. Denies symptoms of hypothyroidism.      Component      Latest Ref Rng 6/24/2023   TSH 3RD GENERATON      0.450 - 4.500 uIU/mL 1.369          Patient Active Problem List   Diagnosis   • Anemia   • Benign essential hypertension   • Arthritis   • Mixed diabetic hyperlipidemia associated with type 2 diabetes mellitus (HCC)   • Disc degeneration, lumbar   • Fibrocystic breast disease, unspecified laterality   • GERD without esophagitis   • Hypothyroidism   • IBS (irritable bowel syndrome)   • Neoplasm of uncertain behavior of skin   • MARIA FERNANDA (obstructive sleep apnea)   • Primary osteoarthritis of both knees   • Vitamin D deficiency   • Type 2 diabetes mellitus with hyperglycemia, without long-term current use of insulin ([facility])   • Fatty liver   • Full thickness rotator cuff tear   • Shoulder joint pain   • Family history of breast cancer in female   • Screening mammogram, encounter for   • Lumbar radiculopathy   • History of ductal carcinoma in situ (DCIS) of breast   • BRCA negative   • Chronic kidney disease (CKD) stage G3b/A1, moderately decreased glomerular filtration rate (GFR) between 30-44 mL/min/1.73 square meter and albuminuria creatinine ratio less than 30 mg/g ([facility])   • Class 3 severe obesity due to excess calories with serious comorbidity and body mass index (BMI) of 45.0 to 49.9 in [facility])   • Yeast infection involving the vagina and surrounding area   • Encounter for follow-up surveillance of breast cancer      Past Medical History:   Diagnosis Date   • Achrochordon    • Arthritis    • Cancer ([address]) 2021    Breast (left)   • Colon polyp    • Dysuria    • Headache(784.0)    • Hyperlipidemia    • IBS (irritable bowel syndrome)    • Inflammatory bowel disease    • Polycystic ovarian syndrome    • Rotator cuff tear     right side    • Wears glasses       Past Surgical History:   Procedure Laterality Date   • ANAL FISTULOTOMY      subcutaneous    • BREAST LUMPECTOMY Left 2021   • BREAST RECONSTRUCTION Left 2022    Procedure: IMMEDIATE RECONSTRUCTION WITH TISSUE EXPANDER PLACEMENT;  Surgeon: Doug Marie MD;  Location: [facility];  Service: Plastics   • CHOLECYSTECTOMY     • COLONOSCOPY     • FLAP LOCAL TRUNK Left 2022    Procedure: DE-EPITHELIALIZED FLAP BREAST;  Surgeon: Doug Marie MD;  [facility];  Service: Plastics   • HEMORRHOID SURGERY     • INCISION AND DRAINAGE PERIRECTAL ABSCESS     • MASTECTOMY Left 2022   • MASTECTOMY W/ SENTINEL NODE BIOPSY Left 2022    Procedure: MASTECTOMY WITH BIOPSY LYMPH NODE SENTINEL; [address] MED;  Surgeon: Riley Rudd MD;  [facility]: [facility];  Service: Surgical Oncology   • MOUTH SURGERY     • OVARIAN CYST REMOVAL     • WA DEBRIDEMENT SUBCUTANEOUS TISSUE 20 SQ CM/< Left 2022    Procedure: DEBRIDEMENT OF NON-HEALING SURGICAL WOUND, LEFT BREAST,  ADVANCEMENT FLAP;  Surgeon: Doug Marie MD;  [facility];  Service: Plastics   • ROTATOR CUFF REPAIR Right    • TONSILLECTOMY     • US GUIDANCE BREAST BIOPSY LEFT EACH ADDITIONAL Left 2021   • US GUIDED BREAST BIOPSY LEFT COMPLETE Left 2021      Family History   Problem Relation Age of Onset   • Diabetes Mother         Type II   • Diabetes type II Mother    • Dementia Mother          09   • Hearing loss Mother          2009   • Breast cancer Paternal Aunt    • Cancer Paternal Aunt Breast, Skin   • Cancer Paternal Uncle         Prostate   • Prostate cancer Paternal Uncle            • Breast cancer Family    • Breast cancer Maternal Aunt            • Cancer Maternal Aunt         Breast   • Breast cancer Maternal Aunt    • Diabetes type II Maternal Aunt    • Thyroid cancer Maternal Aunt    • Thyroid disease Maternal Aunt         Cancer   • Diabetes Maternal Aunt    • Cancer Maternal Aunt         Breast   • Hypertension Father          01   • Depression Father          01   • Stroke Father          2001   • Heart disease Father          01   • No Known Problems Sister    • Depression Maternal Grandmother         Committed suicide - 10/24/68   • Asthma Maternal Grandmother    • Completed Suicide  Maternal Grandmother         10/24/1968   • Depression Paternal Grandmother            • Asthma Sister         Born with it/outgrew it with age (pretty young)   • No Known Problems Paternal Aunt    • No Known Problems Maternal Aunt    • Diabetes type II Maternal Uncle    • Diabetes Maternal Uncle    • Diabetes type II Maternal Grandfather    • Diabetes Maternal Grandfather    • Breast cancer Cousin    • Cancer Paternal Uncle         Oral Cancer   • Heart disease Paternal Grandfather              Social History     Tobacco Use   • Smoking status: Never   • Smokeless tobacco: Never   Substance Use Topics   • Alcohol use: Yes     Comment: Maybe 4-6 drinks (beer, mix drink) per year.      No Known Allergies      Current Outpatient Medications:   •  Acetaminophen 500 MG, Take 2 capsules (1,000 mg total) by mouth every 6 (six) hours as needed for mild pain for up to 21 doses, Disp: 21 capsule, Rfl: 0  •  aspirin (ECOTRIN LOW STRENGTH) 81 mg EC tablet, Take 1 tablet by mouth daily, Disp: , Rfl:   •  Blood Glucose Monitoring Suppl (ONE TOUCH ULTRA 2) w/Device KIT, Use 3 (three) times a day, Disp: 1 kit, Rfl: 0  •  cholecalciferol (VITAMIN D3) 1,000 units tablet, Take 4 tablets (4,000 Units total) by mouth daily, Disp: 90 tablet, Rfl: 1  •  Continuous Blood Gluc  (FreeStyle Sam 2 [place]) WARNER, Dispense 1 reader., Disp: 1 each, Rfl: 0  •  Continuous Blood Gluc Sensor (FreeStyle Sam 2 Sensor) MISC, Use 1 sensor every 14 days for glucose monitoring., Disp: 6 each, Rfl: 1  •  dapagliflozin (Farxiga) 10 MG tablet, Take 1 tablet (10 mg total) by mouth daily (Patient taking differently: Take 10 mg by mouth daily at bedtime), Disp: 90 tablet, Rfl: 1  •  furosemide (LASIX) 20 mg tablet, TAKE ONE TABLET BY MOUTH DAILY AS NEEDED FOR SWELLING (Patient taking differently: Did not start yet), Disp: 30 tablet, Rfl: 1  •  gabapentin (NEURONTIN) 300 mg capsule, Take 1 capsule (300 mg total) by mouth 2 (two) times a day, Disp: 180 capsule, Rfl: 3  •  glipiZIDE (GLUCOTROL XL) 5 mg 24 hr tablet, Take 1 tablet (5 mg total) by mouth daily, Disp: 90 tablet, Rfl: 1  •  glucose blood (OneTouch Ultra) test strip, Use as instructed, Disp: 100 each, Rfl: 3  •  Insulin Glargine Solostar (Lantus SoloStar) 100 UNIT/ML SOPN, Inject 18 units nightly., Disp: 15 mL, Rfl: 2  •  Insulin Pen Needle (BD Pen Needle Wendy U/F) 32G X 4 MM MISC, Use to inject insulin once daily. , Disp: 100 each, Rfl: 2  •  levothyroxine 100 mcg tablet, Take 1 tablet (100 mcg total) by mouth daily, Disp: 90 tablet, Rfl: 3  •  lisinopril (ZESTRIL) 20 mg tablet, Take 1 tablet (20 mg total) by mouth daily, Disp: 90 tablet, Rfl: 2  •  ONETOUCH DELICA LANCETS FINE MISC, by Does not apply route 2 (two) times a day, Disp: , Rfl:   •  pantoprazole (PROTONIX) 40 mg tablet, Take 1 tablet (40 mg total) by mouth daily, Disp: 90 tablet, Rfl: 3  •  polyethylene glycol (GLYCOLAX) 17 GM/SCOOP powder, Take by mouth as needed, Disp: , Rfl:   •  rosuvastatin (CRESTOR) 20 MG tablet, Take 1 tablet (20 mg total) by mouth daily (Patient taking differently: Take 20 mg by mouth daily at bedtime), Disp: 90 tablet, Rfl: 3  •  semaglutide, 1 mg/dose, (Ozempic, 1 MG/DOSE,) 4 mg/3 mL injection pen, Inject 0.75 mL (1 mg total) under the skin every 7 days for 28 days, Disp: 3 mL, Rfl: 0    Review of Systems   Constitutional: Positive for fatigue. Negative for activity change, appetite change and unexpected weight change. HENT: Negative for dental problem, sore throat, trouble swallowing and voice change. Eyes: Negative for visual disturbance. Respiratory: Negative for cough, chest tightness and shortness of breath. Cardiovascular: Negative for chest pain, palpitations and leg swelling. Gastrointestinal: Negative for constipation, diarrhea, nausea and vomiting. Endocrine: Negative for cold intolerance, heat intolerance, polydipsia, polyphagia and polyuria. Genitourinary: Negative for frequency. Musculoskeletal: Negative for arthralgias, back pain, gait problem and myalgias. Skin: Negative for wound. Allergic/Immunologic: Negative for environmental allergies and food allergies. Neurological: Positive for numbness. Negative for dizziness, weakness, light-headedness and headaches. Psychiatric/Behavioral: Negative for decreased concentration, dysphoric mood and sleep disturbance. The patient is not nervous/anxious. Physical Exam:  Body mass index is 47.03 kg/m². /80   Pulse 78   Resp 18   Ht 5' 4" (1.626 m)   Wt 124 kg (274 lb)   LMP  (LMP Unknown)   SpO2 98%   BMI 47.03 kg/m²    Wt Readings from Last 3 Encounters:   08/03/23 124 kg (274 lb)   07/24/23 124 kg (273 lb)   06/22/23 122 kg (269 lb)       Physical Exam  Vitals reviewed. Constitutional:       General: She is not in acute distress. Appearance: She is well-developed. She is obese. She is not ill-appearing. HENT:      Head: Normocephalic and atraumatic. Eyes:      Pupils: Pupils are equal, round, and reactive to light. Neck:      Thyroid: No thyromegaly.    Cardiovascular:      Rate and Rhythm: Normal rate and regular rhythm. Pulses: Normal pulses. Heart sounds: Normal heart sounds. Pulmonary:      Effort: Pulmonary effort is normal.      Breath sounds: Normal breath sounds. Abdominal:      General: Bowel sounds are normal. There is no distension. Palpations: Abdomen is soft. Tenderness: There is no abdominal tenderness. Musculoskeletal:      Cervical back: Normal range of motion and neck supple. Right lower leg: No edema. Left lower leg: No edema. Lymphadenopathy:      Cervical: No cervical adenopathy. Skin:     General: Skin is warm and dry. Capillary Refill: Capillary refill takes less than 2 seconds. Neurological:      Mental Status: She is alert and oriented to person, place, and time. Gait: Gait normal.   Psychiatric:         Mood and Affect: Mood normal.         Behavior: Behavior normal.           Labs:   Lab Results   Component Value Date    HGBA1C 8.2 (H) 06/24/2023    HGBA1C 8.1 (H) 02/23/2023    HGBA1C 7.8 (H) 10/17/2022     Lab Results   Component Value Date    CREATININE 1.33 (H) 06/24/2023    CREATININE 1.43 (H) 06/19/2023    CREATININE 1.19 02/21/2023    BUN 34 (H) 06/24/2023     10/09/2015    K 5.1 06/24/2023     06/24/2023    CO2 27 06/24/2023     eGFR   Date Value Ref Range Status   06/24/2023 42 ml/min/1.73sq m Final     Lab Results   Component Value Date    CHOL 198 10/09/2015    HDL 34 (L) 10/17/2022    TRIG 243 (H) 10/17/2022     Lab Results   Component Value Date    ALT 17 06/24/2023    AST 15 06/24/2023    ALKPHOS 70 06/24/2023    BILITOT 0.48 10/09/2015     Lab Results   Component Value Date    OON3CFLKUZTS 1.369 06/24/2023    ZCR2OFVHSLWJ 2.000 02/21/2023    KYC7YAZQELSF 1.630 07/05/2022     Lab Results   Component Value Date    FREET4 1.11 07/05/2022             Discussed with the patient and all questioned fully answered. She will call me if any problems arise. Follow-up appointment in 4 months.      Counseled patient on diagnostic results, prognosis, risk and benefit of treatment options, instruction for management, importance of treatment compliance, Risk  factor reduction and impressions    Patient Instructions   1. Increase Lantus to 18 units nightly. If you notice that your blood sugars remain greater than 150 on most days, let me know in 2 weeks. 2. Continue 1 mg of Ozempic once weekly. When you start to get low on the 1 mg of Ozempic, let me know and I will order you 2 mg of Ozempic once weekly. 3. We will order you a Freestyle Sam 2. Once you have this, give us a call if you would like us to set it up for you.    [address], [address] [facility]

## 2023-08-03 ENCOUNTER — OFFICE VISIT (OUTPATIENT)
Dept: ENDOCRINOLOGY | Facility: CLINIC | Age: 63
End: 2023-08-03
Payer: COMMERCIAL

## 2023-08-03 VITALS
RESPIRATION RATE: 18 BRPM | OXYGEN SATURATION: 98 % | DIASTOLIC BLOOD PRESSURE: 80 MMHG | HEIGHT: 64 IN | HEART RATE: 78 BPM | SYSTOLIC BLOOD PRESSURE: 148 MMHG | BODY MASS INDEX: 46.78 KG/M2 | WEIGHT: 274 LBS

## 2023-08-03 DIAGNOSIS — N18.32 TYPE 2 DIABETES MELLITUS WITH STAGE 3B CHRONIC KIDNEY DISEASE, WITHOUT LONG-TERM CURRENT USE OF INSULIN (HCC): ICD-10-CM

## 2023-08-03 DIAGNOSIS — E11.22 TYPE 2 DIABETES MELLITUS WITH STAGE 3B CHRONIC KIDNEY DISEASE, WITHOUT LONG-TERM CURRENT USE OF INSULIN (HCC): ICD-10-CM

## 2023-08-03 DIAGNOSIS — E11.65 TYPE 2 DIABETES MELLITUS WITH HYPERGLYCEMIA, WITHOUT LONG-TERM CURRENT USE OF INSULIN (HCC): Primary | ICD-10-CM

## 2023-08-03 DIAGNOSIS — E78.2 MIXED DIABETIC HYPERLIPIDEMIA ASSOCIATED WITH TYPE 2 DIABETES MELLITUS (HCC): ICD-10-CM

## 2023-08-03 DIAGNOSIS — E11.69 MIXED DIABETIC HYPERLIPIDEMIA ASSOCIATED WITH TYPE 2 DIABETES MELLITUS (HCC): ICD-10-CM

## 2023-08-03 DIAGNOSIS — I10 BENIGN ESSENTIAL HYPERTENSION: ICD-10-CM

## 2023-08-03 DIAGNOSIS — E55.9 VITAMIN D DEFICIENCY: ICD-10-CM

## 2023-08-03 DIAGNOSIS — G47.33 OSA (OBSTRUCTIVE SLEEP APNEA): ICD-10-CM

## 2023-08-03 DIAGNOSIS — E66.01 CLASS 3 SEVERE OBESITY DUE TO EXCESS CALORIES WITH SERIOUS COMORBIDITY AND BODY MASS INDEX (BMI) OF 45.0 TO 49.9 IN ADULT (HCC): ICD-10-CM

## 2023-08-03 DIAGNOSIS — N18.32 CHRONIC KIDNEY DISEASE (CKD) STAGE G3B/A1, MODERATELY DECREASED GLOMERULAR FILTRATION RATE (GFR) BETWEEN 30-44 ML/MIN/1.73 SQUARE METER AND ALBUMINURIA CREATININE RATIO LESS THAN 30 MG/G (HCC): ICD-10-CM

## 2023-08-03 DIAGNOSIS — E03.9 HYPOTHYROIDISM, UNSPECIFIED TYPE: ICD-10-CM

## 2023-08-03 PROCEDURE — 99214 OFFICE O/P EST MOD 30 MIN: CPT | Performed by: NURSE PRACTITIONER

## 2023-08-03 RX ORDER — INSULIN GLARGINE 100 [IU]/ML
INJECTION, SOLUTION SUBCUTANEOUS
Qty: 15 ML | Refills: 2 | Status: SHIPPED | OUTPATIENT
Start: 2023-08-03

## 2023-08-03 RX ORDER — BLOOD SUGAR DIAGNOSTIC
STRIP MISCELLANEOUS
Qty: 100 EACH | Refills: 3 | Status: SHIPPED | OUTPATIENT
Start: 2023-08-03

## 2023-08-03 NOTE — ASSESSMENT & PLAN NOTE
Lab Results   Component Value Date    EGFR 42 06/24/2023    EGFR 39 06/19/2023    EGFR 49 02/21/2023    CREATININE 1.33 (H) 06/24/2023    CREATININE 1.43 (H) 06/19/2023    CREATININE 1.19 02/21/2023   Reviewed CMP. GFR is slightly increased to 42. Avoid nephrotoxic agents. Continue to follow with nephrology.

## 2023-08-03 NOTE — ASSESSMENT & PLAN NOTE
Patient has resumed Ozempic. Complete 1 month of 1 mg once weekly of Ozempic and then increase to 2 mg once weekly. Encourage patient to follow a healthy diet and increase physical activity.

## 2023-08-03 NOTE — PATIENT INSTRUCTIONS
Increase Lantus to 18 units nightly. If you notice that your blood sugars remain greater than 150 on most days, let me know in 2 weeks. Continue 1 mg of Ozempic once weekly. When you start to get low on the 1 mg of Ozempic, let me know and I will order you 2 mg of Ozempic once weekly. We will order you a Freestyle Sam 2. Once you have this, give us a call if you would like us to set it up for you.    KORI Romo

## 2023-08-03 NOTE — ASSESSMENT & PLAN NOTE
Thyroid function is stable. Continue 100 mcg of levothyroxine daily. Repeat thyroid function test in 6 months.

## 2023-08-03 NOTE — ASSESSMENT & PLAN NOTE
BP is elevated today at 148/80. However, patient took her senna Emmie Pander shortly before arriving at her appointment. As of July 24, 2023, BP was 138/80. Continue 20 mg of lisinopril daily.

## 2023-08-03 NOTE — ASSESSMENT & PLAN NOTE
Check fasting lipid panel. Continue 20 mg of rosuvastatin nightly.   Lab Results   Component Value Date    HGBA1C 8.2 (H) 06/24/2023

## 2023-08-03 NOTE — ASSESSMENT & PLAN NOTE
Patient remains poorly controlled with frequent hyperglycemia. She is about to start 1 mg of Ozempic, which will certainly assist with glycemic management. Increase Lantus to 18 units nightly. Continue farxiga and glipizide. Patient should have a CGM. Order a freestyle carter 2. The patient is a candidate for CGM use: Indications: Diabetes Type 2  The patient is treated 1 injection of insulin daily. SMBG data is being used to make adjustments to the insulin regimen  In the meantime, test blood sugar twice daily and provide a glucose log in 2 weeks at which time, we can adjust her basal insulin again if needed. Once she has completed 1 month of 1 mg of Ozempic, will increase to 2mg once weekly which will reduce insulin needs and assist with hyperglycemia and weight loss. Encouraged patient to be more mindful of following a healthy, consistent carbohydrate diet. Increase physical activity as tolerated. F/U in 3 months.    Lab Results   Component Value Date    HGBA1C 8.2 (H) 06/24/2023

## 2023-08-08 DIAGNOSIS — E11.65 TYPE 2 DIABETES MELLITUS WITH HYPERGLYCEMIA, WITHOUT LONG-TERM CURRENT USE OF INSULIN (HCC): ICD-10-CM

## 2023-08-08 RX ORDER — SEMAGLUTIDE 1.34 MG/ML
INJECTION, SOLUTION SUBCUTANEOUS
Qty: 3 ML | Refills: 0 | Status: SHIPPED | OUTPATIENT
Start: 2023-08-08

## 2023-08-16 ENCOUNTER — HOSPITAL ENCOUNTER (OUTPATIENT)
Dept: MAMMOGRAPHY | Facility: HOSPITAL | Age: 63
Discharge: HOME | End: 2023-08-16
Payer: COMMERCIAL

## 2023-08-16 VITALS — WEIGHT: 274 LBS | BODY MASS INDEX: 46.78 KG/M2 | HEIGHT: 64 IN

## 2023-08-16 DIAGNOSIS — Z12.31 SCREENING MAMMOGRAM, ENCOUNTER FOR: ICD-10-CM

## 2023-08-16 PROCEDURE — 77067 SCR MAMMO BI INCL CAD: CPT

## 2023-08-16 PROCEDURE — 77063 BREAST TOMOSYNTHESIS BI: CPT

## 2023-08-28 ENCOUNTER — OFFICE VISIT (OUTPATIENT)
Dept: FAMILY MEDICINE CLINIC | Facility: CLINIC | Age: 63
End: 2023-08-28
Payer: COMMERCIAL

## 2023-08-28 VITALS
DIASTOLIC BLOOD PRESSURE: 78 MMHG | RESPIRATION RATE: 16 BRPM | WEIGHT: 271.2 LBS | SYSTOLIC BLOOD PRESSURE: 124 MMHG | HEART RATE: 82 BPM | BODY MASS INDEX: 46.3 KG/M2 | TEMPERATURE: 97.1 F | HEIGHT: 64 IN | OXYGEN SATURATION: 98 %

## 2023-08-28 DIAGNOSIS — B02.9 HERPES ZOSTER WITHOUT COMPLICATION: Primary | ICD-10-CM

## 2023-08-28 DIAGNOSIS — E03.9 HYPOTHYROIDISM, UNSPECIFIED TYPE: ICD-10-CM

## 2023-08-28 LAB
LEFT EYE DIABETIC RETINOPATHY: NORMAL
RIGHT EYE DIABETIC RETINOPATHY: NORMAL

## 2023-08-28 PROCEDURE — 99213 OFFICE O/P EST LOW 20 MIN: CPT | Performed by: FAMILY MEDICINE

## 2023-08-28 RX ORDER — LEVOTHYROXINE SODIUM 0.1 MG/1
100 TABLET ORAL DAILY
Qty: 90 TABLET | Refills: 3 | OUTPATIENT
Start: 2023-08-28

## 2023-08-28 RX ORDER — VALACYCLOVIR HYDROCHLORIDE 1 G/1
1000 TABLET, FILM COATED ORAL 3 TIMES DAILY
Qty: 21 TABLET | Refills: 0 | Status: SHIPPED | OUTPATIENT
Start: 2023-08-28 | End: 2023-09-04

## 2023-08-28 NOTE — PROGRESS NOTES
Name: Evette Heard      : 1960      MRN: [id number]  Encounter Provider: Nadeen Rich DO  Encounter Date: 2023   Encounter department: [address] [place] [address]   Discussed treatment options. Patient will start Valtrex 1000 mg 3 times daily for 7 days and recommend patient increase gabapentin to 300 mg 3 times daily for the next 1 to 2 weeks. She may take Tylenol as needed. Increase fluids and rest.  Return to the office in 1 week or call sooner as needed. 1. Herpes zoster without complication  -     valACYclovir (VALTREX) 1,000 mg tablet; Take 1 tablet (1,000 mg total) by mouth 3 (three) times a day for 7 days           Subjective      Patient awoke 3 days ago with right shoulder pain. Then 2 days ago noticed a rash on her right shoulder with blistering. She complains of itching initially and now pain and burning down her arm. She has treated this with Tylenol without significant relief. Patient has not had shingles vaccine. Rash  This is a new problem. The current episode started in the past 7 days. The problem has been gradually worsening since onset. The affected locations include the right shoulder. The rash is characterized by blistering, redness, pain, burning and itchiness. She was exposed to nothing. Associated symptoms include fatigue. Pertinent negatives include no cough, fever or sore throat. Treatments tried: tylenol. The treatment provided mild relief. Review of Systems   Constitutional: Positive for fatigue. Negative for fever. HENT: Negative for sore throat. Respiratory: Negative for cough. Skin: Positive for rash.        Current Outpatient Medications on File Prior to Visit   Medication Sig   • Acetaminophen 500 MG Take 2 capsules (1,000 mg total) by mouth every 6 (six) hours as needed for mild pain for up to 21 doses   • aspirin (ECOTRIN LOW STRENGTH) 81 mg EC tablet Take 1 tablet by mouth daily   • Blood Glucose Monitoring Suppl (ONE TOUCH ULTRA 2) w/Device KIT Use 3 (three) times a day   • cholecalciferol (VITAMIN D3) 1,000 units tablet Take 4 tablets (4,000 Units total) by mouth daily   • Continuous Blood Gluc  (FreeStyle [place] 2 [place]) WARNER Dispense 1 reader. • Continuous Blood Gluc Sensor (FreeStyle [place] 2 Sensor) MISC Use 1 sensor every 14 days for glucose monitoring. • dapagliflozin (Farxiga) 10 MG tablet Take 1 tablet (10 mg total) by mouth daily (Patient taking differently: Take 10 mg by mouth daily at bedtime)   • furosemide (LASIX) 20 mg tablet TAKE ONE TABLET BY MOUTH DAILY AS NEEDED FOR SWELLING   • gabapentin (NEURONTIN) 300 mg capsule Take 1 capsule (300 mg total) by mouth 2 (two) times a day   • glipiZIDE (GLUCOTROL XL) 5 mg 24 hr tablet Take 1 tablet (5 mg total) by mouth daily   • glucose blood (OneTouch Ultra) test strip Use as instructed   • Insulin Glargine Solostar (Lantus SoloStar) 100 UNIT/ML SOPN Inject 18 units nightly. • Insulin Pen Needle (BD Pen Needle Wendy U/F) 32G X 4 MM MISC Use to inject insulin once daily.    • levothyroxine 100 mcg tablet Take 1 tablet (100 mcg total) by mouth daily   • lisinopril (ZESTRIL) 20 mg tablet Take 1 tablet (20 mg total) by mouth daily   • ONETOUCH DELICA LANCETS FINE MISC by Does not apply route 2 (two) times a day   • Ozempic, 1 MG/DOSE, 4 MG/3ML injection pen INJECT 0.75 ML (1 MG TOTAL) UNDER THE SKIN EVERY 7 DAYS FOR 28 DAYS   • pantoprazole (PROTONIX) 40 mg tablet Take 1 tablet (40 mg total) by mouth daily   • polyethylene glycol (GLYCOLAX) 17 GM/SCOOP powder Take by mouth as needed   • rosuvastatin (CRESTOR) 20 MG tablet Take 1 tablet (20 mg total) by mouth daily (Patient taking differently: Take 20 mg by mouth daily at bedtime)       Objective     /78 (BP Location: Right arm, Patient Position: Sitting, Cuff Size: Standard) Comment (Cuff Size): lower  Pulse 82   Temp (!) 97.1 °F (36.2 °C) (Tympanic)   Resp 16   Ht 5' 4" (1.626 m)   Wt 123 kg (271 lb 3.2 oz)   LMP  (LMP Unknown)   SpO2 98%   BMI 46.55 kg/m²     Physical Exam  Constitutional:       General: She is not in acute distress. Appearance: Normal appearance. She is obese. She is not ill-appearing, toxic-appearing or diaphoretic. HENT:      Head: Normocephalic. Mouth/Throat:      Mouth: Mucous membranes are moist.   Eyes:      General: No scleral icterus. Conjunctiva/sclera: Conjunctivae normal.   Neck:      Vascular: No carotid bruit. Cardiovascular:      Rate and Rhythm: Normal rate and regular rhythm. Pulmonary:      Effort: Pulmonary effort is normal.      Breath sounds: Normal breath sounds. Abdominal:      Palpations: Abdomen is soft. Tenderness: There is no abdominal tenderness. Musculoskeletal:      Cervical back: Neck supple. Right lower leg: No edema. Left lower leg: No edema. Lymphadenopathy:      Cervical: No cervical adenopathy. Skin:     General: Skin is warm and dry. Findings: Rash present. Comments: Right posterior shoulder with erythematous papular vesicular rash. Neurological:      General: No focal deficit present. Mental Status: She is alert and oriented to person, place, and time. Psychiatric:         Mood and Affect: Mood normal.         Behavior: Behavior normal.         Thought Content:  Thought content normal.         Judgment: Judgment normal.       Fabiana Car, DO

## 2023-08-31 DIAGNOSIS — E11.65 TYPE 2 DIABETES MELLITUS WITH HYPERGLYCEMIA, WITHOUT LONG-TERM CURRENT USE OF INSULIN (HCC): ICD-10-CM

## 2023-09-11 DIAGNOSIS — R60.0 LOCALIZED EDEMA: ICD-10-CM

## 2023-09-11 RX ORDER — FUROSEMIDE 20 MG/1
TABLET ORAL
Qty: 30 TABLET | Refills: 1 | Status: SHIPPED | OUTPATIENT
Start: 2023-09-11

## 2023-09-18 ENCOUNTER — TELEPHONE (OUTPATIENT)
Dept: FAMILY MEDICINE CLINIC | Facility: CLINIC | Age: 63
End: 2023-09-18

## 2023-09-18 DIAGNOSIS — B02.9 HERPES ZOSTER WITHOUT COMPLICATION: ICD-10-CM

## 2023-09-18 DIAGNOSIS — M17.0 PRIMARY OSTEOARTHRITIS OF BOTH KNEES: ICD-10-CM

## 2023-09-18 DIAGNOSIS — M51.36 DISC DEGENERATION, LUMBAR: Primary | ICD-10-CM

## 2023-09-18 RX ORDER — TRAMADOL HYDROCHLORIDE 50 MG/1
50 TABLET ORAL EVERY 8 HOURS PRN
Qty: 30 TABLET | Refills: 0 | Status: SHIPPED | OUTPATIENT
Start: 2023-09-18 | End: 2023-10-19

## 2023-09-18 NOTE — TELEPHONE ENCOUNTER
Received a call from the patient stating pain medication was discussed at her last office visit. She said that the pain is not letting up and wanted to see if she could get something for pain called into her pharmacy. [facility] on [address]. Thanks!

## 2023-09-20 ENCOUNTER — TELEPHONE (OUTPATIENT)
Dept: HEMATOLOGY ONCOLOGY | Facility: CLINIC | Age: 63
End: 2023-09-20

## 2023-09-20 NOTE — TELEPHONE ENCOUNTER
Appointment Change  Cancel, Reschedule, Change to Virtual      Who are you speaking with? Patient   If it is not the patient, is the caller listed on the communication consent form? N/A   Which provider is the appointment scheduled with? KORI Mariano   When was the original appointment scheduled? Please list date and time 9/22/23 11:30am   At which location is the appointment scheduled to take place? [place]   Was the appointment rescheduled? Was the appointment changed from an in person visit to a virtual visit? If so, please list the details of the change. Patient is currently on her 3rd week of having shingles and is having difficulty moving her arm. Patient also has a 2nd mammogram and ultrasound on 9/26/23 and would like to reschedule her follow up appointment with [facility] when she is feeling better and after she has her scans done. Patient stated if there are any questions to give her a stevie at [phone]. What is the reason for the appointment change? Patient not feeling well and has another mammogram and ultrasound on 9/26/22. Was [facility] transport scheduled? No   Does [facility] transport need to be scheduled for the new visit (if applicable) No   Does the patient need an infusion appointment rescheduled? No   Does the patient have an upcoming infusion appointment scheduled? If so, when? No   Is the patient undergoing chemotherapy? No   For appointments cancelled with less than 24 hours:  Was the no-show policy reviewed?  Yes

## 2023-09-24 NOTE — PROGRESS NOTES
[facility] [address] [place] [address]    ASSESSMENT/PLAN: Cecily Davis is a 58 y.o. Lindajean Overcast who presents for annual gynecologic exam.    Encounter for routine gynecologic examination  - Routine well woman exam completed today. - Cervical Cancer Screening: Current ASCCP Guidelines reviewed. Last Pap: 09/25/2023 normal. Next Pap Due: today  - HPV Vaccination status: Not immunized  - STI screening offered including HIV: not indicated based on hx or requested at time of visit  - Breast Cancer Screening: Last Mammogram 08/16/2023, - has follow-up  - Colorectal cancer screening was not ordered. - The following were reviewed in today's visit: breast self exam, mammography screening ordered, adequate intake of calcium and vitamin D, exercise and vaginal health  - RTO 1 yr or PRN    Additional problems addressed at this visit:  1. Encounter for annual routine gynecological examination  -     Liquid-based pap, screening    2. Screening for cervical cancer  -     Ambulatory Referral to Gynecology    3. History of ductal carcinoma in situ (DCIS) of breast    4. Skin lesion    - polypoid lesion- right groin area  - hx of chronic irritation due to location  - will schedule to have removed      CC:  Annual Gynecologic Examination    HPI: Cecily Davis is a 58 y.o. Darlenejean Overcast who presents for annual gynecologic examination. Karsten Bellamy presents today for gyn exam. No vaginal bleeding since onset of menopause. 2019 last pap smear- normal, Hx of abnormal pap smear - no. Sexually active- no. Does/does not desire STI testing. Dx right breast mammogram and ultrasound- scheduled for tomorrow- followed by surgeon, and 1/2023 colonoscopy- repeat 5 yrs. Reports 8 hrs of sleep daily, 1-2 servings of calcium rich foods daily. Exercises : no routine per week. Occasional servings of caffeine daily. Does not perform SBE. Safe at home- yes. Wears seatbelt - yes.   Concerns: has mammogram and ultrasound tomorrow, follows with Dr Shonda Silva. Notes that she has an area on the right side for a few years and is not certain if it is a concern        The following portions of the patient's history were reviewed and updated as appropriate: allergies, current medications, past family history, past medical history, obstetric history, gynecologic history, past social history, past surgical history and problem list.    Review of Systems   Constitutional: Positive for chills. Negative for fatigue and fever. Respiratory: Negative for cough and shortness of breath. Cardiovascular: Negative for chest pain, palpitations and leg swelling. Gastrointestinal: Positive for constipation. Negative for diarrhea. Genitourinary: Negative for difficulty urinating, dysuria, frequency, pelvic pain, urgency, vaginal bleeding, vaginal discharge and vaginal pain. Neurological: Positive for headaches. Negative for light-headedness. Psychiatric/Behavioral: The patient is not nervous/anxious. Objective:  /76   Ht 5' 3.5" (1.613 m)   Wt 120 kg (264 lb)   LMP  (LMP Unknown)   BMI 46.03 kg/m²    Physical Exam  Vitals reviewed. Constitutional:       Appearance: Normal appearance. HENT:      Head: Normocephalic. Neck:      Thyroid: No thyroid mass or thyroid tenderness. Cardiovascular:      Rate and Rhythm: Normal rate and regular rhythm. Heart sounds: Normal heart sounds. Pulmonary:      Effort: Pulmonary effort is normal.      Breath sounds: Normal breath sounds. Chest:   Breasts:     Right: No mass, nipple discharge, skin change or tenderness. Comments: Left breast incisions- without inflammation or irritation. Has to see plastic surgeon for follow-up - scheduled for 10/10/23. Abdominal:      General: There is no distension. Palpations: There is no mass. Tenderness: There is no abdominal tenderness. There is no guarding. Genitourinary:     General: Normal vulva. Exam position: Lithotomy position. Labia:         Right: No tenderness or lesion. Left: No tenderness or lesion. Vagina: No vaginal discharge, tenderness, bleeding or lesions. Cervix: No discharge, lesion, erythema or cervical bleeding. Uterus: Normal. Not enlarged and not tender. Adnexa:         Right: No mass, tenderness or fullness. Left: No mass, tenderness or fullness. Comments: Superficial right polypoid groin lesion on stalk. Bulbous part is 1-1.5 cm in diameter. Areas of irritation noted, falls in line with elastic of underwear for right leg. Musculoskeletal:      Cervical back: Normal range of motion. Lymphadenopathy:      Upper Body:      Right upper body: No axillary adenopathy. Left upper body: No axillary adenopathy. Skin:     General: Skin is warm and dry. Neurological:      Mental Status: She is alert.    Psychiatric:         Mood and Affect: Mood normal.         Behavior: Behavior normal.         Judgment: Judgment normal.             Karin Schmidt CNM  [facility]  09/25/23 11:55 AM

## 2023-09-25 ENCOUNTER — OFFICE VISIT (OUTPATIENT)
Dept: OBGYN CLINIC | Facility: CLINIC | Age: 63
End: 2023-09-25
Payer: COMMERCIAL

## 2023-09-25 VITALS
SYSTOLIC BLOOD PRESSURE: 126 MMHG | DIASTOLIC BLOOD PRESSURE: 76 MMHG | BODY MASS INDEX: 45.07 KG/M2 | WEIGHT: 264 LBS | HEIGHT: 64 IN

## 2023-09-25 DIAGNOSIS — Z01.419 ENCOUNTER FOR ANNUAL ROUTINE GYNECOLOGICAL EXAMINATION: Primary | ICD-10-CM

## 2023-09-25 DIAGNOSIS — Z12.4 SCREENING FOR CERVICAL CANCER: ICD-10-CM

## 2023-09-25 DIAGNOSIS — Z86.000 HISTORY OF DUCTAL CARCINOMA IN SITU (DCIS) OF BREAST: ICD-10-CM

## 2023-09-25 DIAGNOSIS — L98.9 SKIN LESION: ICD-10-CM

## 2023-09-25 PROCEDURE — G0145 SCR C/V CYTO,THINLAYER,RESCR: HCPCS | Performed by: ADVANCED PRACTICE MIDWIFE

## 2023-09-25 PROCEDURE — S0610 ANNUAL GYNECOLOGICAL EXAMINA: HCPCS | Performed by: ADVANCED PRACTICE MIDWIFE

## 2023-09-26 ENCOUNTER — HOSPITAL ENCOUNTER (OUTPATIENT)
Dept: MAMMOGRAPHY | Facility: HOSPITAL | Age: 63
Discharge: HOME | End: 2023-09-26
Payer: COMMERCIAL

## 2023-09-26 ENCOUNTER — HOSPITAL ENCOUNTER (OUTPATIENT)
Dept: ULTRASOUND IMAGING | Facility: HOSPITAL | Age: 63
Discharge: HOME | End: 2023-09-26
Payer: COMMERCIAL

## 2023-09-26 DIAGNOSIS — R92.8 ABNORMAL MAMMOGRAM: ICD-10-CM

## 2023-09-26 PROCEDURE — 77065 DX MAMMO INCL CAD UNI: CPT

## 2023-09-26 PROCEDURE — 76642 ULTRASOUND BREAST LIMITED: CPT

## 2023-09-26 PROCEDURE — G0279 TOMOSYNTHESIS, MAMMO: HCPCS

## 2023-09-29 ENCOUNTER — APPOINTMENT (OUTPATIENT)
Dept: RADIOLOGY | Facility: CLINIC | Age: 63
End: 2023-09-29
Payer: COMMERCIAL

## 2023-09-29 ENCOUNTER — OFFICE VISIT (OUTPATIENT)
Dept: FAMILY MEDICINE CLINIC | Facility: CLINIC | Age: 63
End: 2023-09-29
Payer: COMMERCIAL

## 2023-09-29 VITALS
OXYGEN SATURATION: 100 % | BODY MASS INDEX: 44.39 KG/M2 | WEIGHT: 260 LBS | TEMPERATURE: 98 F | HEIGHT: 64 IN | DIASTOLIC BLOOD PRESSURE: 64 MMHG | SYSTOLIC BLOOD PRESSURE: 141 MMHG | HEART RATE: 78 BPM

## 2023-09-29 DIAGNOSIS — S46.011S TRAUMATIC COMPLETE TEAR OF RIGHT ROTATOR CUFF, SEQUELA: ICD-10-CM

## 2023-09-29 DIAGNOSIS — Z11.59 NEED FOR HEPATITIS C SCREENING TEST: ICD-10-CM

## 2023-09-29 DIAGNOSIS — E11.65 POORLY CONTROLLED TYPE 2 DIABETES MELLITUS WITH PERIPHERAL NEUROPATHY (HCC): ICD-10-CM

## 2023-09-29 DIAGNOSIS — Z11.4 SCREENING FOR HIV (HUMAN IMMUNODEFICIENCY VIRUS): ICD-10-CM

## 2023-09-29 DIAGNOSIS — R29.898 SHOULDER WEAKNESS: Primary | ICD-10-CM

## 2023-09-29 DIAGNOSIS — E11.42 POORLY CONTROLLED TYPE 2 DIABETES MELLITUS WITH PERIPHERAL NEUROPATHY (HCC): ICD-10-CM

## 2023-09-29 DIAGNOSIS — B02.29 POST HERPETIC NEURALGIA: ICD-10-CM

## 2023-09-29 DIAGNOSIS — R29.898 SHOULDER WEAKNESS: ICD-10-CM

## 2023-09-29 PROCEDURE — 99214 OFFICE O/P EST MOD 30 MIN: CPT | Performed by: FAMILY MEDICINE

## 2023-09-29 PROCEDURE — 73030 X-RAY EXAM OF SHOULDER: CPT

## 2023-09-29 RX ORDER — GABAPENTIN 300 MG/1
300 CAPSULE ORAL 3 TIMES DAILY
Qty: 180 CAPSULE | Refills: 3 | Status: SHIPPED | OUTPATIENT
Start: 2023-09-29

## 2023-09-29 RX ORDER — CAPSAICIN 0.75 MG/G
CREAM TOPICAL 3 TIMES DAILY
Qty: 120 G | Refills: 0 | Status: SHIPPED | OUTPATIENT
Start: 2023-09-29

## 2023-09-29 NOTE — PROGRESS NOTES
Family Medicine Follow-Up Office Visit  Carloz Plummer 58 y.o. female   MRN: [id number] : 1960  ENCOUNTER: 2023 12:07 PM    Assessment and Plan   Shoulder weakness  Patient with right shoulder pain and weakness with active range of motion. We will start with right shoulder x-rays for further evaluation. Will likely require more advanced imaging to assess for rotator cuff injury sequela. We will reach out to her plastic surgeon regarding if left breast tissue expander is MRI compatible. Recommend follow-up in 1 month for continued symptoms or any physical.  May follow-up sooner as needed    Post herpetic neuralgia  Patient does have signs and symptoms consistent with postherpetic neuralgia    Plan:  Patient is encouraged to consistently increase gabapentin to 300 mg 3 times daily  mg every morning and 600 mg nightly for improved symptom management especially overnight. Prescription for capsaicin cream is provided for topical relief  Continue with Tylenol as needed and may also continue with as needed tramadol as prescribed by Dr. De La Cruz Rom    Orders for screening for HIV and hepatitis C are added at this time for completion with upcoming routine labs. 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 Patient was seen approximately 1 month ago for episode of shingles and prescribed Valtrex at that time with improvement of symptoms. She notes starting at that same incident she lost mobility in her right arm and has been unable to lift it.   She notes that in 2017 she had an injury where she tore tendons and required rotator cuff surgery and required 50 PT sessions to improve strength. She reports that since the shingles episode she has been unable to use her arm even to lift the milk or dishes and reports that the pain is similar to her prior rotator cuff tear. She does also note itching, numbness, tingling of the right lateral arm. She notes that this does radiate up to her neck. She notes that the pain is relatively well controlled with her evening gabapentin pill at night however she wakes up with the pain early morning and finds it difficult to fall back asleep. Her primary concern is the associated weakness. She denies any recent associated injury. She does note that she sleeps on her right side    Review of Systems   Review of Systems   Constitutional: Negative for fever. HENT: Negative for sore throat. Respiratory: Negative for cough. Cardiovascular: Positive for leg swelling. Musculoskeletal: Positive for arthralgias and myalgias. Skin: Negative for rash. Neurological: Positive for weakness and numbness.        Active Problem List     Patient Active Problem List   Diagnosis   • Anemia   • Benign essential hypertension   • Arthritis   • Mixed diabetic hyperlipidemia associated with type 2 diabetes mellitus (HCC)   • Disc degeneration, lumbar   • Fibrocystic breast disease, unspecified laterality   • GERD without esophagitis   • Hypothyroidism   • IBS (irritable bowel syndrome)   • Neoplasm of uncertain behavior of skin   • MARIA FERNANDA (obstructive sleep apnea)   • Primary osteoarthritis of both knees   • Vitamin D deficiency   • Type 2 diabetes mellitus with hyperglycemia, without long-term current use of insulin (HCC)   • Fatty liver   • Full thickness rotator cuff tear   • Shoulder joint pain   • Family history of breast cancer in female   • Screening mammogram, encounter for   • Lumbar radiculopathy   • History of ductal carcinoma in situ (DCIS) of breast   • BRCA negative   • Chronic kidney disease (CKD) stage G3b/A1, moderately decreased glomerular filtration rate (GFR) between 30-44 mL/min/1.73 square meter and albuminuria creatinine ratio less than 30 mg/g ([facility])   • Class 3 severe obesity due to excess calories with serious comorbidity and body mass index (BMI) of 45.0 to 49.9 in adult [facility])   • Yeast infection involving the vagina and surrounding area   • Encounter for follow-up surveillance of breast cancer   • Shoulder weakness   • Post herpetic neuralgia       Past Medical History, Past Surgical History, Family History, and Social History were reviewed and updated today as appropriate. Objective   /64 (BP Location: Left arm, Patient Position: Sitting, Cuff Size: Standard)   Pulse 78   Temp 98 °F (36.7 °C) (Tympanic)   Ht 5' 4" (1.626 m)   Wt 118 kg (260 lb)   LMP  (LMP Unknown)   SpO2 100%   BMI 44.63 kg/m²     Physical Exam  Constitutional:       General: She is not in acute distress. Appearance: Normal appearance. She is obese. She is not ill-appearing, toxic-appearing or diaphoretic. HENT:      Head: Normocephalic. Mouth/Throat:      Mouth: Mucous membranes are moist.   Eyes:      General: No scleral icterus. Conjunctiva/sclera: Conjunctivae normal.   Neck:      Vascular: No carotid bruit. Cardiovascular:      Rate and Rhythm: Normal rate and regular rhythm. Pulmonary:      Effort: Pulmonary effort is normal.      Breath sounds: Normal breath sounds. Abdominal:      Palpations: Abdomen is soft. Tenderness: There is no abdominal tenderness. Musculoskeletal:      Cervical back: Neck supple. Right lower leg: No edema. Left lower leg: No edema. Comments: Is unable to lift the right arm past 10 to 20 degrees. Decreased range of motion with internal rotation. Patient denies pain with passive range of motion of the right arm. Lymphadenopathy:      Cervical: No cervical adenopathy.    Skin:     General: Skin is warm and dry.   Neurological:      General: No focal deficit present. Mental Status: She is alert and oriented to person, place, and time. Psychiatric:         Mood and Affect: Mood normal.         Behavior: Behavior normal.         Thought Content:  Thought content normal.         Judgment: Judgment normal.           Pertinent Laboratory/Diagnostic Studies:  Lab Results   Component Value Date    GLUCOSE 145 (H) 10/09/2015    BUN 34 (H) 06/24/2023    CREATININE 1.33 (H) 06/24/2023    CALCIUM 9.1 06/24/2023     10/09/2015    K 5.1 06/24/2023    CO2 27 06/24/2023     06/24/2023     Lab Results   Component Value Date    ALT 17 06/24/2023    AST 15 06/24/2023    ALKPHOS 70 06/24/2023    BILITOT 0.48 10/09/2015       Lab Results   Component Value Date    WBC 7.56 06/24/2023    HGB 11.3 (L) 06/24/2023    HCT 36.9 06/24/2023    MCV 93 06/24/2023     06/24/2023       No results found for: "TSH"    Lab Results   Component Value Date    CHOL 198 10/09/2015     Lab Results   Component Value Date    TRIG 243 (H) 10/17/2022     Lab Results   Component Value Date    HDL 34 (L) 10/17/2022     Lab Results   Component Value Date    LDLCALC 77 10/17/2022     Lab Results   Component Value Date    HGBA1C 8.2 (H) 06/24/2023       Results for orders placed or performed in visit on 08/28/23    Diabetes Eye Exam   Result Value Ref Range    Right Eye Diabetic Retinopathy None     Left Eye Diabetic Retinopathy None        Orders Placed This Encounter   Procedures   • XR shoulder 2+ vw right   • Hepatitis C Antibody   • HIV 1/2 AG/AB w Reflex [facility] for 2 yr old and above         Current Medications     Current Outpatient Medications   Medication Sig Dispense Refill   • Acetaminophen 500 MG Take 2 capsules (1,000 mg total) by mouth every 6 (six) hours as needed for mild pain for up to 21 doses 21 capsule 0   • aspirin (ECOTRIN LOW STRENGTH) 81 mg EC tablet Take 1 tablet by mouth daily     • Blood Glucose Monitoring Suppl (ONE TOUCH ULTRA 2) w/Device KIT Use 3 (three) times a day 1 kit 0   • capsicum (ZOSTRIX) 0.075 % topical cream Apply topically 3 (three) times a day 120 g 0   • cholecalciferol (VITAMIN D3) 1,000 units tablet Take 4 tablets (4,000 Units total) by mouth daily 90 tablet 1   • Continuous Blood Gluc  (FreeStyle [place] 2 [place]) WARNER Dispense 1 reader. 1 each 0   • Continuous Blood Gluc Sensor (FreeStyle Sam 2 Sensor) MISC Use 1 sensor every 14 days for glucose monitoring. 6 each 1   • dapagliflozin (Farxiga) 10 MG tablet Take 1 tablet (10 mg total) by mouth daily (Patient taking differently: Take 10 mg by mouth daily at bedtime) 90 tablet 1   • furosemide (LASIX) 20 mg tablet TAKE ONE TABLET BY MOUTH DAILY AS NEEDED FOR SWELLING 30 tablet 1   • gabapentin (NEURONTIN) 300 mg capsule Take 1 capsule (300 mg total) by mouth 3 (three) times a day 180 capsule 3   • glipiZIDE (GLUCOTROL XL) 5 mg 24 hr tablet Take 1 tablet (5 mg total) by mouth daily 90 tablet 1   • glucose blood (OneTouch Ultra) test strip Use as instructed 100 each 3   • Insulin Glargine Solostar (Lantus SoloStar) 100 UNIT/ML SOPN Inject 18 units nightly. 15 mL 2   • Insulin Pen Needle (BD Pen Needle Wendy U/F) 32G X 4 MM MISC Use to inject insulin once daily.  100 each 2   • levothyroxine 100 mcg tablet Take 1 tablet (100 mcg total) by mouth daily 90 tablet 3   • lisinopril (ZESTRIL) 20 mg tablet Take 1 tablet (20 mg total) by mouth daily 90 tablet 2   • ONETOUCH DELICA LANCETS FINE MISC by Does not apply route 2 (two) times a day     • pantoprazole (PROTONIX) 40 mg tablet Take 1 tablet (40 mg total) by mouth daily 90 tablet 3   • polyethylene glycol (GLYCOLAX) 17 GM/SCOOP powder Take by mouth as needed     • rosuvastatin (CRESTOR) 20 MG tablet Take 1 tablet (20 mg total) by mouth daily (Patient taking differently: Take 20 mg by mouth daily at bedtime) 90 tablet 3   • semaglutide, 2 mg/dose, (Ozempic, 2 MG/DOSE,) 8 mg/ mL injection pen Inject 0.75 mL (2 mg total) under the skin every 7 days 3 mL 2   • traMADol (Ultram) 50 mg tablet Take 1 tablet (50 mg total) by mouth every 8 (eight) hours as needed for moderate pain 30 tablet 0   • valACYclovir (VALTREX) 1,000 mg tablet Take 1 tablet (1,000 mg total) by mouth 3 (three) times a day for 7 days (Patient not taking: Reported on 9/25/2023) 21 tablet 0     No current facility-administered medications for this visit.        ALLERGIES:  No Known Allergies    Health Maintenance     Health Maintenance   Topic Date Due   • Hepatitis C Screening  Never done   • HIV Screening  Never done   • DTaP,Tdap,and Td Vaccines (1 - Tdap) 07/07/2005   • Pneumococcal Vaccine: Pediatrics (0 to 5 Years) and At-Risk Patients (6 to 59 Years) (2 - PCV) 06/20/2011   • COVID-19 Vaccine (3 - Pfizer series) 06/14/2021   • Annual Physical  08/27/2021   • Cervical Cancer Screening  08/13/2022   • Influenza Vaccine (1) 09/01/2023   • HEMOGLOBIN A1C  09/24/2023   • Diabetic Foot Exam  10/31/2023   • Depression Screening  02/20/2024   • BMI: Followup Plan  02/20/2024   • Kidney Health Evaluation: Albumin/Creatinine Ratio  06/19/2024   • Kidney Health Evaluation: GFR  06/24/2024   • Breast Cancer Screening: Mammogram  09/26/2024   • BMI: Adult  09/29/2024   • DM Eye Exam  08/28/2025   • Colorectal Cancer Screening  01/30/2026   • HIB Vaccine  Aged Out   • IPV Vaccine  Aged Out   • Hepatitis A Vaccine  Aged Out   • Meningococcal ACWY Vaccine  Aged Out   • HPV Vaccine  Aged Out     Immunization History   Administered Date(s) Administered   • COVID-19 PFIZER VACCINE 0.3 ML IM 03/29/2021, 04/19/2021   • INFLUENZA 12/30/2015, 10/31/2016, 10/31/2016, 09/21/2017, 10/10/2017, 09/05/2018, 09/06/2018, 09/06/2018, 11/21/2019, 11/26/2019, 10/07/2020, 11/18/2022   • Influenza Quadrivalent, 6-35 Months IM 09/08/2017   • Influenza Recombinant Preservative Free Im 10/21/2020   • Influenza, recombinant, quadrivalent,injectable, preservative free 09/06/2018, 11/21/2019 • Influenza, seasonal, injectable 09/27/2013, 12/30/2015, 10/30/2016, 10/10/2017, 09/05/2018, 09/06/2018, 11/21/2019, 11/26/2019, 10/07/2020   • Pneumococcal Polysaccharide PPV23 06/20/2010   • Td (adult), adsorbed 07/06/2005         Alix Elliott, DO   [address]  9/29/2023  12:07 PM    Parts of this note were dictated using M*Modal dictation software and may have sounds-like errors due to variation in pronunciation.

## 2023-09-29 NOTE — ASSESSMENT & PLAN NOTE
Patient with right shoulder pain and weakness with active range of motion. We will start with right shoulder x-rays for further evaluation. Will likely require more advanced imaging to assess for rotator cuff injury sequela. We will reach out to her plastic surgeon regarding if left breast tissue expander is MRI compatible.      Recommend follow-up in 1 month for continued symptoms or any physical.  May follow-up sooner as needed

## 2023-09-29 NOTE — ASSESSMENT & PLAN NOTE
Patient does have signs and symptoms consistent with postherpetic neuralgia    Plan:  · Patient is encouraged to consistently increase gabapentin to 300 mg 3 times daily  mg every morning and 600 mg nightly for improved symptom management especially overnight.   · Prescription for capsaicin cream is provided for topical relief  · Continue with Tylenol as needed and may also continue with as needed tramadol as prescribed by Dr. Sherine Conteh

## 2023-10-03 LAB
LAB AP GYN PRIMARY INTERPRETATION: NORMAL
Lab: NORMAL

## 2023-10-06 DIAGNOSIS — E11.22 TYPE 2 DIABETES MELLITUS WITH STAGE 3B CHRONIC KIDNEY DISEASE, WITHOUT LONG-TERM CURRENT USE OF INSULIN (HCC): ICD-10-CM

## 2023-10-06 DIAGNOSIS — N18.32 TYPE 2 DIABETES MELLITUS WITH STAGE 3B CHRONIC KIDNEY DISEASE, WITHOUT LONG-TERM CURRENT USE OF INSULIN (HCC): ICD-10-CM

## 2023-10-06 PROCEDURE — 3066F NEPHROPATHY DOC TX: CPT | Performed by: INTERNAL MEDICINE

## 2023-10-06 RX ORDER — GLIPIZIDE 5 MG/1
5 TABLET, FILM COATED, EXTENDED RELEASE ORAL DAILY
Qty: 90 TABLET | Refills: 1 | Status: SHIPPED | OUTPATIENT
Start: 2023-10-06

## 2023-10-09 ENCOUNTER — TELEPHONE (OUTPATIENT)
Dept: FAMILY MEDICINE CLINIC | Facility: CLINIC | Age: 63
End: 2023-10-09

## 2023-10-09 DIAGNOSIS — S46.011S TRAUMATIC COMPLETE TEAR OF RIGHT ROTATOR CUFF, SEQUELA: Primary | ICD-10-CM

## 2023-10-09 NOTE — TELEPHONE ENCOUNTER
Received a call from the patient regarding her XR results. She, also, was asking what the next step(s) would be, as she is in pain. Thanks!

## 2023-10-12 ENCOUNTER — RA CDI HCC (OUTPATIENT)
Dept: OTHER | Facility: HOSPITAL | Age: 63
End: 2023-10-12

## 2023-10-12 NOTE — PROGRESS NOTES
[address] [facility] coding opportunities          Chart Reviewed number of suggestions sent to Provider: 1   Z79.4    Patients Insurance        Commercial Insurance: Martín Van

## 2023-10-17 ENCOUNTER — TELEPHONE (OUTPATIENT)
Dept: NEPHROLOGY | Facility: CLINIC | Age: 63
End: 2023-10-17

## 2023-10-19 ENCOUNTER — OFFICE VISIT (OUTPATIENT)
Dept: FAMILY MEDICINE CLINIC | Facility: CLINIC | Age: 63
End: 2023-10-19
Payer: COMMERCIAL

## 2023-10-19 VITALS
TEMPERATURE: 98.3 F | DIASTOLIC BLOOD PRESSURE: 68 MMHG | HEIGHT: 64 IN | WEIGHT: 261 LBS | HEART RATE: 80 BPM | OXYGEN SATURATION: 100 % | SYSTOLIC BLOOD PRESSURE: 145 MMHG | RESPIRATION RATE: 16 BRPM | BODY MASS INDEX: 44.56 KG/M2

## 2023-10-19 DIAGNOSIS — B02.29 POST HERPETIC NEURALGIA: ICD-10-CM

## 2023-10-19 DIAGNOSIS — M25.511 PAIN IN JOINT OF RIGHT SHOULDER: Primary | ICD-10-CM

## 2023-10-19 DIAGNOSIS — R29.898 SHOULDER WEAKNESS: ICD-10-CM

## 2023-10-19 PROCEDURE — 99213 OFFICE O/P EST LOW 20 MIN: CPT | Performed by: FAMILY MEDICINE

## 2023-10-19 NOTE — PROGRESS NOTES
Assessment/Plan:  Patient is being referred to Dr. Miracle Hutson at [facility] orthopedics for further evaluation and treatment. Patient instructed to increase gabapentin 300 mg to 3 times daily. She may continue Tylenol extra strength or Tylenol arthritis as needed. Discussed physical therapy evaluation and treatment although will hold off until orthopedic evaluation completed. Return to the office as scheduled or call sooner as needed. No problem-specific Assessment & Plan notes found for this encounter. Diagnoses and all orders for this visit:    Pain in joint of right shoulder  -     Ambulatory Referral to Orthopedic Surgery; Future    Shoulder weakness  -     Ambulatory Referral to Orthopedic Surgery; Future    Post herpetic neuralgia          Subjective:      Patient ID: Kristian Frias is a 58 y.o. female. Patient is here for follow-up appointment for right shoulder pain. Patient had episode of shingles over the right shoulder 2 months ago treated with Valtrex for 7 days and gabapentin. Patient was seen 3 weeks ago with increasing shoulder pain and weakness and reduced range of motion and was sent for an x-ray of her right shoulder which showed mild osteoarthritis. Patient denies any specific injury or fall. She is status post right shoulder rotator cuff surgery several years ago with Dr. Katy Sprague at [facility]. Patient is currently unable to have an MRI secondary to tissue expander in her breast.  Patient tried tramadol for pain without significant relief and caused constipation and therefore discontinued it. She takes Tylenol as needed. Shoulder Pain   The pain is present in the right shoulder. This is a new problem. The current episode started more than 1 month ago. The problem occurs constantly. The problem has been gradually improving. The quality of the pain is described as aching, burning and sharp. Associated symptoms include joint swelling, a limited range of motion, stiffness and tingling. Pertinent negatives include no inability to bear weight, joint locking or numbness. The symptoms are aggravated by activity. She has tried oral narcotics, cold and acetaminophen for the symptoms. The treatment provided mild relief. The following portions of the patient's history were reviewed and updated as appropriate: allergies, current medications, past family history, past medical history, past social history, past surgical history, and problem list.    Review of Systems   Musculoskeletal:  Positive for stiffness. Neurological:  Positive for tingling. Negative for numbness. Objective:      /68 (BP Location: Right arm, Patient Position: Sitting, Cuff Size: Large)   Pulse 80   Temp 98.3 °F (36.8 °C) (Tympanic)   Resp 16   Ht 5' 4" (1.626 m)   Wt 118 kg (261 lb)   LMP  (LMP Unknown)   SpO2 100%   BMI 44.80 kg/m²          Physical Exam  Constitutional:       General: She is not in acute distress. Appearance: Normal appearance. She is obese. HENT:      Head: Normocephalic. Mouth/Throat:      Mouth: Mucous membranes are moist.   Eyes:      General: No scleral icterus. Conjunctiva/sclera: Conjunctivae normal.   Neck:      Comments: Right cervical paravertebral and trapezius muscle tenderness. Cardiovascular:      Rate and Rhythm: Normal rate and regular rhythm. Pulmonary:      Effort: Pulmonary effort is normal.      Breath sounds: Normal breath sounds. Abdominal:      Palpations: Abdomen is soft. Tenderness: There is no abdominal tenderness. Musculoskeletal:         General: Tenderness present. Cervical back: Normal range of motion and neck supple. Right lower leg: No edema. Left lower leg: No edema. Comments: Right shoulder with significantly decreased range of motion, tenderness and weakness. Lymphadenopathy:      Cervical: No cervical adenopathy. Skin:     General: Skin is warm and dry. Findings: Rash present.       Comments: Scabbed over healing shingles rash right deltoid area. Neurological:      General: No focal deficit present. Mental Status: She is alert and oriented to person, place, and time. Psychiatric:         Mood and Affect: Mood normal.         Behavior: Behavior normal.         Thought Content:  Thought content normal.         Judgment: Judgment normal.

## 2023-10-23 ENCOUNTER — APPOINTMENT (OUTPATIENT)
Dept: LAB | Facility: CLINIC | Age: 63
End: 2023-10-23
Payer: COMMERCIAL

## 2023-10-23 DIAGNOSIS — N18.30 STAGE 3 CHRONIC KIDNEY DISEASE, UNSPECIFIED WHETHER STAGE 3A OR 3B CKD (HCC): ICD-10-CM

## 2023-10-23 DIAGNOSIS — E11.22 TYPE 2 DIABETES MELLITUS WITH STAGE 3B CHRONIC KIDNEY DISEASE, WITHOUT LONG-TERM CURRENT USE OF INSULIN (HCC): ICD-10-CM

## 2023-10-23 DIAGNOSIS — N18.32 TYPE 2 DIABETES MELLITUS WITH STAGE 3B CHRONIC KIDNEY DISEASE, WITHOUT LONG-TERM CURRENT USE OF INSULIN (HCC): ICD-10-CM

## 2023-10-23 LAB
ALBUMIN SERPL BCP-MCNC: 3.8 G/DL (ref 3.5–5)
ALP SERPL-CCNC: 55 U/L (ref 34–104)
ALT SERPL W P-5'-P-CCNC: 10 U/L (ref 7–52)
ANION GAP SERPL CALCULATED.3IONS-SCNC: 6 MMOL/L
AST SERPL W P-5'-P-CCNC: 13 U/L (ref 13–39)
BACTERIA UR QL AUTO: ABNORMAL /HPF
BILIRUB SERPL-MCNC: 0.46 MG/DL (ref 0.2–1)
BILIRUB UR QL STRIP: NEGATIVE
BUN SERPL-MCNC: 22 MG/DL (ref 5–25)
CALCIUM SERPL-MCNC: 9.2 MG/DL (ref 8.4–10.2)
CHLORIDE SERPL-SCNC: 106 MMOL/L (ref 96–108)
CHOLEST SERPL-MCNC: 169 MG/DL
CLARITY UR: CLEAR
CO2 SERPL-SCNC: 28 MMOL/L (ref 21–32)
COLOR UR: ABNORMAL
CREAT SERPL-MCNC: 1.17 MG/DL (ref 0.6–1.3)
CREAT UR-MCNC: 80.8 MG/DL
EST. AVERAGE GLUCOSE BLD GHB EST-MCNC: 177 MG/DL
GFR SERPL CREATININE-BSD FRML MDRD: 50 ML/MIN/1.73SQ M
GLUCOSE P FAST SERPL-MCNC: 135 MG/DL (ref 65–99)
GLUCOSE UR STRIP-MCNC: ABNORMAL MG/DL
HBA1C MFR BLD: 7.8 %
HDLC SERPL-MCNC: 36 MG/DL
HGB UR QL STRIP.AUTO: NEGATIVE
KETONES UR STRIP-MCNC: NEGATIVE MG/DL
LDLC SERPL CALC-MCNC: 83 MG/DL (ref 0–100)
LEUKOCYTE ESTERASE UR QL STRIP: ABNORMAL
MAGNESIUM SERPL-MCNC: 1.7 MG/DL (ref 1.9–2.7)
MICROALBUMIN UR-MCNC: 10 MG/L
MICROALBUMIN/CREAT 24H UR: 12 MG/G CREATININE (ref 0–30)
NITRITE UR QL STRIP: NEGATIVE
NON-SQ EPI CELLS URNS QL MICRO: ABNORMAL /HPF
PH UR STRIP.AUTO: 5.5 [PH]
POTASSIUM SERPL-SCNC: 4.4 MMOL/L (ref 3.5–5.3)
PROT SERPL-MCNC: 7 G/DL (ref 6.4–8.4)
PROT UR STRIP-MCNC: NEGATIVE MG/DL
RBC #/AREA URNS AUTO: ABNORMAL /HPF
SODIUM SERPL-SCNC: 140 MMOL/L (ref 135–147)
SP GR UR STRIP.AUTO: 1.02 (ref 1–1.03)
TRIGL SERPL-MCNC: 250 MG/DL
URATE SERPL-MCNC: 5.9 MG/DL (ref 2–7.5)
UROBILINOGEN UR STRIP-ACNC: <2 MG/DL
WBC #/AREA URNS AUTO: ABNORMAL /HPF

## 2023-10-23 PROCEDURE — 3061F NEG MICROALBUMINURIA REV: CPT | Performed by: INTERNAL MEDICINE

## 2023-10-23 PROCEDURE — 83735 ASSAY OF MAGNESIUM: CPT

## 2023-10-23 PROCEDURE — 82570 ASSAY OF URINE CREATININE: CPT

## 2023-10-23 PROCEDURE — 82043 UR ALBUMIN QUANTITATIVE: CPT

## 2023-10-23 PROCEDURE — 83036 HEMOGLOBIN GLYCOSYLATED A1C: CPT

## 2023-10-23 PROCEDURE — 81001 URINALYSIS AUTO W/SCOPE: CPT

## 2023-10-23 PROCEDURE — 80053 COMPREHEN METABOLIC PANEL: CPT

## 2023-10-23 PROCEDURE — 80061 LIPID PANEL: CPT

## 2023-10-23 PROCEDURE — 36415 COLL VENOUS BLD VENIPUNCTURE: CPT

## 2023-10-23 PROCEDURE — 84550 ASSAY OF BLOOD/URIC ACID: CPT

## 2023-10-26 ENCOUNTER — OFFICE VISIT (OUTPATIENT)
Dept: NEPHROLOGY | Facility: CLINIC | Age: 63
End: 2023-10-26
Payer: COMMERCIAL

## 2023-10-26 VITALS
SYSTOLIC BLOOD PRESSURE: 128 MMHG | OXYGEN SATURATION: 97 % | DIASTOLIC BLOOD PRESSURE: 86 MMHG | BODY MASS INDEX: 44.49 KG/M2 | HEIGHT: 64 IN | HEART RATE: 82 BPM | WEIGHT: 260.6 LBS

## 2023-10-26 DIAGNOSIS — N18.30 TYPE 2 DIABETES MELLITUS WITH STAGE 3 CHRONIC KIDNEY DISEASE, WITHOUT LONG-TERM CURRENT USE OF INSULIN, UNSPECIFIED WHETHER STAGE 3A OR 3B CKD (HCC): ICD-10-CM

## 2023-10-26 DIAGNOSIS — N18.30 STAGE 3 CHRONIC KIDNEY DISEASE, UNSPECIFIED WHETHER STAGE 3A OR 3B CKD (HCC): ICD-10-CM

## 2023-10-26 DIAGNOSIS — E11.22 TYPE 2 DIABETES MELLITUS WITH STAGE 3 CHRONIC KIDNEY DISEASE, WITHOUT LONG-TERM CURRENT USE OF INSULIN, UNSPECIFIED WHETHER STAGE 3A OR 3B CKD (HCC): ICD-10-CM

## 2023-10-26 DIAGNOSIS — I10 ESSENTIAL HYPERTENSION: ICD-10-CM

## 2023-10-26 DIAGNOSIS — E83.42 HYPOMAGNESEMIA: Primary | ICD-10-CM

## 2023-10-26 PROCEDURE — 99214 OFFICE O/P EST MOD 30 MIN: CPT | Performed by: INTERNAL MEDICINE

## 2023-10-26 RX ORDER — MAGNESIUM 200 MG
200 TABLET ORAL DAILY
Qty: 30 TABLET | Refills: 2 | Status: SHIPPED | OUTPATIENT
Start: 2023-10-26

## 2023-10-26 NOTE — PROGRESS NOTES
Assessment & Plan:    1. Hypomagnesemia  -     Magnesium 200 MG TABS; Take 1 tablet (200 mg total) by mouth in the morning  -     Urinalysis with microscopic; Future; Expected date: 04/17/2024  -     Uric acid; Future; Expected date: 04/17/2024  -     PTH, intact; Future; Expected date: 04/17/2024  -     Albumin / creatinine urine ratio; Future; Expected date: 04/17/2024  -     Comprehensive metabolic panel; Future; Expected date: 04/17/2024  -     Magnesium; Future; Expected date: 04/17/2024  -     CBC and differential; Future; Expected date: 04/17/2024  -     Phosphorus; Future; Expected date: 04/17/2024    2. Stage 3 chronic kidney disease, unspecified whether stage 3a or 3b CKD ([facility])  -     Urinalysis with microscopic; Future; Expected date: 04/17/2024  -     Uric acid; Future; Expected date: 04/17/2024  -     PTH, intact; Future; Expected date: 04/17/2024  -     Albumin / creatinine urine ratio; Future; Expected date: 04/17/2024  -     Comprehensive metabolic panel; Future; Expected date: 04/17/2024  -     Magnesium; Future; Expected date: 04/17/2024  -     CBC and differential; Future; Expected date: 04/17/2024  -     Phosphorus; Future; Expected date: 04/17/2024    3. Type 2 diabetes mellitus with stage 3 chronic kidney disease, without long-term current use of insulin, unspecified whether stage 3a or 3b CKD ([address])    4. Essential hypertension       1.CKD3a A1  Volume status appear compensated, she has chronic lymphedema. Etiology suspected due to HTN /vascular nephrosclerosis/DM/ obesity related hyper filtration. Cr 1.17 with eGFR 50ml/min. Recent baseline 1.1-1.2mg/dL. Metabolic stable. Alb/cr ratio 12 mg/g cr. No s/s UTI. Bp/proteinuria/TCO2 at goal.    Recommend:  Reviewed CKD stages, Cr and eGFR trends. Continue current antihypertensive regimen. Continue A1C management per primary. Continue lisinopril and Delmus Nancy for CKD progression. Follow up in 6 months with labs prior.     2. Hypomagnesemia,mild,asymptomatic. Mag 1.7. Start magnesium oxide 200mg/day. Reviewed side effect profile. Goal Mag 2.0.    3. Type 2 DM  Continue Farxiga, ozempic, insulin and glipizde. A1C 7.8. goal<7. Continue management per primary. 4. Essential HTN  Volume status appears compensated. BP goal <130/80. Continue lisinopril 20mg/day. Continue lasix as needed. The benefits, risks and alternatives to the treatment plan were discussed at this visit. Patient was advised of common adverse effects of any medical therapies prescribed. All questions were answered and discussed with the patient and any accompanying family members or caretakers. Subjective:      Patient ID: Tahir Butterfield is a 61 y.o. female [facility]. Patient last seen on 6/22/23 for CKD3. She was advised she can take lasix 20mg 2-3 times weekly as needed. HPI    In the interim since last visit, reports Shingles diagnosis in [place] on right shoulder/outer arm and was on valtrex x 1 week. She was on gabapentin prior to this and was not taking consistetnly previously. Her PCP did increase dose. MRI shoulder ordered but cannot do because of breast tissue expander. She meets with plastic surgery on Nov 1 for potential removal.    DM control appears reasonable, A1C 7.8.  -140s. She was surprised after losing weight and improving her diet that A1C was not better controlled. Since shingles, lost 15 lb. Takes lasix as needed -2 times a month. Does not check BP at home. BP appears well controlled. The following portions of the patient's history were reviewed and updated as appropriate: allergies, current medications, past family history, past medical history, past social history, past surgical history, and problem list.    Review of Systems   Respiratory: Negative. Cardiovascular: Negative. Genitourinary: Negative. Musculoskeletal:  Positive for arthralgias.    Neurological: Positive for weakness. All other systems reviewed and are negative. Objective:      /86 (BP Location: Right arm, Patient Position: Sitting)   Pulse 82   Ht 5' 4" (1.626 m)   Wt 118 kg (260 lb 9.6 oz)   LMP  (LMP Unknown)   SpO2 97%   BMI 44.73 kg/m²          Physical Exam  Vitals reviewed. Constitutional:       General: She is not in acute distress. Appearance: She is obese. She is not ill-appearing. HENT:      Head: Atraumatic. Nose: Nose normal.      Mouth/Throat:      Mouth: Mucous membranes are moist.      Pharynx: Oropharynx is clear. Eyes:      Extraocular Movements: Extraocular movements intact. Conjunctiva/sclera: Conjunctivae normal.   Cardiovascular:      Rate and Rhythm: Normal rate and regular rhythm. Heart sounds: Normal heart sounds. No friction rub. Pulmonary:      Breath sounds: No wheezing, rhonchi or rales. Abdominal:      Palpations: Abdomen is soft. Tenderness: There is no abdominal tenderness. There is no guarding. Musculoskeletal:      Right lower leg: Edema present. Left lower leg: Edema present. Comments: Lymphedema BL LE   Decreased ROM to right arm, weakness noted   Skin:     Coloration: Skin is not jaundiced. Findings: No bruising or rash. Neurological:      General: No focal deficit present. Mental Status: She is alert and oriented to person, place, and time. Mental status is at baseline. Cranial Nerves: No cranial nerve deficit.    Psychiatric:         Mood and Affect: Mood normal.         Behavior: Behavior normal.             Lab Results   Component Value Date     10/09/2015    SODIUM 140 10/23/2023    K 4.4 10/23/2023     10/23/2023    CO2 28 10/23/2023    ANIONGAP 6 10/09/2015    AGAP 6 10/23/2023    BUN 22 10/23/2023    CREATININE 1.17 10/23/2023    GLUC 145 (H) 07/22/2022    GLUF 135 (H) 10/23/2023    CALCIUM 9.2 10/23/2023    AST 13 10/23/2023    ALT 10 10/23/2023    ALKPHOS 55 10/23/2023    PROT 7.2 10/09/2015    TP 7.0 10/23/2023    BILITOT 0.48 10/09/2015    TBILI 0.46 10/23/2023    EGFR 50 10/23/2023      Lab Results   Component Value Date    CREATININE 1.17 10/23/2023    CREATININE 1.33 (H) 06/24/2023    CREATININE 1.43 (H) 06/19/2023    CREATININE 1.19 02/21/2023    CREATININE 1.13 01/23/2023    CREATININE 1.51 (H) 10/17/2022    CREATININE 1.52 (H) 07/22/2022    CREATININE 1.68 (H) 07/05/2022    CREATININE 1.17 02/09/2022    CREATININE 1.43 (H) 01/13/2022    CREATININE 1.22 12/23/2019    CREATININE 1.19 08/16/2019    CREATININE 1.23 12/17/2018    CREATININE 1.22 09/06/2018    CREATININE 1.28 (H) 07/11/2018      Lab Results   Component Value Date    COLORU Light Yellow 10/23/2023    CLARITYU Clear 10/23/2023    SPECGRAV 1.019 10/23/2023    PHUR 5.5 10/23/2023    LEUKOCYTESUR Moderate (A) 10/23/2023    NITRITE Negative 10/23/2023    PROTEIN UA Negative 10/23/2023    GLUCOSEU >=1000 (1%) (A) 10/23/2023    KETONESU Negative 10/23/2023    UROBILINOGEN <2.0 10/23/2023    BILIRUBINUR Negative 10/23/2023    BLOODU Negative 10/23/2023    RBCUA 2-4 (A) 10/23/2023    WBCUA 20-30 (A) 10/23/2023    EPIS Occasional 10/23/2023    BACTERIA None Seen 10/23/2023      No results found for: "LABPROT"  No results found for: "MICROALBUR", "[id number]"  Lab Results   Component Value Date    WBC 7.56 06/24/2023    HGB 11.3 (L) 06/24/2023    HCT 36.9 06/24/2023    MCV 93 06/24/2023     06/24/2023      Lab Results   Component Value Date    HGB 11.3 (L) 06/24/2023    HGB 10.5 (L) 01/23/2023    HGB 10.1 (L) 04/15/2022    HGB 9.4 (L) 02/09/2022    HGB 11.1 (L) 01/13/2022      No results found for: "IRON", "TIBC", "FERRITIN"   No results found for: "PTHCALCIUM", "[id number]", "PHOSPHORUS"   Lab Results   Component Value Date    CHOLESTEROL 169 10/23/2023    HDL 36 (L) 10/23/2023    LDLCALC 83 10/23/2023    TRIG 250 (H) 10/23/2023      Lab Results   Component Value Date    URICACID 5.9 10/23/2023      Lab Results   Component Value Date    HGBA1C 7.8 (H) 10/23/2023      Lab Results   Component Value Date    FREET4 1.11 07/05/2022      Lab Results   Component Value Date    BILL Negative 06/19/2023      Lab Results   Component Value Date    PROT 7.2 10/09/2015        Portions of the record may have been created with voice recognition software. Occasional wrong word or "sound a like" substitutions may have occurred due to the inherent limitations of voice recognition software. Read the chart carefully and recognize, using context, where substitutions have occurred. If you have any questions, please contact the dictating provider.

## 2023-10-26 NOTE — PATIENT INSTRUCTIONS
Your magnesium is slightly low, recommend taking a once a day supplement. Magnesium comes in many preparation which we discussed. Will prescribe you magnesium oxide once a day. If you do not tolerate due to gi issues, other over the counter formulation like citrate,lactate, chloride may be easier on the stomach. No changes to medications.    Your kidney function is stable at 50%

## 2023-10-31 ENCOUNTER — VBI (OUTPATIENT)
Dept: ADMINISTRATIVE | Facility: OTHER | Age: 63
End: 2023-10-31

## 2023-11-01 ENCOUNTER — OFFICE VISIT (OUTPATIENT)
Dept: PLASTIC SURGERY | Facility: CLINIC | Age: 63
End: 2023-11-01
Payer: COMMERCIAL

## 2023-11-01 DIAGNOSIS — I10 ESSENTIAL HYPERTENSION: ICD-10-CM

## 2023-11-01 DIAGNOSIS — Z71.89 ENCOUNTER TO DISCUSS BREAST RECONSTRUCTION: Primary | ICD-10-CM

## 2023-11-01 PROCEDURE — 99214 OFFICE O/P EST MOD 30 MIN: CPT | Performed by: SURGERY

## 2023-11-01 RX ORDER — LISINOPRIL 20 MG/1
20 TABLET ORAL DAILY
Qty: 90 TABLET | Refills: 2 | Status: SHIPPED | OUTPATIENT
Start: 2023-11-01

## 2023-11-01 NOTE — PROGRESS NOTES
Assessment/Plan: Please see HPI. We again discussed/implant exchange, how the procedure will be performed, and especially focused on the increased risk given her poor glycemic control with recent hemoglobin A1c of 7.8. We mutually agreed to have her defer surgery until she is able to maintain better glycemic control, she will follow-up with her endocrinologist in house to initiate a food log and concentrate on lowering her hemoglobin A1c prior to her next visit which will be in [address] [place] [address]. There are no diagnoses linked to this encounter. Subjective: Breast reconstruction     Patient ID: Marc Broussard is a 61 y.o. female. HPI Remigio Melendez presents in follow-up, she is status post immediate left breast reconstruction with tissue expander and de-epithelialized flap, (February 8, 2022) she currently has 740 cc and a 650 cc expander with 13 cm base diameter and 8.2 cm of projection. Recent hemoglobin A1c (10/23/2023) was 7.8. She is recovering from shingles. The following portions of the patient's history were reviewed and updated as appropriate: allergies, current medications, past family history, past medical history, past social history, past surgical history, and problem list.    Review of Systems   Constitutional:  Negative for chills and fever. HENT:  Negative for hearing loss. Eyes:  Positive for visual disturbance. Negative for discharge. Respiratory:  Negative for chest tightness and shortness of breath. Cardiovascular:  Positive for leg swelling. Negative for chest pain. Recently started on Lasix   Gastrointestinal:  Positive for constipation. Negative for blood in stool, diarrhea and nausea. Genitourinary:  Negative for dysuria. Musculoskeletal:  Negative for gait problem. Skin:  Negative for rash. Allergic/Immunologic: Negative for immunocompromised state. Neurological:  Negative for seizures and headaches. Hematological:  Does not bruise/bleed easily. Psychiatric/Behavioral:  Negative for dysphoric mood. The patient is nervous/anxious. Objective:      LMP  (LMP Unknown)          Physical Exam  HENT:      Head: Normocephalic and atraumatic. Eyes:      Extraocular Movements: Extraocular movements intact. Pupils: Pupils are equal, round, and reactive to light. Cardiovascular:      Rate and Rhythm: Normal rate. Pulmonary:      Effort: Pulmonary effort is normal.   Abdominal:      Palpations: Abdomen is soft. Musculoskeletal:         General: Normal range of motion. Cervical back: Normal range of motion and neck supple. Skin:     General: Skin is warm. Comments: Left breast with tissue expander in good position, no signs of unusual inflammation or infection, see photos in media   Neurological:      Mental Status: She is alert and oriented to person, place, and time.    Psychiatric:         Mood and Affect: Mood normal.

## 2023-11-07 DIAGNOSIS — E78.5 HYPERLIPIDEMIA, UNSPECIFIED HYPERLIPIDEMIA TYPE: ICD-10-CM

## 2023-11-08 RX ORDER — ROSUVASTATIN CALCIUM 20 MG/1
20 TABLET, COATED ORAL DAILY
Qty: 90 TABLET | Refills: 3 | Status: SHIPPED | OUTPATIENT
Start: 2023-11-08

## 2023-11-08 NOTE — PROGRESS NOTES
Established Patient Progress Note      Chief Complaint   Patient presents with    Diabetes Type 2        Impression & Plan:    Problem List Items Addressed This Visit          Endocrine    Mixed diabetic hyperlipidemia associated with type 2 diabetes mellitus      Stable. Continue statin. Lab Results   Component Value Date    HGBA1C 7.8 (H) 10/23/2023          Relevant Medications    dapagliflozin (Farxiga) 10 MG tablet    Insulin Glargine Solostar (Lantus SoloStar) 100 UNIT/ML SOPN    Hypothyroidism     Continue 100 mcg of levothyroxine daily. Check TSH with reflex to T4 prior to next appointment. Relevant Orders    TSH, 3rd generation with Free T4 reflex    Type 2 diabetes mellitus with hyperglycemia, without long-term current use of insulin ([facility]) - Primary     Control is improving. Increase Lantus to 24 units nightly. Continue remainder of regimen. Continue to focus on healthy diet. Increase physical activity. Referral given for diabetes education to learn how to use her Lyfepointsay. Patient is to notify me once she has started using this so that I can review the data. We discussed potentially switching glipizide to nateglinide prior to meals depending on the data. A1C goal is 6.9% or less. F/U in 3 months. Lab Results   Component Value Date    HGBA1C 7.8 (H) 10/23/2023          Relevant Medications    dapagliflozin (Farxiga) 10 MG tablet    Insulin Glargine Solostar (Lantus SoloStar) 100 UNIT/ML SOPN    Other Relevant Orders    Ambulatory referral to Diabetic Education    Hemoglobin A5S    Basic metabolic panel       Cardiovascular and Mediastinum    Benign essential hypertension     Well-controlled with blood pressure of 128/62. Continue current regimen.             Genitourinary    Chronic kidney disease (CKD) stage G3b/A1, moderately decreased glomerular filtration rate (GFR) between 30-44 mL/min/1.73 square meter and albuminuria creatinine ratio less than 30 mg/g ([facility])     Lab Results Component Value Date    EGFR 50 10/23/2023    EGFR 42 06/24/2023    EGFR 39 06/19/2023    CREATININE 1.17 10/23/2023    CREATININE 1.33 (H) 06/24/2023    CREATININE 1.43 (H) 06/19/2023   Stable. Following with nephrology. Continue Farxiga and lisinopril. Continue to optimize glycemic control. Other    Class 3 severe obesity due to excess calories with serious comorbidity and body mass index (BMI) of 45.0 to 49.9 in adult [facility])     Patient has lost approximately 15 pounds since starting 2 mg of Ozempic. Continue 2 mg of Ozempic once weekly. Reviewed the importance of healthy diet and exercise. Orders Placed This Encounter   Procedures    Hemoglobin A1C     Standing Status:   Future     Standing Expiration Date:   89/2/7455    Basic metabolic panel     This is a patient instruction: Patient fasting for 8 hours or longer recommended. Standing Status:   Future     Standing Expiration Date:   11/9/2024    TSH, 3rd generation with Free T4 reflex     Standing Status:   Future     Standing Expiration Date:   11/9/2024    Ambulatory referral to Diabetic Education     Standing Status:   Future     Standing Expiration Date:   11/9/2024     Referral Priority:   Routine     Referral Type:   Consult - AMB     Referral Reason:   Specialty Services Required     Requested Specialty:   Diabetes Services     Number of Visits Requested:   1     Expiration Date:   11/9/2024       History of Present Illness:   Skyla Cardona is a 61 y.o. female with HTN, HLD, hypothyroidism, and type 2 diabetes without long term use of insulin since approximately 2010 presenting for routine follow up. Reports complications of neuropathy. Denies recent illness or hospitalizations. Denies recent severe hypoglycemic or severe hyperglycemic episodes.  Denies any issues with her current regimen. home glucose monitoring: are performed regularly-once daily in the morning     + family history of T2DM in mother and maternal grandmother. At patient's last appointment, she was increasing her Ozempic to 1 mg once weekly. Since then, we have increased again to 2 mg once weekly. We also ordered the patient a continuous glucose monitor. Patient contracted shingles at the end of August. Lost 14 lbs    She is not using a CGM yet due to the shingles. Hemoglobin A1C   Latest Ref Rng Normal 4.0-5.6%; PreDiabetic 5.7-6.4%; Diabetic >=6.5%; Glycemic control for adults with diabetes <7.0% %   2/23/2023 8.1 (H)    6/24/2023 8.2 (H)    10/23/2023 7.8 (H)       eAG, EST AVG Glucose   Latest Ref Rng mg/dl   2/23/2023 186    6/24/2023 189    10/23/2023 177       Legend:  (H) High    Home blood glucose readings:   Before breakfast: 130, 141, 104, 165, 152, 107, 197, 155, 164, 168, 135, 132, 146, 148, 157, 113, 143, 198, 172    Current regimen:   Farxiga 10 mg daily  Glipizide XL 5 mg daily  Lantus 18 units nightly  Ozempic 2 mg once weekly    Last Eye Exam: UTD  Last Foot Exam: UTD    For hypothyroidism, she is taking 100 mcg of levothyroxine daily. Thyroid function as of 6/24/2023 was stable. Denies symptoms of hypothyroidism. For hyperlipidemia, she is taking 20 mg of rosuvastatin nightly. She denies myalgias. For hypertension, she is taking 20 mg of lisinopril daily. She denies headache and cough.     Patient Active Problem List   Diagnosis    Anemia    Benign essential hypertension    Arthritis    Mixed diabetic hyperlipidemia associated with type 2 diabetes mellitus     Disc degeneration, lumbar    Fibrocystic breast disease, unspecified laterality    GERD without esophagitis    Hypothyroidism    IBS (irritable bowel syndrome)    Neoplasm of uncertain behavior of skin    MARIA FERNANDA (obstructive sleep apnea)    Primary osteoarthritis of both knees    Vitamin D deficiency    Type 2 diabetes mellitus with hyperglycemia, without long-term current use of insulin (HCC)    Fatty liver    Full thickness rotator cuff tear    Shoulder joint pain Family history of breast cancer in female    Screening mammogram, encounter for    Lumbar radiculopathy    History of ductal carcinoma in situ (DCIS) of breast    BRCA negative    Chronic kidney disease (CKD) stage G3b/A1, moderately decreased glomerular filtration rate (GFR) between 30-44 mL/min/1.73 square meter and albuminuria creatinine ratio less than 30 mg/g (HCC)    Class 3 severe obesity due to excess calories with serious comorbidity and body mass index (BMI) of 45.0 to 49.9 in adult [facility])    Yeast infection involving the vagina and surrounding area    Encounter for follow-up surveillance of breast cancer    Shoulder weakness    Post herpetic neuralgia      Past Medical History:   Diagnosis Date    Achrochordon     Arthritis     BRCA1 negative     BRCA2 negative     Cancer ([address]) 09/23/2021    Breast (left)    Chronic kidney disease     Colon polyp     Diabetes mellitus ([address])     Dysuria     Headache(784.0)     Hyperlipidemia     Hypertension     IBS (irritable bowel syndrome)     Inflammatory bowel disease     Polycystic ovarian syndrome     Rotator cuff tear     right side     Wears glasses       Past Surgical History:   Procedure Laterality Date    ANAL FISTULOTOMY      subcutaneous     BREAST BIOPSY  09/2021    BREAST LUMPECTOMY Left 09/23/2021    BREAST RECONSTRUCTION Left 02/08/2022    Procedure: IMMEDIATE RECONSTRUCTION WITH TISSUE EXPANDER PLACEMENT;  Surgeon: Charissa Hicks MD;  Location: [facility];  Service: Plastics    CHOLECYSTECTOMY      COLONOSCOPY      FLAP LOCAL TRUNK Left 02/08/2022    Procedure: DE-EPITHELIALIZED FLAP BREAST;  Surgeon: Charissa Hicks MD;  [facility];  Service: Plastics    HEMORRHOID SURGERY      INCISION AND DRAINAGE PERIRECTAL ABSCESS      MASTECTOMY Left 02/08/2022    MASTECTOMY W/ SENTINEL NODE BIOPSY Left 02/08/2022    Procedure: MASTECTOMY WITH BIOPSY LYMPH NODE SENTINEL; [facility] NUC MED;  Surgeon: Kameron Steinberg MD;  Location: [facility];  Service: Surgical Oncology    MOUTH SURGERY      OVARIAN CYST REMOVAL      WV DEBRIDEMENT SUBCUTANEOUS TISSUE 20 SQ CM/< Left 2022    Procedure: DEBRIDEMENT OF NON-HEALING SURGICAL WOUND, LEFT BREAST,  ADVANCEMENT FLAP;  Surgeon: Ross Smyth MD;  [facility];  Service: Plastics    ROTATOR CUFF REPAIR Right     TONSILLECTOMY      US GUIDANCE BREAST BIOPSY LEFT EACH ADDITIONAL Left 2021    US GUIDED BREAST BIOPSY LEFT COMPLETE Left 2021      Family History   Problem Relation Age of Onset    Diabetes Mother         Type II    Diabetes type II Mother     Dementia Mother          09    Hearing loss Mother          2009    Breast cancer Paternal Aunt     Cancer Paternal Aunt         Breast, Skin    Cancer Paternal Uncle         Prostate    Prostate cancer Paternal Uncle             Breast cancer Family     Breast cancer Maternal Aunt             Cancer Maternal Aunt         Breast    Breast cancer Maternal Aunt     Diabetes type II Maternal Aunt     Thyroid cancer Maternal Aunt     Thyroid disease Maternal Aunt         Cancer    Diabetes Maternal Aunt     Cancer Maternal Aunt         Breast    Hypertension Father          01    Depression Father          01    Stroke Father          2001    Heart disease Father          01    No Known Problems Sister     Depression Maternal Grandmother         Committed suicide - 10/24/68    Asthma Maternal Grandmother     Completed Suicide  Maternal Grandmother         10/24/1968    Depression Paternal Grandmother             Asthma Sister         Born with it/outgrew it with age (pretty young)    No Known Problems Paternal Aunt     No Known Problems Maternal Aunt     Diabetes type II Maternal Uncle     Diabetes Maternal Uncle     Diabetes type II Maternal Grandfather     Diabetes Maternal Grandfather     Breast cancer Cousin     Cancer Paternal Uncle         Oral Cancer    Heart disease Paternal Grandfather              Social History     Tobacco Use    Smoking status: Never    Smokeless tobacco: Never   Substance Use Topics    Alcohol use: Yes     Comment: rare     No Known Allergies      Current Outpatient Medications:     Acetaminophen 500 MG, Take 2 capsules (1,000 mg total) by mouth every 6 (six) hours as needed for mild pain for up to 21 doses, Disp: 21 capsule, Rfl: 0    aspirin (ECOTRIN LOW STRENGTH) 81 mg EC tablet, Take 1 tablet by mouth daily, Disp: , Rfl:     Blood Glucose Monitoring Suppl (ONE TOUCH ULTRA 2) w/Device KIT, Use 3 (three) times a day, Disp: 1 kit, Rfl: 0    capsicum (ZOSTRIX) 0.075 % topical cream, Apply topically 3 (three) times a day, Disp: 120 g, Rfl: 0    cholecalciferol (VITAMIN D3) 1,000 units tablet, Take 4 tablets (4,000 Units total) by mouth daily, Disp: 90 tablet, Rfl: 1    Continuous Blood Gluc  (FreeStyle Sam 2 [place]) [facility], Dispense 1 reader., Disp: 1 each, Rfl: 0    Continuous Blood Gluc Sensor (FreeStyle Sam 2 Sensor) MISC, Use 1 sensor every 14 days for glucose monitoring., Disp: 6 each, Rfl: 1    dapagliflozin (Farxiga) 10 MG tablet, Take 1 tablet (10 mg total) by mouth daily at bedtime, Disp: 90 tablet, Rfl: 1    furosemide (LASIX) 20 mg tablet, TAKE ONE TABLET BY MOUTH DAILY AS NEEDED FOR SWELLING, Disp: 30 tablet, Rfl: 1    gabapentin (NEURONTIN) 300 mg capsule, Take 1 capsule (300 mg total) by mouth 3 (three) times a day, Disp: 180 capsule, Rfl: 3    glipiZIDE (GLUCOTROL XL) 5 mg 24 hr tablet, TAKE ONE TABLET BY MOUTH ONCE DAILY, Disp: 90 tablet, Rfl: 1    glucose blood (OneTouch Ultra) test strip, Use as instructed, Disp: 100 each, Rfl: 3    Insulin Glargine Solostar (Lantus SoloStar) 100 UNIT/ML SOPN, Inject 24 units nightly., Disp: 30 mL, Rfl: 1    Insulin Pen Needle (BD Pen Needle Wendy U/F) 32G X 4 MM MISC, Use to inject insulin once daily. , Disp: 100 each, Rfl: 2    levothyroxine 100 mcg tablet, Take 1 tablet (100 mcg total) by mouth daily, Disp: 90 tablet, Rfl: 3    lisinopril (ZESTRIL) 20 mg tablet, TAKE ONE TABLET BY MOUTH ONCE DAILY, Disp: 90 tablet, Rfl: 2    Magnesium 200 MG TABS, Take 1 tablet (200 mg total) by mouth in the morning, Disp: 30 tablet, Rfl: 2    ONETOUCH DELICA LANCETS FINE MISC, by Does not apply route 2 (two) times a day, Disp: , Rfl:     pantoprazole (PROTONIX) 40 mg tablet, Take 1 tablet (40 mg total) by mouth daily, Disp: 90 tablet, Rfl: 3    polyethylene glycol (GLYCOLAX) 17 GM/SCOOP powder, Take by mouth as needed, Disp: , Rfl:     rosuvastatin (CRESTOR) 20 MG tablet, TAKE ONE TABLET BY MOUTH DAILY, Disp: 90 tablet, Rfl: 3    semaglutide, 2 mg/dose, (Ozempic, 2 MG/DOSE,) 8 mg/ mL injection pen, Inject 0.75 mL (2 mg total) under the skin every 7 days, Disp: 3 mL, Rfl: 2    Review of Systems   Constitutional:  Negative for activity change, appetite change, fatigue and unexpected weight change. HENT:  Negative for dental problem, sore throat, trouble swallowing and voice change. Eyes:  Negative for visual disturbance. Respiratory:  Negative for cough, chest tightness and shortness of breath. Cardiovascular:  Negative for chest pain, palpitations and leg swelling. Gastrointestinal:  Positive for nausea (intermittent). Negative for constipation, diarrhea and vomiting. Endocrine: Negative for cold intolerance, heat intolerance, polydipsia, polyphagia and polyuria. Genitourinary:  Negative for frequency. Musculoskeletal:  Positive for arthralgias and myalgias. Negative for back pain and gait problem. Skin:  Negative for wound. Allergic/Immunologic: Negative for environmental allergies and food allergies. Neurological:  Positive for weakness (R arm/shoulder) and numbness. Negative for dizziness, light-headedness and headaches. Psychiatric/Behavioral:  Negative for decreased concentration, dysphoric mood and sleep disturbance. The patient is not nervous/anxious. Physical Exam:  Body mass index is 44.63 kg/m². /62   Pulse 76   Temp 98 °F (36.7 °C)   Resp 18   Ht 5' 4" (1.626 m)   Wt 118 kg (260 lb)   LMP  (LMP Unknown)   SpO2 98%   BMI 44.63 kg/m²    Wt Readings from Last 3 Encounters:   11/09/23 118 kg (260 lb)   10/26/23 118 kg (260 lb 9.6 oz)   10/19/23 118 kg (261 lb)       Physical Exam  Vitals reviewed. Constitutional:       General: She is not in acute distress. Appearance: She is well-developed. She is obese. She is not ill-appearing. HENT:      Head: Normocephalic and atraumatic. Eyes:      Pupils: Pupils are equal, round, and reactive to light. Neck:      Thyroid: No thyromegaly. Cardiovascular:      Rate and Rhythm: Normal rate and regular rhythm. Pulses: Normal pulses. Heart sounds: Normal heart sounds. Pulmonary:      Effort: Pulmonary effort is normal.      Breath sounds: Normal breath sounds. Abdominal:      General: Bowel sounds are normal. There is no distension. Palpations: Abdomen is soft. Tenderness: There is no abdominal tenderness. Musculoskeletal:      Cervical back: Normal range of motion and neck supple. Right lower leg: No edema. Left lower leg: No edema. Lymphadenopathy:      Cervical: No cervical adenopathy. Skin:     General: Skin is warm and dry. Capillary Refill: Capillary refill takes less than 2 seconds. Findings: Lesion (scattered healing shingle lesions on RUE) present. Neurological:      Mental Status: She is alert and oriented to person, place, and time.       Gait: Gait normal.   Psychiatric:         Mood and Affect: Mood normal.         Behavior: Behavior normal.           Labs:   Lab Results   Component Value Date    HGBA1C 7.8 (H) 10/23/2023    HGBA1C 8.2 (H) 06/24/2023    HGBA1C 8.1 (H) 02/23/2023     Lab Results   Component Value Date    CREATININE 1.17 10/23/2023    CREATININE 1.33 (H) 06/24/2023    CREATININE 1.43 (H) 06/19/2023    BUN 22 10/23/2023     10/09/2015    K 4.4 10/23/2023     10/23/2023    CO2 28 10/23/2023     eGFR   Date Value Ref Range Status   10/23/2023 50 ml/min/1.73sq m Final     Lab Results   Component Value Date    CHOL 198 10/09/2015    HDL 36 (L) 10/23/2023    TRIG 250 (H) 10/23/2023     Lab Results   Component Value Date    ALT 10 10/23/2023    AST 13 10/23/2023    ALKPHOS 55 10/23/2023    BILITOT 0.48 10/09/2015     Lab Results   Component Value Date    PWF2QTPBFJKX 1.369 06/24/2023    [id number] 2.000 02/21/2023    [id number] 1.630 07/05/2022     Lab Results   Component Value Date    FREET4 1.11 07/05/2022             Discussed with the patient and all questioned fully answered. She will call me if any problems arise. Follow-up appointment in 3 months.      Counseled patient on diagnostic results, prognosis, risk and benefit of treatment options, instruction for management, importance of treatment compliance, Risk  factor reduction and impressions    Patient Instructions   Increase Lantus to 24 units  Continue remainder of regimen  Schedule an appointment to learn how to use your [address] [facility], [address] [place] [address]

## 2023-11-09 ENCOUNTER — OFFICE VISIT (OUTPATIENT)
Dept: ENDOCRINOLOGY | Facility: CLINIC | Age: 63
End: 2023-11-09
Payer: COMMERCIAL

## 2023-11-09 VITALS
OXYGEN SATURATION: 98 % | WEIGHT: 260 LBS | BODY MASS INDEX: 44.39 KG/M2 | HEIGHT: 64 IN | RESPIRATION RATE: 18 BRPM | HEART RATE: 76 BPM | SYSTOLIC BLOOD PRESSURE: 128 MMHG | TEMPERATURE: 98 F | DIASTOLIC BLOOD PRESSURE: 62 MMHG

## 2023-11-09 DIAGNOSIS — I10 BENIGN ESSENTIAL HYPERTENSION: ICD-10-CM

## 2023-11-09 DIAGNOSIS — E03.9 HYPOTHYROIDISM, UNSPECIFIED TYPE: ICD-10-CM

## 2023-11-09 DIAGNOSIS — E11.69 MIXED DIABETIC HYPERLIPIDEMIA ASSOCIATED WITH TYPE 2 DIABETES MELLITUS: ICD-10-CM

## 2023-11-09 DIAGNOSIS — E78.2 MIXED DIABETIC HYPERLIPIDEMIA ASSOCIATED WITH TYPE 2 DIABETES MELLITUS: ICD-10-CM

## 2023-11-09 DIAGNOSIS — N18.32 CHRONIC KIDNEY DISEASE (CKD) STAGE G3B/A1, MODERATELY DECREASED GLOMERULAR FILTRATION RATE (GFR) BETWEEN 30-44 ML/MIN/1.73 SQUARE METER AND ALBUMINURIA CREATININE RATIO LESS THAN 30 MG/G (HCC): ICD-10-CM

## 2023-11-09 DIAGNOSIS — E66.01 CLASS 3 SEVERE OBESITY DUE TO EXCESS CALORIES WITH SERIOUS COMORBIDITY AND BODY MASS INDEX (BMI) OF 45.0 TO 49.9 IN ADULT (HCC): ICD-10-CM

## 2023-11-09 DIAGNOSIS — E11.65 TYPE 2 DIABETES MELLITUS WITH HYPERGLYCEMIA, WITHOUT LONG-TERM CURRENT USE OF INSULIN (HCC): Primary | ICD-10-CM

## 2023-11-09 PROCEDURE — 99214 OFFICE O/P EST MOD 30 MIN: CPT | Performed by: NURSE PRACTITIONER

## 2023-11-09 RX ORDER — INSULIN GLARGINE 100 [IU]/ML
INJECTION, SOLUTION SUBCUTANEOUS
Qty: 30 ML | Refills: 1 | Status: SHIPPED | OUTPATIENT
Start: 2023-11-09

## 2023-11-09 NOTE — ASSESSMENT & PLAN NOTE
Control is improving. Increase Lantus to 24 units nightly. Continue remainder of regimen. Continue to focus on healthy diet. Increase physical activity. Referral given for diabetes education to learn how to use her SimpliVTay. Patient is to notify me once she has started using this so that I can review the data. We discussed potentially switching glipizide to nateglinide prior to meals depending on the data. A1C goal is 6.9% or less. F/U in 3 months.    Lab Results   Component Value Date    HGBA1C 7.8 (H) 10/23/2023

## 2023-11-09 NOTE — ASSESSMENT & PLAN NOTE
Lab Results   Component Value Date    EGFR 50 10/23/2023    EGFR 42 06/24/2023    EGFR 39 06/19/2023    CREATININE 1.17 10/23/2023    CREATININE 1.33 (H) 06/24/2023    CREATININE 1.43 (H) 06/19/2023   Stable. Following with nephrology. Continue Farxiga and lisinopril. Continue to optimize glycemic control.

## 2023-11-09 NOTE — PATIENT INSTRUCTIONS
Increase Lantus to 24 units  Continue remainder of regimen  Schedule an appointment to learn how to use your [facility] [place]

## 2023-11-09 NOTE — ASSESSMENT & PLAN NOTE
Patient has lost approximately 15 pounds since starting 2 mg of Ozempic. Continue 2 mg of Ozempic once weekly. Reviewed the importance of healthy diet and exercise.

## 2023-11-10 ENCOUNTER — OFFICE VISIT (OUTPATIENT)
Dept: OBGYN CLINIC | Facility: MEDICAL CENTER | Age: 63
End: 2023-11-10
Payer: COMMERCIAL

## 2023-11-10 VITALS
DIASTOLIC BLOOD PRESSURE: 80 MMHG | HEART RATE: 69 BPM | HEIGHT: 64 IN | BODY MASS INDEX: 44.39 KG/M2 | WEIGHT: 260 LBS | SYSTOLIC BLOOD PRESSURE: 140 MMHG

## 2023-11-10 DIAGNOSIS — M67.911 ROTATOR CUFF DYSFUNCTION, RIGHT: ICD-10-CM

## 2023-11-10 DIAGNOSIS — M24.811 INTERNAL DERANGEMENT OF SHOULDER, RIGHT: ICD-10-CM

## 2023-11-10 DIAGNOSIS — B02.29 POST HERPETIC NEURALGIA: ICD-10-CM

## 2023-11-10 PROCEDURE — 99204 OFFICE O/P NEW MOD 45 MIN: CPT | Performed by: ORTHOPAEDIC SURGERY

## 2023-11-10 RX ORDER — CAPSAICIN 0.75 MG/G
CREAM TOPICAL 3 TIMES DAILY
Qty: 120 G | Refills: 0 | Status: SHIPPED | OUTPATIENT
Start: 2023-11-10

## 2023-11-10 NOTE — PROGRESS NOTES
Orthopaedic Surgery - Office Note  Karan Everett (29 y.o. female)   : 1960   MRN: [id number]  Encounter Date: 11/10/2023    Chief Complaint   Patient presents with    Right Shoulder - Pain       Assessment / Plan  Right shoulder concern for RTC re-tear    Patient has an examination worrisome for rotator cuff pathology, give patient's weakness on exam today she  indicated at this time for an CT arthrogram to evaluate for surgical pathology. (unable to have MRI due to tissue expander)  We will review the results of the study when it has been obtained and discuss further treatment options. Return for discussion of diagnostic studies. History of Present Illness  Karan Everett is a 61 y.o. female who presents with a chief complaint of right shoulder pain. The pain began 3 month(s) ago and started after she has shingles in the LUE. The patient describes the pain as aching and dull in intensity,  constant in timing, and localizes the pain to the  right deltoid insertion. The pain is worse with overuse and relieved by rest.  The pain is not associated with numbness and tingling. The pain is not associated with constitutional symptoms. The patient is not awoken at night by the pain. The patient has not has physical therapy  Patient had a RTC repair 8 yrs ago by Dr. Britany Prince of [facility]  Pertinent items are noted in HPI. All other systems were reviewed and are negative. Physical Exam  /80   Pulse 69   Ht 5' 4" (1.626 m)   Wt 118 kg (260 lb)   LMP  (LMP Unknown)   BMI 44.63 kg/m²   Cons: Appears well. No apparent distress. Psych: Alert. Oriented x3. Mood and affect normal.  Eyes: PERRLA, EOMI  Resp: Normal effort. No audible wheezing or stridor. CV: Palpable pulse. No discernable arrhythmia. No LE edema. Lymph:  No palpable cervical, axillary, or inguinal lymphadenopathy. Skin: Warm. No palpable masses. No visible lesions. Neuro: Normal muscle tone.   Normal and symmetric DTR's.     Right Shoulder Exam  Alignment / Posture:  Normal shoulder posture. Inspection:  No swelling. No edema. No erythema. No ecchymosis. Palpation:  No tenderness. ROM:  Shoulder FE 85 PROM 160. Shoulder ER 30. Strength:  Supraspinatus 4-/5. Infraspinatus 4-/5. Stability:  No objective shoulder instability. Tests: (+) Reynoso. (+) Neer. Neurovascular:  Sensation intact in Ax/R/M/U nerve distributions. 2+ radial pulse. Studies Reviewed  XR of right shoulder - no fracture or dislocation, no osseous abnormality. Procedures  No procedures today. Medical, Surgical, Family, and Social History  The patient's medical history, family history, and social history, were reviewed and updated as appropriate.     Past Medical History:   Diagnosis Date    Achrochordon     Arthritis     BRCA1 negative     BRCA2 negative     Cancer ([address]) 09/23/2021    Breast (left)    Chronic kidney disease     Colon polyp     Diabetes mellitus ([address])     Dysuria     Headache(784.0)     Hyperlipidemia     Hypertension     IBS (irritable bowel syndrome)     Inflammatory bowel disease     Polycystic ovarian syndrome     Rotator cuff tear     right side     Wears glasses        Past Surgical History:   Procedure Laterality Date    ANAL FISTULOTOMY      subcutaneous     BREAST BIOPSY  09/2021    BREAST LUMPECTOMY Left 09/23/2021    BREAST RECONSTRUCTION Left 02/08/2022    Procedure: IMMEDIATE RECONSTRUCTION WITH TISSUE EXPANDER PLACEMENT;  Surgeon: Man Manzo MD;  Location: [facility];  Service: Plastics    CHOLECYSTECTOMY      COLONOSCOPY      FLAP LOCAL TRUNK Left 02/08/2022    Procedure: DE-EPITHELIALIZED FLAP BREAST;  Surgeon: Man Manzo MD;  Location: [facility];  Service: Plastics    HEMORRHOID SURGERY      INCISION AND DRAINAGE PERIRECTAL ABSCESS      MASTECTOMY Left 02/08/2022    MASTECTOMY W/ SENTINEL NODE BIOPSY Left 02/08/2022    Procedure: MASTECTOMY WITH BIOPSY LYMPH NODE SENTINEL; [address]; Surgeon: Esme Patrick MD;  Location: [facility];  Service: Surgical Oncology    MOUTH SURGERY      OVARIAN CYST REMOVAL      OK DEBRIDEMENT SUBCUTANEOUS TISSUE 20 SQ CM/< Left 2022    Procedure: DEBRIDEMENT OF NON-HEALING SURGICAL WOUND, LEFT BREAST,  ADVANCEMENT FLAP;  Surgeon: Elza Flaherty MD;  [facility];  Service: Plastics    ROTATOR CUFF REPAIR Right     TONSILLECTOMY      US GUIDANCE BREAST BIOPSY LEFT EACH ADDITIONAL Left 2021    US GUIDED BREAST BIOPSY LEFT COMPLETE Left 2021       Family History   Problem Relation Age of Onset    Diabetes Mother         Type II    Diabetes type II Mother     Dementia Mother          09    Hearing loss Mother          2009    Breast cancer Paternal Aunt     Cancer Paternal Aunt         Breast, Skin    Cancer Paternal Uncle         Prostate    Prostate cancer Paternal Uncle             Breast cancer Family     Breast cancer Maternal Aunt             Cancer Maternal Aunt         Breast    Breast cancer Maternal Aunt     Diabetes type II Maternal Aunt     Thyroid cancer Maternal Aunt     Thyroid disease Maternal Aunt         Cancer    Diabetes Maternal Aunt     Cancer Maternal Aunt         Breast    Hypertension Father          01    Depression Father          01    Stroke Father          2001    Heart disease Father          01    No Known Problems Sister     Depression Maternal Grandmother         Committed suicide - 10/24/68    Asthma Maternal Grandmother     Completed Suicide  Maternal Grandmother         10/24/1968    Depression Paternal Grandmother             Asthma Sister         Born with it/outgrew it with age (pretty young)    No Known Problems Paternal Aunt     No Known Problems Maternal Aunt     Diabetes type II Maternal Uncle     Diabetes Maternal Uncle     Diabetes type II Maternal Grandfather     Diabetes Maternal Grandfather Breast cancer Cousin     Cancer Paternal Uncle         Oral Cancer    Heart disease Paternal Grandfather                Social History     Occupational History    Not on file   Tobacco Use    Smoking status: Never    Smokeless tobacco: Never   Vaping Use    Vaping Use: Never used   Substance and Sexual Activity    Alcohol use: Yes     Comment: rare    Drug use: No    Sexual activity: Not Currently     Partners: Male     Birth control/protection: None       No Known Allergies      Current Outpatient Medications:     Acetaminophen 500 MG, Take 2 capsules (1,000 mg total) by mouth every 6 (six) hours as needed for mild pain for up to 21 doses, Disp: 21 capsule, Rfl: 0    Arthritis Pain Relieving 0.075 % topical cream, APPLY TOPICALLY THREE TIMES A DAY, Disp: 120 g, Rfl: 0    aspirin (ECOTRIN LOW STRENGTH) 81 mg EC tablet, Take 1 tablet by mouth daily, Disp: , Rfl:     Blood Glucose Monitoring Suppl (ONE TOUCH ULTRA 2) w/Device KIT, Use 3 (three) times a day, Disp: 1 kit, Rfl: 0    cholecalciferol (VITAMIN D3) 1,000 units tablet, Take 4 tablets (4,000 Units total) by mouth daily, Disp: 90 tablet, Rfl: 1    Continuous Blood Gluc  (FreeStyle Sam 2 Dilliner) [facility], Dispense 1 reader., Disp: 1 each, Rfl: 0    Continuous Blood Gluc Sensor (FreeStyle Sam 2 Sensor) MISC, Use 1 sensor every 14 days for glucose monitoring., Disp: 6 each, Rfl: 1    dapagliflozin (Farxiga) 10 MG tablet, Take 1 tablet (10 mg total) by mouth daily at bedtime, Disp: 90 tablet, Rfl: 1    furosemide (LASIX) 20 mg tablet, TAKE ONE TABLET BY MOUTH DAILY AS NEEDED FOR SWELLING, Disp: 30 tablet, Rfl: 1    gabapentin (NEURONTIN) 300 mg capsule, Take 1 capsule (300 mg total) by mouth 3 (three) times a day, Disp: 180 capsule, Rfl: 3    glipiZIDE (GLUCOTROL XL) 5 mg 24 hr tablet, TAKE ONE TABLET BY MOUTH ONCE DAILY, Disp: 90 tablet, Rfl: 1    glucose blood (OneTouch Ultra) test strip, Use as instructed, Disp: 100 each, Rfl: 3    Insulin Glargine Solostar (Lantus SoloStar) 100 UNIT/ML SOPN, Inject 24 units nightly., Disp: 30 mL, Rfl: 1    Insulin Pen Needle (BD Pen Needle Wnedy U/F) 32G X 4 MM MISC, Use to inject insulin once daily. , Disp: 100 each, Rfl: 2    levothyroxine 100 mcg tablet, Take 1 tablet (100 mcg total) by mouth daily, Disp: 90 tablet, Rfl: 3    lisinopril (ZESTRIL) 20 mg tablet, TAKE ONE TABLET BY MOUTH ONCE DAILY, Disp: 90 tablet, Rfl: 2    Magnesium 200 MG TABS, Take 1 tablet (200 mg total) by mouth in the morning, Disp: 30 tablet, Rfl: 2    ONETOUCH DELICA LANCETS FINE MISC, by Does not apply route 2 (two) times a day, Disp: , Rfl:     pantoprazole (PROTONIX) 40 mg tablet, Take 1 tablet (40 mg total) by mouth daily, Disp: 90 tablet, Rfl: 3    polyethylene glycol (GLYCOLAX) 17 GM/SCOOP powder, Take by mouth as needed, Disp: , Rfl:     rosuvastatin (CRESTOR) 20 MG tablet, TAKE ONE TABLET BY MOUTH DAILY, Disp: 90 tablet, Rfl: 3    semaglutide, 2 mg/dose, (Ozempic, 2 MG/DOSE,) 8 mg/ mL injection pen, Inject 0.75 mL (2 mg total) under the skin every 7 days, Disp: 3 mL, Rfl: 2      Yenny       I,:  Krystin Arroyo am acting as a scribe while in the presence of the attending physician.:       I,:  Miriam Jones MD personally performed the services described in this documentation    as scribed in my presence.:

## 2023-11-13 DIAGNOSIS — E11.65 TYPE 2 DIABETES MELLITUS WITH HYPERGLYCEMIA, WITHOUT LONG-TERM CURRENT USE OF INSULIN (HCC): ICD-10-CM

## 2023-11-13 RX ORDER — SEMAGLUTIDE 2.68 MG/ML
INJECTION, SOLUTION SUBCUTANEOUS
Qty: 3 ML | Refills: 2 | Status: SHIPPED | OUTPATIENT
Start: 2023-11-13

## 2023-11-16 DIAGNOSIS — N18.31 STAGE 3A CHRONIC KIDNEY DISEASE (HCC): Primary | ICD-10-CM

## 2023-11-16 NOTE — PROGRESS NOTES
Patient planning on Ct arthrogram Dec 11. Recommend hold lisinopril, lasix, Farxiga 24 hr prior to imaging and the day of. Check bmp 3-4 days post procedure. Mild risk based on CKD 3a. Patient notified she should inform orthopedic that she has CKD. Cr 1.17 with eGFR 50ML/MIN on 10/23/23.

## 2023-11-24 DIAGNOSIS — E11.22 TYPE 2 DIABETES MELLITUS WITH STAGE 3B CHRONIC KIDNEY DISEASE, WITHOUT LONG-TERM CURRENT USE OF INSULIN (HCC): ICD-10-CM

## 2023-11-24 DIAGNOSIS — N18.32 TYPE 2 DIABETES MELLITUS WITH STAGE 3B CHRONIC KIDNEY DISEASE, WITHOUT LONG-TERM CURRENT USE OF INSULIN (HCC): ICD-10-CM

## 2023-11-27 ENCOUNTER — TELEPHONE (OUTPATIENT)
Dept: CT IMAGING | Facility: HOSPITAL | Age: 63
End: 2023-11-27

## 2023-11-27 NOTE — TELEPHONE ENCOUNTER
Call placed to patient to discuss upcoming CT Arthrogram at [facility]. Allergies reviewed and verified patient does not currently take any anticoagulant medications. Pre-procedure instructions discussed with patient. Patient instructed that she may eat normally and take medications as usual before the procedure. Patient contacted nephrology and has specific instructions on medications to hold prior to procedure. Procedure and post procedure expectations and instructions reviewed with the patient. Recommended to patient to have a  post procedure. Patient verbalizes understanding and denies any questions at this time.

## 2023-11-28 ENCOUNTER — TELEPHONE (OUTPATIENT)
Dept: ADMINISTRATIVE | Facility: OTHER | Age: 63
End: 2023-11-28

## 2023-11-28 ENCOUNTER — OFFICE VISIT (OUTPATIENT)
Dept: FAMILY MEDICINE CLINIC | Facility: CLINIC | Age: 63
End: 2023-11-28
Payer: COMMERCIAL

## 2023-11-28 VITALS
BODY MASS INDEX: 44.63 KG/M2 | TEMPERATURE: 97.2 F | RESPIRATION RATE: 16 BRPM | HEIGHT: 64 IN | HEART RATE: 84 BPM | SYSTOLIC BLOOD PRESSURE: 122 MMHG | OXYGEN SATURATION: 99 % | DIASTOLIC BLOOD PRESSURE: 82 MMHG

## 2023-11-28 DIAGNOSIS — E11.69 MIXED DIABETIC HYPERLIPIDEMIA ASSOCIATED WITH TYPE 2 DIABETES MELLITUS: ICD-10-CM

## 2023-11-28 DIAGNOSIS — N18.32 CHRONIC KIDNEY DISEASE (CKD) STAGE G3B/A1, MODERATELY DECREASED GLOMERULAR FILTRATION RATE (GFR) BETWEEN 30-44 ML/MIN/1.73 SQUARE METER AND ALBUMINURIA CREATININE RATIO LESS THAN 30 MG/G (HCC): ICD-10-CM

## 2023-11-28 DIAGNOSIS — E66.01 CLASS 3 SEVERE OBESITY DUE TO EXCESS CALORIES WITH SERIOUS COMORBIDITY AND BODY MASS INDEX (BMI) OF 45.0 TO 49.9 IN ADULT (HCC): ICD-10-CM

## 2023-11-28 DIAGNOSIS — E78.2 MIXED DIABETIC HYPERLIPIDEMIA ASSOCIATED WITH TYPE 2 DIABETES MELLITUS: ICD-10-CM

## 2023-11-28 DIAGNOSIS — I10 BENIGN ESSENTIAL HYPERTENSION: ICD-10-CM

## 2023-11-28 DIAGNOSIS — K21.9 GERD WITHOUT ESOPHAGITIS: ICD-10-CM

## 2023-11-28 DIAGNOSIS — E03.9 HYPOTHYROIDISM, UNSPECIFIED TYPE: ICD-10-CM

## 2023-11-28 DIAGNOSIS — E55.9 VITAMIN D DEFICIENCY: ICD-10-CM

## 2023-11-28 DIAGNOSIS — E11.65 TYPE 2 DIABETES MELLITUS WITH HYPERGLYCEMIA, WITHOUT LONG-TERM CURRENT USE OF INSULIN (HCC): Primary | ICD-10-CM

## 2023-11-28 PROCEDURE — 99214 OFFICE O/P EST MOD 30 MIN: CPT | Performed by: FAMILY MEDICINE

## 2023-11-28 NOTE — LETTER
Diabetic Foot Exam Form    Date Requested: 23  Patient: Arie Jonathan  Patient : 1960   Referring Provider: Anthony Felix DO    Diabetic Foot Exam Performed with shoes and socks removed        Yes         No     Date of Diabetic Foot Exam ______________________________  Risk Score ____________________________________________    Left Foot       Visual Inspection         Monofilament Testing Sensory Exam        Pedal Pulses         Additional Comments         Right Foot      Visual Inspection         Monofilament Testing Sensory Exam       Pedal Pulses         Additional Comments         Comments __________________________________________________________    Practice Providing Exam ______________________________________________    Exam Performed By (print name) _______________________________________      Provider Signature ___________________________________________________      These reports are needed for  compliance. Please fax this completed form and a copy of the Diabetic Foot Exam report to our office located at [address] [place] [address] as soon as possible via Fax [phone] grazyna Lamb: Phone [phone]    We thank you for your assistance in treating our mutual patient.

## 2023-11-28 NOTE — PROGRESS NOTES
Assessment/Plan:  Patient to continue present treatment. Instructed to follow a low-fat, low-salt and a low sugar/carbohydrate diet more carefully and to get regular aerobic exercise walking as tolerated. Weight loss encouraged. Continue home glucose monitoring. Follow-up with specialist as scheduled. Return to the office in 6 months. No problem-specific Assessment & Plan notes found for this encounter. Diagnoses and all orders for this visit:    Type 2 diabetes mellitus with hyperglycemia, without long-term current use of insulin ([address])    Mixed diabetic hyperlipidemia associated with type 2 diabetes mellitus     Benign essential hypertension    Hypothyroidism, unspecified type    GERD without esophagitis    Chronic kidney disease (CKD) stage G3b/A1, moderately decreased glomerular filtration rate (GFR) between 30-44 mL/min/1.73 square meter and albuminuria creatinine ratio less than 30 mg/g ([facility])    Class 3 severe obesity due to excess calories with serious comorbidity and body mass index (BMI) of 45.0 to 49.9 in [facility])    Vitamin D deficiency          Subjective:      Patient ID: Chris Ballard is a 61 y.o. female. Patient is here for follow-up appoint for chronic conditions. Recent labs reviewed. She received yearly flu vaccine recently at the pharmacy. Patient has been feeling fairly well overall except for ongoing right shoulder pain. She is scheduled for CT arthrogram per Dr. Marisol Em at [facility] orthopedics then follow-up appointment. Patient recently saw [facility] endocrinology and nephrology and has labs ordered. Patient is up-to-date on diabetic eye exam and foot exam.    Diabetes  She presents for her follow-up diabetic visit. She has type 2 diabetes mellitus. Her disease course has been improving. There are no hypoglycemic associated symptoms. Associated symptoms include blurred vision, foot paresthesias, visual change and weight loss.  Pertinent negatives for diabetes include no chest pain, no fatigue, no foot ulcerations, no polydipsia, no polyuria and no weakness. There are no hypoglycemic complications. Symptoms are stable. Diabetic complications include nephropathy, peripheral neuropathy and retinopathy. Pertinent negatives for diabetic complications include no CVA or heart disease. Risk factors for coronary artery disease include dyslipidemia, diabetes mellitus, hypertension, family history, obesity and post-menopausal. Current diabetic treatment includes insulin injections and oral agent (dual therapy). She is compliant with treatment all of the time. Her weight is decreasing steadily. She is following a generally healthy diet. She participates in exercise intermittently. Her home blood glucose trend is decreasing steadily. Her breakfast blood glucose is taken between 7-8 am. Her breakfast blood glucose range is generally 130-140 mg/dl. An ACE inhibitor/angiotensin II receptor blocker is being taken. She sees a podiatrist.Eye exam is current. The following portions of the patient's history were reviewed and updated as appropriate: allergies, current medications, past family history, past medical history, past social history, past surgical history, and problem list.    Review of Systems   Constitutional:  Positive for weight loss. Negative for fatigue. Eyes:  Positive for blurred vision. Cardiovascular:  Negative for chest pain. Endocrine: Negative for polydipsia and polyuria. Neurological:  Negative for weakness. Objective:      /82   Pulse 84   Temp (!) 97.2 °F (36.2 °C)   Resp 16   Ht 5' 4" (1.626 m)   LMP  (LMP Unknown)   SpO2 99%   BMI 44.63 kg/m²          Physical Exam  Constitutional:       General: She is not in acute distress. Appearance: Normal appearance. She is obese. HENT:      Head: Normocephalic. Mouth/Throat:      Mouth: Mucous membranes are moist.   Eyes:      General: No scleral icterus.      Conjunctiva/sclera: Conjunctivae normal.   Neck:      Vascular: No carotid bruit. Cardiovascular:      Rate and Rhythm: Normal rate and regular rhythm. Pulmonary:      Effort: Pulmonary effort is normal.      Breath sounds: Normal breath sounds. Abdominal:      Palpations: Abdomen is soft. Tenderness: There is no abdominal tenderness. Musculoskeletal:         General: Tenderness present. Cervical back: Neck supple. Right lower leg: No edema. Left lower leg: No edema. Comments: Right shoulder with significant decreased range of motion and tenderness. Lymphadenopathy:      Cervical: No cervical adenopathy. Skin:     General: Skin is warm and dry. Neurological:      General: No focal deficit present. Mental Status: She is alert and oriented to person, place, and time. Psychiatric:         Mood and Affect: Mood normal.         Behavior: Behavior normal.         Thought Content:  Thought content normal.         Judgment: Judgment normal.

## 2023-11-28 NOTE — TELEPHONE ENCOUNTER
----- Message from Nila [place], [place] sent at 11/28/2023 11:03 AM EST -----  Regarding: DM Foot Exam  Patient had her DM Foot Exam done @ [facility] on 10/31/2023.

## 2023-11-29 NOTE — TELEPHONE ENCOUNTER
Upon review of the In Basket request and the patient's chart, initial outreach has been made via fax to facility. Please see Contacts section for details.      Thank you  Chandni Miranda

## 2023-11-30 DIAGNOSIS — E83.42 HYPOMAGNESEMIA: ICD-10-CM

## 2023-11-30 RX ORDER — MAGNESIUM 200 MG
200 TABLET ORAL DAILY
Qty: 90 TABLET | Refills: 0 | Status: SHIPPED | OUTPATIENT
Start: 2023-11-30

## 2023-12-04 NOTE — TELEPHONE ENCOUNTER
Upon review of the In Basket request we were able to locate, review, and update the patient chart as requested for Diabetic Foot Exam.    Any additional questions or concerns should be emailed to the Practice Liaisons via the appropriate education email address, please do not reply via In Basket.     Thank you  Aldair Garza

## 2023-12-11 ENCOUNTER — HOSPITAL ENCOUNTER (OUTPATIENT)
Dept: CT IMAGING | Facility: HOSPITAL | Age: 63
Discharge: HOME | End: 2023-12-11
Attending: ORTHOPAEDIC SURGERY
Payer: COMMERCIAL

## 2023-12-11 ENCOUNTER — HOSPITAL ENCOUNTER (OUTPATIENT)
Dept: RADIOLOGY | Facility: HOSPITAL | Age: 63
Discharge: HOME | End: 2023-12-11
Attending: ORTHOPAEDIC SURGERY
Payer: COMMERCIAL

## 2023-12-11 DIAGNOSIS — M24.811 INTERNAL DERANGEMENT OF SHOULDER, RIGHT: ICD-10-CM

## 2023-12-11 DIAGNOSIS — M67.911 ROTATOR CUFF DYSFUNCTION, RIGHT: ICD-10-CM

## 2023-12-11 PROCEDURE — G1004 CDSM NDSC: HCPCS

## 2023-12-11 PROCEDURE — 77002 NEEDLE LOCALIZATION BY XRAY: CPT

## 2023-12-11 PROCEDURE — 73200 CT UPPER EXTREMITY W/O DYE: CPT

## 2023-12-11 PROCEDURE — 23350 INJECTION FOR SHOULDER X-RAY: CPT

## 2023-12-11 RX ORDER — LIDOCAINE HYDROCHLORIDE 10 MG/ML
6 INJECTION, SOLUTION EPIDURAL; INFILTRATION; INTRACAUDAL; PERINEURAL
Status: DISCONTINUED | OUTPATIENT
Start: 2023-12-11 | End: 2023-12-15 | Stop reason: HOSPADM

## 2023-12-11 RX ADMIN — IOHEXOL 4 ML: 300 INJECTION, SOLUTION INTRAVENOUS at 16:16

## 2023-12-15 ENCOUNTER — APPOINTMENT (OUTPATIENT)
Dept: LAB | Facility: CLINIC | Age: 63
End: 2023-12-15
Payer: COMMERCIAL

## 2023-12-15 DIAGNOSIS — N18.31 STAGE 3A CHRONIC KIDNEY DISEASE (HCC): ICD-10-CM

## 2023-12-15 LAB
ANION GAP SERPL CALCULATED.3IONS-SCNC: 9 MMOL/L
BUN SERPL-MCNC: 30 MG/DL (ref 5–25)
CALCIUM SERPL-MCNC: 10.2 MG/DL (ref 8.4–10.2)
CHLORIDE SERPL-SCNC: 104 MMOL/L (ref 96–108)
CO2 SERPL-SCNC: 26 MMOL/L (ref 21–32)
CREAT SERPL-MCNC: 1.38 MG/DL (ref 0.6–1.3)
GFR SERPL CREATININE-BSD FRML MDRD: 40 ML/MIN/1.73SQ M
GLUCOSE P FAST SERPL-MCNC: 157 MG/DL (ref 65–99)
POTASSIUM SERPL-SCNC: 4.9 MMOL/L (ref 3.5–5.3)
SODIUM SERPL-SCNC: 139 MMOL/L (ref 135–147)

## 2023-12-15 PROCEDURE — 80048 BASIC METABOLIC PNL TOTAL CA: CPT

## 2023-12-15 PROCEDURE — 36415 COLL VENOUS BLD VENIPUNCTURE: CPT

## 2023-12-20 ENCOUNTER — OFFICE VISIT (OUTPATIENT)
Dept: OBGYN CLINIC | Facility: OTHER | Age: 63
End: 2023-12-20
Payer: COMMERCIAL

## 2023-12-20 VITALS
DIASTOLIC BLOOD PRESSURE: 77 MMHG | BODY MASS INDEX: 44.39 KG/M2 | WEIGHT: 260 LBS | HEIGHT: 64 IN | SYSTOLIC BLOOD PRESSURE: 120 MMHG | HEART RATE: 67 BPM

## 2023-12-20 DIAGNOSIS — M75.111 INCOMPLETE TEAR OF RIGHT ROTATOR CUFF, UNSPECIFIED WHETHER TRAUMATIC: Primary | ICD-10-CM

## 2023-12-20 PROCEDURE — 99214 OFFICE O/P EST MOD 30 MIN: CPT | Performed by: ORTHOPAEDIC SURGERY

## 2023-12-20 NOTE — PROGRESS NOTES
"Orthopaedic Surgery - Office Note  Shanique Heredia (63 y.o. female)   : 1960   MRN: [id number]  Encounter Date: 2023    Chief Complaint   Patient presents with    Right Shoulder - Follow-up       Assessment / Plan  Right shoulder rotator cuff partial thickness re-tear of the supraspinatus tendon.  S/p right shoulder rotator cuff repair of the supraspinatus and infraspinatus performed by Dr. Kim in .    We discussed surgical (rotator cuff repair with possible graft vs balloon vs RTSA) and non-surgical treatment (PT).   1-2 PT session for shoulder, scapular stabilizers, rotator cuff strengthening and ROM to progress to transitional HEP. PT script provided.  Ice, heat, Tylenol for pain PRN.  No follow-ups on file.    History of Present Illness  Shanique Heredia is a 63 y.o. female who presents for follow-up of right shoulder pain concerning for rotator cuff re-tear. The patient is right hand dominant. The patient has diabetes. The patient has thyroid disorder. The patient had a RTC repair in  performed by Dr. Kim. The patient reports right shoulder pain that began in 2023 after she had shingles in the LUE. The patient states her shoulder is heavy and burning. The patient reports pain that goes into the neck. The patient reports difficulty with overhead activities and lifting. The patient reports difficulties with ADLs including putting milk in the fridge and putting the dishes away. The patient denies radiating pain to the elbow. The patient takes extra strength Tylenol as needed for pain. The patient denies distal paresthesias.     Review of Systems  Pertinent items are noted in HPI.  All other systems were reviewed and are negative.    Physical Exam  /77   Pulse 67   Ht 5' 4\" (1.626 m)   Wt 118 kg (260 lb)   LMP  (LMP Unknown)   BMI 44.63 kg/m²   Cons: Appears well.  No apparent distress.  Psych: Alert. Oriented x3.  Mood and affect normal.  Eyes: PERRLA, EOMI  Resp: Normal " effort.  No audible wheezing or stridor.  CV: Palpable pulse.  No discernable arrhythmia.  No LE edema.  Lymph:  No palpable cervical, axillary, or inguinal lymphadenopathy.  Skin: Warm.  No palpable masses.  No visible lesions.  Neuro: Normal muscle tone.  Normal and symmetric DTR's.     Right Shoulder Exam  Alignment / Posture:  Normal shoulder posture.  Inspection:  No swelling. No erythema. No ecchymosis.  Palpation:   Mild tenderness at lateral shoulder.  ROM:  Shoulder FE AROM 110 PROM 150. Shoulder ER 40. Shoulder IR T1.  Strength:  Supraspinatus 4/5. Infraspinatus 4/5. Subscapularis 5/5.  Stability:  No objective shoulder instability.  Tests: No pertinent positive or negative tests.  Neurovascular:  Sensation intact in Ax/R/M/U nerve distributions. 2+ radial pulse.      Studies Reviewed  I have personally reviewed pertinent films in PACS.  XR of right shoulder - 09/29/2023 - No acute osseous abnormality. There are 2 orthopedic anchors overlying the humeral head. There are mild osteoarthritic degenerative changes of the acromioclavicular and glenohumeral joints.  CT arthrogram of right shoulder - 12/20/2023 - Status post rotator cuff repair without definite evidence of recurrent high-grade rotator cuff tear. There is thinning of the supraspinatus tendon indicative of partial thickness rotator cuff re-tear.    Procedures  No procedures today.    Medical, Surgical, Family, and Social History  The patient's medical history, family history, and social history, were reviewed and updated as appropriate.    Past Medical History:   Diagnosis Date    Achrochordon     Arthritis     BRCA1 negative     BRCA2 negative     Cancer (HCC) 09/23/2021    Breast (left)    Chronic kidney disease     Colon polyp     Diabetes mellitus (HCC)     Dysuria     Headache(784.0)     Hyperlipidemia     Hypertension     IBS (irritable bowel syndrome)     Inflammatory bowel disease     Polycystic ovarian syndrome     Rotator cuff tear      right side     Wears glasses        Past Surgical History:   Procedure Laterality Date    ANAL FISTULOTOMY      subcutaneous     BREAST BIOPSY  2021    BREAST LUMPECTOMY Left 2021    BREAST RECONSTRUCTION Left 2022    Procedure: IMMEDIATE RECONSTRUCTION WITH TISSUE EXPANDER PLACEMENT;  Surgeon: Benedicto Cardona MD;  Location: [facility];  Service: Plastics    CHOLECYSTECTOMY      COLONOSCOPY      FL INJECTION RIGHT SHOULDER (ARTHROGRAM)  2023    FLAP LOCAL TRUNK Left 2022    Procedure: DE-EPITHELIALIZED FLAP BREAST;  Surgeon: Benedicto Cardona MD;  Location: [facility];  Service: Plastics    HEMORRHOID SURGERY      INCISION AND DRAINAGE PERIRECTAL ABSCESS      MASTECTOMY Left 2022    MASTECTOMY W/ SENTINEL NODE BIOPSY Left 2022    Procedure: MASTECTOMY WITH BIOPSY LYMPH NODE SENTINEL; 1200 NUC MED;  Surgeon: Rachael Bear MD;  Location: [facility];  Service: Surgical Oncology    MOUTH SURGERY      OVARIAN CYST REMOVAL      PA DEBRIDEMENT SUBCUTANEOUS TISSUE 20 SQ CM/< Left 2022    Procedure: DEBRIDEMENT OF NON-HEALING SURGICAL WOUND, LEFT BREAST,  ADVANCEMENT FLAP;  Surgeon: Benedicto Cardona MD;  Location: [facility];  Service: Plastics    ROTATOR CUFF REPAIR Right     TONSILLECTOMY      US GUIDANCE BREAST BIOPSY LEFT EACH ADDITIONAL Left 2021    US GUIDED BREAST BIOPSY LEFT COMPLETE Left 2021       Family History   Problem Relation Age of Onset    Diabetes Mother         Type II    Diabetes type II Mother     Dementia Mother          09    Hearing loss Mother          2009    Breast cancer Paternal Aunt     Cancer Paternal Aunt         Breast, Skin    Cancer Paternal Uncle         Prostate    Prostate cancer Paternal Uncle             Breast cancer Family     Breast cancer Maternal Aunt             Cancer Maternal Aunt         Breast    Breast cancer Maternal Aunt     Diabetes type II Maternal Aunt     Thyroid  cancer Maternal Aunt     Thyroid disease Maternal Aunt         Cancer    Diabetes Maternal Aunt     Cancer Maternal Aunt         Breast    Hypertension Father          01    Depression Father          01    Stroke Father          2001    Heart disease Father          01    No Known Problems Sister     Depression Maternal Grandmother         Committed suicide - 10/24/68    Asthma Maternal Grandmother     Completed Suicide  Maternal Grandmother         10/24/1968    Depression Paternal Grandmother             Asthma Sister         Born with it/outgrew it with age (pretty young)    No Known Problems Paternal Aunt     No Known Problems Maternal Aunt     Diabetes type II Maternal Uncle     Diabetes Maternal Uncle     Diabetes type II Maternal Grandfather     Diabetes Maternal Grandfather     Breast cancer Cousin     Cancer Paternal Uncle         Oral Cancer    Heart disease Paternal Grandfather                Social History     Occupational History    Not on file   Tobacco Use    Smoking status: Never    Smokeless tobacco: Never   Vaping Use    Vaping status: Never Used   Substance and Sexual Activity    Alcohol use: Yes     Comment: rare    Drug use: No    Sexual activity: Not Currently     Partners: Male     Birth control/protection: None       No Known Allergies      Current Outpatient Medications:     Acetaminophen 500 MG, Take 2 capsules (1,000 mg total) by mouth every 6 (six) hours as needed for mild pain for up to 21 doses, Disp: 21 capsule, Rfl: 0    Arthritis Pain Relieving 0.075 % topical cream, APPLY TOPICALLY THREE TIMES A DAY, Disp: 120 g, Rfl: 0    aspirin (ECOTRIN LOW STRENGTH) 81 mg EC tablet, Take 1 tablet by mouth daily, Disp: , Rfl:     Blood Glucose Monitoring Suppl (ONE TOUCH ULTRA 2) w/Device KIT, Use 3 (three) times a day, Disp: 1 kit, Rfl: 0    cholecalciferol (VITAMIN D3) 1,000 units tablet, Take 4 tablets (4,000 Units total) by  mouth daily, Disp: 90 tablet, Rfl: 1    Continuous Blood Gluc  (FreeStyle Sam 2 Moncure) [facility], Dispense 1 reader., Disp: 1 each, Rfl: 0    Continuous Blood Gluc Sensor (FreeStyle Sam 2 Sensor) [facility] [place] [facility], USE ONE SENSOR EVERY FOURTEEN DAYS FOR GLUCOSE MONITORING., Disp: 6 each, Rfl: 1    dapagliflozin (Farxiga) 10 MG tablet, Take 1 tablet (10 mg total) by mouth daily at bedtime, Disp: 90 tablet, Rfl: 1    furosemide (LASIX) 20 mg tablet, TAKE ONE TABLET BY MOUTH DAILY AS NEEDED FOR SWELLING, Disp: 30 tablet, Rfl: 1    gabapentin (NEURONTIN) 300 mg capsule, Take 1 capsule (300 mg total) by mouth 3 (three) times a day, Disp: 180 capsule, Rfl: 3    glipiZIDE (GLUCOTROL XL) 5 mg 24 hr tablet, TAKE ONE TABLET BY MOUTH ONCE DAILY, Disp: 90 tablet, Rfl: 1    glucose blood (OneTouch Ultra) test strip, Use as instructed, Disp: 100 each, Rfl: 3    Insulin Glargine Solostar (Lantus SoloStar) 100 UNIT/ML SOP, Inject 24 units nightly., Disp: 30 mL, Rfl: 1    Insulin Pen Needle (BD Pen Needle Wendy U/F) 32G X 4 MM MISC, Use to inject insulin once daily., Disp: 100 each, Rfl: 2    levothyroxine 100 mcg tablet, Take 1 tablet (100 mcg total) by mouth daily, Disp: 90 tablet, Rfl: 3    lisinopril (ZESTRIL) 20 mg tablet, TAKE ONE TABLET BY MOUTH ONCE DAILY, Disp: 90 tablet, Rfl: 2    Magnesium 200 MG TABS, Take 1 tablet (200 mg total) by mouth in the morning, Disp: 90 tablet, Rfl: 0    ONETOUCH DELICA LANCETS FINE MISC, by Does not apply route 2 (two) times a day, Disp: , Rfl:     Ozempic, 2 MG/DOSE, 8 MG/3ML injection pen, INJECT 0.75 ML (2 MG TOTAL) UNDER THE SKIN EVERY 7 DAYS, Disp: 3 mL, Rfl: 2    pantoprazole (PROTONIX) 40 mg tablet, Take 1 tablet (40 mg total) by mouth daily, Disp: 90 tablet, Rfl: 3    polyethylene glycol (GLYCOLAX) 17 GM/SCOOP powder, Take by mouth as needed, Disp: , Rfl:     rosuvastatin (CRESTOR) 20 MG tablet, TAKE ONE TABLET BY MOUTH DAILY, Disp: 90 tablet, Rfl: 3      Opal Gage  Attestation      I,:   am acting as a scribe while in the presence of the attending physician.:       I,:   personally performed the services described in this documentation    as scribed in my presence.:

## 2023-12-22 DIAGNOSIS — B02.29 POST HERPETIC NEURALGIA: ICD-10-CM

## 2023-12-22 RX ORDER — CAPSAICIN 0.75 MG/G
CREAM TOPICAL 3 TIMES DAILY
Qty: 114 G | Refills: 0 | Status: SHIPPED | OUTPATIENT
Start: 2023-12-22

## 2024-01-02 ENCOUNTER — TELEPHONE (OUTPATIENT)
Age: 64
End: 2024-01-02

## 2024-01-02 NOTE — TELEPHONE ENCOUNTER
Patient called back to reschedule, unfortunately she was not able to come in on the day that was offered to her. We rescheduled for 2/14/24. Please reach out to patient with any questions or concerns.

## 2024-01-04 ENCOUNTER — VBI (OUTPATIENT)
Dept: ADMINISTRATIVE | Facility: OTHER | Age: 64
End: 2024-01-04

## 2024-01-08 ENCOUNTER — EVALUATION (OUTPATIENT)
Dept: PHYSICAL THERAPY | Facility: CLINIC | Age: 64
End: 2024-01-08
Payer: COMMERCIAL

## 2024-01-08 DIAGNOSIS — M75.111 INCOMPLETE TEAR OF RIGHT ROTATOR CUFF, UNSPECIFIED WHETHER TRAUMATIC: Primary | ICD-10-CM

## 2024-01-08 DIAGNOSIS — M25.511 RIGHT SHOULDER PAIN, UNSPECIFIED CHRONICITY: ICD-10-CM

## 2024-01-08 DIAGNOSIS — E11.65 TYPE 2 DIABETES MELLITUS WITH HYPERGLYCEMIA, WITHOUT LONG-TERM CURRENT USE OF INSULIN (HCC): ICD-10-CM

## 2024-01-08 PROCEDURE — 97112 NEUROMUSCULAR REEDUCATION: CPT | Performed by: PHYSICAL THERAPIST

## 2024-01-08 PROCEDURE — 97110 THERAPEUTIC EXERCISES: CPT | Performed by: PHYSICAL THERAPIST

## 2024-01-08 PROCEDURE — 97162 PT EVAL MOD COMPLEX 30 MIN: CPT | Performed by: PHYSICAL THERAPIST

## 2024-01-09 ENCOUNTER — HOSPITAL ENCOUNTER (OUTPATIENT)
Dept: ULTRASOUND IMAGING | Facility: HOSPITAL | Age: 64
Discharge: HOME | End: 2024-01-09
Payer: COMMERCIAL

## 2024-01-09 ENCOUNTER — HOSPITAL ENCOUNTER (OUTPATIENT)
Dept: MAMMOGRAPHY | Facility: HOSPITAL | Age: 64
Discharge: HOME | End: 2024-01-09
Payer: COMMERCIAL

## 2024-01-09 DIAGNOSIS — R92.8 ABNORMAL MAMMOGRAM OF RIGHT BREAST: ICD-10-CM

## 2024-01-09 PROCEDURE — 76642 ULTRASOUND BREAST LIMITED: CPT

## 2024-01-09 PROCEDURE — 77065 DX MAMMO INCL CAD UNI: CPT

## 2024-01-09 PROCEDURE — G0279 TOMOSYNTHESIS, MAMMO: HCPCS

## 2024-01-09 RX ORDER — SEMAGLUTIDE 2.68 MG/ML
INJECTION, SOLUTION SUBCUTANEOUS
Qty: 3 ML | Refills: 2 | Status: SHIPPED | OUTPATIENT
Start: 2024-01-09

## 2024-01-15 ENCOUNTER — OFFICE VISIT (OUTPATIENT)
Dept: PHYSICAL THERAPY | Facility: CLINIC | Age: 64
End: 2024-01-15
Payer: COMMERCIAL

## 2024-01-15 DIAGNOSIS — M25.511 RIGHT SHOULDER PAIN, UNSPECIFIED CHRONICITY: ICD-10-CM

## 2024-01-15 DIAGNOSIS — M75.111 INCOMPLETE TEAR OF RIGHT ROTATOR CUFF, UNSPECIFIED WHETHER TRAUMATIC: Primary | ICD-10-CM

## 2024-01-15 PROCEDURE — 97110 THERAPEUTIC EXERCISES: CPT | Performed by: PHYSICAL THERAPIST

## 2024-01-15 PROCEDURE — 97112 NEUROMUSCULAR REEDUCATION: CPT | Performed by: PHYSICAL THERAPIST
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
## 2024-01-23 ENCOUNTER — OFFICE VISIT (OUTPATIENT)
Dept: PHYSICAL THERAPY | Facility: CLINIC | Age: 64
End: 2024-01-23
Payer: COMMERCIAL

## 2024-01-23 DIAGNOSIS — M75.111 INCOMPLETE TEAR OF RIGHT ROTATOR CUFF, UNSPECIFIED WHETHER TRAUMATIC: Primary | ICD-10-CM

## 2024-01-23 DIAGNOSIS — M25.511 RIGHT SHOULDER PAIN, UNSPECIFIED CHRONICITY: ICD-10-CM

## 2024-01-23 PROCEDURE — 97110 THERAPEUTIC EXERCISES: CPT | Performed by: PHYSICAL THERAPIST

## 2024-01-23 PROCEDURE — 97112 NEUROMUSCULAR REEDUCATION: CPT | Performed by: PHYSICAL THERAPIST

## 2024-01-23 NOTE — PROGRESS NOTES
"Daily Note     Today's date: 2024  Patient name: Shanique Heredia  : 1960  MRN: [id number]  Referring provider: Ha Doe, *  Dx:   Encounter Diagnosis     ICD-10-CM    1. Incomplete tear of right rotator cuff, unspecified whether traumatic  M75.111       2. Right shoulder pain, unspecified chronicity  M25.511                      Subjective: The patient reports doing \" pretty good\" today rating her R shoulder pain a 2/10 in intensity at present.      Objective: See treatment diary below      Assessment: The patient was very pleasant and agreeable during the session today.  The patient tolerated the exercises fairly well today.  There were complaints of some UE tingling but nothing that persisted.  The patient needed VC to avoid valsalva breath holding and to perform the exercises with proper form.  The patient reports feeling some stiffness at her R shoulder at the end of the treatment today.  The patient will benefit from continued skilled physical therapy to progress towards achieving patient centered goals.        Plan: Continue per plan of care.  Progress treatment as tolerated.       Precautions: Hx CA / DM / HTN    Re-evaluation:   [id number]  Shoulder Specialty Daily Treatment Diary     Manual  1/8 1/15 1/23     STM/MFR shoulder /scapula  /stretching                GH jt mobs        Pec Minor stretch                    Therapeutic Exercise 1/8 1/15 1/23     UBE for muscle endurance        Pulleys At home       Shoulder isometrics (flex, ext, abd, IR, ER) *Flex / ABD/ ER  X10:05 Flex, EXT, ER, ABD 10x:05      TB sh. IR, ER * * RED x10ea     Table slides flex/abd/ER Flex x10 Add ABD and ER 5x:05      T-spine extension * X10 seated X15 seated             Doorway pec stretch   5x:15     Test repeated SH ER in flexion  EXT  IR   *     Cane AAROM        Prone rows / extension / horizontal abduction        S/L ER        shoulder                Upper Trap Stretch * 5x:15 R shoulder down 5x:15 R "                     Neuro Re-ed        Chin Tucks x10 x10      Shoulder flex in mirror  X5 flex only Flex 1x10  Scaption 1x10     MTP/LTP * Mid Row RED x10 *RED x15     Horizontal ABD Band   *yellow x10     YTI wall slide with lift off *Y/T Y no lift x5 Y no lift 2x5     Chin tuck w/ EXT *x10       Scapular retract w/ ER  X10:05 mirror 2x10     Table plank  Table plank 5x:15     Scapular retraction *x10 x10      Therapeutic Activity        Crate carry        3 height crate lifts                Cones in Shelf *           Modalities        CP shoulder

## 2024-01-31 ENCOUNTER — APPOINTMENT (OUTPATIENT)
Dept: PHYSICAL THERAPY | Facility: CLINIC | Age: 64
End: 2024-01-31
Payer: COMMERCIAL

## 2024-02-06 NOTE — PROGRESS NOTES
PT Re-Evaluation  and PT Discharge    Today's date: 2024  Patient name: Shanique Heredia  : 1960  MRN: [id number]  Referring provider: Ha Doe, *  Dx:   Encounter Diagnosis     ICD-10-CM    1. Incomplete tear of right rotator cuff, unspecified whether traumatic  M75.111       2. Right shoulder pain, unspecified chronicity  M25.511                      Assessment  Assessment details: Shanique Heredia is a 63 y.o. female with a history of CA, arthritis, CKD, DM, dysuria, hyperlipidemia, HTN, IBS, polycycstic ovarian syndrome, RTC tear, BMI > 30 that presents for a physical therapy RE evaluation.  The patient demonstrates signs and symptoms consistent with incomplete RTC tear R shoulder; R shoulder pain.  During the examination the patient demonstrated improved R UE strength, improved R shoulder ROM, improved activity tolerance, and decreased R shoulder pain.  The patient has made functional gains since starting therapy.  The patient is now able to place a gallon of milk in/out of the fridge with less difficulty.  The patient is putting away the dishes with less difficulty.  The patient continues to have difficulty with over head activities.  The patient will be discharged from formal PT to an independent HEP per request.                  Symptom irritability: lowUnderstanding of Dx/Px/POC: good   Prognosis: good  Prognosis details: Positive prognostic indicators include: positive attitude toward recovery, good understanding of diagnosis/treatment plan, and absence of observed red flags.      Negative prognostic indicators include: Significant medical Hx, chronicity of condition,     Goals  STG: ACHIEVE in 4 -6 weeks  1.  Improve R shoulder pain at worst by 50% to improve ADL's. MET  2.  Improve R shoulder strength by 1/2-1 muscle grade to improve lifting/carrying tasks.PARTIALLY MET  3.  Improve R shoulder flexion, abduction, IR/ER ROM by 25 degrees to improve the ability to reach over head. MET  EXCEPT ER    LTG:  Achieve in 8-12 weeks  1.  Patient return to raising her R arm without pain greater than 2/10 in R shoulder to achieve their personal goal. NOT MET  2.  Patient's R shoulder strength return to equal for both sides to lift/carry items without pain/difficulty. NOT MET  3.  Patient's R shoulder ROM improve to WNL to perform all ADL's and overhead tasks without difficulty. PARTIALLY MET  4.  Patient to be independent with home exercise plan at time of discharge. MET  5. Patient's shoulder FOTO score improve to > 65% to indicate a return to normal function. PARTIALLY MET             Plan  Plan details: D/C PT TO AN INDEPENDENT HEP PER PATIENT REQUEST  Treatment plan discussed with: PTA and patient        Subjective Evaluation    History of Present Illness  Date of onset: 8/30/2023  Mechanism of injury: SUBJECTIVE: 2/7/24: The patient reports improvement since starting therapy.  The patient is now able to place a gallon of milk in/out of the fridge with less difficulty.  The patient is putting away the dishes with less difficulty.  The patient continues to have difficulty with over head activities.  The patient will be discharged from formal PT to an independent HEP per request.         INJURY HISTORY: Shanique Heredia is a 63 y.o. female that presents to outpatient physical therapy with complaints of R shoulder pain and weakness R UE.  The patient reports onset of pain at her R shoulder this past August which reminded her of RTC pain( previous Hx of RTC tear and subsequent surgery in 2015 with extensive rehab- 50 visits).  The patient was diagnosed with shingles at the R shoulder a few days later.  The patient received medication which helped to decrease the intensity of symptoms.  The patient has decreased mobility at her R shoulder since.  A recent CT scan of the R shoulder showed a partial thickness RTC tear - not a candidate for surgery currently.  The patient was referred to outpatient PT by  orthopedics for the patient to learn HEP in a few sessions.  The patient has most difficulty with placing dishes on an upper shelf, lifitng a gallon of milk in/out of fridge, and reaching over head.  The patient's main goal for physical therapy is to strengthen the R shoulder for function.             Recurrent probem    Patient Goals  Patient goals for therapy: decreased pain, increased motion, increased strength, return to sport/leisure activities and independence with ADLs/IADLs  Patient goal: improve strength ( progressing)  Pain  Current pain rating: 3  At best pain rating: 3  At worst pain ratin  Location: R shoulder / upper arm  Quality: burning and tight  Relieving factors: medications  Aggravating factors: overhead activity and lifting  Progression: improved    Social Support  Steps to enter house: no  Stairs in house: yes (basement for laundry)   Lives in: one-story house  Lives with: significant other    Employment status: not working (retired)  Hand dominance: right    Treatments  Current treatment: physical therapy        Objective     Static Posture     Head  Forward.    Shoulders  Rounded.    Thoracic Spine  Hyperkyphosis.    Palpation     Additional Palpation Details  (-) TTP    Tenderness     Additional Tenderness Details  (-) TTP    Cervical/Thoracic Screen   Cervical range of motion within normal limits with the following exceptions: Cervical:  Protrusion: min loss  Retraction: brigido loss  Flexion: nil loss  EXT: mod loss  ROT: L: 75 loss R: 65 loss  Lat Flex: L:mod loss R: mod loss  NO C-SPINE RELEVANCE WITH PATIENT'S SHOULDER SX's        Neurological Testing     Sensation     Shoulder   Left Shoulder   Intact: light touch    Right Shoulder   Intact: Light touch    Active Range of Motion   Left Shoulder   Flexion: 150 degrees   Extension: 60 degrees   Abduction: 140 degrees   Adduction: 45 degrees   External rotation 0°: 50 degrees   External rotation BTH: T2   Internal rotation BTB: T9      Right Shoulder   Flexion: 125 degrees with pain  Extension: 70 degrees   Abduction: 102 degrees with pain  Adduction: 37 degrees   External rotation 0°: 33 degrees   External rotation BTH: C2 (unable to get hand behind head)   Internal rotation BTB: T10     Additional Active Range of Motion Details  Flex: improved 30 deg; ABD 14 deg    Passive Range of Motion   Left Shoulder   Normal passive range of motion    Right Shoulder   Flexion: 145 degrees   Abduction: 140 degrees   External rotation 0°: 40 degrees     Strength/Myotome Testing     Left Shoulder     Planes of Motion   Flexion: 4+   Extension: 4+   Abduction: 4+   Adduction: 4+   External rotation at 0°: 4+   Internal rotation at 0°: 4+     Right Shoulder     Planes of Motion   Flexion: 3-   Extension: 4-   Abduction: 3-   Adduction: 4-   External rotation at 0°: 4-   Internal rotation at 0°: 4     Additional Strength Details  IMPROVED    Tests     Additional Tests Details  FOTO:61% ( predicted 64%) IMPROVED               Precautions: Hx CA / DM / HTN    Re-evaluation:     Shoulder Specialty Daily Treatment Diary     Manual  1/8 1/15 1/23 2/7    STM/MFR shoulder /scapula  /stretching                GH jt mobs        Pec Minor stretch                    Therapeutic Exercise 1/8 1/15 1/23 2/7    UBE for muscle endurance        Pulleys At home       Shoulder isometrics (flex, ext, abd, IR, ER) *Flex / ABD/ ER  X10:05 Flex, EXT, ER, ABD 10x:05      TB sh. IR, ER * * RED x10ea     Table slides flex/abd/ER Flex x10 Add ABD and ER 5x:05  ER 5x:30    T-spine extension * X10 seated X15 seated             Doorway pec stretch   5x:15 reviewed    Test repeated SH ER in flexion  EXT  IR   * Sh ER in flexion  X15 NC    Cane AAROM        Prone rows / extension / horizontal abduction        S/L ER        shoulder                Upper Trap Stretch * 5x:15 R shoulder down 5x:15 R                     Neuro Re-ed        Chin Tucks x10 x10  reviewed    Shoulder flex in mirror   X5 flex only Flex 1x10  Scaption 1x10 Flex eccentric instructed  ABD eccentric x5    MTP/LTP * Mid Row RED x10 *RED x15 review    Horizontal ABD Band   *yellow x10 review    YTI wall slide with lift off *Y/T Y no lift x5 Y no lift 2x5     Chin tuck w/ EXT *x10       Scapular retract w/ ER  X10:05 mirror 2x10     Table plank  Table plank 5x:15     Scapular retraction *x10 x10      Therapeutic Activity        Crate carry        3 height crate lifts                Cones in Shelf *           Modalities        CP shoulder                                 The patient was given a new home exercise plan with instruction, pictures, and verbal feedback.  The patient accepts and understands the new home activities.

## 2024-02-07 ENCOUNTER — EVALUATION (OUTPATIENT)
Dept: PHYSICAL THERAPY | Facility: CLINIC | Age: 64
End: 2024-02-07
Payer: COMMERCIAL

## 2024-02-07 DIAGNOSIS — M75.111 INCOMPLETE TEAR OF RIGHT ROTATOR CUFF, UNSPECIFIED WHETHER TRAUMATIC: Primary | ICD-10-CM

## 2024-02-07 DIAGNOSIS — M25.511 RIGHT SHOULDER PAIN, UNSPECIFIED CHRONICITY: ICD-10-CM

## 2024-02-07 PROCEDURE — 97164 PT RE-EVAL EST PLAN CARE: CPT | Performed by: PHYSICAL THERAPIST

## 2024-02-07 PROCEDURE — 97110 THERAPEUTIC EXERCISES: CPT | Performed by: PHYSICAL THERAPIST

## 2024-02-07 PROCEDURE — 97112 NEUROMUSCULAR REEDUCATION: CPT | Performed by: PHYSICAL THERAPIST

## 2024-03-05 DIAGNOSIS — N18.32 TYPE 2 DIABETES MELLITUS WITH STAGE 3B CHRONIC KIDNEY DISEASE, WITHOUT LONG-TERM CURRENT USE OF INSULIN (HCC): ICD-10-CM

## 2024-03-05 DIAGNOSIS — E11.22 TYPE 2 DIABETES MELLITUS WITH STAGE 3B CHRONIC KIDNEY DISEASE, WITHOUT LONG-TERM CURRENT USE OF INSULIN (HCC): ICD-10-CM

## 2024-03-05 RX ORDER — GLIPIZIDE 5 MG/1
5 TABLET, FILM COATED, EXTENDED RELEASE ORAL DAILY
Qty: 90 TABLET | Refills: 1 | Status: SHIPPED | OUTPATIENT
Start: 2024-03-05

## 2024-03-11 DIAGNOSIS — E11.65 TYPE 2 DIABETES MELLITUS WITH HYPERGLYCEMIA, WITHOUT LONG-TERM CURRENT USE OF INSULIN (HCC): ICD-10-CM

## 2024-03-11 RX ORDER — DAPAGLIFLOZIN 10 MG/1
10 TABLET, FILM COATED ORAL
Qty: 90 TABLET | Refills: 1 | Status: SHIPPED | OUTPATIENT
Start: 2024-03-11

## 2024-04-04 DIAGNOSIS — E03.9 HYPOTHYROIDISM, UNSPECIFIED TYPE: ICD-10-CM

## 2024-04-04 DIAGNOSIS — K21.9 GASTROESOPHAGEAL REFLUX DISEASE WITHOUT ESOPHAGITIS: ICD-10-CM

## 2024-04-04 RX ORDER — LEVOTHYROXINE SODIUM 0.1 MG/1
100 TABLET ORAL DAILY
Qty: 90 TABLET | Refills: 1 | Status: SHIPPED | OUTPATIENT
Start: 2024-04-04

## 2024-04-04 RX ORDER — PANTOPRAZOLE SODIUM 40 MG/1
40 TABLET, DELAYED RELEASE ORAL DAILY
Qty: 90 TABLET | Refills: 1 | Status: SHIPPED | OUTPATIENT
Start: 2024-04-04

## 2024-04-18 ENCOUNTER — TELEPHONE (OUTPATIENT)
Dept: NEPHROLOGY | Facility: CLINIC | Age: 64
End: 2024-04-18

## 2024-04-25 ENCOUNTER — APPOINTMENT (OUTPATIENT)
Dept: LAB | Facility: CLINIC | Age: 64
End: 2024-04-25
Payer: COMMERCIAL

## 2024-04-25 DIAGNOSIS — E83.42 HYPOMAGNESEMIA: ICD-10-CM

## 2024-04-25 DIAGNOSIS — N18.30 STAGE 3 CHRONIC KIDNEY DISEASE, UNSPECIFIED WHETHER STAGE 3A OR 3B CKD (HCC): ICD-10-CM

## 2024-04-25 DIAGNOSIS — E03.9 HYPOTHYROIDISM, UNSPECIFIED TYPE: ICD-10-CM

## 2024-04-25 DIAGNOSIS — E11.65 TYPE 2 DIABETES MELLITUS WITH HYPERGLYCEMIA, WITHOUT LONG-TERM CURRENT USE OF INSULIN (HCC): ICD-10-CM

## 2024-04-25 LAB
ALBUMIN SERPL BCP-MCNC: 4.3 G/DL (ref 3.5–5)
ALP SERPL-CCNC: 57 U/L (ref 34–104)
ALT SERPL W P-5'-P-CCNC: 11 U/L (ref 7–52)
ANION GAP SERPL CALCULATED.3IONS-SCNC: 9 MMOL/L (ref 4–13)
AST SERPL W P-5'-P-CCNC: 14 U/L (ref 13–39)
BACTERIA UR QL AUTO: ABNORMAL /HPF
BASOPHILS # BLD AUTO: 0.09 THOUSANDS/ÂΜL (ref 0–0.1)
BASOPHILS NFR BLD AUTO: 1 % (ref 0–1)
BILIRUB SERPL-MCNC: 0.53 MG/DL (ref 0.2–1)
BILIRUB UR QL STRIP: NEGATIVE
BUN SERPL-MCNC: 35 MG/DL (ref 5–25)
CALCIUM SERPL-MCNC: 9.6 MG/DL (ref 8.4–10.2)
CHLORIDE SERPL-SCNC: 103 MMOL/L (ref 96–108)
CLARITY UR: CLEAR
CO2 SERPL-SCNC: 27 MMOL/L (ref 21–32)
COLOR UR: COLORLESS
CREAT SERPL-MCNC: 1.39 MG/DL (ref 0.6–1.3)
CREAT UR-MCNC: 59.3 MG/DL
EOSINOPHIL # BLD AUTO: 0.15 THOUSAND/ÂΜL (ref 0–0.61)
EOSINOPHIL NFR BLD AUTO: 2 % (ref 0–6)
ERYTHROCYTE [DISTWIDTH] IN BLOOD BY AUTOMATED COUNT: 13.5 % (ref 11.6–15.1)
EST. AVERAGE GLUCOSE BLD GHB EST-MCNC: 186 MG/DL
GFR SERPL CREATININE-BSD FRML MDRD: 40 ML/MIN/1.73SQ M
GLUCOSE P FAST SERPL-MCNC: 103 MG/DL (ref 65–99)
GLUCOSE UR STRIP-MCNC: ABNORMAL MG/DL
HBA1C MFR BLD: 8.1 %
HCT VFR BLD AUTO: 37.5 % (ref 34.8–46.1)
HGB BLD-MCNC: 11.7 G/DL (ref 11.5–15.4)
HGB UR QL STRIP.AUTO: NEGATIVE
IMM GRANULOCYTES # BLD AUTO: 0.04 THOUSAND/UL (ref 0–0.2)
IMM GRANULOCYTES NFR BLD AUTO: 1 % (ref 0–2)
KETONES UR STRIP-MCNC: NEGATIVE MG/DL
LEUKOCYTE ESTERASE UR QL STRIP: ABNORMAL
LYMPHOCYTES # BLD AUTO: 2.28 THOUSANDS/ÂΜL (ref 0.6–4.47)
LYMPHOCYTES NFR BLD AUTO: 33 % (ref 14–44)
MAGNESIUM SERPL-MCNC: 1.9 MG/DL (ref 1.9–2.7)
MCH RBC QN AUTO: 29 PG (ref 26.8–34.3)
MCHC RBC AUTO-ENTMCNC: 31.2 G/DL (ref 31.4–37.4)
MCV RBC AUTO: 93 FL (ref 82–98)
MICROALBUMIN UR-MCNC: 7.4 MG/L
MICROALBUMIN/CREAT 24H UR: 12 MG/G CREATININE (ref 0–30)
MONOCYTES # BLD AUTO: 0.61 THOUSAND/ÂΜL (ref 0.17–1.22)
MONOCYTES NFR BLD AUTO: 9 % (ref 4–12)
NEUTROPHILS # BLD AUTO: 3.82 THOUSANDS/ÂΜL (ref 1.85–7.62)
NEUTS SEG NFR BLD AUTO: 54 % (ref 43–75)
NITRITE UR QL STRIP: NEGATIVE
NON-SQ EPI CELLS URNS QL MICRO: ABNORMAL /HPF
NRBC BLD AUTO-RTO: 0 /100 WBCS
PH UR STRIP.AUTO: 5.5 [PH]
PHOSPHATE SERPL-MCNC: 3.4 MG/DL (ref 2.3–4.1)
PLATELET # BLD AUTO: 220 THOUSANDS/UL (ref 149–390)
PMV BLD AUTO: 11.8 FL (ref 8.9–12.7)
POTASSIUM SERPL-SCNC: 5.2 MMOL/L (ref 3.5–5.3)
PROT SERPL-MCNC: 7 G/DL (ref 6.4–8.4)
PROT UR STRIP-MCNC: NEGATIVE MG/DL
PTH-INTACT SERPL-MCNC: 86.9 PG/ML (ref 12–88)
RBC # BLD AUTO: 4.04 MILLION/UL (ref 3.81–5.12)
RBC #/AREA URNS AUTO: ABNORMAL /HPF
SODIUM SERPL-SCNC: 139 MMOL/L (ref 135–147)
SP GR UR STRIP.AUTO: 1.01 (ref 1–1.03)
TSH SERPL DL<=0.05 MIU/L-ACNC: 1.28 UIU/ML (ref 0.45–4.5)
URATE SERPL-MCNC: 6.3 MG/DL (ref 2–7.5)
UROBILINOGEN UR STRIP-ACNC: <2 MG/DL
WBC # BLD AUTO: 6.99 THOUSAND/UL (ref 4.31–10.16)
WBC #/AREA URNS AUTO: ABNORMAL /HPF

## 2024-04-25 PROCEDURE — 85025 COMPLETE CBC W/AUTO DIFF WBC: CPT

## 2024-04-25 PROCEDURE — 83970 ASSAY OF PARATHORMONE: CPT

## 2024-04-25 PROCEDURE — 83735 ASSAY OF MAGNESIUM: CPT

## 2024-04-25 PROCEDURE — 82043 UR ALBUMIN QUANTITATIVE: CPT

## 2024-04-25 PROCEDURE — 84100 ASSAY OF PHOSPHORUS: CPT

## 2024-04-25 PROCEDURE — 84550 ASSAY OF BLOOD/URIC ACID: CPT

## 2024-04-25 PROCEDURE — 80053 COMPREHEN METABOLIC PANEL: CPT

## 2024-04-25 PROCEDURE — 83036 HEMOGLOBIN GLYCOSYLATED A1C: CPT

## 2024-04-25 PROCEDURE — 84443 ASSAY THYROID STIM HORMONE: CPT

## 2024-04-25 PROCEDURE — 82570 ASSAY OF URINE CREATININE: CPT

## 2024-04-25 PROCEDURE — 81001 URINALYSIS AUTO W/SCOPE: CPT

## 2024-04-25 PROCEDURE — 36415 COLL VENOUS BLD VENIPUNCTURE: CPT

## 2024-04-30 ENCOUNTER — TELEPHONE (OUTPATIENT)
Age: 64
End: 2024-04-30

## 2024-04-30 NOTE — TELEPHONE ENCOUNTER
Patient called to reschedule appointment she had in March that she had to cancel. I was able to schedule her for next available in August.     Patient will be due for another A1C in July.    Patient asking if that can be placed for her to get done prior to her August appt

## 2024-04-30 NOTE — TELEPHONE ENCOUNTER
We can do a POC HgA1C at that appointment. Thank you.     As A1C is quite high, would patient like me to review her CGM so we can make adjustments to her insulin regimen? I will do so if she is connected or would like to come by for a download. Thanks.

## 2024-05-01 ENCOUNTER — OFFICE VISIT (OUTPATIENT)
Dept: NEPHROLOGY | Facility: CLINIC | Age: 64
End: 2024-05-01
Payer: COMMERCIAL

## 2024-05-01 VITALS
BODY MASS INDEX: 46.1 KG/M2 | HEART RATE: 70 BPM | OXYGEN SATURATION: 97 % | HEIGHT: 64 IN | WEIGHT: 270 LBS | DIASTOLIC BLOOD PRESSURE: 60 MMHG | SYSTOLIC BLOOD PRESSURE: 110 MMHG

## 2024-05-01 DIAGNOSIS — N25.81 SECONDARY HYPERPARATHYROIDISM (HCC): ICD-10-CM

## 2024-05-01 DIAGNOSIS — E11.22 TYPE 2 DIABETES MELLITUS WITH STAGE 3 CHRONIC KIDNEY DISEASE, WITHOUT LONG-TERM CURRENT USE OF INSULIN, UNSPECIFIED WHETHER STAGE 3A OR 3B CKD (HCC): ICD-10-CM

## 2024-05-01 DIAGNOSIS — I10 BENIGN ESSENTIAL HYPERTENSION: ICD-10-CM

## 2024-05-01 DIAGNOSIS — E66.01 OBESITY, CLASS III, BMI 40-49.9 (MORBID OBESITY) (HCC): ICD-10-CM

## 2024-05-01 DIAGNOSIS — N18.32 STAGE 3B CHRONIC KIDNEY DISEASE (HCC): Primary | ICD-10-CM

## 2024-05-01 DIAGNOSIS — N18.30 TYPE 2 DIABETES MELLITUS WITH STAGE 3 CHRONIC KIDNEY DISEASE, WITHOUT LONG-TERM CURRENT USE OF INSULIN, UNSPECIFIED WHETHER STAGE 3A OR 3B CKD (HCC): ICD-10-CM

## 2024-05-01 PROCEDURE — 99214 OFFICE O/P EST MOD 30 MIN: CPT | Performed by: INTERNAL MEDICINE

## 2024-05-01 NOTE — PROGRESS NOTES
Assessment & Plan:    1. Stage 3b chronic kidney disease (HCC)  -     Urinalysis with microscopic; Future; Expected date: 10/16/2024  -     PTH, intact; Future; Expected date: 10/16/2024  -     Uric acid; Future; Expected date: 10/16/2024  -     Comprehensive metabolic panel; Future; Expected date: 10/16/2024  -     Albumin / creatinine urine ratio; Future; Expected date: 10/16/2024  -     Magnesium; Future; Expected date: 10/16/2024  -     Phosphorus; Future; Expected date: 10/16/2024  2. Benign essential hypertension  -     Urinalysis with microscopic; Future; Expected date: 10/16/2024  -     PTH, intact; Future; Expected date: 10/16/2024  -     Uric acid; Future; Expected date: 10/16/2024  -     Comprehensive metabolic panel; Future; Expected date: 10/16/2024  -     Albumin / creatinine urine ratio; Future; Expected date: 10/16/2024  -     Magnesium; Future; Expected date: 10/16/2024  -     Phosphorus; Future; Expected date: 10/16/2024  3. Type 2 diabetes mellitus with stage 3 chronic kidney disease, without long-term current use of insulin, unspecified whether stage 3a or 3b CKD (HCC)  -     Urinalysis with microscopic; Future; Expected date: 10/16/2024  -     PTH, intact; Future; Expected date: 10/16/2024  -     Uric acid; Future; Expected date: 10/16/2024  -     Comprehensive metabolic panel; Future; Expected date: 10/16/2024  -     Albumin / creatinine urine ratio; Future; Expected date: 10/16/2024  -     Magnesium; Future; Expected date: 10/16/2024  -     Phosphorus; Future; Expected date: 10/16/2024  4. Secondary hyperparathyroidism (HCC)  5. Obesity, Class III, BMI 40-49.9 (morbid obesity) (HCC)         CKD3b  Not biopsy proven, etiology suspected 2/2 DKD, obesity related FSGS, HTN, vascular disease.  Cr baseline 1.3-1.4. 4/25/24 Cr 1.39 with eGFR 40 ml/min.   Volume status compensated.  Alb/cr ratio 12 mg/g cr.  Reviewed CKD stages, Cr and eGFR trends and eGFR meaning in simple terms.  Volume status  compensated.  Continue to follow renal indices Q 6 months.  Potassium trends borderline, follow.   Continue SGLT-2I and ACE for CKD progression.  Goal TCO2>22.  Goal BP<130/80.    2. Benign HTN  BP well controlled at present.  Continue low sodium diet.  Continue lisinopril 20mg/day for CKD progression, BP does not permit titration.    3. Type 2 DM, not under optimal control.  Continue management per endocrine. A1C 8.1.  Ozempic 2 mg,Farxiga 10mg/day,  glipizide 5mg/day and lantus 24 unit per day.     4. Secondary HPT  Follow Ca,phos,PTH parameters.  PTH improving--86.9. goal 35-70 for CKD staging. Phos and Ca WNL.    5. Obesity   Continue management with GLP-1 and encourage dietary modification.    The benefits, risks and alternatives to the treatment plan were discussed at this visit. Patient was advised of common adverse effects of any medical therapies prescribed. All questions were answered and discussed with the patient and any accompanying family members or caretakers.      Subjective:      Patient ID: Shanique Heredia is a 63 y.o. female presents for follow up in the [place] office for CKD management. Patient last seen 10/26/23.     HPI  In the interim since last visit, reports ongoing orthopedic issues. Follows with orthopedic and PT. She has history of right rotator cuff partial thickness re tear of supraspinatus tendon.     Ro breast cancer, she has temp implant and needs perm implant in. Per plastic surgery she has to be under 7.5. For past 6 weeks she has been not as good.     Continue magnesium supplement. Denies diarrhea.    She says weight is up a little ble.BG ---119-200s. BG typically in 150s. A1C 8.1.BG higher than when she would like  Currently on Ozempic 2 mg,Farxiga 10mg/day,  glipizide 5mg/day and lantus 24 unit per day.     Not checking BP at home.   /60.  Denies falls/syncope/near syncope.     Takes lasix once a month.     The following portions of the patient's history were reviewed and  "updated as appropriate: allergies, current medications, past family history, past medical history, past social history, past surgical history, and problem list.    Review of Systems   Constitutional: Negative.    Respiratory: Negative.     Cardiovascular: Negative.    Gastrointestinal: Negative.    Genitourinary: Negative.    Neurological: Negative.    All other systems reviewed and are negative.        Objective:      /60 (BP Location: Right arm, Patient Position: Sitting, Cuff Size: Large)   Pulse 70   Ht 5' 4\" (1.626 m)   Wt 122 kg (270 lb)   LMP  (LMP Unknown)   SpO2 97%   BMI 46.35 kg/m²          Physical Exam  Vitals reviewed.   Constitutional:       General: She is not in acute distress.     Appearance: She is obese. She is not ill-appearing.   HENT:      Nose: Nose normal.      Mouth/Throat:      Mouth: Mucous membranes are moist.      Pharynx: Oropharynx is clear.   Eyes:      Conjunctiva/sclera: Conjunctivae normal.   Cardiovascular:      Rate and Rhythm: Normal rate and regular rhythm.      Heart sounds:      No friction rub.   Pulmonary:      Breath sounds: Normal breath sounds. No rhonchi or rales.   Abdominal:      General: There is no distension.      Palpations: Abdomen is soft.      Tenderness: There is no abdominal tenderness. There is no guarding.   Musculoskeletal:      Right lower leg: No edema.      Left lower leg: No edema.   Skin:     Coloration: Skin is not jaundiced.      Findings: No bruising.   Neurological:      General: No focal deficit present.      Mental Status: She is alert and oriented to person, place, and time.             Lab Results   Component Value Date     10/09/2015    SODIUM 139 04/25/2024    K 5.2 04/25/2024     04/25/2024    CO2 27 04/25/2024    ANIONGAP 6 10/09/2015    AGAP 9 04/25/2024    BUN 35 (H) 04/25/2024    CREATININE 1.39 (H) 04/25/2024    GLUC 145 (H) 07/22/2022    GLUF 103 (H) 04/25/2024    CALCIUM 9.6 04/25/2024    AST 14 04/25/2024    " "ALT 11 04/25/2024    ALKPHOS 57 04/25/2024    PROT 7.2 10/09/2015    TP 7.0 04/25/2024    BILITOT 0.48 10/09/2015    TBILI 0.53 04/25/2024    EGFR 40 04/25/2024      Lab Results   Component Value Date    CREATININE 1.39 (H) 04/25/2024    CREATININE 1.38 (H) 12/15/2023    CREATININE 1.17 10/23/2023    CREATININE 1.33 (H) 06/24/2023    CREATININE 1.43 (H) 06/19/2023    CREATININE 1.19 02/21/2023    CREATININE 1.13 01/23/2023    CREATININE 1.51 (H) 10/17/2022    CREATININE 1.52 (H) 07/22/2022    CREATININE 1.68 (H) 07/05/2022    CREATININE 1.17 02/09/2022    CREATININE 1.43 (H) 01/13/2022    CREATININE 1.32 (H) 05/20/2021    CREATININE 1.27 (H) 01/21/2021    CREATININE 1.22 12/23/2019      Lab Results   Component Value Date    COLORU Colorless 04/25/2024    CLARITYU Clear 04/25/2024    SPECGRAV 1.009 04/25/2024    PHUR 5.5 04/25/2024    LEUKOCYTESUR Trace (A) 04/25/2024    NITRITE Negative 04/25/2024    PROTEIN UA Negative 04/25/2024    GLUCOSEU 1000 (1%) (A) 04/25/2024    KETONESU Negative 04/25/2024    UROBILINOGEN <2.0 04/25/2024    BILIRUBINUR Negative 04/25/2024    BLOODU Negative 04/25/2024    RBCUA 1-2 04/25/2024    WBCUA 4-10 (A) 04/25/2024    EPIS Occasional 04/25/2024    BACTERIA Occasional 04/25/2024      No results found for: \"LABPROT\"  No results found for: \"MICROALBUR\", \"[id number]\"  Lab Results   Component Value Date    WBC 6.99 04/25/2024    HGB 11.7 04/25/2024    HCT 37.5 04/25/2024    MCV 93 04/25/2024     04/25/2024      Lab Results   Component Value Date    HGB 11.7 04/25/2024    HGB 11.3 (L) 06/24/2023    HGB 10.5 (L) 01/23/2023    HGB 10.1 (L) 04/15/2022    HGB 9.4 (L) 02/09/2022      No results found for: \"IRON\", \"TIBC\", \"FERRITIN\"   No results found for: \"PTHCALCIUM\", \"TQSE26QKMZPE\", \"PHOSPHORUS\"   Lab Results   Component Value Date    CHOLESTEROL 169 10/23/2023    HDL 36 (L) 10/23/2023    LDLCALC 83 10/23/2023    TRIG 250 (H) 10/23/2023      Lab Results   Component Value Date    URICACID " "6.3 04/25/2024      Lab Results   Component Value Date    HGBA1C 8.1 (H) 04/25/2024      Lab Results   Component Value Date    FREET4 1.11 07/05/2022      Lab Results   Component Value Date    BILL Negative 06/19/2023      Lab Results   Component Value Date    PROT 7.2 10/09/2015        Portions of the record may have been created with voice recognition software. Occasional wrong word or \"sound a like\" substitutions may have occurred due to the inherent limitations of voice recognition software. Read the chart carefully and recognize, using context, where substitutions have occurred. If you have any questions, please contact the dictating provider.      "

## 2024-05-01 NOTE — PATIENT INSTRUCTIONS
You have stable stage 3 kidney disease at 40%.   You blood pressure is well controlled.   Okay for glucosamine chondroitin. If you had to take an occasional ibuprofen, 1-2 times a week, this would be okay. ROUTINE use(which is daily) should be avoided.  No changes to medications otherwise.   Follow up in 6 months.   From initial review of literature, berberine seems to be okay from kidney perspective to take.

## 2024-05-01 NOTE — TELEPHONE ENCOUNTER
Patient called back, informed her of provider's reply, she would like to stop in for the CGM download.

## 2024-05-08 DIAGNOSIS — I10 ESSENTIAL HYPERTENSION: ICD-10-CM

## 2024-05-08 RX ORDER — LISINOPRIL 20 MG/1
20 TABLET ORAL DAILY
Qty: 90 TABLET | Refills: 1 | Status: SHIPPED | OUTPATIENT
Start: 2024-05-08

## 2024-05-20 ENCOUNTER — RA CDI HCC (OUTPATIENT)
Dept: OTHER | Facility: HOSPITAL | Age: 64
End: 2024-05-20

## 2024-05-20 PROBLEM — N18.30 TYPE 2 DIABETES MELLITUS WITH STAGE 3 CHRONIC KIDNEY DISEASE, WITH LONG-TERM CURRENT USE OF INSULIN (HCC): Status: ACTIVE | Noted: 2024-05-20

## 2024-05-20 PROBLEM — E11.22 TYPE 2 DIABETES MELLITUS WITH STAGE 3 CHRONIC KIDNEY DISEASE, WITH LONG-TERM CURRENT USE OF INSULIN (HCC): Status: ACTIVE | Noted: 2024-05-20

## 2024-05-20 PROBLEM — Z79.4 TYPE 2 DIABETES MELLITUS WITH STAGE 3 CHRONIC KIDNEY DISEASE, WITH LONG-TERM CURRENT USE OF INSULIN (HCC): Status: ACTIVE | Noted: 2024-05-20

## 2024-05-20 NOTE — PROGRESS NOTES
HCC coding opportunities          Chart Reviewed number of suggestions sent to Provider: 1   Z79.4    Patients Insurance        Commercial Insurance: [employer]

## 2024-05-22 ENCOUNTER — OFFICE VISIT (OUTPATIENT)
Dept: ENDOCRINOLOGY | Facility: CLINIC | Age: 64
End: 2024-05-22
Payer: COMMERCIAL

## 2024-05-22 VITALS
HEIGHT: 64 IN | RESPIRATION RATE: 18 BRPM | SYSTOLIC BLOOD PRESSURE: 126 MMHG | TEMPERATURE: 97.4 F | HEART RATE: 75 BPM | BODY MASS INDEX: 46.1 KG/M2 | OXYGEN SATURATION: 98 % | DIASTOLIC BLOOD PRESSURE: 80 MMHG | WEIGHT: 270 LBS

## 2024-05-22 DIAGNOSIS — E66.01 CLASS 3 SEVERE OBESITY DUE TO EXCESS CALORIES WITH SERIOUS COMORBIDITY AND BODY MASS INDEX (BMI) OF 45.0 TO 49.9 IN ADULT (HCC): ICD-10-CM

## 2024-05-22 DIAGNOSIS — E11.65 TYPE 2 DIABETES MELLITUS WITH HYPERGLYCEMIA, WITHOUT LONG-TERM CURRENT USE OF INSULIN (HCC): Primary | ICD-10-CM

## 2024-05-22 DIAGNOSIS — I10 BENIGN ESSENTIAL HYPERTENSION: ICD-10-CM

## 2024-05-22 DIAGNOSIS — E03.9 HYPOTHYROIDISM, UNSPECIFIED TYPE: ICD-10-CM

## 2024-05-22 DIAGNOSIS — N18.32 CHRONIC KIDNEY DISEASE (CKD) STAGE G3B/A1, MODERATELY DECREASED GLOMERULAR FILTRATION RATE (GFR) BETWEEN 30-44 ML/MIN/1.73 SQUARE METER AND ALBUMINURIA CREATININE RATIO LESS THAN 30 MG/G (HCC): ICD-10-CM

## 2024-05-22 PROBLEM — Z79.4 TYPE 2 DIABETES MELLITUS WITH STAGE 3 CHRONIC KIDNEY DISEASE, WITH LONG-TERM CURRENT USE OF INSULIN (HCC): Status: ACTIVE | Noted: 2017-07-27

## 2024-05-22 PROBLEM — E11.22 TYPE 2 DIABETES MELLITUS WITH STAGE 3 CHRONIC KIDNEY DISEASE, WITH LONG-TERM CURRENT USE OF INSULIN (HCC): Status: ACTIVE | Noted: 2017-07-27

## 2024-05-22 PROBLEM — N18.30 TYPE 2 DIABETES MELLITUS WITH STAGE 3 CHRONIC KIDNEY DISEASE, WITH LONG-TERM CURRENT USE OF INSULIN (HCC): Status: ACTIVE | Noted: 2017-07-27

## 2024-05-22 PROCEDURE — 99214 OFFICE O/P EST MOD 30 MIN: CPT | Performed by: NURSE PRACTITIONER

## 2024-05-22 RX ORDER — INSULIN GLARGINE 100 [IU]/ML
INJECTION, SOLUTION SUBCUTANEOUS
Qty: 30 ML | Refills: 1 | Status: SHIPPED | OUTPATIENT
Start: 2024-05-22

## 2024-05-22 NOTE — ASSESSMENT & PLAN NOTE
Taking max dose of Ozempic 2 mg once weekly.  However, dietary choices continue to present a significant barrier to meaningful weight loss.

## 2024-05-22 NOTE — PROGRESS NOTES
Established Patient Progress Note    Chief Complaint:  Diabetes follow up visit    Impression & Plan:    Problem List Items Addressed This Visit       Benign essential hypertension     /80. Continue current regimen.          Hypothyroidism     Thyroid function is stable. Continue 100 mcg of levothyroxine daily. Repeat TFT in 6 months.         Chronic kidney disease (CKD) stage G3b/A1, moderately decreased glomerular filtration rate (GFR) between 30-44 mL/min/1.73 square meter and albuminuria creatinine ratio less than 30 mg/g ([facility])     Lab Results   Component Value Date    EGFR 40 04/25/2024    EGFR 40 12/15/2023    EGFR 50 10/23/2023    CREATININE 1.39 (H) 04/25/2024    CREATININE 1.38 (H) 12/15/2023    CREATININE 1.17 10/23/2023   Stable.  Following closely with nephrology.  Continue Farxiga.  Continue lisinopril.         Class 3 severe obesity due to excess calories with serious comorbidity and body mass index (BMI) of 45.0 to 49.9 in adult ([facility])     Taking max dose of Ozempic 2 mg once weekly.  However, dietary choices continue to present a significant barrier to meaningful weight loss.          Other Visit Diagnoses       Type 2 diabetes mellitus with hyperglycemia, without long-term current use of insulin ([facility])    -  Primary    Relevant Medications    Insulin Glargine Solostar (Lantus SoloStar) 100 UNIT/ML SOPN    Other Relevant Orders    Hemoglobin A1C    Basic metabolic panel            History of Present Illness:   Shanique Heredia is a 63 y.o. female with  HTN, HLD, hypothyroidism, and type 2 diabetes without long term use of insulin since approximately 2010 presenting for late follow up. Reports complications of neuropathy.     Patient's last appointment on 11/9/2023, Lantus was increased.  Patient was given a referral for diabetes education to learn how to use her freestyle carter.     Hemoglobin A1C   Latest Ref Rng Normal 4.0-5.6%; PreDiabetic 5.7-6.4%; Diabetic >=6.5%; Glycemic control for adults  with diabetes <7.0% %   6/24/2023 8.2 (H)    10/23/2023 7.8 (H)    4/25/2024 8.1 (H)       eAG, EST AVG Glucose   Latest Ref Rng mg/dl   6/24/2023 189    10/23/2023 177    4/25/2024 186       Legend:  (H) High    Home blood glucose readings:   Before breakfast: 189, 171, 183, 173, 145, 265 (after meal),185, 205, 181, 196, 136, 178,        Current regimen:  Glipizide 5 mg once daily  Lantus 24 units nightly  Ozempic 2 mg once weekly  Farxiga 10 mg daily        Patient Active Problem List   Diagnosis    Anemia    Benign essential hypertension    Arthritis    Mixed diabetic hyperlipidemia associated with type 2 diabetes mellitus  (HCC)    Disc degeneration, lumbar    Fibrocystic breast disease, unspecified laterality    GERD without esophagitis    Hypothyroidism    IBS (irritable bowel syndrome)    Neoplasm of uncertain behavior of skin    MARIA FERNANDA (obstructive sleep apnea)    Primary osteoarthritis of both knees    Vitamin D deficiency    Fatty liver    Full thickness rotator cuff tear    Shoulder joint pain    Family history of breast cancer in female    Screening mammogram, encounter for    Lumbar radiculopathy    History of ductal carcinoma in situ (DCIS) of breast    BRCA negative    Chronic kidney disease (CKD) stage G3b/A1, moderately decreased glomerular filtration rate (GFR) between 30-44 mL/min/1.73 square meter and albuminuria creatinine ratio less than 30 mg/g (HCC)    Class 3 severe obesity due to excess calories with serious comorbidity and body mass index (BMI) of 45.0 to 49.9 in adult (HCC)    Yeast infection involving the vagina and surrounding area    Encounter for follow-up surveillance of breast cancer    Shoulder weakness    Post herpetic neuralgia    Secondary hyperparathyroidism (HCC)    Type 2 diabetes mellitus with stage 3 chronic kidney disease, with long-term current use of insulin (HCC)      Past Medical History:   Diagnosis Date    Achrochordon     Arthritis     BRCA1 negative     BRCA2 negative      Cancer (HCC) 2021    Breast (left)    Chronic kidney disease     Colon polyp     Diabetes mellitus (HCC)     Dysuria     Headache(784.0)     Hyperlipidemia     Hypertension     IBS (irritable bowel syndrome)     Inflammatory bowel disease     Polycystic ovarian syndrome     Rotator cuff tear     right side     Wears glasses       Past Surgical History:   Procedure Laterality Date    ANAL FISTULOTOMY      subcutaneous     BREAST BIOPSY  2021    BREAST LUMPECTOMY Left 2021    BREAST RECONSTRUCTION Left 2022    Procedure: IMMEDIATE RECONSTRUCTION WITH TISSUE EXPANDER PLACEMENT;  Surgeon: Benedicto Cardona MD;  Location: [facility];  Service: Plastics    CHOLECYSTECTOMY      COLONOSCOPY      FL INJECTION RIGHT SHOULDER (ARTHROGRAM)  2023    FLAP LOCAL TRUNK Left 2022    Procedure: DE-EPITHELIALIZED FLAP BREAST;  Surgeon: Benedicto Cardona MD;  Location: [facility];  Service: Plastics    HEMORRHOID SURGERY      INCISION AND DRAINAGE PERIRECTAL ABSCESS      MASTECTOMY Left 2022    MASTECTOMY W/ SENTINEL NODE BIOPSY Left 2022    Procedure: MASTECTOMY WITH BIOPSY LYMPH NODE SENTINEL; 1200 NUC MED;  Surgeon: Rachael Bear MD;  Location: [facility];  Service: Surgical Oncology    MOUTH SURGERY      OVARIAN CYST REMOVAL      NH DEBRIDEMENT SUBCUTANEOUS TISSUE 1ST 20 SQ CM/< Left 2022    Procedure: DEBRIDEMENT OF NON-HEALING SURGICAL WOUND, LEFT BREAST,  ADVANCEMENT FLAP;  Surgeon: Benedicto Cardona MD;  Location: [facility];  Service: Plastics    ROTATOR CUFF REPAIR Right     TONSILLECTOMY      US GUIDANCE BREAST BIOPSY LEFT EACH ADDITIONAL Left 2021    US GUIDED BREAST BIOPSY LEFT COMPLETE Left 2021      Family History   Problem Relation Age of Onset    Diabetes Mother         Type II    Diabetes type II Mother     Dementia Mother          09    Hearing loss Mother          2009    Breast cancer Paternal Aunt     Cancer Paternal  Aunt         Breast, Skin    Cancer Paternal Uncle         Prostate    Prostate cancer Paternal Uncle             Breast cancer Family     Breast cancer Maternal Aunt             Cancer Maternal Aunt         Breast    Breast cancer Maternal Aunt     Diabetes type II Maternal Aunt     Thyroid cancer Maternal Aunt     Thyroid disease Maternal Aunt         Cancer    Diabetes Maternal Aunt     Cancer Maternal Aunt         Breast    Hypertension Father          01    Depression Father          01    Stroke Father          2001    Heart disease Father          01    No Known Problems Sister     Depression Maternal Grandmother         Committed suicide - 10/24/68    Asthma Maternal Grandmother     Completed Suicide  Maternal Grandmother         10/24/1968    Depression Paternal Grandmother             Asthma Sister         Born with it/outgrew it with age (pretty young)    No Known Problems Paternal Aunt     No Known Problems Maternal Aunt     Diabetes type II Maternal Uncle     Diabetes Maternal Uncle     Diabetes type II Maternal Grandfather     Diabetes Maternal Grandfather     Breast cancer Cousin     Cancer Paternal Uncle         Oral Cancer    Heart disease Paternal Grandfather              Social History     Tobacco Use    Smoking status: Never    Smokeless tobacco: Never   Substance Use Topics    Alcohol use: Yes     Comment: rare     No Known Allergies      Current Outpatient Medications:     Acetaminophen 500 MG, Take 2 capsules (1,000 mg total) by mouth every 6 (six) hours as needed for mild pain for up to 21 doses, Disp: 21 capsule, Rfl: 0    aspirin (ECOTRIN LOW STRENGTH) 81 mg EC tablet, Take 1 tablet by mouth daily, Disp: , Rfl:     Blood Glucose Monitoring Suppl (ONE TOUCH ULTRA 2) w/Device KIT, Use 3 (three) times a day, Disp: 1 kit, Rfl: 0    capsicum (Arthritis Pain Relieving) 0.075 % topical cream, APPLY TOPICALLY THREE  TIMES A DAY, Disp: 114 g, Rfl: 0    cholecalciferol (VITAMIN D3) 1,000 units tablet, Take 4 tablets (4,000 Units total) by mouth daily, Disp: 90 tablet, Rfl: 1    Continuous Blood Gluc  (FreeStyle Sam 2 [place]) WARNER, Dispense 1 reader., Disp: 1 each, Rfl: 0    Continuous Blood Gluc Sensor (FreeStyle Sam 2 Sensor) [facility], USE ONE SENSOR EVERY FOURTEEN DAYS FOR GLUCOSE MONITORING., Disp: 6 each, Rfl: 1    Farxiga 10 MG tablet, TAKE ONE TABLET (10 MG TOTAL) BY MOUTH DAILY AT BEDTIME, Disp: 90 tablet, Rfl: 1    furosemide (LASIX) 20 mg tablet, TAKE ONE TABLET BY MOUTH DAILY AS NEEDED FOR SWELLING, Disp: 30 tablet, Rfl: 1    gabapentin (NEURONTIN) 300 mg capsule, Take 1 capsule (300 mg total) by mouth 3 (three) times a day, Disp: 180 capsule, Rfl: 3    glipiZIDE (GLUCOTROL XL) 5 mg 24 hr tablet, TAKE ONE TABLET BY MOUTH ONCE DAILY, Disp: 90 tablet, Rfl: 1    glucose blood (OneTouch Ultra) test strip, Use as instructed, Disp: 100 each, Rfl: 3    Insulin Glargine Solostar (Lantus SoloStar) 100 UNIT/ML SOPN, Inject 32 units nightly., Disp: 30 mL, Rfl: 1    Insulin Pen Needle (BD Pen Needle Wendy U/F) 32G X 4 MM MISC, Use to inject insulin once daily., Disp: 100 each, Rfl: 2    levothyroxine 100 mcg tablet, TAKE ONE TABLET BY MOUTH DAILY, Disp: 90 tablet, Rfl: 1    lisinopril (ZESTRIL) 20 mg tablet, TAKE ONE (1) TABLET BY MOUTH ONCE DAILY, Disp: 90 tablet, Rfl: 1    Magnesium 200 MG TABS, Take 1 tablet (200 mg total) by mouth in the morning, Disp: 90 tablet, Rfl: 0    ONETOUCH DELICA LANCETS FINE MISC, by Does not apply route 2 (two) times a day, Disp: , Rfl:     Ozempic, 2 MG/DOSE, 8 MG/3ML injection pen, INJECT 0.75 ML (2 MG TOTAL) UNDER THE SKIN EVERY SEVEN (7) DAYS, Disp: 3 mL, Rfl: 2    pantoprazole (PROTONIX) 40 mg tablet, TAKE ONE TABLET BY MOUTH DAILY, Disp: 90 tablet, Rfl: 1    polyethylene glycol (GLYCOLAX) 17 GM/SCOOP powder, Take by mouth as needed, Disp: , Rfl:     rosuvastatin (CRESTOR) 20 MG tablet,  "TAKE ONE TABLET BY MOUTH DAILY, Disp: 90 tablet, Rfl: 3    Review of Systems   Constitutional:  Positive for fatigue. Negative for activity change, appetite change and unexpected weight change.   HENT:  Negative for dental problem, sore throat, trouble swallowing and voice change.    Eyes:  Negative for visual disturbance.   Respiratory:  Negative for cough, chest tightness and shortness of breath.    Cardiovascular:  Negative for chest pain, palpitations and leg swelling.   Gastrointestinal:  Negative for constipation, diarrhea, nausea and vomiting.   Endocrine: Negative for cold intolerance, heat intolerance, polydipsia, polyphagia and polyuria.   Genitourinary:  Negative for frequency.   Musculoskeletal:  Positive for arthralgias. Negative for back pain, gait problem and myalgias.   Skin:  Negative for wound.   Allergic/Immunologic: Negative for environmental allergies and food allergies.   Neurological:  Positive for numbness. Negative for dizziness, weakness, light-headedness and headaches.   Psychiatric/Behavioral:  Negative for decreased concentration, dysphoric mood and sleep disturbance. The patient is not nervous/anxious.        Physical Exam:  Body mass index is 46.35 kg/m².  /80   Pulse 75   Temp (!) 97.4 °F (36.3 °C)   Resp 18   Ht 5' 4\" (1.626 m)   Wt 122 kg (270 lb)   LMP  (LMP Unknown)   SpO2 98%   BMI 46.35 kg/m²    Wt Readings from Last 3 Encounters:   05/22/24 122 kg (270 lb)   05/01/24 122 kg (270 lb)   12/20/23 118 kg (260 lb)       Physical Exam  Vitals reviewed.   Constitutional:       General: She is not in acute distress.     Appearance: She is well-developed. She is obese. She is not ill-appearing.   HENT:      Head: Normocephalic and atraumatic.   Eyes:      Pupils: Pupils are equal, round, and reactive to light.   Neck:      Thyroid: No thyromegaly.   Cardiovascular:      Rate and Rhythm: Normal rate.      Pulses: Normal pulses.   Pulmonary:      Effort: Pulmonary effort is " normal.   Musculoskeletal:      Cervical back: Normal range of motion and neck supple.      Right lower leg: No edema.      Left lower leg: No edema.   Lymphadenopathy:      Cervical: No cervical adenopathy.   Skin:     General: Skin is warm and dry.      Capillary Refill: Capillary refill takes less than 2 seconds.   Neurological:      Mental Status: She is alert and oriented to person, place, and time.      Gait: Gait normal.   Psychiatric:         Mood and Affect: Mood normal.         Behavior: Behavior normal.           Labs:   Lab Results   Component Value Date    HGBA1C 8.1 (H) 04/25/2024    HGBA1C 7.8 (H) 10/23/2023    HGBA1C 8.2 (H) 06/24/2023     Lab Results   Component Value Date    CREATININE 1.39 (H) 04/25/2024    CREATININE 1.38 (H) 12/15/2023    CREATININE 1.17 10/23/2023    BUN 35 (H) 04/25/2024     10/09/2015    K 5.2 04/25/2024     04/25/2024    CO2 27 04/25/2024     eGFR   Date Value Ref Range Status   04/25/2024 40 ml/min/1.73sq m Final     Lab Results   Component Value Date    CHOL 198 10/09/2015    HDL 36 (L) 10/23/2023    TRIG 250 (H) 10/23/2023     Lab Results   Component Value Date    ALT 11 04/25/2024    AST 14 04/25/2024    ALKPHOS 57 04/25/2024    BILITOT 0.48 10/09/2015     Lab Results   Component Value Date    NIM7BHMMXKDU 1.278 04/25/2024    PUM4UKCBKZBP 1.369 06/24/2023    [id number] 2.000 02/21/2023     Lab Results   Component Value Date    FREET4 1.11 07/05/2022       Discussed with the patient and all questioned fully answered. She will call me if any problems arise.    Follow-up appointment in 3 months.     Counseled patient on diagnostic results, prognosis, risk and benefit of treatment options, instruction for management, importance of treatment compliance, Risk  factor reduction and impressions    KORI Winston

## 2024-05-22 NOTE — PATIENT INSTRUCTIONS
Increase Lantus to 32 units nightly  [id number] the freestyle carter 2. If you have questions about how to use it or any problems, let us know.  If you continue with fingers sticks, test twice daily, record on log and give to be in 4 weeks.   Work on the diet.

## 2024-05-22 NOTE — ASSESSMENT & PLAN NOTE
Lab Results   Component Value Date    EGFR 40 04/25/2024    EGFR 40 12/15/2023    EGFR 50 10/23/2023    CREATININE 1.39 (H) 04/25/2024    CREATININE 1.38 (H) 12/15/2023    CREATININE 1.17 10/23/2023   Stable.  Following closely with nephrology.  Continue Farxiga.  Continue lisinopril.

## 2024-05-22 NOTE — ASSESSMENT & PLAN NOTE
Diabetes remains uncontrolled. Patient continues to struggle with making healthy dietary choices. Lengthy discussion regarding perceived barriers to success. Again, she has also not started using the CGM. Finger sticks performed once daily only. Discussed switching from daily glipizide to three times daily prandin to assist with postprandial upper glycemia.  Patient has concerns, as do I, that she would not be able to remember to take the pill 3 times daily.  Therefore, we agreed to increase Lantus to 32 units nightly.  Continue glipizide 5 mg daily.  Continue Ozempic 2 mg once weekly.  Continue Farxiga 10 mg daily.  Strongly encourage patient to start using her freestyle carter.  If she continues to not wish to do this, recommend starting to test her blood sugars at least twice daily goal hemoglobin A1c remains 6.9% or less.  Lab Results   Component Value Date    HGBA1C 8.1 (H) 04/25/2024

## 2024-05-22 NOTE — ASSESSMENT & PLAN NOTE
>>ASSESSMENT AND PLAN FOR TYPE 2 DIABETES MELLITUS WITH HYPERGLYCEMIA, WITHOUT LONG-TERM CURRENT USE OF INSULIN ([facility]) WRITTEN ON 5/22/2024  3:04 PM BY KORI GRACIA    Diabetes remains uncontrolled. Patient continues to struggle with making healthy dietary choices. Lengthy discussion regarding perceived barriers to success. Again, she has also not started using the CGM. Finger sticks performed once daily only. Discussed switching from daily glipizide to three times daily prandin to assist with postprandial upper glycemia.  Patient has concerns, as do I, that she would not be able to remember to take the pill 3 times daily.  Therefore, we agreed to increase Lantus to 32 units nightly.  Continue glipizide 5 mg daily.  Continue Ozempic 2 mg once weekly.  Continue Farxiga 10 mg daily.  Strongly encourage patient to start using her freestyle carter.  If she continues to not wish to do this, recommend starting to test her blood sugars at least twice daily goal hemoglobin A1c remains 6.9% or less.  Lab Results   Component Value Date    HGBA1C 8.1 (H) 04/25/2024

## 2024-05-28 ENCOUNTER — OFFICE VISIT (OUTPATIENT)
Dept: FAMILY MEDICINE CLINIC | Facility: CLINIC | Age: 64
End: 2024-05-28
Payer: COMMERCIAL

## 2024-05-28 VITALS
RESPIRATION RATE: 16 BRPM | DIASTOLIC BLOOD PRESSURE: 76 MMHG | WEIGHT: 266.6 LBS | HEIGHT: 64 IN | BODY MASS INDEX: 45.52 KG/M2 | OXYGEN SATURATION: 99 % | SYSTOLIC BLOOD PRESSURE: 118 MMHG | TEMPERATURE: 98.1 F | HEART RATE: 80 BPM

## 2024-05-28 DIAGNOSIS — N18.30 TYPE 2 DIABETES MELLITUS WITH STAGE 3 CHRONIC KIDNEY DISEASE, WITH LONG-TERM CURRENT USE OF INSULIN, UNSPECIFIED WHETHER STAGE 3A OR 3B CKD (HCC): ICD-10-CM

## 2024-05-28 DIAGNOSIS — E03.9 HYPOTHYROIDISM, UNSPECIFIED TYPE: ICD-10-CM

## 2024-05-28 DIAGNOSIS — K21.9 GERD WITHOUT ESOPHAGITIS: ICD-10-CM

## 2024-05-28 DIAGNOSIS — M17.0 PRIMARY OSTEOARTHRITIS OF BOTH KNEES: ICD-10-CM

## 2024-05-28 DIAGNOSIS — E66.01 CLASS 3 SEVERE OBESITY DUE TO EXCESS CALORIES WITH SERIOUS COMORBIDITY AND BODY MASS INDEX (BMI) OF 45.0 TO 49.9 IN ADULT (HCC): ICD-10-CM

## 2024-05-28 DIAGNOSIS — Z13.820 SCREENING FOR OSTEOPOROSIS: ICD-10-CM

## 2024-05-28 DIAGNOSIS — I10 BENIGN ESSENTIAL HYPERTENSION: Primary | ICD-10-CM

## 2024-05-28 DIAGNOSIS — Z79.4 TYPE 2 DIABETES MELLITUS WITH STAGE 3 CHRONIC KIDNEY DISEASE, WITH LONG-TERM CURRENT USE OF INSULIN, UNSPECIFIED WHETHER STAGE 3A OR 3B CKD (HCC): ICD-10-CM

## 2024-05-28 DIAGNOSIS — N18.32 CHRONIC KIDNEY DISEASE (CKD) STAGE G3B/A1, MODERATELY DECREASED GLOMERULAR FILTRATION RATE (GFR) BETWEEN 30-44 ML/MIN/1.73 SQUARE METER AND ALBUMINURIA CREATININE RATIO LESS THAN 30 MG/G (HCC): ICD-10-CM

## 2024-05-28 DIAGNOSIS — E11.22 TYPE 2 DIABETES MELLITUS WITH STAGE 3 CHRONIC KIDNEY DISEASE, WITH LONG-TERM CURRENT USE OF INSULIN, UNSPECIFIED WHETHER STAGE 3A OR 3B CKD (HCC): ICD-10-CM

## 2024-05-28 PROCEDURE — 99214 OFFICE O/P EST MOD 30 MIN: CPT | Performed by: FAMILY MEDICINE

## 2024-05-28 NOTE — PROGRESS NOTES
Assessment/Plan:   Patient to continue present treatment.  Instructed to follow a low-fat, low salt and a low sugar/carbohydrate diet more carefully and get regular aerobic exercise as tolerated.  Patient to schedule DEXA bone scan.  Patient to schedule appoint with Dr. Doe at [facility] orthopedics regarding right knee pain.  Follow-up with specialist as scheduled.  Return to the office in 6 months.   Diagnoses and all orders for this visit:    Benign essential hypertension    Type 2 diabetes mellitus with stage 3 chronic kidney disease, with long-term current use of insulin, unspecified whether stage 3a or 3b CKD ([facility])  -     Cancel: POCT hemoglobin A1c    Hypothyroidism, unspecified type    GERD without esophagitis    Primary osteoarthritis of both knees    Chronic kidney disease (CKD) stage G3b/A1, moderately decreased glomerular filtration rate (GFR) between 30-44 mL/min/1.73 square meter and albuminuria creatinine ratio less than 30 mg/g ([facility])    Class 3 severe obesity due to excess calories with serious comorbidity and body mass index (BMI) of 45.0 to 49.9 in adult ([facility])    Screening for osteoporosis  -     DXA bone density spine hip and pelvis; Future          Subjective:     Patient ID: Shanique Heredia is a 63 y.o. female.    Patient is here for follow-up appoint for chronic conditions and we reviewed recent labs.  Patient recently saw [facility] endocrinology and nephrology.  Patient is up-to-date on diabetic eye exam and foot exam.  Patient plans to schedule an appoint with Dr. Doe at [facility] orthopedics regarding right knee arthritic pain.    Hypertension  This is a chronic problem. The problem is controlled. Associated symptoms include blurred vision and peripheral edema. Pertinent negatives include no anxiety, chest pain, headaches, orthopnea, palpitations, PND or shortness of breath. Risk factors for coronary artery disease include dyslipidemia, diabetes mellitus, male gender, obesity,  post-menopausal state and family history. Past treatments include ACE inhibitors and diuretics. The current treatment provides significant improvement. Compliance problems include exercise.  Hypertensive end-organ damage includes kidney disease. There is no history of CAD/MI or CVA.   Knee Pain   There was no injury mechanism. The pain is present in the right knee. The quality of the pain is described as aching and stabbing. The pain has been Worsening since onset. Associated symptoms include a loss of motion. Pertinent negatives include no inability to bear weight, muscle weakness, numbness or tingling. The symptoms are aggravated by weight bearing and movement. She has tried acetaminophen, ice and rest for the symptoms. The treatment provided mild relief.       Review of Systems   Eyes:  Positive for blurred vision.   Respiratory:  Negative for shortness of breath.    Cardiovascular:  Negative for chest pain, palpitations, orthopnea and PND.   Neurological:  Negative for tingling, numbness and headaches.         Objective:     Physical Exam  Constitutional:       General: She is not in acute distress.     Appearance: Normal appearance. She is obese.   HENT:      Head: Normocephalic.      Mouth/Throat:      Mouth: Mucous membranes are moist.   Eyes:      General: No scleral icterus.     Conjunctiva/sclera: Conjunctivae normal.   Neck:      Vascular: No carotid bruit.   Cardiovascular:      Rate and Rhythm: Normal rate and regular rhythm.   Pulmonary:      Effort: Pulmonary effort is normal.      Breath sounds: Normal breath sounds.   Abdominal:      Palpations: Abdomen is soft.      Tenderness: There is no abdominal tenderness.   Musculoskeletal:         General: Tenderness present.      Cervical back: Neck supple.      Right lower leg: No edema.      Left lower leg: No edema.      Comments: Right knee reveals decreased range of motion, swelling and tenderness.   Lymphadenopathy:      Cervical: No cervical  adenopathy.   Skin:     General: Skin is warm and dry.   Neurological:      General: No focal deficit present.      Mental Status: She is alert and oriented to person, place, and time.   Psychiatric:         Mood and Affect: Mood normal.         Behavior: Behavior normal.         Thought Content: Thought content normal.         Judgment: Judgment normal.

## 2024-06-03 DIAGNOSIS — E11.42 POORLY CONTROLLED TYPE 2 DIABETES MELLITUS WITH PERIPHERAL NEUROPATHY (HCC): ICD-10-CM

## 2024-06-03 DIAGNOSIS — E11.65 POORLY CONTROLLED TYPE 2 DIABETES MELLITUS WITH PERIPHERAL NEUROPATHY (HCC): ICD-10-CM

## 2024-06-03 RX ORDER — GABAPENTIN 300 MG/1
CAPSULE ORAL
Qty: 270 CAPSULE | Refills: 1 | Status: SHIPPED | OUTPATIENT
Start: 2024-06-03

## 2024-06-09 DIAGNOSIS — E11.65 TYPE 2 DIABETES MELLITUS WITH HYPERGLYCEMIA, WITHOUT LONG-TERM CURRENT USE OF INSULIN (HCC): ICD-10-CM

## 2024-06-10 RX ORDER — DAPAGLIFLOZIN 10 MG/1
10 TABLET, FILM COATED ORAL
Qty: 90 TABLET | Refills: 0 | Status: SHIPPED | OUTPATIENT
Start: 2024-06-10

## 2024-07-23 DIAGNOSIS — E11.65 TYPE 2 DIABETES MELLITUS WITH HYPERGLYCEMIA, WITHOUT LONG-TERM CURRENT USE OF INSULIN (HCC): ICD-10-CM

## 2024-07-23 RX ORDER — PEN NEEDLE, DIABETIC 32GX 5/32"
NEEDLE, DISPOSABLE MISCELLANEOUS
Qty: 100 EACH | Refills: 0 | Status: SHIPPED | OUTPATIENT
Start: 2024-07-23

## 2024-07-27 DIAGNOSIS — E11.65 TYPE 2 DIABETES MELLITUS WITH HYPERGLYCEMIA, WITHOUT LONG-TERM CURRENT USE OF INSULIN (HCC): ICD-10-CM

## 2024-07-28 RX ORDER — INSULIN GLARGINE 100 [IU]/ML
INJECTION, SOLUTION SUBCUTANEOUS
Qty: 30 ML | Refills: 1 | Status: SHIPPED | OUTPATIENT
Start: 2024-07-28

## 2024-07-31 DIAGNOSIS — K21.9 GASTROESOPHAGEAL REFLUX DISEASE WITHOUT ESOPHAGITIS: ICD-10-CM

## 2024-07-31 RX ORDER — PANTOPRAZOLE SODIUM 40 MG/1
40 TABLET, DELAYED RELEASE ORAL DAILY
Qty: 90 TABLET | Refills: 1 | Status: SHIPPED | OUTPATIENT
Start: 2024-07-31

## 2024-08-07 DIAGNOSIS — E11.65 TYPE 2 DIABETES MELLITUS WITH HYPERGLYCEMIA, WITHOUT LONG-TERM CURRENT USE OF INSULIN (HCC): ICD-10-CM

## 2024-08-07 RX ORDER — SEMAGLUTIDE 2.68 MG/ML
INJECTION, SOLUTION SUBCUTANEOUS
Qty: 3 ML | Refills: 2 | Status: SHIPPED | OUTPATIENT
Start: 2024-08-07

## 2024-08-13 LAB
LEFT EYE DIABETIC RETINOPATHY: NORMAL
RIGHT EYE DIABETIC RETINOPATHY: NORMAL

## 2024-08-28 ENCOUNTER — HOSPITAL ENCOUNTER (OUTPATIENT)
Dept: BONE DENSITY | Facility: HOSPITAL | Age: 64
Discharge: HOME | End: 2024-08-28
Payer: COMMERCIAL

## 2024-08-28 VITALS — HEIGHT: 64 IN | WEIGHT: 270 LBS | BODY MASS INDEX: 46.1 KG/M2

## 2024-08-28 DIAGNOSIS — Z13.820 SCREENING FOR OSTEOPOROSIS: ICD-10-CM

## 2024-08-28 PROCEDURE — 77080 DXA BONE DENSITY AXIAL: CPT

## 2024-09-05 DIAGNOSIS — E11.65 TYPE 2 DIABETES MELLITUS WITH HYPERGLYCEMIA, WITHOUT LONG-TERM CURRENT USE OF INSULIN (HCC): ICD-10-CM

## 2024-09-05 DIAGNOSIS — N18.32 TYPE 2 DIABETES MELLITUS WITH STAGE 3B CHRONIC KIDNEY DISEASE, WITHOUT LONG-TERM CURRENT USE OF INSULIN (HCC): ICD-10-CM

## 2024-09-05 DIAGNOSIS — I10 ESSENTIAL HYPERTENSION: ICD-10-CM

## 2024-09-05 DIAGNOSIS — E11.22 TYPE 2 DIABETES MELLITUS WITH STAGE 3B CHRONIC KIDNEY DISEASE, WITHOUT LONG-TERM CURRENT USE OF INSULIN (HCC): ICD-10-CM

## 2024-09-05 RX ORDER — DAPAGLIFLOZIN 10 MG/1
10 TABLET, FILM COATED ORAL
Qty: 90 TABLET | Refills: 1 | Status: SHIPPED | OUTPATIENT
Start: 2024-09-05

## 2024-09-05 RX ORDER — LISINOPRIL 20 MG/1
20 TABLET ORAL DAILY
Qty: 90 TABLET | Refills: 1 | Status: SHIPPED | OUTPATIENT
Start: 2024-09-05

## 2024-09-05 RX ORDER — GLIPIZIDE 5 MG/1
5 TABLET, FILM COATED, EXTENDED RELEASE ORAL DAILY
Qty: 90 TABLET | Refills: 1 | Status: SHIPPED | OUTPATIENT
Start: 2024-09-05

## 2024-09-16 DIAGNOSIS — E11.65 TYPE 2 DIABETES MELLITUS WITH HYPERGLYCEMIA, WITHOUT LONG-TERM CURRENT USE OF INSULIN (HCC): ICD-10-CM

## 2024-10-03 DIAGNOSIS — E78.5 HYPERLIPIDEMIA, UNSPECIFIED HYPERLIPIDEMIA TYPE: ICD-10-CM

## 2024-10-03 RX ORDER — ROSUVASTATIN CALCIUM 20 MG/1
20 TABLET, COATED ORAL DAILY
Qty: 90 TABLET | Refills: 1 | Status: SHIPPED | OUTPATIENT
Start: 2024-10-03

## 2024-10-17 ENCOUNTER — VBI (OUTPATIENT)
Dept: ADMINISTRATIVE | Facility: OTHER | Age: 64
End: 2024-10-17

## 2024-10-17 NOTE — TELEPHONE ENCOUNTER
10/17/24 7:17 AM     Chart reviewed for Diabetic Eye Exam was/were submitted to the patient's insurance.     YOHANA MATTHEW MA   [facility]

## 2024-10-21 DIAGNOSIS — E11.65 TYPE 2 DIABETES MELLITUS WITH HYPERGLYCEMIA, WITHOUT LONG-TERM CURRENT USE OF INSULIN (HCC): ICD-10-CM

## 2024-10-22 RX ORDER — INSULIN GLARGINE 100 [IU]/ML
INJECTION, SOLUTION SUBCUTANEOUS
Qty: 30 ML | Refills: 1 | Status: SHIPPED | OUTPATIENT
Start: 2024-10-22

## 2024-10-26 DIAGNOSIS — E11.65 TYPE 2 DIABETES MELLITUS WITH HYPERGLYCEMIA, WITHOUT LONG-TERM CURRENT USE OF INSULIN (HCC): ICD-10-CM

## 2024-10-26 DIAGNOSIS — E11.42 POORLY CONTROLLED TYPE 2 DIABETES MELLITUS WITH PERIPHERAL NEUROPATHY (HCC): ICD-10-CM

## 2024-10-26 DIAGNOSIS — K21.9 GASTROESOPHAGEAL REFLUX DISEASE WITHOUT ESOPHAGITIS: ICD-10-CM

## 2024-10-26 DIAGNOSIS — E11.65 POORLY CONTROLLED TYPE 2 DIABETES MELLITUS WITH PERIPHERAL NEUROPATHY (HCC): ICD-10-CM

## 2024-10-26 NOTE — PROGRESS NOTES
Assessment/Plan:   Felisha Quesada is a 58-year-old female who is status post immediate left breast reconstruction with tissue expander and de-epithelialized flap done in conjunction with Dr Hermann Finnegan on 02/08/2022  Please see HPI  Her anterior drain was removed today  She will continue with antibiotics  I did discuss with her range of motion exercises  We will see her back in 1 week to assess the remaining drain and remove sutures  She is encouraged to follow up sooner with any concerns  Diagnoses and all orders for this visit:    Ductal carcinoma in situ (DCIS) of left breast          Subjective:     Patient ID: Junior Healy is a 64 y o  female  HPI   She reports some discomfort along the left lateral aspect  She states that her anterior drain is draining approximately 10 cc per day and her posterior drain is draining approximately 25-35 cc daily  Review of Systems   Skin:        As per HPI  Objective:     Physical Exam  Skin:     Comments: Left breast with tissue expander in place  Incision is clean, dry and intact  Drains are serosanguineous  Anterior drain was removed without difficulty  Palpitations

## 2024-10-27 RX ORDER — PANTOPRAZOLE SODIUM 40 MG/1
40 TABLET, DELAYED RELEASE ORAL DAILY
Qty: 90 TABLET | Refills: 1 | Status: SHIPPED | OUTPATIENT
Start: 2024-10-27

## 2024-10-28 RX ORDER — GABAPENTIN 300 MG/1
CAPSULE ORAL
Qty: 270 CAPSULE | Refills: 1 | Status: SHIPPED | OUTPATIENT
Start: 2024-10-28

## 2024-10-28 RX ORDER — PEN NEEDLE, DIABETIC 32GX 5/32"
NEEDLE, DISPOSABLE MISCELLANEOUS
Qty: 100 EACH | Refills: 3 | Status: SHIPPED | OUTPATIENT
Start: 2024-10-28

## 2024-10-31 DIAGNOSIS — R60.0 LOCALIZED EDEMA: ICD-10-CM

## 2024-10-31 RX ORDER — FUROSEMIDE 20 MG/1
20 TABLET ORAL DAILY
Qty: 30 TABLET | Refills: 5 | Status: SHIPPED | OUTPATIENT
Start: 2024-10-31

## 2024-11-14 ENCOUNTER — TELEPHONE (OUTPATIENT)
Dept: FAMILY MEDICINE CLINIC | Facility: CLINIC | Age: 64
End: 2024-11-14

## 2024-11-14 NOTE — TELEPHONE ENCOUNTER
Received fax from [facility] foot [facility] placed on providers desk     Initially AVF did not cannulate. Vascular consulted in the ED and s/p fistulogram 4/18 with balloon and stent placement.  s/p bedside washout of fistula wound and has been receiving wound packing daily  Continue ACE wrap of the arm (though patient poorly compliant).  - Continue daily wet to dry dressings to incisions.

## 2024-11-18 ENCOUNTER — TELEPHONE (OUTPATIENT)
Age: 64
End: 2024-11-18

## 2024-11-18 NOTE — PROGRESS NOTES
[facility] [address] Dayanara Petit PA    ASSESSMENT/PLAN: Shanique Heredia is a 64 y.o.  who presents for annual gynecologic exam.    Encounter for routine gynecologic examination  - Routine well woman exam completed today.  - Cervical Cancer Screening: Current ASCCP Guidelines reviewed. Last Pap: 2023 normal. Next Pap Due:   - HPV Vaccination status: Not immunized  - STI screening offered including HIV: not indicated based on hx or requested at time of visit  - Breast Cancer Screening: Last Mammogram 2024, script given  - Colorectal cancer screening was not ordered.  - The following were reviewed in today's visit: breast self exam, mammography screening ordered, adequate intake of calcium and vitamin D, and exercise  - RTO 1 yr    Additional problems addressed at this visit:  1. Encounter for annual routine gynecological examination  2. History of ductal carcinoma in situ (DCIS) of breast  3. BRCA negative  4. Abnormal mammogram  -     US breast right limited (diagnostic); Future; Expected date: 2024  - needs to schedule follow-up with Dr Bear      CC:  Annual Gynecologic Examination    HPI: Shanique Heredia is a 64 y.o.  who presents for annual gynecologic examination.  Shanique presents today for gyn exam. No vaginal bleeding since onset of menopause. 2023 last pap smear- normal, Hx of abnormal pap smear - no. Sexually active- no, with partner 11 yrs. Does not desire STI testing.  2024 right breast  mammogram-  probably benign was to have a 6 month follow-up, and 2023 colonoscopy- repeat 5 yrs. Reports 7-8 hrs of sleep daily, 1-2 servings of calcium rich foods daily. Exercises: no routine per week. 1-2 servings of caffeine daily. Breast changes - no change right breast. Safe at home- yes. Concerns: none.          The following portions of the patient's history were reviewed and updated as appropriate: allergies, current medications, past family  "history, past medical history, obstetric history, gynecologic history, past social history, past surgical history and problem list.    Review of Systems   Constitutional:  Positive for fatigue. Negative for chills, fever and unexpected weight change.   Respiratory:  Negative for cough and shortness of breath.    Cardiovascular:  Negative for chest pain, palpitations and leg swelling.   Gastrointestinal:  Negative for abdominal pain, constipation and diarrhea.   Genitourinary:  Negative for difficulty urinating, dysuria, frequency, pelvic pain, urgency, vaginal bleeding, vaginal discharge and vaginal pain.   Musculoskeletal:  Positive for back pain. Negative for gait problem.        Known knee problems   Neurological:  Negative for dizziness and headaches.   Psychiatric/Behavioral:  Negative for self-injury. The patient is not nervous/anxious.          Objective:  /70   Ht 5' 4\" (1.626 m)   Wt 123 kg (271 lb)   LMP  (LMP Unknown)   BMI 46.52 kg/m²    Physical Exam  Vitals reviewed.   Constitutional:       Appearance: Normal appearance.   HENT:      Head: Normocephalic.   Neck:      Thyroid: No thyroid mass or thyroid tenderness.   Cardiovascular:      Rate and Rhythm: Normal rate and regular rhythm.      Heart sounds: Normal heart sounds.   Pulmonary:      Effort: Pulmonary effort is normal.      Breath sounds: Normal breath sounds.   Chest:   Breasts:     Right: No mass, nipple discharge, skin change or tenderness.      Comments: Left breast implant -may be shifting laterally toward axilla, states that she has some stabbing sensation inner upper aspect.   Abdominal:      General: There is no distension.      Palpations: There is no mass.      Tenderness: There is no abdominal tenderness. There is no guarding.   Genitourinary:     General: Normal vulva.      Exam position: Lithotomy position.      Labia:         Right: No rash, tenderness or lesion.         Left: No rash, tenderness or lesion.       " Urethra: No urethral pain, urethral swelling or urethral lesion.      Vagina: No vaginal discharge, erythema, tenderness, bleeding or lesions.      Cervix: No cervical motion tenderness, discharge, friability, lesion, erythema or cervical bleeding.      Uterus: Normal. Not enlarged and not tender.       Adnexa:         Right: No mass, tenderness or fullness.          Left: No mass, tenderness or fullness.     Musculoskeletal:      Cervical back: Normal range of motion.   Lymphadenopathy:      Upper Body:      Right upper body: No axillary adenopathy.      Left upper body: No axillary adenopathy.   Skin:     General: Skin is warm and dry.   Neurological:      Mental Status: She is alert.   Psychiatric:         Mood and Affect: Mood normal.         Behavior: Behavior normal.         Judgment: Judgment normal.             China Morton CNM  [facility]  11/19/24 8:02 AM

## 2024-11-18 NOTE — TELEPHONE ENCOUNTER
I called and left a VM for Shanique regarding completing blood and urine studies prior to upcoming appt on 11/25/24.

## 2024-11-19 ENCOUNTER — ANNUAL EXAM (OUTPATIENT)
Dept: OBGYN CLINIC | Facility: CLINIC | Age: 64
End: 2024-11-19
Payer: COMMERCIAL

## 2024-11-19 VITALS
WEIGHT: 271 LBS | SYSTOLIC BLOOD PRESSURE: 118 MMHG | DIASTOLIC BLOOD PRESSURE: 70 MMHG | HEIGHT: 64 IN | BODY MASS INDEX: 46.26 KG/M2

## 2024-11-19 DIAGNOSIS — Z13.71 BRCA NEGATIVE: ICD-10-CM

## 2024-11-19 DIAGNOSIS — Z86.000 HISTORY OF DUCTAL CARCINOMA IN SITU (DCIS) OF BREAST: ICD-10-CM

## 2024-11-19 DIAGNOSIS — R92.8 ABNORMAL MAMMOGRAM: ICD-10-CM

## 2024-11-19 DIAGNOSIS — Z01.419 ENCOUNTER FOR ANNUAL ROUTINE GYNECOLOGICAL EXAMINATION: Primary | ICD-10-CM

## 2024-11-19 PROBLEM — M72.2 PLANTAR FASCIITIS: Status: ACTIVE | Noted: 2018-03-29

## 2024-11-19 PROCEDURE — S0612 ANNUAL GYNECOLOGICAL EXAMINA: HCPCS | Performed by: ADVANCED PRACTICE MIDWIFE

## 2024-11-21 DIAGNOSIS — E11.65 TYPE 2 DIABETES MELLITUS WITH HYPERGLYCEMIA, WITHOUT LONG-TERM CURRENT USE OF INSULIN (HCC): ICD-10-CM

## 2024-11-21 DIAGNOSIS — E11.22 TYPE 2 DIABETES MELLITUS WITH STAGE 3B CHRONIC KIDNEY DISEASE, WITHOUT LONG-TERM CURRENT USE OF INSULIN (HCC): ICD-10-CM

## 2024-11-21 DIAGNOSIS — N18.32 TYPE 2 DIABETES MELLITUS WITH STAGE 3B CHRONIC KIDNEY DISEASE, WITHOUT LONG-TERM CURRENT USE OF INSULIN (HCC): ICD-10-CM

## 2024-11-21 RX ORDER — GLIPIZIDE 5 MG/1
5 TABLET, FILM COATED, EXTENDED RELEASE ORAL DAILY
Qty: 90 TABLET | Refills: 1 | Status: SHIPPED | OUTPATIENT
Start: 2024-11-21

## 2024-11-21 RX ORDER — DAPAGLIFLOZIN 10 MG/1
10 TABLET, FILM COATED ORAL
Qty: 90 TABLET | Refills: 1 | Status: SHIPPED | OUTPATIENT
Start: 2024-11-21

## 2024-11-22 ENCOUNTER — APPOINTMENT (OUTPATIENT)
Dept: LAB | Facility: CLINIC | Age: 64
End: 2024-11-22
Payer: COMMERCIAL

## 2024-11-22 ENCOUNTER — RESULTS FOLLOW-UP (OUTPATIENT)
Dept: ENDOCRINOLOGY | Facility: CLINIC | Age: 64
End: 2024-11-22

## 2024-11-22 ENCOUNTER — RESULTS FOLLOW-UP (OUTPATIENT)
Dept: NEPHROLOGY | Facility: CLINIC | Age: 64
End: 2024-11-22

## 2024-11-22 DIAGNOSIS — E11.65 TYPE 2 DIABETES MELLITUS WITH HYPERGLYCEMIA, WITHOUT LONG-TERM CURRENT USE OF INSULIN (HCC): ICD-10-CM

## 2024-11-22 DIAGNOSIS — N18.32 STAGE 3B CHRONIC KIDNEY DISEASE (HCC): ICD-10-CM

## 2024-11-22 DIAGNOSIS — E03.9 HYPOTHYROIDISM, UNSPECIFIED TYPE: ICD-10-CM

## 2024-11-22 DIAGNOSIS — E11.22 TYPE 2 DIABETES MELLITUS WITH STAGE 3 CHRONIC KIDNEY DISEASE, WITHOUT LONG-TERM CURRENT USE OF INSULIN, UNSPECIFIED WHETHER STAGE 3A OR 3B CKD (HCC): ICD-10-CM

## 2024-11-22 DIAGNOSIS — N18.30 TYPE 2 DIABETES MELLITUS WITH STAGE 3 CHRONIC KIDNEY DISEASE, WITHOUT LONG-TERM CURRENT USE OF INSULIN, UNSPECIFIED WHETHER STAGE 3A OR 3B CKD (HCC): ICD-10-CM

## 2024-11-22 DIAGNOSIS — I10 BENIGN ESSENTIAL HYPERTENSION: ICD-10-CM

## 2024-11-22 LAB
ALBUMIN SERPL BCG-MCNC: 4.1 G/DL (ref 3.5–5)
ALP SERPL-CCNC: 59 U/L (ref 34–104)
ALT SERPL W P-5'-P-CCNC: 12 U/L (ref 7–52)
ANION GAP SERPL CALCULATED.3IONS-SCNC: 9 MMOL/L (ref 4–13)
AST SERPL W P-5'-P-CCNC: 17 U/L (ref 13–39)
BACTERIA UR QL AUTO: ABNORMAL /HPF
BILIRUB SERPL-MCNC: 0.53 MG/DL (ref 0.2–1)
BILIRUB UR QL STRIP: NEGATIVE
BUN SERPL-MCNC: 28 MG/DL (ref 5–25)
CALCIUM SERPL-MCNC: 9 MG/DL (ref 8.4–10.2)
CHLORIDE SERPL-SCNC: 104 MMOL/L (ref 96–108)
CLARITY UR: CLEAR
CO2 SERPL-SCNC: 26 MMOL/L (ref 21–32)
COLOR UR: ABNORMAL
CREAT SERPL-MCNC: 1.37 MG/DL (ref 0.6–1.3)
CREAT UR-MCNC: 128.7 MG/DL
EST. AVERAGE GLUCOSE BLD GHB EST-MCNC: 169 MG/DL
GFR SERPL CREATININE-BSD FRML MDRD: 40 ML/MIN/1.73SQ M
GLUCOSE P FAST SERPL-MCNC: 122 MG/DL (ref 65–99)
GLUCOSE UR STRIP-MCNC: ABNORMAL MG/DL
HBA1C MFR BLD: 7.5 %
HGB UR QL STRIP.AUTO: NEGATIVE
KETONES UR STRIP-MCNC: NEGATIVE MG/DL
LEUKOCYTE ESTERASE UR QL STRIP: ABNORMAL
MAGNESIUM SERPL-MCNC: 1.8 MG/DL (ref 1.9–2.7)
MICROALBUMIN UR-MCNC: 14.2 MG/L
MICROALBUMIN/CREAT 24H UR: 11 MG/G CREATININE (ref 0–30)
NITRITE UR QL STRIP: NEGATIVE
NON-SQ EPI CELLS URNS QL MICRO: ABNORMAL /HPF
PH UR STRIP.AUTO: 5.5 [PH]
PHOSPHATE SERPL-MCNC: 3.2 MG/DL (ref 2.3–4.1)
POTASSIUM SERPL-SCNC: 4.7 MMOL/L (ref 3.5–5.3)
PROT SERPL-MCNC: 7.2 G/DL (ref 6.4–8.4)
PROT UR STRIP-MCNC: ABNORMAL MG/DL
PTH-INTACT SERPL-MCNC: 114.4 PG/ML (ref 12–88)
RBC #/AREA URNS AUTO: ABNORMAL /HPF
SODIUM SERPL-SCNC: 139 MMOL/L (ref 135–147)
SP GR UR STRIP.AUTO: 1.02 (ref 1–1.03)
URATE SERPL-MCNC: 6.5 MG/DL (ref 2–7.5)
UROBILINOGEN UR STRIP-ACNC: <2 MG/DL
WBC #/AREA URNS AUTO: ABNORMAL /HPF

## 2024-11-22 PROCEDURE — 36415 COLL VENOUS BLD VENIPUNCTURE: CPT

## 2024-11-22 PROCEDURE — 82570 ASSAY OF URINE CREATININE: CPT

## 2024-11-22 PROCEDURE — 82043 UR ALBUMIN QUANTITATIVE: CPT

## 2024-11-22 PROCEDURE — 84550 ASSAY OF BLOOD/URIC ACID: CPT

## 2024-11-22 PROCEDURE — 83036 HEMOGLOBIN GLYCOSYLATED A1C: CPT

## 2024-11-22 PROCEDURE — 83970 ASSAY OF PARATHORMONE: CPT

## 2024-11-22 PROCEDURE — 84100 ASSAY OF PHOSPHORUS: CPT

## 2024-11-22 PROCEDURE — 80053 COMPREHEN METABOLIC PANEL: CPT

## 2024-11-22 PROCEDURE — 83735 ASSAY OF MAGNESIUM: CPT

## 2024-11-22 PROCEDURE — 81001 URINALYSIS AUTO W/SCOPE: CPT

## 2024-11-22 NOTE — TELEPHONE ENCOUNTER
I called and left a detailed VM for Shanique regarding the following:    ----- Message from Gianluca Mcarthur PA-C sent at 11/22/2024  2:15 PM EST -----  Lab results are stable at this time.  Will be reviewed at upcoming office appointment      I requested a call back if she has any questions or concerns.

## 2024-11-23 RX ORDER — LEVOTHYROXINE SODIUM 100 UG/1
100 TABLET ORAL DAILY
Qty: 90 TABLET | Refills: 0 | Status: SHIPPED | OUTPATIENT
Start: 2024-11-23

## 2024-11-25 ENCOUNTER — TELEPHONE (OUTPATIENT)
Dept: SLEEP CENTER | Facility: CLINIC | Age: 64
End: 2024-11-25

## 2024-11-25 ENCOUNTER — OFFICE VISIT (OUTPATIENT)
Age: 64
End: 2024-11-25
Payer: COMMERCIAL

## 2024-11-25 VITALS
TEMPERATURE: 97.7 F | WEIGHT: 268.8 LBS | BODY MASS INDEX: 45.89 KG/M2 | SYSTOLIC BLOOD PRESSURE: 122 MMHG | HEART RATE: 67 BPM | OXYGEN SATURATION: 98 % | DIASTOLIC BLOOD PRESSURE: 76 MMHG | HEIGHT: 64 IN

## 2024-11-25 DIAGNOSIS — E66.01 CLASS 3 SEVERE OBESITY DUE TO EXCESS CALORIES WITH SERIOUS COMORBIDITY AND BODY MASS INDEX (BMI) OF 45.0 TO 49.9 IN ADULT (HCC): ICD-10-CM

## 2024-11-25 DIAGNOSIS — E11.22 TYPE 2 DIABETES MELLITUS WITH STAGE 3 CHRONIC KIDNEY DISEASE, WITHOUT LONG-TERM CURRENT USE OF INSULIN, UNSPECIFIED WHETHER STAGE 3A OR 3B CKD (HCC): ICD-10-CM

## 2024-11-25 DIAGNOSIS — I10 BENIGN ESSENTIAL HYPERTENSION: ICD-10-CM

## 2024-11-25 DIAGNOSIS — E66.813 CLASS 3 SEVERE OBESITY DUE TO EXCESS CALORIES WITH SERIOUS COMORBIDITY AND BODY MASS INDEX (BMI) OF 45.0 TO 49.9 IN ADULT (HCC): ICD-10-CM

## 2024-11-25 DIAGNOSIS — N18.32 STAGE 3B CHRONIC KIDNEY DISEASE (HCC): Primary | ICD-10-CM

## 2024-11-25 DIAGNOSIS — N25.81 SECONDARY HYPERPARATHYROIDISM OF RENAL ORIGIN (HCC): ICD-10-CM

## 2024-11-25 DIAGNOSIS — N18.30 TYPE 2 DIABETES MELLITUS WITH STAGE 3 CHRONIC KIDNEY DISEASE, WITHOUT LONG-TERM CURRENT USE OF INSULIN, UNSPECIFIED WHETHER STAGE 3A OR 3B CKD (HCC): ICD-10-CM

## 2024-11-25 PROCEDURE — 99214 OFFICE O/P EST MOD 30 MIN: CPT | Performed by: INTERNAL MEDICINE

## 2024-11-25 NOTE — PROGRESS NOTES
Assessment & Plan:    1. Stage 3b chronic kidney disease ([facility])  -     Urinalysis with microscopic; Future; Expected date: 05/20/2025  -     Albumin / creatinine urine ratio; Future; Expected date: 05/20/2025  -     PTH, intact; Future; Expected date: 05/20/2025  -     Comprehensive metabolic panel; Future; Expected date: 05/20/2025  -     CBC and differential; Future; Expected date: 05/20/2025  -     Magnesium; Future; Expected date: 05/20/2025  -     Vitamin D 25 hydroxy; Future; Expected date: 05/20/2025  2. Secondary hyperparathyroidism of renal origin ([facility])  -     Urinalysis with microscopic; Future; Expected date: 05/20/2025  -     Albumin / creatinine urine ratio; Future; Expected date: 05/20/2025  -     PTH, intact; Future; Expected date: 05/20/2025  -     Comprehensive metabolic panel; Future; Expected date: 05/20/2025  -     CBC and differential; Future; Expected date: 05/20/2025  -     Magnesium; Future; Expected date: 05/20/2025  -     Vitamin D 25 hydroxy; Future; Expected date: 05/20/2025  3. Class 3 severe obesity due to excess calories with serious comorbidity and body mass index (BMI) of 45.0 to 49.9 in adult ([facility])  4. Benign essential hypertension  5. Type 2 diabetes mellitus with stage 3 chronic kidney disease, without long-term current use of insulin, unspecified whether stage 3a or 3b CKD ([facility])         CKD3   Baseline 1.3-1.4 mg/dL. Patient is not biopsy proven at present. She has normal UACR.  Etiology suspected 2/2 nonproteinuric DKD,HTN nephrosclerosis and vascular disease. Potential component from morbid obesity as well, although normal UACR.    11/22/24 Cr 1.37 with eGFR 40 ml/min. Metabolic parameters and volume status stable.  Albumin WNL.  Normotensive, BP at goal.  A1C reasonable, consider transition to Mounjaro per endo.    Fu in 6 months with labs prior.  Avoid NSAID.  Encourage hydration to 2 quart per day.    2. Secondary HPT due to renal disease  Trend Ca and PTH trends. Not  currently on calcitriol therapy.  11/22/23 PTH up to 114.4. Goal<150. No indication for activated vitamin D.  Check PTH/25 vitamin D/Ca with next set of labs.    3. Class 3 obesity  Currently on maximal dose Ozempic 2mg/week. Advise to discuss transition to Mounjaro with endocrinology.      4. Benign essential HTN  BP goal<130/80.  Continue Lisinopril 20mg/day.    5. Type 2 DM  A1C 7.5 11/22/24, reasonably well controlled. Follows with endocrinology, defer titration to their discretion.  Currently on Farxiga 10mg/day and Ozempic 2mg/week(max dose).    Advise to discuss Mounjaro transition with endocrinology.    Follow up in 6 months with labs prior.    The benefits, risks and alternatives to the treatment plan were discussed at this visit. Patient was advised of common adverse effects of any medical therapies prescribed. All questions were answered and discussed with the patient and any accompanying family members or caretakers.      Subjective:      Patient ID: Shanique Heredia is a 64 y.o. female presents for follow up in the [place] office for CKD management. Patient last seen on 5/1/24 for CKD3b.  Patient has hx DM type 2, HTN,CKD3B, HPT,obesity.    HPI    In the interim since last visit, reports Mohs surgery to left arm and follows with Dermatology in  [place].     She had a recent normal DEXA scan.    Reports awaiting left breast surgery.    Diabetes managed by endocrinology and has appt in December. She is on max dose Ozempic 2mg/week, Lantus Solostar 32 unit/night, Farxiga 10mg/day and glipizide 5mg/day.    Denies cp/v/d/LUTS.  Reports issues with plantar fasciatis.     Takes lasix twice a month.   Not routinely monitoring BP at home, currently on Lisinopril 20mg/day. BP well controlled at present.    The following portions of the patient's history were reviewed and updated as appropriate: allergies, current medications, past family history, past medical history, past social history, past surgical  "history, and problem list.    Review of Systems   Constitutional: Negative.    Respiratory: Negative.     Cardiovascular: Negative.    Gastrointestinal: Negative.    Genitourinary: Negative.    Musculoskeletal:  Positive for arthralgias.   Neurological: Negative.    All other systems reviewed and are negative.        Objective:      /76 (BP Location: Right arm, Patient Position: Sitting, Cuff Size: Large)   Pulse 67   Temp 97.7 °F (36.5 °C) (Temporal)   Ht 5' 4\" (1.626 m)   Wt 122 kg (268 lb 12.8 oz)   LMP  (LMP Unknown)   SpO2 98%   BMI 46.14 kg/m²          Physical Exam  Vitals reviewed.   Constitutional:       General: She is not in acute distress.     Appearance: She is obese.   HENT:      Head: Normocephalic and atraumatic.      Nose: Nose normal. No congestion.      Mouth/Throat:      Mouth: Mucous membranes are moist.      Pharynx: Oropharynx is clear.   Cardiovascular:      Rate and Rhythm: Normal rate and regular rhythm.      Heart sounds:      No friction rub.   Pulmonary:      Breath sounds: Normal breath sounds. No wheezing or rales.   Abdominal:      General: Bowel sounds are normal.      Palpations: Abdomen is soft.      Tenderness: There is no abdominal tenderness. There is no guarding.   Musculoskeletal:      Right lower leg: No edema.      Left lower leg: No edema.   Skin:     General: Skin is warm and dry.      Coloration: Skin is not jaundiced.      Findings: No bruising or rash.   Neurological:      General: No focal deficit present.      Mental Status: She is alert and oriented to person, place, and time. Mental status is at baseline.      Cranial Nerves: No cranial nerve deficit.   Psychiatric:         Mood and Affect: Mood normal.         Behavior: Behavior normal.             Lab Results   Component Value Date     10/09/2015    SODIUM 139 11/22/2024    K 4.7 11/22/2024     11/22/2024    CO2 26 11/22/2024    ANIONGAP 6 10/09/2015    AGAP 9 11/22/2024    BUN 28 (H) " "11/22/2024    CREATININE 1.37 (H) 11/22/2024    GLUC 145 (H) 07/22/2022    GLUF 122 (H) 11/22/2024    CALCIUM 9.0 11/22/2024    AST 17 11/22/2024    ALT 12 11/22/2024    ALKPHOS 59 11/22/2024    PROT 7.2 10/09/2015    TP 7.2 11/22/2024    BILITOT 0.48 10/09/2015    TBILI 0.53 11/22/2024    EGFR 40 11/22/2024      Lab Results   Component Value Date    CREATININE 1.37 (H) 11/22/2024    CREATININE 1.39 (H) 04/25/2024    CREATININE 1.38 (H) 12/15/2023    CREATININE 1.17 10/23/2023    CREATININE 1.33 (H) 06/24/2023    CREATININE 1.43 (H) 06/19/2023    CREATININE 1.19 02/21/2023    CREATININE 1.13 01/23/2023    CREATININE 1.51 (H) 10/17/2022    CREATININE 1.52 (H) 07/22/2022    CREATININE 1.68 (H) 07/05/2022    CREATININE 1.17 02/09/2022    CREATININE 1.43 (H) 01/13/2022    CREATININE 1.32 (H) 05/20/2021    CREATININE 1.27 (H) 01/21/2021      Lab Results   Component Value Date    COLORU Light Yellow 11/22/2024    CLARITYU Clear 11/22/2024    SPECGRAV 1.022 11/22/2024    PHUR 5.5 11/22/2024    LEUKOCYTESUR Small (A) 11/22/2024    NITRITE Negative 11/22/2024    PROTEIN UA Trace (A) 11/22/2024    GLUCOSEU >=1000 (1%) (A) 11/22/2024    KETONESU Negative 11/22/2024    UROBILINOGEN <2.0 11/22/2024    BILIRUBINUR Negative 11/22/2024    BLOODU Negative 11/22/2024    RBCUA None Seen 11/22/2024    WBCUA 4-10 (A) 11/22/2024    EPIS Occasional 11/22/2024    BACTERIA None Seen 11/22/2024      No results found for: \"LABPROT\"  No results found for: \"MICROALBUR\", \"[id number]\"  Lab Results   Component Value Date    WBC 6.99 04/25/2024    HGB 11.7 04/25/2024    HCT 37.5 04/25/2024    MCV 93 04/25/2024     04/25/2024      Lab Results   Component Value Date    HGB 11.7 04/25/2024    HGB 11.3 (L) 06/24/2023    HGB 10.5 (L) 01/23/2023    HGB 10.1 (L) 04/15/2022    HGB 9.4 (L) 02/09/2022      No results found for: \"IRON\", \"TIBC\", \"FERRITIN\"   No results found for: \"PTHCALCIUM\", \"[id number]\", \"PHOSPHORUS\"   Lab Results   Component Value " "Date    CHOLESTEROL 169 10/23/2023    HDL 36 (L) 10/23/2023    LDLCALC 83 10/23/2023    TRIG 250 (H) 10/23/2023      Lab Results   Component Value Date    URICACID 6.5 11/22/2024      Lab Results   Component Value Date    HGBA1C 7.5 (H) 11/22/2024      Lab Results   Component Value Date    FREET4 1.11 07/05/2022      Lab Results   Component Value Date    BILL Negative 06/19/2023      Lab Results   Component Value Date    PROT 7.2 10/09/2015        Portions of the record may have been created with voice recognition software. Occasional wrong word or \"sound a like\" substitutions may have occurred due to the inherent limitations of voice recognition software. Read the chart carefully and recognize, using context, where substitutions have occurred. If you have any questions, please contact the dictating provider.  "

## 2024-11-25 NOTE — PATIENT INSTRUCTIONS
Your kidney function is stable at 40%, this is stable for 1 year. You have stable stage 3 kidney disease.  You have mildly elevated parathyroid but no indication for therapy yet.   Discuss transitioning to Mounjaro with endocrinology.   Avoid motrin,advil,aleve,ibuprofen.  Hydrate to 2 quart per day.

## 2024-11-26 DIAGNOSIS — I10 ESSENTIAL HYPERTENSION: ICD-10-CM

## 2024-11-27 RX ORDER — LISINOPRIL 20 MG/1
TABLET ORAL
Qty: 90 TABLET | Refills: 1 | Status: SHIPPED | OUTPATIENT
Start: 2024-11-27

## 2024-11-29 DIAGNOSIS — E03.9 HYPOTHYROIDISM, UNSPECIFIED TYPE: ICD-10-CM

## 2024-11-29 RX ORDER — LEVOTHYROXINE SODIUM 100 UG/1
100 TABLET ORAL DAILY
Qty: 90 TABLET | Refills: 0 | Status: SHIPPED | OUTPATIENT
Start: 2024-11-29

## 2024-12-03 NOTE — PROGRESS NOTES
Name: Shanique Heredia      : 1960      MRN: [id number]  Encounter Provider: KORI Winston  Encounter Date: 2024   Encounter department: [facility] [place]  :  Assessment & Plan  Type 2 diabetes mellitus with stage 3 chronic kidney disease, with long-term current use of insulin, unspecified whether stage 3a or 3b CKD (HCC)  Patient remains above goal despite being on maximum dose of Ozempic for over 1 year (started 2 mg dose in 2023). Recommend transitioning to Mounjaro starting at 5 mg once weekly. Patient will benefit from increased weight loss, improved glycemic control, and reduction of exogenous insulin needs. Will inject 5 mg once weekly for 4 weeks then increase to 7.5 mg for 4 weeks. Goal is 10 mg once weekly in order to reduce insulin needs and potentially reduce or discontinue glipizide.  Reviewed mechanism of action as well as potential side effects including nausea, vomiting, constipation, and diarrhea.  Patient has no history of pancreatitis, medullary thyroid cancer, or M EN-2.  Continue Farxiga 10 mg daily.  Patient did not care for using CGM.  Therefore, I have instructed her to test her blood sugar twice daily while increasing doses of Mounjaro and notify me with any blood sugars less than 70 mg/dL.  Counseled on appropriate hypoglycemia surveillance and treatment.  Follow-up in 4 months.  Lab Results   Component Value Date    HGBA1C 7.5 (H) 2024       Orders:    Tirzepatide (Mounjaro) 5 MG/0.5ML SOAJ; Inject 5 mg under the skin once weekly.    Hemoglobin A1C; Future    Hypothyroidism, unspecified type  Continue 100 mcg of levothyroxine daily.  Repeat thyroid function test prior to next appointment.    Orders:    TSH, 3rd generation; Future    T4, free; Future    Class 3 severe obesity due to excess calories with serious comorbidity and body mass index (BMI) of 45.0 to 49.9 in adult (HCC)  Patient started 2 mg dose of  Ozempic in August 2023.  At that time, her starting weight was 274 pounds.  Today she is 268 pounds over 1 year later.  Recommend transitioning to Mounjaro, which has demonstrated more efficacy and weight loss and subsequent glycemic control.    Orders:    Tirzepatide (Mounjaro) 5 MG/0.5ML SOAJ; Inject 5 mg under the skin once weekly.    Benign essential hypertension  BP well-controlled at 120/64.  Continue 20 mg of lisinopril daily.         Secondary hyperparathyroidism (HCC)  Following with nephrology.             History of Present Illness     HPI  Shanique Heredia is a 64 y.o. female with type 2 diabetes with long-term use of insulin and hypothyroidism presenting to the office today for routine follow-up.    Current regimen:    Ozempic 2 mg once weekly  Lantus 32 units nightly  Glipizide XL 5 mg daily  Farxiga 10 mg daily     Hemoglobin A1C   Latest Ref Rng Normal 4.0-5.6%; PreDiabetic 5.7-6.4%; Diabetic >=6.5%; Glycemic control for adults with diabetes <7.0% %   10/23/2023 7.8 (H)    4/25/2024 8.1 (H)    11/22/2024 7.5 (H)       eAG, EST AVG Glucose   Latest Ref Rng mg/dl   10/23/2023 177    4/25/2024 186    11/22/2024 169       Legend:  (H) High    Reports fasting blood sugars range from 103-140      Despite taking highest dose of Ozempic, patient has not had significant weight loss or meaningful improvement in glycemic control.     For hypothyroidism, she is taking 100 mcg of levothyroxine daily.  TSH from April 2024 stable.      Review of Systems   Constitutional:  Negative for activity change, appetite change, fatigue and unexpected weight change.   HENT:  Negative for dental problem, sore throat, trouble swallowing and voice change.    Eyes:  Negative for visual disturbance.   Respiratory:  Negative for cough, chest tightness and shortness of breath.    Cardiovascular:  Negative for chest pain, palpitations and leg swelling.   Gastrointestinal:  Negative for constipation, diarrhea, nausea and vomiting.  "  Endocrine: Negative for cold intolerance, heat intolerance, polydipsia, polyphagia and polyuria.   Genitourinary:  Negative for frequency.   Musculoskeletal:  Positive for arthralgias. Negative for back pain, gait problem and myalgias.   Skin:  Negative for wound.   Allergic/Immunologic: Negative for environmental allergies and food allergies.   Neurological:  Negative for dizziness, weakness, light-headedness, numbness and headaches.   Psychiatric/Behavioral:  Negative for decreased concentration, dysphoric mood and sleep disturbance. The patient is not nervous/anxious.           Objective   /64 (BP Location: Right arm, Patient Position: Sitting, Cuff Size: Large)   Pulse 69   Temp 97.6 °F (36.4 °C) (Temporal)   Resp 18   Ht 5' 4\" (1.626 m)   Wt 122 kg (268 lb)   LMP  (LMP Unknown)   SpO2 99%   BMI 46.00 kg/m²      Physical Exam  Vitals reviewed.   Constitutional:       General: She is not in acute distress.     Appearance: She is well-developed. She is obese. She is not ill-appearing.   HENT:      Head: Normocephalic and atraumatic.   Eyes:      Conjunctiva/sclera: Conjunctivae normal.      Pupils: Pupils are equal, round, and reactive to light.   Cardiovascular:      Rate and Rhythm: Normal rate.      Pulses: Normal pulses.   Pulmonary:      Effort: Pulmonary effort is normal. No respiratory distress.   Abdominal:      Palpations: Abdomen is soft.      Tenderness: There is no abdominal tenderness.   Musculoskeletal:      Cervical back: Normal range of motion.      Right lower leg: No edema.      Left lower leg: No edema.   Skin:     General: Skin is warm and dry.      Capillary Refill: Capillary refill takes less than 2 seconds.   Neurological:      Mental Status: She is alert.   Psychiatric:         Mood and Affect: Mood normal.           "

## 2024-12-04 ENCOUNTER — TELEPHONE (OUTPATIENT)
Dept: ENDOCRINOLOGY | Facility: CLINIC | Age: 64
End: 2024-12-04

## 2024-12-04 ENCOUNTER — TELEPHONE (OUTPATIENT)
Dept: ADMINISTRATIVE | Facility: OTHER | Age: 64
End: 2024-12-04

## 2024-12-04 ENCOUNTER — OFFICE VISIT (OUTPATIENT)
Dept: FAMILY MEDICINE CLINIC | Facility: CLINIC | Age: 64
End: 2024-12-04
Payer: COMMERCIAL

## 2024-12-04 ENCOUNTER — OFFICE VISIT (OUTPATIENT)
Dept: ENDOCRINOLOGY | Facility: CLINIC | Age: 64
End: 2024-12-04
Payer: COMMERCIAL

## 2024-12-04 VITALS
DIASTOLIC BLOOD PRESSURE: 64 MMHG | SYSTOLIC BLOOD PRESSURE: 120 MMHG | HEIGHT: 64 IN | WEIGHT: 268 LBS | HEART RATE: 69 BPM | BODY MASS INDEX: 45.75 KG/M2 | OXYGEN SATURATION: 99 % | TEMPERATURE: 97.6 F | RESPIRATION RATE: 18 BRPM

## 2024-12-04 VITALS
RESPIRATION RATE: 16 BRPM | HEART RATE: 76 BPM | SYSTOLIC BLOOD PRESSURE: 124 MMHG | HEIGHT: 64 IN | BODY MASS INDEX: 45.55 KG/M2 | DIASTOLIC BLOOD PRESSURE: 78 MMHG | OXYGEN SATURATION: 98 % | WEIGHT: 266.8 LBS | TEMPERATURE: 98.3 F

## 2024-12-04 DIAGNOSIS — E11.69 MIXED DIABETIC HYPERLIPIDEMIA ASSOCIATED WITH TYPE 2 DIABETES MELLITUS  (HCC): ICD-10-CM

## 2024-12-04 DIAGNOSIS — E03.9 HYPOTHYROIDISM, UNSPECIFIED TYPE: ICD-10-CM

## 2024-12-04 DIAGNOSIS — E66.01 CLASS 3 SEVERE OBESITY DUE TO EXCESS CALORIES WITH SERIOUS COMORBIDITY AND BODY MASS INDEX (BMI) OF 45.0 TO 49.9 IN ADULT (HCC): ICD-10-CM

## 2024-12-04 DIAGNOSIS — N18.30 TYPE 2 DIABETES MELLITUS WITH STAGE 3 CHRONIC KIDNEY DISEASE, WITH LONG-TERM CURRENT USE OF INSULIN, UNSPECIFIED WHETHER STAGE 3A OR 3B CKD (HCC): Primary | ICD-10-CM

## 2024-12-04 DIAGNOSIS — E55.9 VITAMIN D DEFICIENCY: ICD-10-CM

## 2024-12-04 DIAGNOSIS — N18.30 TYPE 2 DIABETES MELLITUS WITH STAGE 3 CHRONIC KIDNEY DISEASE, WITH LONG-TERM CURRENT USE OF INSULIN, UNSPECIFIED WHETHER STAGE 3A OR 3B CKD (HCC): ICD-10-CM

## 2024-12-04 DIAGNOSIS — E11.22 TYPE 2 DIABETES MELLITUS WITH STAGE 3 CHRONIC KIDNEY DISEASE, WITH LONG-TERM CURRENT USE OF INSULIN, UNSPECIFIED WHETHER STAGE 3A OR 3B CKD (HCC): Primary | ICD-10-CM

## 2024-12-04 DIAGNOSIS — E78.2 MIXED DIABETIC HYPERLIPIDEMIA ASSOCIATED WITH TYPE 2 DIABETES MELLITUS  (HCC): ICD-10-CM

## 2024-12-04 DIAGNOSIS — Z00.00 ANNUAL PHYSICAL EXAM: Primary | ICD-10-CM

## 2024-12-04 DIAGNOSIS — K21.9 GERD WITHOUT ESOPHAGITIS: ICD-10-CM

## 2024-12-04 DIAGNOSIS — Z23 ENCOUNTER FOR IMMUNIZATION: ICD-10-CM

## 2024-12-04 DIAGNOSIS — M17.0 PRIMARY OSTEOARTHRITIS OF BOTH KNEES: ICD-10-CM

## 2024-12-04 DIAGNOSIS — Z79.4 TYPE 2 DIABETES MELLITUS WITH STAGE 3 CHRONIC KIDNEY DISEASE, WITH LONG-TERM CURRENT USE OF INSULIN, UNSPECIFIED WHETHER STAGE 3A OR 3B CKD (HCC): Primary | ICD-10-CM

## 2024-12-04 DIAGNOSIS — I10 BENIGN ESSENTIAL HYPERTENSION: ICD-10-CM

## 2024-12-04 DIAGNOSIS — Z79.4 TYPE 2 DIABETES MELLITUS WITH STAGE 3 CHRONIC KIDNEY DISEASE, WITH LONG-TERM CURRENT USE OF INSULIN, UNSPECIFIED WHETHER STAGE 3A OR 3B CKD (HCC): ICD-10-CM

## 2024-12-04 DIAGNOSIS — E66.813 CLASS 3 SEVERE OBESITY DUE TO EXCESS CALORIES WITH SERIOUS COMORBIDITY AND BODY MASS INDEX (BMI) OF 45.0 TO 49.9 IN ADULT (HCC): ICD-10-CM

## 2024-12-04 DIAGNOSIS — N25.81 SECONDARY HYPERPARATHYROIDISM (HCC): ICD-10-CM

## 2024-12-04 DIAGNOSIS — E11.22 TYPE 2 DIABETES MELLITUS WITH STAGE 3 CHRONIC KIDNEY DISEASE, WITH LONG-TERM CURRENT USE OF INSULIN, UNSPECIFIED WHETHER STAGE 3A OR 3B CKD (HCC): ICD-10-CM

## 2024-12-04 PROCEDURE — 99214 OFFICE O/P EST MOD 30 MIN: CPT | Performed by: FAMILY MEDICINE

## 2024-12-04 PROCEDURE — 90673 RIV3 VACCINE NO PRESERV IM: CPT

## 2024-12-04 PROCEDURE — 99396 PREV VISIT EST AGE 40-64: CPT | Performed by: FAMILY MEDICINE

## 2024-12-04 PROCEDURE — 90471 IMMUNIZATION ADMIN: CPT

## 2024-12-04 PROCEDURE — 99214 OFFICE O/P EST MOD 30 MIN: CPT | Performed by: NURSE PRACTITIONER

## 2024-12-04 RX ORDER — TIRZEPATIDE 5 MG/.5ML
INJECTION, SOLUTION SUBCUTANEOUS
Qty: 2 ML | Refills: 0 | Status: SHIPPED | OUTPATIENT
Start: 2024-12-04 | End: 2024-12-09 | Stop reason: CLARIF

## 2024-12-04 NOTE — LETTER
Diabetic Foot Exam Form    Date Requested: 24  Patient: Shanique Heredia  Patient : 1960   Referring Provider: Benedicto Holguin DO    Diabetic Foot Exam Performed with shoes and socks removed        Yes         No     Date of Diabetic Foot Exam ______________________________  Risk Score ____________________________________________    Left Foot       Visual Inspection         Monofilament Testing Sensory Exam        Pedal Pulses         Additional Comments         Right Foot      Visual Inspection         Monofilament Testing Sensory Exam       Pedal Pulses         Additional Comments         Comments __________________________________________________________    Practice Providing Exam ______________________________________________    Exam Performed By (print name) _______________________________________      Provider Signature ___________________________________________________      These reports are needed for  compliance.    Please fax this completed form and a copy of the Diabetic Foot Exam report to our office located at [address] [place] [address] as soon as possible via Fax [phone] grazyna Hart: Phone [phone]    We thank you for your assistance in treating our mutual patient.

## 2024-12-04 NOTE — TELEPHONE ENCOUNTER
Upon review of the In Basket request and the patient's chart, initial outreach has been made via fax to facility. Please see Contacts section for details.     Thank you  Hailey Blancas MA

## 2024-12-04 NOTE — TELEPHONE ENCOUNTER
----- Message from Marlen HAMILTON sent at 12/4/2024 11:30 AM EST -----  12/04/24 11:31 AM    Hello, our patient Shanique Heredia has had Diabetic Foot Exam completed/performed. Please assist in updating the patient chart by making an External outreach to [facility] located in Care Everywhere. The date of service is 11/06/2024.    Thank you,  Marlen Maldonado MA   NOELLE ARCE

## 2024-12-04 NOTE — TELEPHONE ENCOUNTER
Upon review of the In Basket request we were able to locate, review, and update the patient chart as requested for Diabetic Foot Exam.    Any additional questions or concerns should be emailed to the Practice Liaisons via the appropriate education email address, please do not reply via In Basket.    Thank you  Hailey Blancas MA   [facility]

## 2024-12-04 NOTE — ASSESSMENT & PLAN NOTE
Patient remains above goal despite being on maximum dose of Ozempic for over 1 year (started 2 mg dose in August 2023). Recommend transitioning to Mounjaro starting at 5 mg once weekly. Patient will benefit from increased weight loss, improved glycemic control, and reduction of exogenous insulin needs. Will inject 5 mg once weekly for 4 weeks then increase to 7.5 mg for 4 weeks. Goal is 10 mg once weekly in order to reduce insulin needs and potentially reduce or discontinue glipizide.  Reviewed mechanism of action as well as potential side effects including nausea, vomiting, constipation, and diarrhea.  Patient has no history of pancreatitis, medullary thyroid cancer, or M EN-2.  Continue Farxiga 10 mg daily.  Patient did not care for using CGM.  Therefore, I have instructed her to test her blood sugar twice daily while increasing doses of Mounjaro and notify me with any blood sugars less than 70 mg/dL.  Counseled on appropriate hypoglycemia surveillance and treatment.  Follow-up in 4 months.  Lab Results   Component Value Date    HGBA1C 7.5 (H) 11/22/2024       Orders:    Tirzepatide (Mounjaro) 5 MG/0.5ML SOAJ; Inject 5 mg under the skin once weekly.    Hemoglobin A1C; Future

## 2024-12-04 NOTE — PATIENT INSTRUCTIONS
We will switch to Mounjaro. Start at 5 mg once weekly. Pay close attention to your sugars. If you have any low blood sugars less than 70 mg/dL, reduce Lantus to 26 units.  Once you have taken your third injection of the 5 mg dose of Mounjaro, please let me know that you are tolerating the medication and I will order th 7.5 mg dose. After that, the 10 mg dose. Then we will reassess.

## 2024-12-04 NOTE — PROGRESS NOTES
Assessment/Plan:   Patient received flu vaccine today.  She declines COVID-19 booster.  Patient to obtain fasting lipid panel with next labs per specialist.  Patient to continue present treatment.  Instructed to follow a low-fat, low-salt and a low sugar/carbohydrate diet and get regular aerobic exercise walking as tolerated.  Continued weight loss encouraged.  Follow-up with specialist as scheduled.  Return to the office in 6 months.   Diagnoses and all orders for this visit:    Annual physical exam    Benign essential hypertension    Type 2 diabetes mellitus with stage 3 chronic kidney disease, with long-term current use of insulin, unspecified whether stage 3a or 3b CKD ([facility])    Mixed diabetic hyperlipidemia associated with type 2 diabetes mellitus  ([facility])  -     Lipid Panel with Direct LDL reflex    Hypothyroidism, unspecified type    GERD without esophagitis    Primary osteoarthritis of both knees    Class 3 severe obesity due to excess calories with serious comorbidity and body mass index (BMI) of 45.0 to 49.9 in adult ([facility])    Vitamin D deficiency    Encounter for immunization  -     influenza vaccine, recombinant, PF, 0.5 mL IM (Flublok)          Subjective:     Patient ID: Shanique Heredia is a 64 y.o. female.    Patient is here for annual physical exam and follow-up of chronic conditions.  Recent labs reviewed.  Patient agrees to flu vaccine today but declines COVID-19 booster.  Patient is feeling fairly well overall although admits to ongoing arthritic knee and back pain.  No regular exercise program although patient remains fairly active at home.  Patient is up-to-date on mammogram, DEXA bone scan and GYN exam.  She is up-to-date on colonoscopy.  Patient is up-to-date on diabetic eye exam and foot exam.    Hypertension  This is a chronic problem. The problem is controlled. Associated symptoms include peripheral edema. Pertinent negatives include no anxiety, blurred vision, chest pain, headaches, neck pain,  orthopnea, palpitations, PND or shortness of breath. Risk factors for coronary artery disease include dyslipidemia, diabetes mellitus, obesity, post-menopausal state and family history. Past treatments include ACE inhibitors. The current treatment provides significant improvement. Compliance problems include exercise.  Hypertensive end-organ damage includes kidney disease. There is no history of CAD/MI or CVA.       Review of Systems   Constitutional:  Negative for activity change, appetite change, chills, diaphoresis, fatigue, fever and unexpected weight change.   HENT: Negative.     Eyes: Negative.  Negative for blurred vision.   Respiratory:  Negative for cough, chest tightness, shortness of breath and wheezing.    Cardiovascular:  Positive for leg swelling. Negative for chest pain, palpitations, orthopnea and PND.   Gastrointestinal:  Positive for constipation. Negative for abdominal pain, blood in stool, diarrhea, nausea and vomiting.   Endocrine: Negative for cold intolerance and heat intolerance.   Genitourinary:  Positive for urgency. Negative for difficulty urinating, dysuria, frequency and hematuria.   Musculoskeletal:  Positive for arthralgias and back pain. Negative for gait problem, joint swelling, myalgias, neck pain and neck stiffness.   Skin: Negative.    Neurological:  Negative for dizziness, syncope, weakness, light-headedness and headaches.   Hematological:  Does not bruise/bleed easily.   Psychiatric/Behavioral:  Negative for decreased concentration, dysphoric mood and sleep disturbance. The patient is not nervous/anxious.          Objective:     Physical Exam  Constitutional:       General: She is not in acute distress.     Appearance: Normal appearance. She is obese.   HENT:      Head: Normocephalic.      Mouth/Throat:      Mouth: Mucous membranes are moist.   Eyes:      General: No scleral icterus.     Conjunctiva/sclera: Conjunctivae normal.   Neck:      Vascular: No carotid bruit.    Cardiovascular:      Rate and Rhythm: Normal rate and regular rhythm.   Pulmonary:      Effort: Pulmonary effort is normal.      Breath sounds: Normal breath sounds.   Abdominal:      Palpations: Abdomen is soft.      Tenderness: There is no abdominal tenderness.   Musculoskeletal:      Cervical back: Neck supple.      Right lower leg: Edema present.      Left lower leg: Edema present.   Lymphadenopathy:      Cervical: No cervical adenopathy.   Skin:     General: Skin is warm and dry.   Neurological:      General: No focal deficit present.      Mental Status: She is alert and oriented to person, place, and time.   Psychiatric:         Mood and Affect: Mood normal.         Behavior: Behavior normal.         Thought Content: Thought content normal.         Judgment: Judgment normal.

## 2024-12-05 ENCOUNTER — TELEPHONE (OUTPATIENT)
Dept: ENDOCRINOLOGY | Facility: CLINIC | Age: 64
End: 2024-12-05

## 2024-12-05 ENCOUNTER — VBI (OUTPATIENT)
Dept: ADMINISTRATIVE | Facility: OTHER | Age: 64
End: 2024-12-05

## 2024-12-05 NOTE — ASSESSMENT & PLAN NOTE
Continue 100 mcg of levothyroxine daily.  Repeat thyroid function test prior to next appointment.    Orders:    TSH, 3rd generation; Future    T4, free; Future

## 2024-12-05 NOTE — TELEPHONE ENCOUNTER
12/05/24 8:49 AM     Chart reviewed for Hemoglobin A1c was/were submitted to the patient's insurance.     Zainab Soliman MA   [facility]

## 2024-12-05 NOTE — ASSESSMENT & PLAN NOTE
Patient started 2 mg dose of Ozempic in August 2023.  At that time, her starting weight was 274 pounds.  Today she is 268 pounds over 1 year later.  Recommend transitioning to Mounjaro, which has demonstrated more efficacy and weight loss and subsequent glycemic control.    Orders:    Tirzepatide (Mounjaro) 5 MG/0.5ML SOAJ; Inject 5 mg under the skin once weekly.

## 2024-12-06 NOTE — TELEPHONE ENCOUNTER
PA for Mounjaro 5 MG/0.5ML SOAJ SUBMITTED to [facility]    via      [x]Numbrs AG-Case ID # [id number]  [x]PA sent as URGENT    All office notes, labs and other pertaining documents and studies sent. Clinical questions answered. Awaiting determination from insurance company.     Turnaround time for your insurance to make a decision on your Prior Authorization can take 7-21 business days.

## 2024-12-06 NOTE — TELEPHONE ENCOUNTER
PA for Mounjaro 5 MG/0.5ML SOAJ  DENIED    Reason:        Message sent to office clinical pool Yes    Denial letter scanned into Media Yes    Appeal started No (Provider will need to decide if appeal is warranted and send clinical documentation to Prior Authorization Team for initiation.)    **Please follow up with your patient regarding denial and next steps**

## 2024-12-09 DIAGNOSIS — Z79.4 TYPE 2 DIABETES MELLITUS WITH STAGE 3 CHRONIC KIDNEY DISEASE, WITH LONG-TERM CURRENT USE OF INSULIN, UNSPECIFIED WHETHER STAGE 3A OR 3B CKD (HCC): Primary | ICD-10-CM

## 2024-12-09 DIAGNOSIS — N18.30 TYPE 2 DIABETES MELLITUS WITH STAGE 3 CHRONIC KIDNEY DISEASE, WITH LONG-TERM CURRENT USE OF INSULIN, UNSPECIFIED WHETHER STAGE 3A OR 3B CKD (HCC): Primary | ICD-10-CM

## 2024-12-09 DIAGNOSIS — E11.22 TYPE 2 DIABETES MELLITUS WITH STAGE 3 CHRONIC KIDNEY DISEASE, WITH LONG-TERM CURRENT USE OF INSULIN, UNSPECIFIED WHETHER STAGE 3A OR 3B CKD (HCC): Primary | ICD-10-CM

## 2024-12-11 DIAGNOSIS — E78.5 HYPERLIPIDEMIA, UNSPECIFIED HYPERLIPIDEMIA TYPE: ICD-10-CM

## 2024-12-11 NOTE — TELEPHONE ENCOUNTER
Patient called the RX Refill Line. Message is being forwarded to the office.     Patient is returning office staff call, warm transfer attempted. Wait time was over for minutes patient places on a call back list.     Please contact patient at [phone]

## 2024-12-11 NOTE — TELEPHONE ENCOUNTER
Patient called back regarding PA. Made aware:    KORI Gallagher to Thea AWA Bowles       12/9/24  1:31 PM  Mounjaro denied  Will have to put patient back on Ozempic  Please let them know  Thanks    Patient verbalized understanding. States nephrologist wanted her on Mounjaro due to it being better in the long run for her kidney function. States she was at a standstill with weight being on Ozempic. Asking if provider can initiate an appeal. Please advise, thank you

## 2024-12-12 RX ORDER — ROSUVASTATIN CALCIUM 20 MG/1
20 TABLET, COATED ORAL DAILY
Qty: 90 TABLET | Refills: 0 | Status: SHIPPED | OUTPATIENT
Start: 2024-12-12

## 2024-12-19 ENCOUNTER — TELEPHONE (OUTPATIENT)
Dept: ADMINISTRATIVE | Facility: OTHER | Age: 64
End: 2024-12-19

## 2024-12-19 DIAGNOSIS — E11.22 TYPE 2 DIABETES MELLITUS WITH STAGE 3B CHRONIC KIDNEY DISEASE, WITHOUT LONG-TERM CURRENT USE OF INSULIN (HCC): ICD-10-CM

## 2024-12-19 DIAGNOSIS — N18.32 TYPE 2 DIABETES MELLITUS WITH STAGE 3B CHRONIC KIDNEY DISEASE, WITHOUT LONG-TERM CURRENT USE OF INSULIN (HCC): ICD-10-CM

## 2024-12-19 NOTE — TELEPHONE ENCOUNTER
----- Message from Nora PATTON sent at 12/18/2024  3:22 PM EST -----  Regarding: Care Gap Request  12/18/24 3:22 PM    Hello, our patient above has had Diabetic Foot Exam completed/performed. Please assist in updating the patient chart by making an External outreach to Dr. Larisa Gibbs facility located in [address] [place] [address]  Phone: [phone]  Fax: [phone]  . The date of service is 2024.    Thank you,  Nora Jasso MA  [facility] [place]

## 2024-12-19 NOTE — LETTER
Diabetic Foot Exam Form    Date Requested: 24  Patient: Shanique Heredia  Patient : 1960   Referring Provider: Benedicto Holguin DO    Diabetic Foot Exam Performed with shoes and socks removed        Yes         No     Date of Diabetic Foot Exam ______________________________  Risk Score ____________________________________________    Left Foot       Visual Inspection         Monofilament Testing Sensory Exam        Pedal Pulses         Additional Comments         Right Foot      Visual Inspection         Monofilament Testing Sensory Exam       Pedal Pulses         Additional Comments         Comments __________________________________________________________    Practice Providing Exam ______________________________________________    Exam Performed By (print name) _______________________________________      Provider Signature ___________________________________________________      These reports are needed for  compliance.    Please fax this completed form and a copy of the Diabetic Foot Exam report to our office located at [address] [place] [address] as soon as possible via Fax [phone] grazyna Chavez: Phone [phone]    We thank you for your assistance in treating our mutual patient.

## 2024-12-19 NOTE — TELEPHONE ENCOUNTER
As a follow-up, a second attempt has been made for outreach via fax to facility. Please see Contacts section for details.    Thank you  Kathy Sherman    melissa

## 2024-12-19 NOTE — TELEPHONE ENCOUNTER
Upon review of the In Basket request we were able to locate, review, and update the patient chart as requested for Diabetic Foot Exam.    Any additional questions or concerns should be emailed to the Practice Liaisons via the appropriate education email address, please do not reply via In Basket.    Thank you  Kathy Sherman   [facility]

## 2024-12-20 RX ORDER — BLOOD-GLUCOSE METER
EACH MISCELLANEOUS 2 TIMES DAILY
Qty: 100 EACH | Refills: 3 | Status: SHIPPED | OUTPATIENT
Start: 2024-12-20

## 2024-12-23 ENCOUNTER — TELEPHONE (OUTPATIENT)
Age: 64
End: 2024-12-23

## 2024-12-23 NOTE — TELEPHONE ENCOUNTER
PA for Embrace Talk Glucose Test test strip    DENIED    Reason:(Screenshot if applicable)        Message sent to office clinical pool Yes    Denial letter scanned into Media Yes    Appeal started No (Provider will need to decide if appeal is warranted and send clinical documentation to Prior Authorization Team for initiation.)    **Please follow up with your patient regarding denial and next steps**

## 2024-12-27 NOTE — TELEPHONE ENCOUNTER
Patient called back. She was able to  the test strips the day they were sent in with no charge.  She doesn't need anymore at the present time. She will contact when she needs

## 2025-01-02 DIAGNOSIS — E11.22 TYPE 2 DIABETES MELLITUS WITH STAGE 3B CHRONIC KIDNEY DISEASE, WITHOUT LONG-TERM CURRENT USE OF INSULIN (HCC): ICD-10-CM

## 2025-01-02 DIAGNOSIS — N18.32 TYPE 2 DIABETES MELLITUS WITH STAGE 3B CHRONIC KIDNEY DISEASE, WITHOUT LONG-TERM CURRENT USE OF INSULIN (HCC): ICD-10-CM

## 2025-01-02 RX ORDER — GLIPIZIDE 5 MG/1
5 TABLET, FILM COATED, EXTENDED RELEASE ORAL DAILY
Qty: 90 TABLET | Refills: 1 | Status: SHIPPED | OUTPATIENT
Start: 2025-01-02

## 2025-01-14 DIAGNOSIS — N18.30 TYPE 2 DIABETES MELLITUS WITH STAGE 3 CHRONIC KIDNEY DISEASE, WITH LONG-TERM CURRENT USE OF INSULIN, UNSPECIFIED WHETHER STAGE 3A OR 3B CKD (HCC): ICD-10-CM

## 2025-01-14 DIAGNOSIS — Z79.4 TYPE 2 DIABETES MELLITUS WITH STAGE 3 CHRONIC KIDNEY DISEASE, WITH LONG-TERM CURRENT USE OF INSULIN, UNSPECIFIED WHETHER STAGE 3A OR 3B CKD (HCC): ICD-10-CM

## 2025-01-14 DIAGNOSIS — E11.22 TYPE 2 DIABETES MELLITUS WITH STAGE 3 CHRONIC KIDNEY DISEASE, WITH LONG-TERM CURRENT USE OF INSULIN, UNSPECIFIED WHETHER STAGE 3A OR 3B CKD (HCC): ICD-10-CM

## 2025-01-15 DIAGNOSIS — K21.9 GASTROESOPHAGEAL REFLUX DISEASE WITHOUT ESOPHAGITIS: ICD-10-CM

## 2025-01-15 RX ORDER — SEMAGLUTIDE 2.68 MG/ML
INJECTION, SOLUTION SUBCUTANEOUS
Qty: 3 ML | Refills: 5 | Status: SHIPPED | OUTPATIENT
Start: 2025-01-15

## 2025-01-15 RX ORDER — PANTOPRAZOLE SODIUM 40 MG/1
40 TABLET, DELAYED RELEASE ORAL DAILY
Qty: 90 TABLET | Refills: 1 | Status: SHIPPED | OUTPATIENT
Start: 2025-01-15

## 2025-02-21 DIAGNOSIS — E11.65 TYPE 2 DIABETES MELLITUS WITH HYPERGLYCEMIA, WITHOUT LONG-TERM CURRENT USE OF INSULIN (HCC): ICD-10-CM

## 2025-02-21 RX ORDER — INSULIN GLARGINE 100 [IU]/ML
INJECTION, SOLUTION SUBCUTANEOUS
Qty: 30 ML | Refills: 1 | Status: SHIPPED | OUTPATIENT
Start: 2025-02-21

## 2025-02-23 DIAGNOSIS — E03.9 HYPOTHYROIDISM, UNSPECIFIED TYPE: ICD-10-CM

## 2025-02-25 RX ORDER — LEVOTHYROXINE SODIUM 100 UG/1
100 TABLET ORAL DAILY
Qty: 90 TABLET | Refills: 1 | Status: SHIPPED | OUTPATIENT
Start: 2025-02-25

## 2025-03-05 ENCOUNTER — TELEPHONE (OUTPATIENT)
Age: 65
End: 2025-03-05

## 2025-03-05 NOTE — TELEPHONE ENCOUNTER
Janell from [facility] pharmacy services is calling to notify the  medication mounjaro is now on the patients formulary .     it can be ordered and prior auth done by cover my meds. com  DX of DM2 is needed only.

## 2025-03-08 DIAGNOSIS — E11.65 TYPE 2 DIABETES MELLITUS WITH HYPERGLYCEMIA, WITHOUT LONG-TERM CURRENT USE OF INSULIN (HCC): ICD-10-CM

## 2025-03-10 RX ORDER — DAPAGLIFLOZIN 10 MG/1
10 TABLET, FILM COATED ORAL
Qty: 90 TABLET | Refills: 1 | Status: SHIPPED | OUTPATIENT
Start: 2025-03-10 | End: 2025-03-12 | Stop reason: SDUPTHER

## 2025-03-11 ENCOUNTER — TELEPHONE (OUTPATIENT)
Age: 65
End: 2025-03-11

## 2025-03-11 NOTE — TELEPHONE ENCOUNTER
PA for Farxiga 10mg tab SUBMITTED to [facility]    via    []CMM-KEY:   [x]Surescripts-Case ID #   []Availity-Auth ID # NDC #   []Faxed to plan   []Other website   []Phone call Case ID #     [x]PA sent as URGENT    All office notes, labs and other pertaining documents and studies sent. Clinical questions answered. Awaiting determination from insurance company.     Turnaround time for your insurance to make a decision on your Prior Authorization can take 7-21 business days.

## 2025-03-12 DIAGNOSIS — E11.65 TYPE 2 DIABETES MELLITUS WITH HYPERGLYCEMIA, WITHOUT LONG-TERM CURRENT USE OF INSULIN (HCC): ICD-10-CM

## 2025-03-12 NOTE — TELEPHONE ENCOUNTER
Re sent a script for name brand only to [facility] to be re sent to be filled as the insurance won't cover generic brand.

## 2025-03-12 NOTE — TELEPHONE ENCOUNTER
PA for Farxiga 10mg DENIED    Reason:    Send script for BRAND Farxiga - generic is not covered    Message sent to office clinical pool Yes    Denial letter scanned into Media Yes    Appeal started No (Provider will need to decide if appeal is warranted and send clinical documentation to Prior Authorization Team for initiation.)    **Please follow up with your patient regarding denial and next steps**

## 2025-03-14 RX ORDER — DAPAGLIFLOZIN 10 MG/1
10 TABLET, FILM COATED ORAL
Qty: 90 TABLET | Refills: 1 | Status: SHIPPED | OUTPATIENT
Start: 2025-03-14

## 2025-03-21 ENCOUNTER — TELEPHONE (OUTPATIENT)
Dept: ENDOCRINOLOGY | Facility: CLINIC | Age: 65
End: 2025-03-21

## 2025-03-24 NOTE — TELEPHONE ENCOUNTER
PA for (Mounjaro) 2.5 MG/0.5ML  APPROVED     Date(s) approved March 24, 2025 to March 24, 2026     Case #    Patient advised by          []MyChart Message  [x]Phone call   []LMOM  []L/M to call office as no active Communication consent on file  []Unable to leave detailed message as VM not approved on Communication consent       Pharmacy advised by    []Fax  []Phone call  []Secure Chat    Specialty Pharmacy    []     Approval letter scanned into Media Yes

## 2025-03-24 NOTE — TELEPHONE ENCOUNTER
PA for  (Mounjaro) 2.5 MG/0.5ML SUBMITTED to [employer]    via      [x]SurescriFlexScore-Case ID #       [x]PA sent as URGENT    All office notes, labs and other pertaining documents and studies sent. Clinical questions answered. Awaiting determination from insurance company.     Turnaround time for your insurance to make a decision on your Prior Authorization can take 7-21 business days.

## 2025-04-09 DIAGNOSIS — E11.65 TYPE 2 DIABETES MELLITUS WITH HYPERGLYCEMIA, WITHOUT LONG-TERM CURRENT USE OF INSULIN (HCC): ICD-10-CM

## 2025-04-10 RX ORDER — INSULIN GLARGINE 100 [IU]/ML
INJECTION, SOLUTION SUBCUTANEOUS
Qty: 30 ML | Refills: 1 | Status: SHIPPED | OUTPATIENT
Start: 2025-04-10

## 2025-04-12 DIAGNOSIS — I10 ESSENTIAL HYPERTENSION: ICD-10-CM

## 2025-04-14 ENCOUNTER — TELEPHONE (OUTPATIENT)
Age: 65
End: 2025-04-14

## 2025-04-14 RX ORDER — LISINOPRIL 20 MG/1
20 TABLET ORAL DAILY
Qty: 90 TABLET | Refills: 1 | Status: SHIPPED | OUTPATIENT
Start: 2025-04-14

## 2025-04-14 NOTE — TELEPHONE ENCOUNTER
PA for  (Lantus SoloStar) 100 UNIT/ML NOT REQUIRED     Reason (screenshot if applicable)    Spoke to pharmacy, too early for refill- No prior auth needed     Patient advised by          [] MyChart Message  [] Phone call   []LMOM  []L/M to call office as no active Communication consent on file  []Unable to leave detailed message as VM not approved on Communication consent       Pharmacy advised by    []Fax  [x]Phone call

## 2025-04-21 ENCOUNTER — APPOINTMENT (OUTPATIENT)
Dept: LAB | Facility: CLINIC | Age: 65
End: 2025-04-21
Attending: FAMILY MEDICINE
Payer: COMMERCIAL

## 2025-04-21 DIAGNOSIS — E03.9 HYPOTHYROIDISM, UNSPECIFIED TYPE: ICD-10-CM

## 2025-04-21 DIAGNOSIS — N18.30 TYPE 2 DIABETES MELLITUS WITH STAGE 3 CHRONIC KIDNEY DISEASE, WITH LONG-TERM CURRENT USE OF INSULIN, UNSPECIFIED WHETHER STAGE 3A OR 3B CKD (HCC): ICD-10-CM

## 2025-04-21 DIAGNOSIS — Z79.4 TYPE 2 DIABETES MELLITUS WITH STAGE 3 CHRONIC KIDNEY DISEASE, WITH LONG-TERM CURRENT USE OF INSULIN, UNSPECIFIED WHETHER STAGE 3A OR 3B CKD (HCC): ICD-10-CM

## 2025-04-21 DIAGNOSIS — E11.22 TYPE 2 DIABETES MELLITUS WITH STAGE 3 CHRONIC KIDNEY DISEASE, WITH LONG-TERM CURRENT USE OF INSULIN, UNSPECIFIED WHETHER STAGE 3A OR 3B CKD (HCC): ICD-10-CM

## 2025-04-21 PROCEDURE — 84443 ASSAY THYROID STIM HORMONE: CPT

## 2025-04-21 PROCEDURE — 83036 HEMOGLOBIN GLYCOSYLATED A1C: CPT

## 2025-04-21 PROCEDURE — 84439 ASSAY OF FREE THYROXINE: CPT

## 2025-04-22 ENCOUNTER — HOSPITAL ENCOUNTER (OUTPATIENT)
Dept: MAMMOGRAPHY | Facility: HOSPITAL | Age: 65
Discharge: HOME | End: 2025-04-22
Payer: COMMERCIAL

## 2025-04-22 ENCOUNTER — RESULTS FOLLOW-UP (OUTPATIENT)
Dept: FAMILY MEDICINE CLINIC | Facility: CLINIC | Age: 65
End: 2025-04-22

## 2025-04-22 ENCOUNTER — RESULTS FOLLOW-UP (OUTPATIENT)
Dept: ENDOCRINOLOGY | Facility: CLINIC | Age: 65
End: 2025-04-22

## 2025-04-22 DIAGNOSIS — R92.8 ABNORMAL MAMMOGRAM: ICD-10-CM

## 2025-04-22 LAB
CHOLEST SERPL-MCNC: 174 MG/DL (ref ?–200)
EST. AVERAGE GLUCOSE BLD GHB EST-MCNC: 189 MG/DL
HBA1C MFR BLD: 8.2 %
HDLC SERPL-MCNC: 40 MG/DL
LDLC SERPL CALC-MCNC: 88 MG/DL (ref 0–100)
T4 FREE SERPL-MCNC: 0.9 NG/DL (ref 0.61–1.12)
TRIGL SERPL-MCNC: 231 MG/DL (ref ?–150)
TSH SERPL DL<=0.05 MIU/L-ACNC: 2.39 UIU/ML (ref 0.45–4.5)

## 2025-04-22 PROCEDURE — G0279 TOMOSYNTHESIS, MAMMO: HCPCS

## 2025-04-22 PROCEDURE — 77065 DX MAMMO INCL CAD UNI: CPT

## 2025-04-23 ENCOUNTER — RESULTS FOLLOW-UP (OUTPATIENT)
Dept: OBGYN CLINIC | Facility: CLINIC | Age: 65
End: 2025-04-23

## 2025-04-23 ENCOUNTER — OFFICE VISIT (OUTPATIENT)
Dept: ENDOCRINOLOGY | Facility: CLINIC | Age: 65
End: 2025-04-23
Payer: COMMERCIAL

## 2025-04-23 VITALS
RESPIRATION RATE: 19 BRPM | SYSTOLIC BLOOD PRESSURE: 140 MMHG | HEART RATE: 66 BPM | OXYGEN SATURATION: 97 % | WEIGHT: 276 LBS | TEMPERATURE: 98.2 F | DIASTOLIC BLOOD PRESSURE: 82 MMHG | BODY MASS INDEX: 47.12 KG/M2 | HEIGHT: 64 IN

## 2025-04-23 DIAGNOSIS — E03.9 HYPOTHYROIDISM, UNSPECIFIED TYPE: ICD-10-CM

## 2025-04-23 DIAGNOSIS — Z79.4 TYPE 2 DIABETES MELLITUS WITH STAGE 3 CHRONIC KIDNEY DISEASE, WITH LONG-TERM CURRENT USE OF INSULIN, UNSPECIFIED WHETHER STAGE 3A OR 3B CKD (HCC): Primary | ICD-10-CM

## 2025-04-23 DIAGNOSIS — E11.22 TYPE 2 DIABETES MELLITUS WITH STAGE 3 CHRONIC KIDNEY DISEASE, WITH LONG-TERM CURRENT USE OF INSULIN, UNSPECIFIED WHETHER STAGE 3A OR 3B CKD (HCC): Primary | ICD-10-CM

## 2025-04-23 DIAGNOSIS — N18.32 CHRONIC KIDNEY DISEASE (CKD) STAGE G3B/A1, MODERATELY DECREASED GLOMERULAR FILTRATION RATE (GFR) BETWEEN 30-44 ML/MIN/1.73 SQUARE METER AND ALBUMINURIA CREATININE RATIO LESS THAN 30 MG/G (HCC): ICD-10-CM

## 2025-04-23 DIAGNOSIS — N25.81 SECONDARY HYPERPARATHYROIDISM (HCC): ICD-10-CM

## 2025-04-23 DIAGNOSIS — E66.813 CLASS 3 SEVERE OBESITY DUE TO EXCESS CALORIES WITH SERIOUS COMORBIDITY AND BODY MASS INDEX (BMI) OF 45.0 TO 49.9 IN ADULT: ICD-10-CM

## 2025-04-23 DIAGNOSIS — N18.30 TYPE 2 DIABETES MELLITUS WITH STAGE 3 CHRONIC KIDNEY DISEASE, WITH LONG-TERM CURRENT USE OF INSULIN, UNSPECIFIED WHETHER STAGE 3A OR 3B CKD (HCC): Primary | ICD-10-CM

## 2025-04-23 PROCEDURE — 99214 OFFICE O/P EST MOD 30 MIN: CPT | Performed by: NURSE PRACTITIONER

## 2025-04-23 RX ORDER — TIRZEPATIDE 5 MG/.5ML
INJECTION, SOLUTION SUBCUTANEOUS
Qty: 2 ML | Refills: 0 | Status: SHIPPED | OUTPATIENT
Start: 2025-04-23

## 2025-04-23 NOTE — ASSESSMENT & PLAN NOTE
Patient has gained 10 pounds in the last 12 months.  We have recently transitioned her to Mounjaro.  I strongly encouraged her to be more mindful of following a healthy, low calorie diet and increasing physical activity.

## 2025-04-23 NOTE — ASSESSMENT & PLAN NOTE
Lab Results   Component Value Date    EGFR 40 11/22/2024    EGFR 40 04/25/2024    EGFR 40 12/15/2023    CREATININE 1.37 (H) 11/22/2024    CREATININE 1.39 (H) 04/25/2024    CREATININE 1.38 (H) 12/15/2023   Following closely with nephrology.  We have transitioned her from Ozempic to Mounjaro to assist with further weight loss and improved glycemic control.

## 2025-04-23 NOTE — PROGRESS NOTES
Name: Shanique Heredia      : 1960      MRN: [id number]  Encounter Provider: KORI Winston  Encounter Date: 2025   Encounter department: [facility] [place]  :  Assessment & Plan  Type 2 diabetes mellitus with stage 3 chronic kidney disease, with long-term current use of insulin, unspecified whether stage 3a or 3b CKD (HCC)  Patient was approved to start Mounjaro in . After over 1 year on maximum dose of Ozempic, A1C did not improve. She completed the 2.5 mg dose without s/e. Increase to 5 mg once weekly for 4 weeks then increase again to 7.5 mg and 10 mg once weekly. Once taking the 7.5 mg dose, I anticipate the need for a 10% reduction of basal insulin to prevent hypoglycemia. For now, continue 5 mg of XL glipizide. Threshold to discontinue is low once patient starts seeing results from Mounjaro. Instructed to test blood sugars twice daily as we adjust her medications. Reviewed s/s of hypoglycemia. Reviewed appropriate treatment. Patient knows to notify me with persistent hyperglycemia or episodes of hypoglycemia.  F/U in 4 months.   Lab Results   Component Value Date    HGBA1C 8.2 (H) 2025       Orders:    Tirzepatide (Mounjaro) 5 MG/0.5ML SOAJ; Inject 5 mg under the skin once weekly.    Hemoglobin A1C; Future    Basic metabolic panel; Future    Secondary hyperparathyroidism (HCC)  Following with nephrology.        Hypothyroidism, unspecified type  Patient is clinically and biochemically euthyroid. Continue 100 mcg of LT4 daily.   Orders:    TSH, 3rd generation; Future    T4, free; Future    Class 3 severe obesity due to excess calories with serious comorbidity and body mass index (BMI) of 45.0 to 49.9 in adult  Patient has gained 10 pounds in the last 12 months.  We have recently transitioned her to Mounjaro.  I strongly encouraged her to be more mindful of following a healthy, low calorie diet and increasing physical activity.          Chronic kidney disease (CKD) stage G3b/A1, moderately decreased glomerular filtration rate (GFR) between 30-44 mL/min/1.73 square meter and albuminuria creatinine ratio less than 30 mg/g ([facility])  Lab Results   Component Value Date    EGFR 40 2024    EGFR 40 2024    EGFR 40 12/15/2023    CREATININE 1.37 (H) 2024    CREATININE 1.39 (H) 2024    CREATININE 1.38 (H) 12/15/2023   Following closely with nephrology.  We have transitioned her from Ozempic to Mounjaro to assist with further weight loss and improved glycemic control.             History of Present Illness   HPI  Shanique Heredia is a 64 y.o. female with type 2 diabetes with long term use of insulin, hypothyroidism, and obesity presenting today for follow up.    Complications of diabetes include CKD stage IIIb.      Patient's last appointment on 2024, it was recommended that patient transition from maximum dose of Ozempic to Mounjaro.  The plan was to start her on 5 mg once weekly and transition her up to 10 mg once weekly.  Currently, she is only taking 2.5 mg once weekly as there was a delay in insurance coverage.     Current regimen:    Lantus 32 units nightly  Glipizide XL 5 mg daily  Farxiga 10 mg daily  Mounjaro 2.5 mg once weekly    Checking BG daily, fasting  Av day 161       Hemoglobin A1C   Latest Ref Rng Normal 4.0-5.6%; PreDiabetic 5.7-6.4%; Diabetic >=6.5%; Glycemic control for adults with diabetes <7.0% %   2024 8.1 (H)    2024 7.5 (H)    2025 8.2 (H)       eAG, EST AVG Glucose   Latest Ref Rng mg/dl   2024 186    2024 169    2025 189       Legend:  (H) High      For hypothyroidism, she is taking 100 mcg of levothyroxine daily.     TSH 3RD GENERATON   Latest Ref Rng 0.450 - 4.500 uIU/mL   2025 2.386              Review of Systems   Constitutional:  Negative for activity change, appetite change, fatigue and unexpected weight change.   HENT:  Negative for dental problem, sore  "throat, trouble swallowing and voice change.    Eyes:  Negative for visual disturbance.   Respiratory:  Negative for cough, chest tightness and shortness of breath.    Cardiovascular:  Negative for chest pain, palpitations and leg swelling.   Gastrointestinal:  Negative for constipation, diarrhea, nausea and vomiting.   Endocrine: Negative for cold intolerance, heat intolerance, polydipsia, polyphagia and polyuria.   Genitourinary:  Negative for frequency.   Musculoskeletal:  Negative for arthralgias, back pain, gait problem and myalgias.   Skin:  Negative for wound.   Allergic/Immunologic: Negative for environmental allergies and food allergies.   Neurological:  Negative for dizziness, weakness, light-headedness, numbness and headaches.   Psychiatric/Behavioral:  Negative for decreased concentration, dysphoric mood and sleep disturbance. The patient is not nervous/anxious.           Objective   /82 (BP Location: Right arm, Patient Position: Sitting, Cuff Size: Large)   Pulse 66   Temp 98.2 °F (36.8 °C) (Temporal)   Resp 19   Ht 5' 4\" (1.626 m)   Wt 125 kg (276 lb)   LMP  (LMP Unknown)   SpO2 97%   BMI 47.38 kg/m²      Physical Exam  Vitals reviewed.   Constitutional:       General: She is not in acute distress.     Appearance: She is well-developed. She is obese. She is not ill-appearing.   HENT:      Head: Normocephalic and atraumatic.   Eyes:      Conjunctiva/sclera: Conjunctivae normal.   Cardiovascular:      Rate and Rhythm: Normal rate and regular rhythm.      Pulses: Normal pulses.      Heart sounds: Normal heart sounds. No murmur heard.  Pulmonary:      Effort: Pulmonary effort is normal. No respiratory distress.      Breath sounds: Normal breath sounds.   Abdominal:      Palpations: Abdomen is soft.      Tenderness: There is no abdominal tenderness.   Musculoskeletal:         General: No swelling.      Cervical back: Normal range of motion and neck supple.      Right lower leg: No edema.      " Left lower leg: No edema.   Skin:     General: Skin is warm and dry.      Capillary Refill: Capillary refill takes less than 2 seconds.   Neurological:      Mental Status: She is alert.   Psychiatric:         Mood and Affect: Mood normal.

## 2025-04-23 NOTE — ASSESSMENT & PLAN NOTE
Patient was approved to start Mounjaro in 2025. After over 1 year on maximum dose of Ozempic, A1C did not improve. She completed the 2.5 mg dose without s/e. Increase to 5 mg once weekly for 4 weeks then increase again to 7.5 mg and 10 mg once weekly. Once taking the 7.5 mg dose, I anticipate the need for a 10% reduction of basal insulin to prevent hypoglycemia. For now, continue 5 mg of XL glipizide. Threshold to discontinue is low once patient starts seeing results from Mounjaro. Instructed to test blood sugars twice daily as we adjust her medications. Reviewed s/s of hypoglycemia. Reviewed appropriate treatment. Patient knows to notify me with persistent hyperglycemia or episodes of hypoglycemia.  F/U in 4 months.   Lab Results   Component Value Date    HGBA1C 8.2 (H) 04/21/2025       Orders:    Tirzepatide (Mounjaro) 5 MG/0.5ML SOAJ; Inject 5 mg under the skin once weekly.    Hemoglobin A1C; Future    Basic metabolic panel; Future

## 2025-04-23 NOTE — ASSESSMENT & PLAN NOTE
Patient is clinically and biochemically euthyroid. Continue 100 mcg of LT4 daily.   Orders:    TSH, 3rd generation; Future    T4, free; Future

## 2025-05-07 DIAGNOSIS — E11.22 TYPE 2 DIABETES MELLITUS WITH STAGE 3B CHRONIC KIDNEY DISEASE, WITHOUT LONG-TERM CURRENT USE OF INSULIN (HCC): ICD-10-CM

## 2025-05-07 DIAGNOSIS — N18.32 TYPE 2 DIABETES MELLITUS WITH STAGE 3B CHRONIC KIDNEY DISEASE, WITHOUT LONG-TERM CURRENT USE OF INSULIN (HCC): ICD-10-CM

## 2025-05-08 RX ORDER — GLIPIZIDE 5 MG/1
5 TABLET, FILM COATED, EXTENDED RELEASE ORAL DAILY
Qty: 90 TABLET | Refills: 1 | Status: SHIPPED | OUTPATIENT
Start: 2025-05-08

## 2025-05-12 DIAGNOSIS — E03.9 HYPOTHYROIDISM, UNSPECIFIED TYPE: ICD-10-CM

## 2025-05-12 RX ORDER — LEVOTHYROXINE SODIUM 100 UG/1
100 TABLET ORAL DAILY
Qty: 90 TABLET | Refills: 1 | OUTPATIENT
Start: 2025-05-12

## 2025-05-17 DIAGNOSIS — I10 ESSENTIAL HYPERTENSION: ICD-10-CM

## 2025-05-19 RX ORDER — LISINOPRIL 20 MG/1
20 TABLET ORAL DAILY
Qty: 90 TABLET | Refills: 1 | Status: SHIPPED | OUTPATIENT
Start: 2025-05-19

## 2025-05-20 DIAGNOSIS — E11.22 TYPE 2 DIABETES MELLITUS WITH STAGE 3B CHRONIC KIDNEY DISEASE, WITHOUT LONG-TERM CURRENT USE OF INSULIN (HCC): Primary | ICD-10-CM

## 2025-05-20 DIAGNOSIS — N18.32 TYPE 2 DIABETES MELLITUS WITH STAGE 3B CHRONIC KIDNEY DISEASE, WITHOUT LONG-TERM CURRENT USE OF INSULIN (HCC): Primary | ICD-10-CM

## 2025-05-20 RX ORDER — TIRZEPATIDE 7.5 MG/.5ML
7.5 INJECTION, SOLUTION SUBCUTANEOUS WEEKLY
Qty: 2 ML | Refills: 0 | Status: SHIPPED | OUTPATIENT
Start: 2025-05-20

## 2025-05-20 NOTE — TELEPHONE ENCOUNTER
Patient called the med refill line to get the next dose up for her Mounjaro 5 mg, she thinks it the 7.5 she would like this new script sent to [facility] [address] [place], [place] [address], her next injection is due on Sunday

## 2025-06-02 DIAGNOSIS — K21.9 GASTROESOPHAGEAL REFLUX DISEASE WITHOUT ESOPHAGITIS: ICD-10-CM

## 2025-06-03 RX ORDER — PANTOPRAZOLE SODIUM 40 MG/1
40 TABLET, DELAYED RELEASE ORAL DAILY
Qty: 90 TABLET | Refills: 1 | Status: SHIPPED | OUTPATIENT
Start: 2025-06-03

## 2025-06-09 DIAGNOSIS — N18.32 STAGE 3B CHRONIC KIDNEY DISEASE (HCC): Primary | ICD-10-CM

## 2025-06-10 ENCOUNTER — OFFICE VISIT (OUTPATIENT)
Dept: FAMILY MEDICINE CLINIC | Facility: CLINIC | Age: 65
End: 2025-06-10
Payer: COMMERCIAL

## 2025-06-10 VITALS
OXYGEN SATURATION: 98 % | HEART RATE: 72 BPM | SYSTOLIC BLOOD PRESSURE: 132 MMHG | DIASTOLIC BLOOD PRESSURE: 82 MMHG | HEIGHT: 64 IN | TEMPERATURE: 98.6 F | BODY MASS INDEX: 46.67 KG/M2 | RESPIRATION RATE: 16 BRPM | WEIGHT: 273.4 LBS

## 2025-06-10 DIAGNOSIS — E03.9 HYPOTHYROIDISM, UNSPECIFIED TYPE: ICD-10-CM

## 2025-06-10 DIAGNOSIS — N18.30 TYPE 2 DIABETES MELLITUS WITH STAGE 3 CHRONIC KIDNEY DISEASE, WITH LONG-TERM CURRENT USE OF INSULIN, UNSPECIFIED WHETHER STAGE 3A OR 3B CKD (HCC): ICD-10-CM

## 2025-06-10 DIAGNOSIS — I10 BENIGN ESSENTIAL HYPERTENSION: Primary | ICD-10-CM

## 2025-06-10 DIAGNOSIS — M17.0 PRIMARY OSTEOARTHRITIS OF BOTH KNEES: ICD-10-CM

## 2025-06-10 DIAGNOSIS — K21.9 GERD WITHOUT ESOPHAGITIS: ICD-10-CM

## 2025-06-10 DIAGNOSIS — E11.22 TYPE 2 DIABETES MELLITUS WITH STAGE 3 CHRONIC KIDNEY DISEASE, WITH LONG-TERM CURRENT USE OF INSULIN, UNSPECIFIED WHETHER STAGE 3A OR 3B CKD (HCC): ICD-10-CM

## 2025-06-10 DIAGNOSIS — E66.813 CLASS 3 SEVERE OBESITY DUE TO EXCESS CALORIES WITH SERIOUS COMORBIDITY AND BODY MASS INDEX (BMI) OF 45.0 TO 49.9 IN ADULT: ICD-10-CM

## 2025-06-10 DIAGNOSIS — Z79.4 TYPE 2 DIABETES MELLITUS WITH STAGE 3 CHRONIC KIDNEY DISEASE, WITH LONG-TERM CURRENT USE OF INSULIN, UNSPECIFIED WHETHER STAGE 3A OR 3B CKD (HCC): ICD-10-CM

## 2025-06-10 PROCEDURE — 99214 OFFICE O/P EST MOD 30 MIN: CPT | Performed by: FAMILY MEDICINE

## 2025-06-10 NOTE — ASSESSMENT & PLAN NOTE
Diabetes remains uncontrolled.  Patient recently switched to Mounjaro per endocrinology and has labs and follow-up appointment scheduled.  Continue present treatment and follow a low sugar and low carbohydrate diet more carefully.  Lab Results   Component Value Date    HGBA1C 8.2 (H) 04/21/2025

## 2025-06-10 NOTE — ASSESSMENT & PLAN NOTE
Blood pressure remains well-controlled on lisinopril 20 mg daily.  Continue present treatment follow a low salt diet and get regular aerobic exercise walking as tolerated.

## 2025-06-10 NOTE — ASSESSMENT & PLAN NOTE
Patient is being referred to [facility] orthopedics for further evaluation and treatment.  Orders:    Ambulatory Referral to Orthopedic Surgery; Future

## 2025-06-10 NOTE — PROGRESS NOTES
:  Assessment & Plan  Benign essential hypertension  Blood pressure remains well-controlled on lisinopril 20 mg daily.  Continue present treatment follow a low salt diet and get regular aerobic exercise walking as tolerated.       Type 2 diabetes mellitus with stage 3 chronic kidney disease, with long-term current use of insulin, unspecified whether stage 3a or 3b CKD (HCC)  Diabetes remains uncontrolled.  Patient recently switched to Mounjaro per endocrinology and has labs and follow-up appointment scheduled.  Continue present treatment and follow a low sugar and low carbohydrate diet more carefully.  Lab Results   Component Value Date    HGBA1C 8.2 (H) 04/21/2025            Hypothyroidism, unspecified type  Clinically euthyroid on levothyroxine 100 mcg daily.  Continue present treatment.       Primary osteoarthritis of both knees  Patient is being referred to [facility] orthopedics for further evaluation and treatment.  Orders:    Ambulatory Referral to Orthopedic Surgery; Future    GERD without esophagitis  Reflux symptoms well-controlled on pantoprazole 40 mg daily.  Continue present treatment.       Class 3 severe obesity due to excess calories with serious comorbidity and body mass index (BMI) of 45.0 to 49.9 in adult    Weight loss encouraged.  Again discussed morbidity associate with obesity.           History of Present Illness     Shanique Heredia is a 64 y.o. female   Patient is here for follow-up appoint for chronic conditions.  She has labs ordered by nephrology and endocrinology and follow-up appointments.  Patient complains of right greater than left arthritic knee pain which is limiting her activity level.  No regular exercise program and patient remains loosely sedentary.  Patient was recently switched from Ozempic to Mounjaro by endocrinology.  Patient is up-to-date on diabetic eye exam and foot exam.    Hypertension  This is a chronic problem. The problem is controlled. Associated symptoms include  "peripheral edema. Pertinent negatives include no anxiety, blurred vision, chest pain, headaches, orthopnea, palpitations, PND or shortness of breath. Risk factors for coronary artery disease include dyslipidemia, diabetes mellitus, obesity, family history and post-menopausal state. Past treatments include ACE inhibitors. The current treatment provides significant improvement. Compliance problems include exercise.  There is no history of CAD/MI or CVA.     Review of Systems   Eyes:  Negative for blurred vision.   Respiratory:  Negative for shortness of breath.    Cardiovascular:  Negative for chest pain, palpitations, orthopnea and PND.   Neurological:  Negative for headaches.     Objective   /82   Pulse 72   Temp 98.6 °F (37 °C)   Resp 16   Ht 5' 4\" (1.626 m)   Wt 124 kg (273 lb 6.4 oz)   LMP  (LMP Unknown)   SpO2 98%   BMI 46.93 kg/m²      Physical Exam  Constitutional:       General: She is not in acute distress.     Appearance: Normal appearance. She is obese.   HENT:      Head: Normocephalic.      Mouth/Throat:      Mouth: Mucous membranes are moist.     Eyes:      General: No scleral icterus.     Conjunctiva/sclera: Conjunctivae normal.     Neck:      Vascular: No carotid bruit.     Cardiovascular:      Rate and Rhythm: Normal rate and regular rhythm.   Pulmonary:      Effort: Pulmonary effort is normal.      Breath sounds: Normal breath sounds.   Abdominal:      Palpations: Abdomen is soft.      Tenderness: There is no abdominal tenderness.     Musculoskeletal:      Cervical back: Neck supple.      Right lower leg: No edema.      Left lower leg: No edema.   Lymphadenopathy:      Cervical: No cervical adenopathy.     Skin:     General: Skin is warm and dry.     Neurological:      General: No focal deficit present.      Mental Status: She is alert and oriented to person, place, and time.     Psychiatric:         Mood and Affect: Mood normal.         Behavior: Behavior normal.         Thought " Content: Thought content normal.         Judgment: Judgment normal.

## 2025-06-22 DIAGNOSIS — E78.5 HYPERLIPIDEMIA, UNSPECIFIED HYPERLIPIDEMIA TYPE: ICD-10-CM

## 2025-06-23 RX ORDER — ROSUVASTATIN CALCIUM 20 MG/1
20 TABLET, COATED ORAL DAILY
Qty: 90 TABLET | Refills: 1 | Status: SHIPPED | OUTPATIENT
Start: 2025-06-23

## 2025-06-24 DIAGNOSIS — N18.32 TYPE 2 DIABETES MELLITUS WITH STAGE 3B CHRONIC KIDNEY DISEASE, WITHOUT LONG-TERM CURRENT USE OF INSULIN (HCC): ICD-10-CM

## 2025-06-24 DIAGNOSIS — E11.22 TYPE 2 DIABETES MELLITUS WITH STAGE 3B CHRONIC KIDNEY DISEASE, WITHOUT LONG-TERM CURRENT USE OF INSULIN (HCC): ICD-10-CM

## 2025-06-24 RX ORDER — TIRZEPATIDE 10 MG/.5ML
0.5 INJECTION, SOLUTION SUBCUTANEOUS WEEKLY
Qty: 2 ML | Refills: 0 | Status: SHIPPED | OUTPATIENT
Start: 2025-06-24

## 2025-06-24 NOTE — TELEPHONE ENCOUNTER
Patient called the RX Refill Line. Message is being forwarded to the office.     Patient is requesting dose increase on [facility] [place] [place] -   [address] [place] [address] [phone]     Any questions Please contact patient at [phone]

## 2025-06-30 ENCOUNTER — TELEPHONE (OUTPATIENT)
Dept: NEPHROLOGY | Facility: CLINIC | Age: 65
End: 2025-06-30

## 2025-06-30 NOTE — TELEPHONE ENCOUNTER
I called and left a VM for Shanique regarding completing blood work prior to upcoming appt on 07/07/2025

## 2025-07-05 ENCOUNTER — APPOINTMENT (OUTPATIENT)
Dept: LAB | Facility: CLINIC | Age: 65
End: 2025-07-05
Payer: COMMERCIAL

## 2025-07-05 DIAGNOSIS — N18.32 STAGE 3B CHRONIC KIDNEY DISEASE (HCC): ICD-10-CM

## 2025-07-05 DIAGNOSIS — N25.81 SECONDARY HYPERPARATHYROIDISM OF RENAL ORIGIN (HCC): ICD-10-CM

## 2025-07-05 LAB
25(OH)D3 SERPL-MCNC: 38.4 NG/ML (ref 30–100)
ALBUMIN SERPL BCG-MCNC: 4 G/DL (ref 3.5–5)
ALP SERPL-CCNC: 60 U/L (ref 34–104)
ALT SERPL W P-5'-P-CCNC: 11 U/L (ref 7–52)
ANION GAP SERPL CALCULATED.3IONS-SCNC: 10 MMOL/L (ref 4–13)
AST SERPL W P-5'-P-CCNC: 16 U/L (ref 13–39)
BACTERIA UR QL AUTO: ABNORMAL /HPF
BASOPHILS # BLD AUTO: 0.08 THOUSANDS/ÂΜL (ref 0–0.1)
BASOPHILS NFR BLD AUTO: 1 % (ref 0–1)
BILIRUB SERPL-MCNC: 0.47 MG/DL (ref 0.2–1)
BILIRUB UR QL STRIP: NEGATIVE
BUN SERPL-MCNC: 33 MG/DL (ref 5–25)
CALCIUM SERPL-MCNC: 9.1 MG/DL (ref 8.4–10.2)
CHLORIDE SERPL-SCNC: 104 MMOL/L (ref 96–108)
CLARITY UR: CLEAR
CO2 SERPL-SCNC: 25 MMOL/L (ref 21–32)
COLOR UR: ABNORMAL
CREAT SERPL-MCNC: 1.49 MG/DL (ref 0.6–1.3)
CREAT UR-MCNC: 78.5 MG/DL
EOSINOPHIL # BLD AUTO: 0.23 THOUSAND/ÂΜL (ref 0–0.61)
EOSINOPHIL NFR BLD AUTO: 3 % (ref 0–6)
ERYTHROCYTE [DISTWIDTH] IN BLOOD BY AUTOMATED COUNT: 13.8 % (ref 11.6–15.1)
GFR SERPL CREATININE-BSD FRML MDRD: 36 ML/MIN/1.73SQ M
GLUCOSE P FAST SERPL-MCNC: 141 MG/DL (ref 65–99)
GLUCOSE UR STRIP-MCNC: ABNORMAL MG/DL
HCT VFR BLD AUTO: 31.6 % (ref 34.8–46.1)
HGB BLD-MCNC: 9.9 G/DL (ref 11.5–15.4)
HGB UR QL STRIP.AUTO: NEGATIVE
IMM GRANULOCYTES # BLD AUTO: 0.05 THOUSAND/UL (ref 0–0.2)
IMM GRANULOCYTES NFR BLD AUTO: 1 % (ref 0–2)
KETONES UR STRIP-MCNC: NEGATIVE MG/DL
LEUKOCYTE ESTERASE UR QL STRIP: ABNORMAL
LYMPHOCYTES # BLD AUTO: 2.44 THOUSANDS/ÂΜL (ref 0.6–4.47)
LYMPHOCYTES NFR BLD AUTO: 29 % (ref 14–44)
MAGNESIUM SERPL-MCNC: 1.9 MG/DL (ref 1.9–2.7)
MCH RBC QN AUTO: 29.6 PG (ref 26.8–34.3)
MCHC RBC AUTO-ENTMCNC: 31.3 G/DL (ref 31.4–37.4)
MCV RBC AUTO: 94 FL (ref 82–98)
MICROALBUMIN UR-MCNC: 7.7 MG/L
MICROALBUMIN/CREAT 24H UR: 10 MG/G CREATININE (ref 0–30)
MONOCYTES # BLD AUTO: 0.72 THOUSAND/ÂΜL (ref 0.17–1.22)
MONOCYTES NFR BLD AUTO: 9 % (ref 4–12)
NEUTROPHILS # BLD AUTO: 4.88 THOUSANDS/ÂΜL (ref 1.85–7.62)
NEUTS SEG NFR BLD AUTO: 57 % (ref 43–75)
NITRITE UR QL STRIP: NEGATIVE
NON-SQ EPI CELLS URNS QL MICRO: ABNORMAL /HPF
NRBC BLD AUTO-RTO: 0 /100 WBCS
PH UR STRIP.AUTO: 5.5 [PH]
PLATELET # BLD AUTO: 243 THOUSANDS/UL (ref 149–390)
PMV BLD AUTO: 11.9 FL (ref 8.9–12.7)
POTASSIUM SERPL-SCNC: 4.8 MMOL/L (ref 3.5–5.3)
PROT SERPL-MCNC: 6.9 G/DL (ref 6.4–8.4)
PROT UR STRIP-MCNC: NEGATIVE MG/DL
PTH-INTACT SERPL-MCNC: 122.6 PG/ML (ref 12–88)
RBC # BLD AUTO: 3.35 MILLION/UL (ref 3.81–5.12)
RBC #/AREA URNS AUTO: ABNORMAL /HPF
SODIUM SERPL-SCNC: 139 MMOL/L (ref 135–147)
SP GR UR STRIP.AUTO: 1.02 (ref 1–1.03)
UROBILINOGEN UR STRIP-ACNC: <2 MG/DL
WBC # BLD AUTO: 8.4 THOUSAND/UL (ref 4.31–10.16)
WBC #/AREA URNS AUTO: ABNORMAL /HPF

## 2025-07-05 PROCEDURE — 83735 ASSAY OF MAGNESIUM: CPT

## 2025-07-05 PROCEDURE — 83970 ASSAY OF PARATHORMONE: CPT

## 2025-07-05 PROCEDURE — 81001 URINALYSIS AUTO W/SCOPE: CPT

## 2025-07-05 PROCEDURE — 82570 ASSAY OF URINE CREATININE: CPT

## 2025-07-05 PROCEDURE — 82043 UR ALBUMIN QUANTITATIVE: CPT

## 2025-07-05 PROCEDURE — 82306 VITAMIN D 25 HYDROXY: CPT

## 2025-07-05 PROCEDURE — 36415 COLL VENOUS BLD VENIPUNCTURE: CPT

## 2025-07-05 PROCEDURE — 80053 COMPREHEN METABOLIC PANEL: CPT

## 2025-07-05 PROCEDURE — 85025 COMPLETE CBC W/AUTO DIFF WBC: CPT

## 2025-07-06 DIAGNOSIS — E11.65 TYPE 2 DIABETES MELLITUS WITH HYPERGLYCEMIA, WITHOUT LONG-TERM CURRENT USE OF INSULIN (HCC): ICD-10-CM

## 2025-07-07 ENCOUNTER — OFFICE VISIT (OUTPATIENT)
Age: 65
End: 2025-07-07
Payer: COMMERCIAL

## 2025-07-07 VITALS
BODY MASS INDEX: 47.46 KG/M2 | HEIGHT: 64 IN | DIASTOLIC BLOOD PRESSURE: 78 MMHG | HEART RATE: 67 BPM | OXYGEN SATURATION: 98 % | TEMPERATURE: 97.3 F | SYSTOLIC BLOOD PRESSURE: 140 MMHG | WEIGHT: 278 LBS

## 2025-07-07 DIAGNOSIS — E11.22 TYPE 2 DIABETES MELLITUS WITH STAGE 3 CHRONIC KIDNEY DISEASE, WITHOUT LONG-TERM CURRENT USE OF INSULIN, UNSPECIFIED WHETHER STAGE 3A OR 3B CKD (HCC): Primary | ICD-10-CM

## 2025-07-07 DIAGNOSIS — N18.30 TYPE 2 DIABETES MELLITUS WITH STAGE 3 CHRONIC KIDNEY DISEASE, WITHOUT LONG-TERM CURRENT USE OF INSULIN, UNSPECIFIED WHETHER STAGE 3A OR 3B CKD (HCC): Primary | ICD-10-CM

## 2025-07-07 DIAGNOSIS — N18.32 STAGE 3B CHRONIC KIDNEY DISEASE (HCC): ICD-10-CM

## 2025-07-07 DIAGNOSIS — N25.81 SECONDARY HYPERPARATHYROIDISM (HCC): ICD-10-CM

## 2025-07-07 DIAGNOSIS — N11.9 CHRONIC TUBULO-INTERSTITIAL NEPHRITIS: ICD-10-CM

## 2025-07-07 DIAGNOSIS — E55.9 VITAMIN D DEFICIENCY: ICD-10-CM

## 2025-07-07 DIAGNOSIS — D64.9 ANEMIA, UNSPECIFIED TYPE: ICD-10-CM

## 2025-07-07 DIAGNOSIS — E66.813 OBESITY, CLASS III, BMI 40-49.9 (MORBID OBESITY): ICD-10-CM

## 2025-07-07 DIAGNOSIS — I10 BENIGN ESSENTIAL HYPERTENSION: ICD-10-CM

## 2025-07-07 PROCEDURE — 99214 OFFICE O/P EST MOD 30 MIN: CPT | Performed by: INTERNAL MEDICINE

## 2025-07-07 RX ORDER — INSULIN GLARGINE 100 [IU]/ML
INJECTION, SOLUTION SUBCUTANEOUS
Qty: 30 ML | Refills: 1 | Status: SHIPPED | OUTPATIENT
Start: 2025-07-07 | End: 2025-07-16 | Stop reason: SDUPTHER

## 2025-07-07 NOTE — PATIENT INSTRUCTIONS
Thanks for coming in today.  You have stage 3 kidney disease, fluctuation can be noted between 1.3-1.4 depending on hydration,protein intake, medications, blood sugars. Most recent function is 36%.   Glucosamine is fine.   Your parathyroid is slightly overactive due to kidney disease. If level above 150, will provide you with activated vitamin D pill. For now, will monitor.   Blood count did drop, may be due to iron deficiency or blood donation. Will recheck level and iron levels.   We discussed that you have white blood cells in the urine which is very nonspecific, this can be seen in states of chronic tubulointerstitial disease  which is a chronic allergic reaction to medications like protonix.   Periodically monitor BP at home, 2-3 times a week. Call offic if routinely above 140/90.  Advise checking Dr Ramirez for orthopedic.

## 2025-07-07 NOTE — PROGRESS NOTES
Assessment & Plan:    1. Type 2 diabetes mellitus with stage 3 chronic kidney disease, without long-term current use of insulin, unspecified whether stage 3a or 3b CKD (HCC)  2. Benign essential hypertension  3. Stage 3b chronic kidney disease (HCC)  -     Urinalysis with microscopic; Future; Expected date: 12/23/2025  -     PTH, intact; Future; Expected date: 12/23/2025  -     Albumin / creatinine urine ratio; Future; Expected date: 12/23/2025  -     Uric acid; Future; Expected date: 12/23/2025  -     Comprehensive metabolic panel; Future; Expected date: 12/23/2025  -     CBC and differential; Future; Expected date: 12/23/2025  -     Vitamin D 25 hydroxy; Future; Expected date: 12/23/2025  4. Anemia, unspecified type  -     Iron Panel (Includes Ferritin, Iron Sat%, Iron, and TIBC); Future  -     Hemoglobin; Future; Expected date: Collect anytime  5. Chronic tubulo-interstitial nephritis  6. Vitamin D deficiency  -     Vitamin D 25 hydroxy; Future; Expected date: 12/23/2025  7. Obesity, Class III, BMI 40-49.9 (morbid obesity)  8. Secondary hyperparathyroidism (HCC)         Type 2 DM with CKD  A1C 8.2, goal<8.0. Mounjaro being titrated by endocrinology colleagues.   Trend UACR.   Continue Farxiga and lisinopril for CKD.    2. Benign essential HTN  BP mildly elevated in the visit.  No changes made during this visit, BP goal<130/80.   Encourage weight loss.   Continue lisinopril 20mg/day.   Volume status compensated.    3. CKD3b  Baseline Cr 1.3-1.4mg/dl. Etiology suspected 2/2 HTN, nonproteinuric DKD, vascular disease.  7/5/25 Cr 1.49 with eGFR 36 ml/min. Metabolic stable.  Normotensive.  Reviewed CKD stages, Cr and eGFR trends.  Continue lisinopril , Farxiga and Mounjaro for CKD management.  No changes made at this time.  Fu in 6 months with labs prior.  Encourage hydration to 2 quart per day. Avoid NSAID use.     4. Anemia, suspect FANNY.  Recently donated blood and instructed to take iron prior to donation.  Hgb  declined to 9.9 from 11.7. hgb 10-11.  Repeat iron panel and hgb in the next couple weeks.     5. Chronic tubulointerstitial disease  Presumed diagnosis with persistent wbc in urine, 10-20. Would not biopsy based on this given stability.  She is on PPI chronically which is necessary for patient.    6. Vitamin D deficiency  Fu 25 vitamin D level prior to next visit.    7. Obesity   Continue Mounjaro titration by endocrinology.    8.secondary HPT  Follow PTH, Ca,phos trends.  .6.  No indication for activated vitamin d.    The benefits, risks and alternatives to the treatment plan were discussed at this visit. Patient was advised of common adverse effects of any medical therapies prescribed. All questions were answered and discussed with the patient and any accompanying family members or caretakers.      Subjective:      Patient ID: Shanique Heredia is a 64 y.o. female presents for fu in the [place] office for CKD management.     HPI  In the interim since last visit, denies any new medication conditions or surgeries. She is tolerating current medication well without side effect.    Takes lasix twice a month.   Follows with kris for DM.   Started Mounjaro 10mg last week.  She does not check BP at home.     Denies dark stool. Donated blood a couple weeks ago,she was told to take iron twice a day for 1 week prior to donation.   ROS-- negative.    Chronic lymphedema.     The following portions of the patient's history were reviewed and updated as appropriate: allergies, current medications, past family history, past medical history, past social history, past surgical history, and problem list.    Review of Systems   Respiratory: Negative.     Cardiovascular: Negative.    Gastrointestinal: Negative.    Genitourinary: Negative.    Neurological: Negative.    All other systems reviewed and are negative.        Objective:      /78 (BP Location: Right arm, Patient Position: Sitting, Cuff Size: Large)   Pulse 67   " Temp (!) 97.3 °F (36.3 °C) (Temporal)   Ht 5' 4\" (1.626 m)   Wt 126 kg (278 lb)   LMP  (LMP Unknown)   SpO2 98%   BMI 47.72 kg/m²          Physical Exam  Vitals reviewed.   Constitutional:       General: She is not in acute distress.     Appearance: She is not ill-appearing.   HENT:      Head: Normocephalic and atraumatic.      Nose: Nose normal.      Mouth/Throat:      Mouth: Mucous membranes are moist.      Pharynx: Oropharynx is clear.     Eyes:      Extraocular Movements: Extraocular movements intact.      Conjunctiva/sclera: Conjunctivae normal.       Cardiovascular:      Rate and Rhythm: Normal rate and regular rhythm.      Heart sounds:      No friction rub.   Pulmonary:      Breath sounds: Normal breath sounds. No wheezing or rales.   Abdominal:      Palpations: Abdomen is soft.      Tenderness: There is no abdominal tenderness. There is no guarding.     Musculoskeletal:      Right lower leg: Edema present.      Left lower leg: Edema present.      Comments: Chronic lymphedema      Skin:     General: Skin is warm and dry.      Coloration: Skin is not jaundiced.      Findings: No bruising.     Neurological:      General: No focal deficit present.      Mental Status: She is alert and oriented to person, place, and time. Mental status is at baseline.      Cranial Nerves: No cranial nerve deficit.     Psychiatric:         Mood and Affect: Mood normal.         Behavior: Behavior normal.             Lab Results   Component Value Date     10/09/2015    SODIUM 139 07/05/2025    K 4.8 07/05/2025     07/05/2025    CO2 25 07/05/2025    ANIONGAP 6 10/09/2015    AGAP 10 07/05/2025    BUN 33 (H) 07/05/2025    CREATININE 1.49 (H) 07/05/2025    GLUC 145 (H) 07/22/2022    GLUF 141 (H) 07/05/2025    CALCIUM 9.1 07/05/2025    AST 16 07/05/2025    ALT 11 07/05/2025    ALKPHOS 60 07/05/2025    PROT 7.2 10/09/2015    TP 6.9 07/05/2025    BILITOT 0.48 10/09/2015    TBILI 0.47 07/05/2025    EGFR 36 07/05/2025    " "  Lab Results   Component Value Date    CREATININE 1.49 (H) 07/05/2025    CREATININE 1.37 (H) 11/22/2024    CREATININE 1.39 (H) 04/25/2024    CREATININE 1.38 (H) 12/15/2023    CREATININE 1.17 10/23/2023    CREATININE 1.33 (H) 06/24/2023    CREATININE 1.43 (H) 06/19/2023    CREATININE 1.19 02/21/2023    CREATININE 1.13 01/23/2023    CREATININE 1.51 (H) 10/17/2022    CREATININE 1.52 (H) 07/22/2022    CREATININE 1.68 (H) 07/05/2022    CREATININE 1.17 02/09/2022    CREATININE 1.43 (H) 01/13/2022    CREATININE 1.32 (H) 05/20/2021      Lab Results   Component Value Date    COLORU Light Yellow 07/05/2025    CLARITYU Clear 07/05/2025    SPECGRAV 1.017 07/05/2025    PHUR 5.5 07/05/2025    LEUKOCYTESUR Moderate (A) 07/05/2025    NITRITE Negative 07/05/2025    PROTEIN UA Negative 07/05/2025    GLUCOSEU >=1000 (1%) (A) 07/05/2025    KETONESU Negative 07/05/2025    UROBILINOGEN <2.0 07/05/2025    BILIRUBINUR Negative 07/05/2025    BLOODU Negative 07/05/2025    RBCUA 1-2 07/05/2025    WBCUA 10-20 (A) 07/05/2025    EPIS Occasional 07/05/2025    BACTERIA None Seen 07/05/2025      No results found for: \"LABPROT\"  No results found for: \"MICROALBUR\", \"RKAM39AIW\"  Lab Results   Component Value Date    WBC 8.40 07/05/2025    HGB 9.9 (L) 07/05/2025    HCT 31.6 (L) 07/05/2025    MCV 94 07/05/2025     07/05/2025      Lab Results   Component Value Date    HGB 9.9 (L) 07/05/2025    HGB 11.7 04/25/2024    HGB 11.3 (L) 06/24/2023    HGB 10.5 (L) 01/23/2023    HGB 10.1 (L) 04/15/2022      No results found for: \"IRON\", \"TIBC\", \"FERRITIN\"   No results found for: \"PTHCALCIUM\", \"OAGR20HZYLUM\", \"PHOSPHORUS\"   Lab Results   Component Value Date    CHOLESTEROL 174 04/21/2025    HDL 40 (L) 04/21/2025    LDLCALC 88 04/21/2025    TRIG 231 (H) 04/21/2025      Lab Results   Component Value Date    URICACID 6.5 11/22/2024      Lab Results   Component Value Date    HGBA1C 8.2 (H) 04/21/2025      Lab Results   Component Value Date    FREET4 0.90 " "04/21/2025      Lab Results   Component Value Date    BILL Negative 06/19/2023      Lab Results   Component Value Date    PROT 7.2 10/09/2015        Portions of the record may have been created with voice recognition software. Occasional wrong word or \"sound a like\" substitutions may have occurred due to the inherent limitations of voice recognition software. Read the chart carefully and recognize, using context, where substitutions have occurred. If you have any questions, please contact the dictating provider.  "

## 2025-07-08 ENCOUNTER — TELEPHONE (OUTPATIENT)
Age: 65
End: 2025-07-08

## 2025-07-08 NOTE — TELEPHONE ENCOUNTER
PA for (Esperanza Moreno) 100 UNIT SUBMITTED to [facility]    via    [x]CMM-KEY: [id number]  [x]PA sent as URGENT    All office notes, labs and other pertaining documents and studies sent. Clinical questions answered. Awaiting determination from insurance company.     Turnaround time for your insurance to make a decision on your Prior Authorization can take 7-21 business days.

## 2025-07-08 NOTE — TELEPHONE ENCOUNTER
PA for (Esperanza Moreno) 100 UNIT  NOT REQUIRED     Reason           Patient advised by          [] MyChart Message  [] Phone call   []LMOM  []L/M to call office as no active Communication consent on file  []Unable to leave detailed message as VM not approved on Communication consent       Pharmacy advised by    [x]Fax  []Phone call

## 2025-07-09 DIAGNOSIS — E11.42 POORLY CONTROLLED TYPE 2 DIABETES MELLITUS WITH PERIPHERAL NEUROPATHY (HCC): ICD-10-CM

## 2025-07-09 DIAGNOSIS — E11.65 POORLY CONTROLLED TYPE 2 DIABETES MELLITUS WITH PERIPHERAL NEUROPATHY (HCC): ICD-10-CM

## 2025-07-11 RX ORDER — GABAPENTIN 300 MG/1
300 CAPSULE ORAL 3 TIMES DAILY
Qty: 270 CAPSULE | Refills: 0 | Status: SHIPPED | OUTPATIENT
Start: 2025-07-11

## 2025-07-16 DIAGNOSIS — E11.65 TYPE 2 DIABETES MELLITUS WITH HYPERGLYCEMIA, WITHOUT LONG-TERM CURRENT USE OF INSULIN (HCC): ICD-10-CM

## 2025-07-16 RX ORDER — INSULIN GLARGINE 100 [IU]/ML
INJECTION, SOLUTION SUBCUTANEOUS
Qty: 30 ML | Refills: 1 | Status: SHIPPED | OUTPATIENT
Start: 2025-07-16

## 2025-07-16 NOTE — TELEPHONE ENCOUNTER
Reason for call:   [x] Refill   [] Prior Auth  [x] Other: Pharmacy notified patient they need a new script sent stated the script that was sent on 7/7/25 there was an issue and they weren't able to fill.  Not a duplicate     Office:   [] PCP/Provider -   [x] Specialty/Provider - [facility] [place]     Medication: Insulin Glargine Solostar (Lantus SoloStar) 100 UNIT/ML SOPN     Dose/Frequency: INJECT 32 UNITS NIGHTLY     Quantity: 30 mL    Pharmacy: [facility]   Does the patient have enough for 3 days?   [x] Yes   [] No - Send as HP to POD    Mail Away Pharmacy   Does the patient have enough for 10 days?   [] Yes   [] No - Send as HP to POD

## 2025-07-25 ENCOUNTER — TELEPHONE (OUTPATIENT)
Dept: ENDOCRINOLOGY | Facility: CLINIC | Age: 65
End: 2025-07-25

## 2025-07-25 DIAGNOSIS — N18.30 TYPE 2 DIABETES MELLITUS WITH STAGE 3 CHRONIC KIDNEY DISEASE, WITH LONG-TERM CURRENT USE OF INSULIN, UNSPECIFIED WHETHER STAGE 3A OR 3B CKD (HCC): Primary | ICD-10-CM

## 2025-07-25 DIAGNOSIS — E11.22 TYPE 2 DIABETES MELLITUS WITH STAGE 3 CHRONIC KIDNEY DISEASE, WITH LONG-TERM CURRENT USE OF INSULIN, UNSPECIFIED WHETHER STAGE 3A OR 3B CKD (HCC): Primary | ICD-10-CM

## 2025-07-25 DIAGNOSIS — Z79.4 TYPE 2 DIABETES MELLITUS WITH STAGE 3 CHRONIC KIDNEY DISEASE, WITH LONG-TERM CURRENT USE OF INSULIN, UNSPECIFIED WHETHER STAGE 3A OR 3B CKD (HCC): Primary | ICD-10-CM

## 2025-07-25 RX ORDER — TIRZEPATIDE 12.5 MG/.5ML
12.5 INJECTION, SOLUTION SUBCUTANEOUS
Qty: 2 ML | Refills: 0 | Status: SHIPPED | OUTPATIENT
Start: 2025-07-25

## 2025-07-25 NOTE — TELEPHONE ENCOUNTER
Patient called the RX Refill Line. Message is being forwarded to the office.     Patient is requesting a refill for the next dose of Mounjaro 12.5 mg.     To be sent to [facility].     She took her last injection today 7/25/25

## 2025-08-04 DIAGNOSIS — E11.22 TYPE 2 DIABETES MELLITUS WITH STAGE 3B CHRONIC KIDNEY DISEASE, WITHOUT LONG-TERM CURRENT USE OF INSULIN (HCC): ICD-10-CM

## 2025-08-04 DIAGNOSIS — N18.32 TYPE 2 DIABETES MELLITUS WITH STAGE 3B CHRONIC KIDNEY DISEASE, WITHOUT LONG-TERM CURRENT USE OF INSULIN (HCC): ICD-10-CM

## 2025-08-04 DIAGNOSIS — E11.65 TYPE 2 DIABETES MELLITUS WITH HYPERGLYCEMIA, WITHOUT LONG-TERM CURRENT USE OF INSULIN (HCC): ICD-10-CM

## 2025-08-05 RX ORDER — DAPAGLIFLOZIN 10 MG/1
10 TABLET, FILM COATED ORAL
Qty: 90 TABLET | Refills: 1 | Status: SHIPPED | OUTPATIENT
Start: 2025-08-05

## 2025-08-05 RX ORDER — GLIPIZIDE 5 MG/1
5 TABLET, FILM COATED, EXTENDED RELEASE ORAL DAILY
Qty: 90 TABLET | Refills: 1 | Status: SHIPPED | OUTPATIENT
Start: 2025-08-05

## 2025-08-07 ENCOUNTER — APPOINTMENT (OUTPATIENT)
Dept: LAB | Facility: CLINIC | Age: 65
End: 2025-08-07
Payer: COMMERCIAL

## (undated) DEVICE — DRESSING SILON DUAL-DRESS 50 FOAM 5.5 X 6 IN

## (undated) DEVICE — ELECTRODE BLADE MOD  E-Z CLEAN 6.5IN -0014M

## (undated) DEVICE — 3M™ STERI-STRIP™ REINFORCED ADHESIVE SKIN CLOSURES, R1547, 1/2 IN X 4 IN (12 MM X 100 MM), 6 STRIPS/ENVELOPE: Brand: 3M™ STERI-STRIP™

## (undated) DEVICE — ULTRASOUND GEL STERILE FOIL PK

## (undated) DEVICE — VIAL DECANTER

## (undated) DEVICE — DISPOSABLE OR TOWEL: Brand: CARDINAL HEALTH

## (undated) DEVICE — TOWEL SURG XR DETECT GREEN STRL RFD

## (undated) DEVICE — SUT PDS II 2-0 CT-2 27 IN Z333H

## (undated) DEVICE — TONGUE DEPRESSOR STERILE

## (undated) DEVICE — SUT STRATAFIX SPIRAL 3-0 PGA/PCL 30 X 30 CM SXMD2B408

## (undated) DEVICE — MEDI-VAC YANKAUER SUCTION HANDLE W/BULBOUS AND CONTROL VENT: Brand: CARDINAL HEALTH

## (undated) DEVICE — NEEDLE 25G X 1 1/2

## (undated) DEVICE — 3000CC GUARDIAN II: Brand: GUARDIAN

## (undated) DEVICE — SUT PDS II 2-0 CT-1 27 IN Z259H

## (undated) DEVICE — INTENDED FOR TISSUE SEPARATION, AND OTHER PROCEDURES THAT REQUIRE A SHARP SURGICAL BLADE TO PUNCTURE OR CUT.: Brand: BARD-PARKER ® CARBON RIB-BACK BLADES

## (undated) DEVICE — SPONGE LAP 18 X 18 IN STRL RFD

## (undated) DEVICE — OCCLUSIVE GAUZE STRIP,3% BISMUTH TRIBROMOPHENATE IN PETROLATUM BLEND: Brand: XEROFORM

## (undated) DEVICE — SYRINGE BULB 2 OZ

## (undated) DEVICE — CHEST/BREAST DRAPE: Brand: CONVERTORS

## (undated) DEVICE — TRAY FOLEY 16FR URIMETER SURESTEP

## (undated) DEVICE — 3M™ TRANSPORE™ WHITE SURGICAL TAPE 1534-2, 2 INCH X 10 YARD (5CM X 9,1M), 6 ROLLS/CARTON 10 CARTONS/CASE: Brand: 3M™ TRANSPORE™

## (undated) DEVICE — BETHLEHEM UNIVERSAL LAPAROTOMY: Brand: CARDINAL HEALTH

## (undated) DEVICE — IMPERVIOUS STOCKINETTE: Brand: DEROYAL

## (undated) DEVICE — JACKSON-PRATT 100CC BULB RESERVOIR: Brand: CARDINAL HEALTH

## (undated) DEVICE — PAD GROUNDING ADULT

## (undated) DEVICE — DRAPE EQUIPMENT RF WAND

## (undated) DEVICE — IV SET INJECTION CARESITE VALVE

## (undated) DEVICE — HEAVY DRAINAGE PACK: Brand: CURITY

## (undated) DEVICE — TELFA NON-ADHERENT ABSORBENT DRESSING: Brand: TELFA

## (undated) DEVICE — TIBURON SPLIT SHEET: Brand: CONVERTORS

## (undated) DEVICE — SYRINGE 50ML LL

## (undated) DEVICE — BOWL: 16OZ PEELPOUCH 75/CS 16/PLT: Brand: MEDEGEN MEDICAL PRODUCTS, LLC

## (undated) DEVICE — SUT STRATAFIX SPIRAL 4-0 PGA/PCL 30 X 30 CM SXMD2B409

## (undated) DEVICE — GLOVE SRG BIOGEL 7

## (undated) DEVICE — SUT MONOCRYL 3-0 PS-2 27 IN Y427H

## (undated) DEVICE — SUT ETHILON 3-0 PS-1 18 IN 1663G

## (undated) DEVICE — CHLORAPREP HI-LITE 26ML ORANGE

## (undated) DEVICE — BULB SYRINGE,IRRIGATION WITH PROTECTIVE CAP: Brand: DOVER

## (undated) DEVICE — ASTOUND STANDARD SURGICAL GOWN, XL: Brand: CONVERTORS

## (undated) DEVICE — SUT VICRYL PLUS 2-0 CTB-1 27 IN VCPB259H

## (undated) DEVICE — DRAPE TOWEL: Brand: CONVERTORS

## (undated) DEVICE — SUT SILK 2-0 SH 30 IN K833H

## (undated) DEVICE — GLOVE SRG BIOGEL 6

## (undated) DEVICE — GAUZE SPONGES,16 PLY: Brand: CURITY

## (undated) DEVICE — FAN SPRAY KIT: Brand: PULSAVAC®

## (undated) DEVICE — SCD SEQUENTIAL COMPRESSION COMFORT SLEEVE MEDIUM KNEE LENGTH: Brand: KENDALL SCD

## (undated) DEVICE — DRAPE SHEET THREE QUARTER

## (undated) DEVICE — SPECIMEN CONTAINER STERILE PEEL PACK

## (undated) DEVICE — GAUZE SPONGES,USP TYPE VII GAUZE, 12 PLY: Brand: CURITY

## (undated) DEVICE — SYRINGE 10ML LL

## (undated) DEVICE — 3M™ TEGADERM™ TRANSPARENT FILM DRESSING FRAME STYLE, 1627, 4 IN X 10 IN (10 CM X 25 CM), 20/CT 4CT/CASE: Brand: 3M™ TEGADERM™

## (undated) DEVICE — SUT MONOCRYL 3-0 SH 27 IN Y416H

## (undated) DEVICE — BAG DECANTER

## (undated) DEVICE — SUT VICRYL 2-0 CT-1 36 IN J945H

## (undated) DEVICE — TUBING SUCTION 5MM X 12 FT

## (undated) DEVICE — PLUMEPEN PRO 10FT

## (undated) DEVICE — 2000CC GUARDIAN II: Brand: GUARDIAN

## (undated) DEVICE — SUT MONOCRYL 4-0 PS-2 27 IN Y426H

## (undated) DEVICE — SKIN MARKER DUAL TIP WITH RULER CAP, FLEXIBLE RULER AND LABELS: Brand: DEVON

## (undated) DEVICE — JP CHAN DRN SIL HUBLESS 15FR W/TRO: Brand: CARDINAL HEALTH

## (undated) DEVICE — SUT STRATAFIX SPIRAL MONOCRYL PLUS 4-0 PS-2 45CM SXMP1B118

## (undated) DEVICE — ELECTRODE EZ CLEAN BLADE -0012

## (undated) DEVICE — STOPCOCK 3-WAY

## (undated) DEVICE — BETHLEHEM UNIVERSAL OUTPATIENT: Brand: CARDINAL HEALTH

## (undated) DEVICE — GLOVE INDICATOR PI UNDERGLOVE SZ 6.5 BLUE

## (undated) DEVICE — LIGACLIP MCA MULTIPLE CLIP APPLIERS, 20 SMALL CLIPS: Brand: LIGACLIP

## (undated) DEVICE — DRAPE FLUID WARMER (BIRD BATH)

## (undated) DEVICE — SUT PDS II 3-0 RB-1 27 IN Z305H

## (undated) DEVICE — ELECTRODE BLADE E-Z CLEAN 6.5IN -0014

## (undated) DEVICE — DRAPE INTESTINAL ISOLATION BAG

## (undated) DEVICE — 3M™ TEGADERM™ TRANSPARENT FILM DRESSING FRAME STYLE, 1626W, 4 IN X 4-3/4 IN (10 CM X 12 CM), 50/CT 4CT/CASE: Brand: 3M™ TEGADERM™

## (undated) DEVICE — ADHESIVE SKIN HIGH VISCOSITY EXOFIN 1ML

## (undated) DEVICE — ELECTRODE BLADE MOD E-Z CLEAN  2.75IN 7CM -0012AM

## (undated) DEVICE — PROXIMATE SKIN STAPLERS (35 WIDE) CONTAINS 35 STAINLESS STEEL STAPLES (FIXED HEAD): Brand: PROXIMATE

## (undated) DEVICE — CAUTERY TIP POLISHER: Brand: DEVON

## (undated) DEVICE — IV CATH INTROCAN 18G X 1 1/4 SAFETY

## (undated) DEVICE — SUT VICRYL 3-0 SH 27 IN J416H

## (undated) DEVICE — INTENDED FOR TISSUE SEPARATION, AND OTHER PROCEDURES THAT REQUIRE A SHARP SURGICAL BLADE TO PUNCTURE OR CUT.: Brand: BARD-PARKER SAFETY BLADES SIZE 15, STERILE

## (undated) DEVICE — GLOVE SRG BIOGEL 6.5

## (undated) DEVICE — PENCIL ELECTROSURG E-Z CLEAN -0035H

## (undated) DEVICE — SYRINGE 10ML LL CONTROL TOP

## (undated) DEVICE — APPLICATOR SURG DUPLOSPRAY 20CM SNAPLOCK

## (undated) DEVICE — PROVE COVER: Brand: UNBRANDED

## (undated) DEVICE — BETHLEHEM UNIVERSAL MINOR GEN: Brand: CARDINAL HEALTH

## (undated) DEVICE — COBAN 4 IN STERILE

## (undated) DEVICE — MAYO STAND COVER: Brand: CONVERTORS